# Patient Record
Sex: MALE | Race: WHITE | Employment: OTHER | URBAN - METROPOLITAN AREA
[De-identification: names, ages, dates, MRNs, and addresses within clinical notes are randomized per-mention and may not be internally consistent; named-entity substitution may affect disease eponyms.]

---

## 2018-10-18 ENCOUNTER — OFFICE VISIT (OUTPATIENT)
Dept: INTERNAL MEDICINE CLINIC | Age: 18
End: 2018-10-18
Payer: MEDICARE

## 2018-10-18 ENCOUNTER — TELEPHONE (OUTPATIENT)
Dept: INTERNAL MEDICINE CLINIC | Age: 18
End: 2018-10-18

## 2018-10-18 VITALS — HEART RATE: 88 BPM | DIASTOLIC BLOOD PRESSURE: 88 MMHG | SYSTOLIC BLOOD PRESSURE: 136 MMHG

## 2018-10-18 DIAGNOSIS — Z23 NEED FOR INFLUENZA VACCINATION: ICD-10-CM

## 2018-10-18 DIAGNOSIS — G80.1 CEREBRAL PALSY WITH SPASTIC DIPLEGIA (HCC): ICD-10-CM

## 2018-10-18 DIAGNOSIS — R32 URINARY INCONTINENCE, UNSPECIFIED TYPE: ICD-10-CM

## 2018-10-18 DIAGNOSIS — E55.9 VITAMIN D DEFICIENCY: ICD-10-CM

## 2018-10-18 DIAGNOSIS — Z76.89 ENCOUNTER TO ESTABLISH CARE: Primary | ICD-10-CM

## 2018-10-18 PROCEDURE — 99203 OFFICE O/P NEW LOW 30 MIN: CPT | Performed by: NURSE PRACTITIONER

## 2018-10-18 PROCEDURE — G0008 ADMIN INFLUENZA VIRUS VAC: HCPCS | Performed by: NURSE PRACTITIONER

## 2018-10-18 PROCEDURE — 90682 RIV4 VACC RECOMBINANT DNA IM: CPT | Performed by: NURSE PRACTITIONER

## 2018-10-18 ASSESSMENT — PATIENT HEALTH QUESTIONNAIRE - PHQ9
SUM OF ALL RESPONSES TO PHQ QUESTIONS 1-9: 0
1. LITTLE INTEREST OR PLEASURE IN DOING THINGS: 0
SUM OF ALL RESPONSES TO PHQ QUESTIONS 1-9: 0
SUM OF ALL RESPONSES TO PHQ9 QUESTIONS 1 & 2: 0
2. FEELING DOWN, DEPRESSED OR HOPELESS: 0

## 2018-10-21 PROBLEM — R32 URINARY INCONTINENCE: Status: ACTIVE | Noted: 2018-10-21

## 2018-10-21 PROBLEM — E55.9 VITAMIN D DEFICIENCY: Status: ACTIVE | Noted: 2018-10-21

## 2018-10-21 PROBLEM — G80.1 CEREBRAL PALSY WITH SPASTIC DIPLEGIA (HCC): Status: ACTIVE | Noted: 2018-10-21

## 2018-10-21 ASSESSMENT — ENCOUNTER SYMPTOMS
GASTROINTESTINAL NEGATIVE: 1
RESPIRATORY NEGATIVE: 1

## 2018-10-21 NOTE — PROGRESS NOTES
SUBJECTIVE:    Patient ID: Jamila Calvert is a 19 y.o. male. CC: New patient appointment    HPI: The patient presents to the office to [address] a new primary care provider. Previous PCP was [facility]. He is also regular patient of [facility] for CP. Lifelong history of cerebral palsy. He is a patient of [facility]  He has chronic leg weakness, has been seen by physical therapy. He uses a wheelchair. He reports good arm strength. He has trouble getting to the restroom on time. This is particularly problematic at work and this is embarrassing for him. His mom inquires if condom catheters might be a reasonable choice. He is going to [employer] life. \"    According to his records, he also has a history of vitamin D deficiency. He is single. He does not smoke. He does not drink alcohol. He denies illicit drug use. He enjoys video games. Past Medical History:   Diagnosis Date    Cerebral palsy ([facility] [address]         No past surgical history on file. Family History   Problem Relation Age of Onset    High Blood Pressure Mother     Diabetes Mother    [de-identified] Arthritis Mother         RA    Substance Abuse Father        Social History     Social History    Marital status: Single     Spouse name: N/A    Number of children: N/A    Years of education: N/A     Occupational History    Not on file. Social History Main Topics    Smoking status: Never Smoker    Smokeless tobacco: Never Used    Alcohol use No    Drug use: No    Sexual activity: Not on file     Other Topics Concern    Not on file     Social History Narrative    No narrative on file       No current outpatient prescriptions on file prior to visit. No current facility-administered medications on file prior to visit. No Known Allergies        Review of Systems   Constitutional: Negative for chills and fever. Respiratory: Negative. Cardiovascular: Negative.     Gastrointestinal: Negative. Genitourinary: Negative. Urinary incontinence   Skin: Negative. Neurological: Positive for weakness. Psychiatric/Behavioral: Negative. All other systems reviewed and are negative. OBJECTIVE:  Physical Exam   Constitutional: He is oriented to person, place, and time. He appears well-developed and well-nourished. No distress. HENT:   Head: Normocephalic and atraumatic. Eyes: Conjunctivae are normal. No scleral icterus. Neck: Neck supple. No JVD present. Cardiovascular: Normal rate and regular rhythm. Pulmonary/Chest: Effort normal and breath sounds normal. No respiratory distress. He has no wheezes. He has no rales. Abdominal: Soft. He exhibits no distension. There is no tenderness. There is no rebound and no guarding. Musculoskeletal: Normal range of motion. He exhibits no edema. Sitting in a wheelchair. Moving his arms   Neurological: He is alert and oriented to person, place, and time. Skin: Skin is warm and dry. He is not diaphoretic. Psychiatric: He has a normal mood and affect. His behavior is normal. Thought content normal.      /88   Pulse 88        PHQ Scores 10/18/2018   PHQ2 Score 0   PHQ9 Score 0     Interpretation of Total Score DepressionSeverity: 1-4 = Minimal depression, 5-9 = Mild depression, 10-14 = Moderate depression, 15-19 = Moderately severe depression, 20-27 = Severe depression         ASSESSMENT/PLAN:  Kishore Gongora was seen today for established new doctor. Diagnoses and all orders for this visit:    Encounter to establish care  - H&P reviewed and scanned into EMR  - Oriented to provider in practice  [facility] records reviewed via EMR  - We will attempt to obtain pediatric records    Cerebral palsy with spastic diplegia ([facility] [address]  - Chronic CP. He has bilateral leg weakness. Normal arm strength. He uses a wheelchair.  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 Urinary incontinence is problematic.   He has trouble

## 2020-02-25 ENCOUNTER — OFFICE VISIT (OUTPATIENT)
Dept: INTERNAL MEDICINE CLINIC | Age: 20
End: 2020-02-25
Payer: MEDICARE

## 2020-02-25 VITALS — HEART RATE: 64 BPM | DIASTOLIC BLOOD PRESSURE: 60 MMHG | SYSTOLIC BLOOD PRESSURE: 100 MMHG

## 2020-02-25 PROCEDURE — G8484 FLU IMMUNIZE NO ADMIN: HCPCS | Performed by: INTERNAL MEDICINE

## 2020-02-25 PROCEDURE — 99213 OFFICE O/P EST LOW 20 MIN: CPT | Performed by: INTERNAL MEDICINE

## 2020-02-25 PROCEDURE — G8421 BMI NOT CALCULATED: HCPCS | Performed by: INTERNAL MEDICINE

## 2020-02-25 PROCEDURE — 20550 NJX 1 TENDON SHEATH/LIGAMENT: CPT | Performed by: INTERNAL MEDICINE

## 2020-02-25 PROCEDURE — 1036F TOBACCO NON-USER: CPT | Performed by: INTERNAL MEDICINE

## 2020-02-25 PROCEDURE — G8427 DOCREV CUR MEDS BY ELIG CLIN: HCPCS | Performed by: INTERNAL MEDICINE

## 2020-02-25 RX ORDER — TRIAMTERENE AND HYDROCHLOROTHIAZIDE 37.5; 25 MG/1; MG/1
1 TABLET ORAL DAILY
Qty: 30 TABLET | Refills: 3 | Status: SHIPPED | OUTPATIENT
Start: 2020-02-25 | End: 2021-02-05 | Stop reason: ALTCHOICE

## 2020-02-26 NOTE — PROGRESS NOTES
Chief complaint foot pain    Present illness    The patient is here for an acute appointment    He chronically wears leg braces, he is developed acute pain over the left calcaneus. Its onset was several months ago, is becoming worse and he is having a difficult time wearing his braces. He is accompanied by his mother. In addition he complains of swelling in his feet bilaterally which is a long-term problem. Physical examination  Bilateral pitting edema of the feet  Lungs clear  Cardiac exam normal  No thyromegaly  Does have a point tenderness over the calcaneus    Impression  Edema of the feet secondary to inactivity  Plantar fasciitis    Plan  Treatment strategies discussed with the patient and his mother, after discussion of risk and benefit of injection, the severity of his problem the fact that he cannot wear his braces it was elected to treat him with injection. 30 mg of triamcinolone and 1 cc of 2% lidocaine was injected into the calcaneal area    He was given a therapeutic trial of hydrochlorothiazide for his chronic peripheral edema.

## 2021-02-03 ENCOUNTER — TELEPHONE (OUTPATIENT)
Dept: INTERNAL MEDICINE CLINIC | Age: 21
End: 2021-02-03

## 2021-02-03 NOTE — TELEPHONE ENCOUNTER
Pt's mother calling and states pt woke up this am with weakness on L side of face. Inability to smile on that side and eye not blinking. Pt's mother not able to provide or obtain good testing for weakness. Advised that he needs to be taken directly to the ER and I would send a message to PCP.

## 2021-02-05 ENCOUNTER — OFFICE VISIT (OUTPATIENT)
Dept: INTERNAL MEDICINE CLINIC | Age: 21
End: 2021-02-05
Payer: MEDICARE

## 2021-02-05 VITALS
SYSTOLIC BLOOD PRESSURE: 150 MMHG | TEMPERATURE: 97.9 F | DIASTOLIC BLOOD PRESSURE: 76 MMHG | OXYGEN SATURATION: 98 % | HEART RATE: 100 BPM

## 2021-02-05 DIAGNOSIS — I10 ESSENTIAL HYPERTENSION: ICD-10-CM

## 2021-02-05 DIAGNOSIS — G51.0 BELL'S PALSY: Primary | ICD-10-CM

## 2021-02-05 PROCEDURE — G8421 BMI NOT CALCULATED: HCPCS | Performed by: NURSE PRACTITIONER

## 2021-02-05 PROCEDURE — G8484 FLU IMMUNIZE NO ADMIN: HCPCS | Performed by: NURSE PRACTITIONER

## 2021-02-05 PROCEDURE — 99213 OFFICE O/P EST LOW 20 MIN: CPT | Performed by: NURSE PRACTITIONER

## 2021-02-05 PROCEDURE — G8427 DOCREV CUR MEDS BY ELIG CLIN: HCPCS | Performed by: NURSE PRACTITIONER

## 2021-02-05 PROCEDURE — 1036F TOBACCO NON-USER: CPT | Performed by: NURSE PRACTITIONER

## 2021-02-05 RX ORDER — LISINOPRIL 10 MG/1
10 TABLET ORAL DAILY
COMMUNITY
Start: 2020-12-20 | End: 2021-02-19 | Stop reason: SDUPTHER

## 2021-02-05 RX ORDER — HYDROCHLOROTHIAZIDE 25 MG/1
25 TABLET ORAL DAILY
Qty: 30 TABLET | Refills: 3 | Status: SHIPPED | OUTPATIENT
Start: 2021-02-05 | End: 2021-02-08

## 2021-02-05 RX ORDER — PREDNISONE 10 MG/1
TABLET ORAL
COMMUNITY
Start: 2021-02-03 | End: 2021-02-15

## 2021-02-05 RX ORDER — ACYCLOVIR 800 MG/1
800 TABLET ORAL
COMMUNITY
Start: 2021-02-03 | End: 2021-02-19 | Stop reason: ALTCHOICE

## 2021-02-09 RX ORDER — HYDROCHLOROTHIAZIDE 25 MG/1
25 TABLET ORAL DAILY
Qty: 90 TABLET | Refills: 3 | Status: SHIPPED | OUTPATIENT
Start: 2021-02-09 | End: 2021-06-17

## 2021-02-11 ASSESSMENT — ENCOUNTER SYMPTOMS
TROUBLE SWALLOWING: 0
GASTROINTESTINAL NEGATIVE: 1
RESPIRATORY NEGATIVE: 1

## 2021-02-11 NOTE — PROGRESS NOTES
SUBJECTIVE:    Patient ID: Royce David is a 23 y.o. male. CC: Bell's palsy    HPI: The patient presents to the office today accompanied by his mother for emergency department follow-up. Much of history comes from mother due to the patient's underlying cerebral palsy and intellectual debility. Mother reports patient was in his normal state of health on 2/3/2021 when he awakened with left-sided facial droop and inability to close his left eye. Patient contacted the call center who recommended he go to the emergency department for emergent evaluation. When I saw the message, I advised that this is likely Bell's palsy but as the patient was already at the emergency department his evaluation had already commenced. He was seen at [facility] emergency department where a CT of the head showed no evidence of acute intracranial hemorrhage, mass, or ischemic infarct. He was diagnosed with left-sided Bell's palsy and given prescriptions for antiviral, eye lubricant, and prednisone. Today, mother reports symptoms are still present but have improved some. They report compliance with medication regimen. Past Medical History:   Diagnosis Date    Borderline intellectual functioning     Cerebral palsy (HCC)     Obesity     Vitamin D deficiency     Wheelchair bound         Current Outpatient Medications   Medication Sig Dispense Refill    lisinopril (PRINIVIL;ZESTRIL) 10 MG tablet Take 10 mg by mouth daily      acyclovir (ZOVIRAX) 800 MG tablet Take 800 mg by mouth 5 times daily      predniSONE (DELTASONE) 10 MG tablet Take by mouth      hydroCHLOROthiazide (HYDRODIURIL) 25 MG tablet TAKE 1 TABLET BY MOUTH DAILY 90 tablet 3    Cholecalciferol (VITAMIN D3) 1000 units CAPS   5    Catheters (GIZMO CONDOM CATHETER) MISC 100 each by Does not apply route as needed (Urinary incontinence) 100 each 5     No current facility-administered medications for this visit.             Review of Systems Constitutional: Negative. HENT: Negative for trouble swallowing. Respiratory: Negative. Cardiovascular: Negative. Gastrointestinal: Negative. Genitourinary: Negative. Musculoskeletal: Negative. Neurological: Positive for facial asymmetry. Negative for numbness and headaches. All other systems reviewed and are negative. OBJECTIVE:  Physical Exam  Constitutional:       General: He is not in acute distress. Appearance: He is well-developed. He is not diaphoretic. HENT:      Head: Normocephalic and atraumatic. Eyes:      General: No scleral icterus. Conjunctiva/sclera: Conjunctivae normal.   Neck:      Musculoskeletal: Neck supple. Vascular: No JVD. Cardiovascular:      Rate and Rhythm: Normal rate and regular rhythm. Pulmonary:      Effort: Pulmonary effort is normal. No respiratory distress. Breath sounds: Normal breath sounds. No wheezing or rales. Abdominal:      General: There is no distension. Palpations: Abdomen is soft. Tenderness: There is no abdominal tenderness. There is no guarding or rebound. Musculoskeletal: Normal range of motion. Comments: Sitting in a wheelchair. Moving his arms   Skin:     General: Skin is warm and dry. Neurological:      Mental Status: He is alert. Cranial Nerves: Facial asymmetry present. Comments: Facial asymmetry with left-sided facial droop consistent with his diagnosis of Bell's palsy   Psychiatric:         Behavior: Behavior normal.         Thought Content:  Thought content normal.        BP (!) 150/76 (Site: Right Upper Arm, Cuff Size: Large Adult)   Pulse 100   Temp 97.9 °F (36.6 °C)   SpO2 98%      PHQ Scores 10/18/2018   PHQ2 Score 0   PHQ9 Score 0     Interpretation of Total Score Depression Severity: 1-4 = Minimal depression, 5-9 = Mild depression, 10-14 = Moderate depression, 15-19 = Moderately severe depression, 20-27 =Severe depression        ASSESSMENT/PLAN:  Reece Iyer was seen today for follow-up from hospital and hypertension. Diagnoses and all orders for this visit:    Bell's palsy  -Symptoms consistent with Bell's palsy. CT of the head was negative for acute finding.  -He is on appropriate treatment for Bell's palsy per ER  -Recommended he continue current regimen. We discussed the risks of eyelid not closing and safety measures to protect the eye. Essential hypertension  -     hydroCHLOROthiazide (HYDRODIURIL) 25 MG tablet;  Take 1 tablet by mouth daily      Kacy Bush, LEVON - CNP

## 2021-02-19 ENCOUNTER — OFFICE VISIT (OUTPATIENT)
Dept: INTERNAL MEDICINE CLINIC | Age: 21
End: 2021-02-19
Payer: MEDICARE

## 2021-02-19 VITALS
TEMPERATURE: 98.4 F | SYSTOLIC BLOOD PRESSURE: 138 MMHG | HEART RATE: 100 BPM | DIASTOLIC BLOOD PRESSURE: 70 MMHG | WEIGHT: 230 LBS | OXYGEN SATURATION: 99 %

## 2021-02-19 DIAGNOSIS — R73.9 HYPERGLYCEMIA: ICD-10-CM

## 2021-02-19 DIAGNOSIS — Z11.59 NEED FOR HEPATITIS C SCREENING TEST: ICD-10-CM

## 2021-02-19 DIAGNOSIS — E55.9 VITAMIN D DEFICIENCY: ICD-10-CM

## 2021-02-19 DIAGNOSIS — G51.0 BELL'S PALSY: ICD-10-CM

## 2021-02-19 DIAGNOSIS — Z11.4 SCREENING FOR HIV (HUMAN IMMUNODEFICIENCY VIRUS): ICD-10-CM

## 2021-02-19 DIAGNOSIS — G80.1 CEREBRAL PALSY WITH SPASTIC DIPLEGIA (HCC): ICD-10-CM

## 2021-02-19 DIAGNOSIS — I10 ESSENTIAL HYPERTENSION: ICD-10-CM

## 2021-02-19 DIAGNOSIS — I10 ESSENTIAL HYPERTENSION: Primary | ICD-10-CM

## 2021-02-19 DIAGNOSIS — Z23 NEED FOR TDAP VACCINATION: ICD-10-CM

## 2021-02-19 LAB
A/G RATIO: 1.5 (ref 1.1–2.2)
ALBUMIN SERPL-MCNC: 4.4 G/DL (ref 3.4–5)
ALP BLD-CCNC: 100 U/L (ref 40–129)
ALT SERPL-CCNC: 48 U/L (ref 10–40)
ANION GAP SERPL CALCULATED.3IONS-SCNC: 10 MMOL/L (ref 3–16)
AST SERPL-CCNC: 20 U/L (ref 15–37)
BILIRUB SERPL-MCNC: 0.3 MG/DL (ref 0–1)
BUN BLDV-MCNC: 15 MG/DL (ref 7–20)
CALCIUM SERPL-MCNC: 10.1 MG/DL (ref 8.3–10.6)
CHLORIDE BLD-SCNC: 100 MMOL/L (ref 99–110)
CHOLESTEROL, TOTAL: 188 MG/DL (ref 0–199)
CO2: 27 MMOL/L (ref 21–32)
CREAT SERPL-MCNC: 0.8 MG/DL (ref 0.9–1.3)
GFR AFRICAN AMERICAN: >60
GFR NON-AFRICAN AMERICAN: >60
GLOBULIN: 2.9 G/DL
GLUCOSE BLD-MCNC: 99 MG/DL (ref 70–99)
HDLC SERPL-MCNC: 39 MG/DL (ref 40–60)
HEPATITIS C ANTIBODY INTERPRETATION: NORMAL
LDL CHOLESTEROL CALCULATED: 100 MG/DL
POTASSIUM SERPL-SCNC: 3.9 MMOL/L (ref 3.5–5.1)
SODIUM BLD-SCNC: 137 MMOL/L (ref 136–145)
TOTAL PROTEIN: 7.3 G/DL (ref 6.4–8.2)
TRIGL SERPL-MCNC: 247 MG/DL (ref 0–150)
VITAMIN D 25-HYDROXY: 24.1 NG/ML
VLDLC SERPL CALC-MCNC: 49 MG/DL

## 2021-02-19 PROCEDURE — G8484 FLU IMMUNIZE NO ADMIN: HCPCS | Performed by: NURSE PRACTITIONER

## 2021-02-19 PROCEDURE — 90715 TDAP VACCINE 7 YRS/> IM: CPT | Performed by: NURSE PRACTITIONER

## 2021-02-19 PROCEDURE — 90471 IMMUNIZATION ADMIN: CPT | Performed by: NURSE PRACTITIONER

## 2021-02-19 PROCEDURE — 1036F TOBACCO NON-USER: CPT | Performed by: NURSE PRACTITIONER

## 2021-02-19 PROCEDURE — 99214 OFFICE O/P EST MOD 30 MIN: CPT | Performed by: NURSE PRACTITIONER

## 2021-02-19 PROCEDURE — G8421 BMI NOT CALCULATED: HCPCS | Performed by: NURSE PRACTITIONER

## 2021-02-19 PROCEDURE — G8427 DOCREV CUR MEDS BY ELIG CLIN: HCPCS | Performed by: NURSE PRACTITIONER

## 2021-02-19 RX ORDER — LISINOPRIL 10 MG/1
10 TABLET ORAL DAILY
Qty: 90 TABLET | Refills: 3 | Status: SHIPPED | OUTPATIENT
Start: 2021-02-19 | End: 2022-02-25

## 2021-02-19 ASSESSMENT — PATIENT HEALTH QUESTIONNAIRE - PHQ9
1. LITTLE INTEREST OR PLEASURE IN DOING THINGS: 0
SUM OF ALL RESPONSES TO PHQ QUESTIONS 1-9: 0
SUM OF ALL RESPONSES TO PHQ9 QUESTIONS 1 & 2: 0
SUM OF ALL RESPONSES TO PHQ QUESTIONS 1-9: 0
SUM OF ALL RESPONSES TO PHQ QUESTIONS 1-9: 0

## 2021-02-20 LAB
ESTIMATED AVERAGE GLUCOSE: 91.1 MG/DL
HBA1C MFR BLD: 4.8 %
HIV AG/AB: NORMAL
HIV ANTIGEN: NORMAL
HIV-1 ANTIBODY: NORMAL
HIV-2 AB: NORMAL

## 2021-02-22 ENCOUNTER — TELEPHONE (OUTPATIENT)
Dept: INTERNAL MEDICINE CLINIC | Age: 21
End: 2021-02-22

## 2021-02-23 ASSESSMENT — ENCOUNTER SYMPTOMS
RESPIRATORY NEGATIVE: 1
TROUBLE SWALLOWING: 0
EYE PAIN: 0
GASTROINTESTINAL NEGATIVE: 1

## 2021-02-23 NOTE — PROGRESS NOTES
SUBJECTIVE:    Patient ID: Radha Walker is a 23 y.o. male. CC: Bell's palsy, hypertension, cerebral palsy, vitamin D deficiency    HPI: The patient presents to the office today for follow-up of Bell's palsy and follow-up of chronic medical conditions as well as to establish with a new primary care provider after his retired. He is seen today accompanied by his grandmother. Patient was recently diagnosed with Bell's palsy. He reports this has been improving. His eye is closing more and smile is returning to normal.  There have been no identified complications. He has a history of cerebral palsy and is wheelchair dependent. Grandmother is frustrated by his lack of effort to ambulate or take care of himself. Patient is agreeable to physical therapy referral.    He has a history of hypertension. He denies any chest pain or shortness of breath. He has a history of vitamin D deficiency. He has not been compliant with repletion. Past Medical History:   Diagnosis Date    Borderline intellectual functioning     Cerebral palsy (HCC)     Obesity     Vitamin D deficiency     Wheelchair bound         Current Outpatient Medications   Medication Sig Dispense Refill    Cholecalciferol (VITAMIN D3) 25 MCG (1000 UT) CAPS Take 1,000 capsules by mouth daily 90 capsule 3    lisinopril (PRINIVIL;ZESTRIL) 10 MG tablet Take 1 tablet by mouth daily 90 tablet 3    hydroCHLOROthiazide (HYDRODIURIL) 25 MG tablet TAKE 1 TABLET BY MOUTH DAILY 90 tablet 3    Catheters (GIZMO CONDOM CATHETER) MISC 100 each by Does not apply route as needed (Urinary incontinence) 100 each 5     No current facility-administered medications for this visit. Review of Systems   Constitutional: Negative. HENT: Negative for trouble swallowing. Eyes: Negative for pain. Respiratory: Negative. Cardiovascular: Negative. Gastrointestinal: Negative. Genitourinary: Negative.     Musculoskeletal: Positive for gait problem. Neurological: Positive for facial asymmetry and weakness. Negative for numbness and headaches. All other systems reviewed and are negative. OBJECTIVE:  Physical Exam  Constitutional:       General: He is not in acute distress. Appearance: He is well-developed. He is obese. He is not diaphoretic. Comments: Young gentleman with cerebral palsy sitting in a wheelchair. HENT:      Head: Normocephalic and atraumatic. Eyes:      General: No scleral icterus. Conjunctiva/sclera: Conjunctivae normal.   Neck:      Musculoskeletal: Neck supple. Vascular: No JVD. Cardiovascular:      Rate and Rhythm: Normal rate and regular rhythm. Pulmonary:      Effort: Pulmonary effort is normal. No respiratory distress. Breath sounds: Normal breath sounds. No wheezing or rales. Abdominal:      General: There is no distension. Palpations: Abdomen is soft. Tenderness: There is no abdominal tenderness. There is no guarding or rebound. Musculoskeletal: Normal range of motion. Comments: Sitting in a wheelchair. Moving his arms   Skin:     General: Skin is warm and dry. Neurological:      Mental Status: He is alert. Cranial Nerves: Facial asymmetry present. Comments: Facial asymmetry with left-sided facial droop consistent with his diagnosis of Bell's palsy   Psychiatric:         Behavior: Behavior normal.         Thought Content: Thought content normal.        /70   Pulse 100   Temp 98.4 °F (36.9 °C)   Wt 230 lb (104.3 kg) Comment: pt reported  SpO2 99%      PHQ Scores 2/19/2021 10/18/2018   PHQ2 Score 0 0   PHQ9 Score 0 0     Interpretation of Total Score Depression Severity: 1-4 = Minimal depression, 5-9 = Mild depression, 10-14 = Moderate depression, 15-19 = Moderately severe depression, 20-27 =Severe depression        ASSESSMENT/PLAN:  Isha Olson was seen today for other.   Diagnoses and all orders for this visit:    Essential hypertension  -

## 2021-03-01 ENCOUNTER — HOSPITAL ENCOUNTER (OUTPATIENT)
Dept: OCCUPATIONAL THERAPY | Age: 21
Setting detail: THERAPIES SERIES
Discharge: HOME | End: 2021-03-01
Payer: MEDICARE

## 2021-03-01 ENCOUNTER — HOSPITAL ENCOUNTER (OUTPATIENT)
Dept: PHYSICAL THERAPY | Age: 21
Setting detail: THERAPIES SERIES
Discharge: HOME | End: 2021-03-01
Payer: MEDICARE

## 2021-03-01 PROCEDURE — 97535 SELF CARE MNGMENT TRAINING: CPT

## 2021-03-01 PROCEDURE — 97112 NEUROMUSCULAR REEDUCATION: CPT

## 2021-03-01 PROCEDURE — 97166 OT EVAL MOD COMPLEX 45 MIN: CPT

## 2021-03-01 PROCEDURE — 97530 THERAPEUTIC ACTIVITIES: CPT

## 2021-03-01 PROCEDURE — 97110 THERAPEUTIC EXERCISES: CPT

## 2021-03-01 PROCEDURE — 97161 PT EVAL LOW COMPLEX 20 MIN: CPT

## 2021-03-01 NOTE — FLOWSHEET NOTE
[facility] - Outpatient Physical Therapy  Phone: [phone]   Fax: [phone]    Physical Therapy Daily Treatment Note  Date:  3/2/2021    Patient Name:  Deondre Melendez    :  2000  MRN: [id number]  Medical/Treatment Diagnosis Information:  Diagnosis: Cerebral palsy with spastic diplegia  Treatment Diagnosis: Decreased trunk control impacting ability to complete safe transfers, decreased standing tolerance, LE weakness  Insurance/Certification information:  PT Insurance Information: Medicare & Medicaid  Physician Information:  Referring Practitioner: JOHNNY Zapata  Plan of care signed (Y/N): []  Yes []  No     Date of Patient follow up with Physician:      Progress Report: []  Yes  []  No     Date Range for reporting period:  Beginning  3/1/2021   Ending      Progress report due (10 Rx/or 30 days whichever is less): visit #10 or 06     Recertification due (POC duration/ or 90 days whichever is less): visit #12 or 21     Visit # Insurance Allowable Auth required?  Date Range    Medical necessity []  Yes  [x]  No n/a     Latex Allergy:  [x]NO      []YES  Preferred Language for Healthcare:   [x]English       []Other:      Functional Scale/Test Evaluation 3/1/2021 30 day  60 day  Discharge   STREAM 23                          Pain level:  0/10  Location:  none    SUBJECTIVE:  See eval    OBJECTIVE: See eval      RESTRICTIONS/PRECAUTIONS: recent Bell's Palsy    Interventions/Exercises:   Therapeutic Exercises (98371) Resistance / level Sets/sec Reps Notes                               Neuromuscular Re-ed (67100) /  Gait Training (73167)                                                               Therapeutic Activities (26690) /  Functional Tasks       Cone reaching across midline in seated  2 X 5 B Min-Mod A to maintain trunk control           STS to std walker    Min A x 2, PT and OT blocking knees and L foot          Transfer to w/c from mat table Mod A for set up, CGA for completion with poor form, decreased safety                               Manual Intervention (10150)                                                     Modalities:     Pt. Education:  -patient educated on diagnosis, prognosis and expectations for rehab  -all patient questions were answered    HEP instruction:      NMR and Therapeutic Activities:    [x] (99160 or 43406) Provided verbal/tactile cueing for activities related to improving balance, coordination, kinesthetic sense, posture, motor skill, proprioception and motor activation to allow for proper function of   [x] LE / Core, hip and LE with self care and ADLs  [x] UE / Shoulder complex: cervical, postural, scapular, scapulothoracic and UE control with self care, carrying, lifting, driving, computer work.   [] (77482) Gait Re-education- Provided training and instruction to the patient for proper LE, core and hip recruitment, positioning, and eccentric body weight control with ambulation re-education, including ascending & descending stairs     Home Management Training / Self Care:  [] (44495) Provided self-care/home management training related to activities of daily living and compensatory training, and/or use of adaptive equipment for improvement with: ADLs and compensatory training, meal preparation, safety procedures and instruction in use of adaptive equipment, including bathing, grooming, dressing, personal hygiene, basic household cleaning and chores. Therapeutic Exercise and NMR EXR  [x] (06327) Provided verbal/tactile cueing for activities related to strengthening, flexibility, endurance, ROM for improvements in  [x] LE / Core stability: LE, hip, and core control with self care, mobility, lifting, ambulation.   [] UE / Shoulder complex: cervical, postural, scapular, scapulothoracic and UE control with self care, reaching, carrying, lifting, house/yardwork, driving, computer work.  [] (37213) Provided verbal/tactile cueing for activities related to improving balance, coordination, kinesthetic sense, posture, motor skill, proprioception to assist with   [] LE / Core stability: LE, hip, and core control in self care, mobility, lifting, ambulation and eccentric single leg control. [] UE / Shoulder complex: cervical, scapular, scapulothoracic and UE control with self care, reaching, carrying, lifting, house/yardwork, driving, computer work.   [] (66874) Therapist is in constant attendance of 2 or more patients providing skilled therapy interventions, but not providing any significant amount of measurable one-on-one time to either patient, for improvements in  [] LE / Core stability: LE, hip, and core control in self care, mobility, lifting, ambulation and eccentric single leg control. [] UE / Shoulder complex: cervical, scapular, scapulothoracic and UE control with self care, reaching, carrying, lifting, house/yardwork, driving, computer work.      Home Exercise Program:    [x] (88641) Reviewed/Progressed HEP activities related to strengthening, flexibility, endurance, ROM of   [] LE / Core stability: core, hip and LE for functional self-care, mobility, lifting and ambulation/stair navigation   [] UE / Shoulder complex: cervical, postural, scapular, scapulothoracic and UE control with self care, reaching, carrying, lifting, house/yardwork, driving, computer work  [] (97046)Reviewed/Progressed HEP activities related to improving balance, coordination, kinesthetic sense, posture, motor skill, proprioception of   [] LE: core, hip and LE for self care, mobility, lifting, and ambulation/stair navigation    [] UE / Shoulder complex: cervical, postural,  scapular, scapulothoracic and UE control with self care, reaching, carrying, lifting, house/yardwork, driving, computer work    Manual Treatments:  PROM / STM / Oscillations-Mobs:  G-I, II, III, IV (PA's, Inf., Post.)  [] (30566) Provided manual therapy to mobilize shoulder complex, hip, LE, and/or cervicothoracic/LS spine soft tissue/joints for the purpose of modulating pain, promoting relaxation,  increasing ROM, reducing/eliminating soft tissue swelling/inflammation/restriction, improving soft tissue extensibility and allowing for proper ROM for normal function with   [] LE / Core stability: self care, mobility, lifting and ambulation. [] UE / Shoulder complex: self care, reaching, carrying, lifting, house/yardwork, driving, computer work. Modalities:  [] (15100) Vasopneumatic compression: Utilized vasopneumatic compression to decrease edema / swelling for the purpose of improving mobility and quad tone / recruitment which will allow for increased overall function including but not limited to self-care, transfers, ambulation, and ascending / descending stairs. Charges:  Timed Code Treatment Minutes: 30   Total Treatment Minutes: 50     [x] EVAL - LOW (26268)   [] EVAL - MOD (77718)  [] EVAL - HIGH (51562)  [] RE-EVAL (71695)  [x] TE (58186) x   1   [] Ionto  [] NMR (57035) x      [] Vaso  [] Manual (08943) x      [] Ultrasound  [x] TA x  1     [] Mech Traction (48748)  [] Gait Training x     [] ES (un) (72632):   [] Aquatic therapy x   [] Other:   [] Group:     GOALS:   Patient stated goal: improve ability to complete transfers   []? Progressing: []? Met: []? Not Met: []? Adjusted     Therapist goals for Patient:   Short Term Goals: To be achieved in: 2 weeks  1. Independent in HEP and progression per patient tolerance, in order to prevent re-injury. []? Progressing: []? Met: []? Not Met: []? Adjusted  2. Patient will have a decrease in pain to facilitate improvement in movement, function, and ADLs as indicated by Functional Deficits. []? Progressing: []? Met: []? Not Met: []? Adjusted     Long Term Goals: To be achieved in: 6 weeks  1. Patient will report increased standing tolerance to at least 30 minutes in standing frame. []? Progressing: []? Met: []? Not Met: []? Adjusted  2. Patient will demonstrate an increase in strength to good shoulder & hip complex, and core activation to allow for proper functional mobility as indicated by patients Functional Deficits. []? Progressing: []? Met: []? Not Met: []? Adjusted  3. Patient will return to functional activities including demo'ing safe transfers to/from w/c with mod I.    []? Progressing: []? Met: []? Not Met: []? Adjusted  4. Patient will increase STREAM score to 32 or above for increased UE/LE movement in sitting, supine, and standing. []? Progressing: []? Met: []? Not Met: []? Adjusted          Overall Progression Towards Functional goals/ Treatment Progress Update:  [] Patient is progressing as expected towards functional goals listed. [] Progression is slowed due to complexities/Impairments listed. [] Progression has been slowed due to co-morbidities. [x] Plan just implemented, too soon to assess goals progression <30days   [] Goals require adjustment due to lack of progress  [] Patient is not progressing as expected and requires additional follow up with physician  [] Other    Persisting Functional Limitations/Impairments:  []Sleeping [x]Sitting               []Standing [x]Transfers        []Walking []Kneeling               []Stairs []Squatting / bending   [x]ADLs []Reaching  []Lifting  []Housework  []Driving []Job related tasks  []Sports/Recreation []Other:        ASSESSMENT:  See eval    Treatment/Activity Tolerance:  [x] Patient able to complete tx [] Patient limited by fatigue  [] Patient limited by pain  [] Patient limited by other medical complications  [] Other:     Prognosis: [] Good [] Fair  [] Poor    Patient Requires Follow-up: [x] Yes  [] No    Plan for next treatment session: transfers, seated core strength, will plan to co-treat with OT when possible    PLAN: See eval. PT 2x / week for 6 weeks.    [] Continue per plan of care [] Alter current plan (see comments)  [x] Plan of care initiated [] Hold pending MD visit [] Discharge    Electronically signed by: Dawood Lebron PT, DPT    Note: If patient does not return for scheduled/ recommended follow up visits, his note will serve as a discharge from care along with most recent update on progress.

## 2021-03-01 NOTE — PROGRESS NOTES
[address] 69, [address] [facility] [address]  Phone: [phone]   Fax: [phone]                                                     Occupational Therapy Certification    Dear Truett Hammans  ,    We had the pleasure of evaluating the following patient for occupational therapy services at [facility]. A summary of our findings can be found in the initial assessment below. This includes our plan of care. If you have any questions or concerns regarding these findings, please do not hesitate to contact me at the office phone number checked above. Thank you for the referral.       Referring Practitioner Signature:_______________________________Date:__________________  By signing above (or electronic signature), therapists plan is approved by the referring practicioner      Patient: Arturo Horton   : 2000   MRN: [id number]  Referring Practicioner:       Evaluation Date: 3/1/2021      Medical Diagnosis Information:     Cerebral palsey , status post rhizotomy as a child, paraplegia                                              Insurance information:   medicare/ medicaide    Precautions/ Contra-indications:    Latex Allergy:  []NO      []YES  Preferred Language for Healthcare:   []English       []Other:    C-SSRS Triggered by Intake questionnaire (Past 2 wk assessment ):   [x] No, Questionnaire did not trigger screening.   [] Yes, Patient intake triggered C-SSRS Screening     [] Completed, no further action required. [] Completed, PCP notified via [place]  Reason for Seeking OT Services and Patient Goals:  Improve strength, rom, mobility, has a stander at home, lives with grandmother,  Patient would like to be able to get in and out of Pamela He,   Personal Interests and Values:  Loves to sing,   Loves to bowl, go out to eat.    Patient does not have accessible Pamela He can only go out if a man comes to help  his mother  get him in and out of [place]. 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 He has not participated in any community based activity since [facility] because his mother is high risk. Performance Patterns (Routines, Roles, Rituals, & Habits): 70year old mother with COPD has to assist patient with all self care, bathing, dressing and toileting , has accessible bathroom with accessible shower, shower seat, hand held shower hose. Physical and Social Environments (which aide or inhibit performance in desired occupations): Ramped entrance, shower chair, grab bars, wheelchair. Patient is wheelchair bound. He uses a wheelchair for all of his mobility. He has taken one arm rest off because the chair is too narrow for him. He has removed the comfort cushion because he feels like it interferes with his transfers. Patient does have a stander that he has not used in the past 6 months due to pain in lower extremity, he was trying to build up time to 90 minutes but has not been able to progress to this duration due to fatigue and leg pain. Personal, Temporal, and Virtual Contexts (which aide or inhibit performance in desired occupations): Patient would like to be able to get in and out of van to be able to get out into community. Patient is social and self motivated.         SUBJECTIVE: Patient stated complaint: decreased independence with standing, decreased safety with transfers, decreased trunk control / decreased sitting balance, decreased independence with all ADL and IADL    Pain Scale: 0/10  Easing factors:   Provocative factors:      Type: []Constant   []Intermittent  []Radiating []Localized []other:       OBJECTIVE:   Hand dominance: ambidextorous Marky's Nodes (PIP)       Mallet Finger       Grind Test Poonam Aviles                      Hand Assessment Left  Right  Comments    9 Hole Peg Test (s)       Strength (lbs)      Lateral Pinch Strength (lbs)      Palmar Pinch Strength (lbs)      Tip Pinch Strength (lbs)      Opposition (Y/N)      Functional Outcome Measures:        STREAM score 23                                  Functional Mobility/Transfers: min assist functional transfers, patient throws himself from one surface to the other , he is not able to maintain upright trunk with scoot transfers , he initiates all transfers with upper trunk throwing himself upper trunk sideways and pulls himself out of the chair from chair to bed and bed to chair     Posture:  Patient has difficulty stabilizing at trunk to complete scoot transfers, lateral reaches    Synergy/Muscle tone:  Grossly proximal hypotonicity    Sensation: : decreased proprioception    Coordination:  Impaired for fasteners, clothing management during toileting    Visual Acuity: wnl    Perception: not tested    MVPT:      Trail making A:     Trail making B:     Visual field cut:    Cognition:  Impulsive with transfers                  [x] Patient history, allergies, meds reviewed. Medical chart reviewed. See intake form. Review Of Systems (ROS):  [x]Performed Review of systems (Integumentary, CardioPulmonary, Neurological) by intake and observation. Intake form has been scanned into medical record. Patient has been instructed to contact their primary care physician regarding ROS issues if not already being addressed at this time.       Co-morbidities/Complexities (which will affect course of rehabilitation):    []None        []Hx of COVID   Arthritic conditions   []Rheumatoid arthritis (M05.9)  []Osteoarthritis (M19.91)  []Gout   Cardiovascular conditions   []Hypertension (I10)  []Hyperlipidemia (E78.5)  []Angina pectoris (I20)  []Atherosclerosis (I70)  []Pacemaker  []Hx of CABG/stent/  cardiac surgeries   Musculoskeletal conditions   []Disc pathology   []Congenital spine pathologies   []Osteoporosis (M81.8)  []Osteopenia (M85.8)  []Scoliosis       Endocrine conditions   []Hypothyroid (E03.9)  []Hyperthyroid Gastrointestinal conditions   []Constipation (L54.96)   Metabolic conditions   [x]Morbid obesity (E66.01)  []Diabetes type 1(E10.65) or 2 (E11.65)   []Neuropathy (G60.9)     Cardio/Pulmonary conditions   []Asthma (J45)  []Coughing   []COPD (J44.9)  []CHF  []A-fib  HTN Psychological Disorders  []Anxiety (F41.9)  []Depression (F32.9)   []Other:   Developmental Disorders  []Autism (F84.0)  [x]CP (G80)  []Down Syndrome (Q90.9)  []Developmental delay     Neurological conditions  []Prior Stroke (I69.30)  []Parkinson's (G20)  []Encephalopathy (G93.40)  []MS (G35)  []Post-polio (G14)  []SCI  []TBI  []ALS Other conditions  []Fibromyalgia (M79.7)  []Vertigo  []Syncope  []Kidney Failure  []Cancer      []currently undergoing                treatment  []Pregnancy  []Incontinence   Prior surgeries  []involved limb  []previous spinal surgery  [] section birth  []hysterectomy  []bowel / bladder surgery  []other relevant surgeries   []Other:              Barriers to/and or personal factors that will affect rehab potential:              [x]Age  []Sex    []Smoker              [x]Motivation/Lack of Motivation                        []Co-Morbidities              [x]Cognitive Function, education/learning barriers              [x]Environmental, home barriers  Mother's vehicle is not accessible              []profession/work barriers  []past PT/medical experience  []other:  Justification: Daniella Figueroa presents with cerebral palsy causing paraplegia. He is self motivated and has a steady decline in his functional status since adolescence. He will benefit from skilled outpatient rehabilitation to improve overall strength, safety and mobility and independent self care.       Falls Risk Assessment (30 days):    [] Falls risk assessed and no intervention required. [x] Falls risk assessed (via clinical reasoning) and patient requires intervention due to being higher risk for falls  [x] Falls education provided, including need for having stand by assist for self care and all transfers    ASSESSMENT: The pt has chief complaints of : Patient stated complaint: decreased independence with standing, decreased safety with transfers, decreased trunk control / decreased sitting balance, decreased independence with all ADL and IADL . These symptoms as well as client factor and performance skill deficits create barriers to the patient engaging in desired occupational pursuits. Therefore, the patient is in need of skilled occupational therapy services to mitigate functional deficits in order to allow the patient to return to baseline, prevent further decline, and/or compensate for decreased functional abilities.       Functional Impairments and Activity Limitations (from functional questionnaire, intake, and interview)    [x]Reduced participation in functional mobility   [x]Reduced cognition   [x]Reduced participation in high level IADLs   [x]Reduced participation ADLs   [x]Reduced endurance  [x]Reduced fine motor control   []Reduced ROM   [x]Reduced sensation   []Reduced participation ADLs   [x]Reduced coordination  [x]Reduced strength   [x]Reduced balance   [x]Reduced posture   [x]Reduced safety awareness   []Reduced functional vision      Participation Restrictions   none   Prognosis/Rehab Potential:      []Excellent   [x]Good    []Fair   []Poor    Tolerance of evaluation/treatment:    []Excellent   [x]Good    []Fair   []Poor    Occupational Therapy Evaluation Complexity Justification   [x] A Occupational Profile/Medical and Therapy History  [] no personal factors and/or comorbidities that impact the plan of care; brief history relating to presenting problem  []1-2 personal factors and/or comorbidities that impact the plan of care; additional review of physical, cognitive, or psychosocial performance  [x]3 personal factors and/or comorbidities that impact the plan of care; extensive review of physical cognitive, psychosocial performance  [x] Patient Assessment:  [] a total of 1-3 performance deficits relating to physical, cognitive, psychosocial limitations/restrictions  [x] a total of 3-5 performance deficits relating to physical, cognitive, psychosocial limitations/restrictions  [] a total of 5 or more performance deficits relating to physical, cognitive, psychosocial limitations/restrictions  [x] A clinical presentation with:  [] low complexity, limited amount of treatment options no assessment modification, no co-morbidities  [x] moderate analytical complexity, detailed assessments, minimal to moderate modification of assessments, may have co-morbidities  [] high analytic complexity, comprehensive assessments, multiple treatment options, significant modifications of assessment, co-morbidities affecting performance  [x] Clinical decision making of [] low , [x] moderate, [] high complexity using standardized patient assessment instrument and/or measurable assessment of functional outcome. [] EVAL (LOW) 75761 (typically 15 minutes face-to-face)  [x] EVAL (MOD) 14509 (typically 30 minutes face-to-face)  [] EVAL (HIGH) 81830 (typically 45 minutes face-to-face)  [] RE-EVAL 18512    PLAN:  2 days per week for6 Weeks:  INTERVENTIONS:  [x] Strength training, ROM, balance training, functional mobility training  [x] Neuromuscular re-education and positioning  [] Manual therapy including soft tissue mobilization and joint manipululations  [] Modalities as needed that may include: E-stim, Ultrasound, Fluidotherapy, Iontophoresis as indicated, Paraffin, Hot Packs, Cold Packs  [x] Patient/caregiver education on joint protection, postural re-education, activity modification, progression of HEP.     [x] Patient/caregiver education regarding home management and safety as well as ADL and IADL performance  [x] Cognitive re-orientation and training  [x] Manual therapy including soft tissue mobilization, manual lymph drainage, and joint manipululations  [x] Adaptive Equipment Education and Training  [] Splinting including custom or pre-fabricated    HEP instruction: Written and verbal HEP instructions provided and reviewed:    Patient encouraged to start working on increasing stand tolerance in stander by using stander 3 15 minute intervals through the day. While in the stander encouraged patient to start working on over head reach to increase trunk length in standing position. Patient and mother verbalized understanding. GOALS:  Patient stated goal: improved trunk control and standing tolerance to increase independence in self care. [] Progressing: [] Met: [] Not Met: [] Adjusted    Therapist goals for Patient:   Short Term Goals: To be achieved in: 30 days  1. Pt will be independent with clothing management on toilet or in wheelchair to complete toileting with use of grab bar  [] Progressing: [] Met: [] Not Met: [] Adjusted  2. Pt will be stand by assist completing scoot transfers on level surfaces  [] Progressing: [] Met: [] Not Met: [] Adjusted  3. Patient will be able to stand up to 30 seconds at grab bar for toile ting and lower body dress with min assist.      Long Term Goals: To be achieved by alton  1.  Pt will will demonstrate an improved stream score of 33  [] Progressing: [] Met: [] Not Met: [] Adjusted  Electronically signed by:  Kaia Diggs OT

## 2021-03-01 NOTE — PROGRESS NOTES
[facility] - Outpatient Occupational Therapy      Hand Therapy Daily Treatment Note  Date:  3/1/2021    Patient: Gladys Blanco   : 2000   MRN: [id number]  Referring Physician:  Sudha Guerrero CNP       Medical Diagnosis Information:paraplegia, cerebral palsey                                                 Insurance information: medicare/ medicaide    Date of Injury:   Date of Surgery:rhizotomy when he was about 13    Progress Report: []  Yes  [x]  No     Date Range for reporting period:  Beginning: 3/1/2021  Endin2021    Progress report due (10 Rx/or 30 days whichever is less): visit #10 or  (date)     Recertification due (POC duration/ or 90 days whichever is less): visit #10 or 2021 (date)     Visit # Insurance Allowable Auth required? Date Range    []  Yes  [x]  No na       Latex Allergy:  []No      []Yes  Pacemaker:  [] No       [] Yes     Preferred Language for Healthcare:   [x]English       []other:    Pain level: 0/10     SUBJECTIVE:  See eval    Functional Disability Index:  STREAM score 23    OBJECTIVE: See eval      RESTRICTIONS/PRECAUTIONS:     Exercises/Interventions: functional assessment complete     Therapeutic Exercises  Resistance / level Sets/sec Reps Notes                                                                                Neuromuscular Re-ed / Therapeutic Activities                                                 Manual Intervention                                                     Modalities:     Patient education:  OT evaluation, plan of care, importance of weight bearing in stander for over health, home exercise program, goals. Home Exercise Program:   Patient encouraged to start using stander 3 sessions a day and while in stander completing over head reaches . Patient to try and reach 15 minute intervals in stander.       Therapeutic Exercise and NMR:  [x] (30015) Provided verbal/tactile cueing for activities related to strengthening, flexibility, endurance, ROM  for improvements in scapular, scapulothoracic and UE control with self care, reaching, carrying, lifting, house/yardwork, driving/computer work.    [] (22365) Provided verbal/tactile cueing for activities related to improving balance, coordination, kinesthetic sense, posture, motor skill, proprioception  to assist with  scapular, scapulothoracic and UE control with self care, reaching, carrying, lifting, house/yardwork, driving/computer work.   [] Comments:    Therapeutic Activities:    [x] (40479 or 31202) Provided verbal/tactile cueing for activities related to improving balance, coordination, kinesthetic sense, posture, motor skill, proprioception and motor activation to allow for proper function of scapular, scapulothoracic and UE control with self care, carrying, lifting, driving/computer work  [] Comments:    Home Exercise Program:    [x] (40310) Reviewed/Progressed HEP activities related to strengthening, flexibility, endurance, ROM of scapular, scapulothoracic and UE control with self care, reaching, carrying, lifting, house/yardwork, driving/computer work  [] (81291) Reviewed/Progressed HEP activities related to improving balance, coordination, kinesthetic sense, posture, motor skill, proprioception of scapular, scapulothoracic and UE control with self care, reaching, carrying, lifting, house/yardwork, driving/computer work    [] Comments:    Manual Treatments:  PROM / STM / Oscillations-Mobs:  G-I, II, III, IV (PA's, Inf., Post.)  [] (36775) Provided manual therapy to mobilize soft tissue/joints of cervical/CT, scapular GHJ and UE for the purpose of modulating pain, promoting relaxation,  increasing ROM, reducing/eliminating soft tissue swelling/inflammation/restriction, improving soft tissue extensibility and allowing for proper ROM for normal function with self care, reaching, carrying, lifting, house/yardwork, driving/computer work  [] Comments:    ADL Training:  [] (21641) Provided self-care/home management training related to activities of daily living and compensatory training, and/or use of adaptive equipment   [] Comments:     Splinting:  [] Fabrication of:   [] (56814) Orthotic/Prosthetic Management, subsequent encounter  [] (52332) Orthotic management and training (fitting and assessment)  [] Comments:      Charges:  Timed Code Treatment Minutes: 45   Total Treatment Minutes: 60     [] EVAL (LOW) 72523   [] OT Re-eval (28259)  [x] EVAL (MOD) 43002   [] EVAL (HIGH) 44710       [] Ru (02496) x     [] VQXJJ(08631)  [x] NMR (06155) x  1   [] Estim (attended) (72787)   [] Manual ([id number]) x      [] US (67520)  [x] TA (03997) x   2  [] Paraffin (55930)  [] ADL  ([phone]) x    [] Splint/L code:    [] Estim (unattended) ([id number])  [] Fluidotherapy (04240)  [] Other:    GOALS:     Patient stated goal: improved trunk control and standing tolerance to increase independence in self care. []? Progressing: []? Met: []? Not Met: []? Adjusted     Therapist goals for Patient:   Short Term Goals: To be achieved in: 30 days  1. Pt will be independent with clothing management on toilet or in wheelchair to complete toileting with use of grab bar  []? Progressing: []? Met: []? Not Met: []? Adjusted  2. Pt will be stand by assist completing scoot transfers on level surfaces  []? Progressing: []? Met: []? Not Met: []? Adjusted  3. Patient will be able to stand up to 30 seconds at grab bar for toile ting and lower body dress with min assist.       Long Term Goals: To be achieved by alton  1. Pt will will demonstrate an improved stream score of 33  []? Progressing: []? Met: []? Not Met: []? Adjusted    Progression Towards Functional goals:  [] Patient is progressing as expected towards functional goals listed. [] Progression is slowed due to complexities listed. [] Progression has been slowed due to co-morbidities.   [x] Plan just implemented, too soon to assess goals progression  [] All goals are met  [] Other:     ASSESSMENT:  See eval    Treatment/Activity Tolerance:  [x] Patient tolerated treatment well [] Patient limited by fatique  [] Patient limited by pain  [] Patient limited by other medical complications  [] Other:     Prognosis: [x] Good [] Fair  [] Poor    Patient Requires Follow-up: [x] Yes  [] No    PLAN: See eval  [] Continue per plan of care [] Alter current plan (see comments)  [x] Plan of care initiated [] Hold pending MD visit [] Discharge    Electronically signed by: Karyle Roof        Note: If patient does not return for scheduled/ recommended follow up visits, this note will serve as a discharge from care along with most recent update on progress.

## 2021-03-01 NOTE — PLAN OF CARE
[address], [facility], [address]  Phone: [phone]   Fax: [phone]     Physical Therapy Certification    Dear Referring Practitioner: JOHNNY Forbes,    We had the pleasure of evaluating the following patient for physical therapy services at [address] [place] [address]. A summary of our findings can be found in the initial assessment below. This includes our plan of care. If you have any questions or concerns regarding these findings, please do not hesitate to contact me at the office phone number checked above. Thank you for the referral.       Physician Signature:_______________________________Date:__________________  By signing above (or electronic signature), therapists plan is approved by physician      Patient: Oliverio Santiago   : 2000   MRN: [id number]  Referring Physician: Referring Practitioner: JOHNNY Forbes      Evaluation Date: 3/2/2021      Medical Diagnosis Information:  Diagnosis: Cerebral palsy with spastic diplegia   Treatment Diagnosis: Decreased trunk control impacting ability to complete safe transfers, decreased standing tolerance, LE weakness                                         Insurance information: PT Insurance Information: Medicare & Medicaid     Precautions/ Contra-indications: Decreased trunk control   Latex Allergy:  [x]NO      []YES  Preferred Language for Healthcare:   [x]English       []other:    Relevant Medical History:   Recent Bell's Palsy    C-SSRS Triggered by Intake questionnaire (Past 2 wk assessment ):   [x] No, Questionnaire did not trigger screening.   [] Yes, Patient intake triggered C-SSRS Screening     [] Completed, no further action required. [] Completed, PCP notified via Epic    SUBJECTIVE:   Pt has CP and presents to physical therapy to be able to work on using his stander and improving ability to complete transfers.      Pain Scale: 0/10  Easing factors: N/A  Provocative factors: N/A  Type: []Constant   []Intermittent  []Radiating []Localized []other:    Numbness/Tingling: L side of face due to recent Bell's Palsy    Occupation/School/Hobbies: Listening to music (60's, 50's), drawing trees, going out to eat. Was participating in community activities but has not since [address] began as his grandmother is high risk. He graduated high school in 2017 and was participating in [employer] at [employer] and Search at [employer]. Living Status/Prior Level of Function: Pt lives with grandmother who adopted him. He was ambulatory until age 13. He was able to stand to manage toileting and LE clothing independent  until 24 yo. Grandmother does not have a handicap accessible [employer], so community outings are limited. Able to manage if friend of grandmother comes to help. Domicile:  []Single level house  []Multi-level house []Trailer/Mobile home        []Condo/Town home  []Apartment   []Other:          []Steps to enter home:    w/ []HR []No HR; []Level [x]Ramped entry/exit; []Elevator access    Assistance in home:   []None  []Family  []Family/Friend/Neighbor outside of home    Available DME:  []SPC     []QC []RW     []4WW []Dick-walker       [x]Manual w/c []Powered w/c []Transport chair     [x]Shower bench []Transfer-tub bench    []Grab bars       []BSC  []RTS []Versa-frame              []Lift-chair  []Bed rail   []Hospital Bed  []Alert Button  [x]Other:  Stander      OBJECTIVE:   Functional Outcome: Tinetti Assessment:  N/A - pt wheelchair dependent    TUG:  N/A - pt wheelchair dependent  STREAM score = 23      Posture:   Pt sitting in posterior pelvic tilt with rounded shoulders and forward head. Unable to attain upright posture in sitting without stabilizing through UEs.     Functional Mobility:     Bed mobility: Pt performs by throwing himself (forcing trunk laterally) and pulling himself, grandmother will assist with LE   Transfers: Pt performs transfers to/from wheelchair by throwing himself and pulling with his UEs, able to grab AFOs and move legs with UEs. Gait: N/A   Balance: Sitting balance: Fair(-) patient unable to maintain balance due to decreased trunk control   Standing: Pt required min A of 2 to stand at RW. PT and OT assisted in blocking pt's knees and feet.  Pt's L LE tended to move more in standing, it did not appear to be full WBing, potentially due to a leg length discrepancy or pelvic malalignment, did not have time to formally assess      AROM LEFT RIGHT Comments   Hamstring 90/90      Ankle Dorsiflexion    NT, wearing AFOs   Ankle Plantarflexion    NT wearing AFOs       MMT LEFT RIGHT Comments   Neck flexion (C1-C2)      Neck sidebending (C3)      Shoulder flexion/elevation (C4)      Shoulder abduction (C5)      Shoulder ER      Shoulder IR      Elbow flexion/wrist extension (C6)      Elbow extension/wrist flexion (C7)      Thumb abduction (C8)      Finger abduction (T1)            Hip flexion (L1-L2)   Unable to compete in sitting   Hip abduction      Hip extension      Knee extension (L2-L4)   Unable to complete in sitting   Knee flexion      Ankle Dorsiflexion (L4-L5)      Ankle Plantar flexion (S1-S2)      Ankle Eversion (S1-S2)      Great Toe Ext (L5)            Core Strength               Cognitive Function:   []Normal [x]Impulsive with transfers []Flat-affect []Other:      Visual Deficit:  [x]None []Hemianopsia []Homonymous []Blindness []Other:     Speech:  []Normal []Dysphasic  []Aphasic []Dysarthria  []Low-volume    Preferred method of feedback:   [x]Immediate  []After task      Quality of Movement:     Coordination:  Rapid Alternation Movement:    Finger to Nose:   Heel to Lianet Ryan      []Normal     []Normal    []Normal     []Dysdiadokinesia   []Dysmetric   []Ataxic              []Abnormal    Tone:   RUE:  []Normotonic  []Hypertonic []Hypotonic []Hyperreflexive []Hyporeflexive []Clonus  LUE:  []Normotonic []Hypertonic []Hypotonic []Hyperreflexive []Hyporeflexive []Clonus  RLE:  []Normotonic  []Hypertonic []Hypotonic []Hyperreflexive []Hyporeflexive []Clonus  LLE:   []Normotonic  []Hypertonic []Hypotonic []Hyperreflexive []Hyporeflexive []Clonus    []R []L UE Synergy: []Flexion  []Extensor  []Other:    []R []L LE Synergy: []Flexion  []Extensor  []Other:        [x] Patient history, allergies, meds reviewed. Medical chart reviewed. See intake form. Review Of Systems (ROS):  [x]Performed Review of systems (Integumentary, CardioPulmonary, Neurological) by intake and observation. Intake form has been scanned into medical record. Patient has been instructed to contact their primary care physician regarding ROS issues if not already being addressed at this time.       Co-morbidities/Complexities (which will affect course of rehabilitation):   []None        []Hx of COVID   Arthritic conditions   []Rheumatoid arthritis (M05.9)  []Osteoarthritis (M19.91)  []Gout   Cardiovascular conditions   []Hypertension (I10)  []Hyperlipidemia (E78.5)  []Angina pectoris (I20)  []Atherosclerosis (I70)  []Pacemaker  []Hx of CABG/stent/  cardiac surgeries   Musculoskeletal conditions   []Disc pathology   []Congenital spine pathologies   []Osteoporosis (M81.8)  []Osteopenia (M85.8)  []Scoliosis       Endocrine conditions   []Hypothyroid (E03.9)  []Hyperthyroid Gastrointestinal conditions   []Constipation (D94.35)   Metabolic conditions   [x]Morbid obesity (E66.01)  []Diabetes type 1(E10.65) or 2 (E11.65)   []Neuropathy (G60.9)     Cardio/Pulmonary conditions   []Asthma (J45)  []Coughing   []COPD (J44.9)  []CHF  []A-fib   Psychological Disorders  []Anxiety (F41.9)  []Depression (F32.9)   []Other:   Developmental Disorders  []Autism (F84.0)  [x]CP (G80)  []Down Syndrome (Q90.9)  []Developmental delay     Neurological conditions  []Prior Stroke (I69.30)  []Parkinson's (G20)  []Encephalopathy (G93.40)  []MS (G35)  []Post-polio []Abnormal reflexes/sensation/myotomal/dermatomal deficits  []Hypertonic UE/LE   []Hypotonic UE/LE  []Dislocated//Hypermobile Glenohumeral joint  [x]Impaired balance/coordination/proprioceptive control    []other:      Functional Activity Limitations (from functional questionnaire and intake)   [x]Reduced ability to tolerate prolonged functional positions   [x]Reduced ability or difficulty with changes of positions or transfers between positions   [x]Reduced ability to maintain good posture and demonstrate good body mechanics with sitting, bending, and lifting   []Reduced ability to sleep   [] Reduced ability or tolerance with driving and/or computer work   []Reduced ability to perform lifting, reaching, carrying tasks   []Reduced ability to squat   []Reduced ability to forward bend   []Reduced ability to ambulate prolonged functional periods/distances/surfaces   []Reduced ability to ascend/descend stairs   []other:       Participation Restrictions   [x]Reduced participation in self care activities   [x]Reduced participation in home management activities   []Reduced participation in work activities   []Reduced participation in social activities. []Reduced participation in sport/recreational activities.     Classification:   []Signs/symptoms consistent with Primary prevention/risk reduction for loss of balance and falls    []Signs/symptoms consistent with Impaired neuromotor development   [x]Signs/symptoms consistent with Impaired motor function and sensory integrity associated w/non-progressive disorders of CNS - Congenital/Aquired in Infancy/Childhood or Acquired in Adolescence/Adulthood   []Signs/symptoms consistent with Impaired motor function and sensory integrity associate w/progressive disorders of the CNS     []Signs/symptoms consistent with Impaired motor function and sensory integrity associated w/acute or chronic polyneuropathies   []Signs/symptoms consistent with Impaired motor function, peripheral nerve integrity, and sendory integrity associated w/non-progressive disorders of spinal cord   []other:      Prognosis/Rehab Potential:   Tolerance of evaluation/treatment:    []Excellent     []Excellent   [x]Good     [x]Good   []Fair      []Fair   []Poor      []Poor      Physical Therapy Evaluation Complexity Justification  [x] A history of present problem with:  [] no personal factors and/or comorbidities that impact the plan of care;  [x]1-2 personal factors and/or comorbidities that impact the plan of care  []3 personal factors and/or comorbidities that impact the plan of care  [x] An examination of body systems using standardized tests and measures addressing any of the following: body structures and functions (impairments), activity limitations, and/or participation restrictions;:  [] a total of 1-2 or more elements   [x] a total of 3 or more elements   [] a total of 4 or more elements   [x] A clinical presentation with:  [x] stable and/or uncomplicated characteristics   [] evolving clinical presentation with changing characteristics  [] unstable and unpredictable characteristics;   [x] Clinical decision making of [x] low, [] moderate, [] high complexity using standardized patient assessment instrument and/or measurable assessment of functional outcome. [x] EVAL (LOW) 06529 (typically 15 minutes face-to-face)  [] EVAL (MOD) 13311 (typically 30 minutes face-to-face)  [] EVAL (HIGH) 55979 (typically 45 minutes face-to-face)  [] RE-EVAL     PLAN: PT to begin focusing on:  Activation of scapular and hip musculature, Transfer & Gait safety, Evenly distributed weight-bearing through extremities, Pain Management    Frequency/Duration:  2 days per week for 6 Weeks:  Interventions:  [x]  Therapeutic exercise including: strength training, ROM, for LE, Glutes and core   [x]  NMR activation and proprioception for shoulder complex, glutes, UE/LE, and Core   [x]  Manual therapy as indicated for Shoulder & Hip complex, LE and core activation to include: STM, manual cues to facilitate/inhibit muscle groups. [x]  Modalities as needed that may include: thermal agents, E-stim, Biofeedback, US as indicated  [x]  Patient education on joint protection, postural re-education, home/activity modification, progression of HEP. GOALS:  Patient stated goal: improve ability to complete transfers   [] Progressing: [] Met: [] Not Met: [] Adjusted    Therapist goals for Patient:   Short Term Goals: To be achieved in: 2 weeks  1. Independent in HEP and progression per patient tolerance, in order to prevent re-injury. [] Progressing: [] Met: [] Not Met: [] Adjusted  2. Patient will have a decrease in pain to facilitate improvement in movement, function, and ADLs as indicated by Functional Deficits. [] Progressing: [] Met: [] Not Met: [] Adjusted    Long Term Goals: To be achieved in: 6 weeks  1. Patient will report increased standing tolerance to at least 30 minutes in standing frame. [] Progressing: [] Met: [] Not Met: [] Adjusted  2. Patient will demonstrate an increase in strength to good shoulder & hip complex, and core activation to allow for proper functional mobility as indicated by patients Functional Deficits. [] Progressing: [] Met: [] Not Met: [] Adjusted  3. Patient will return to functional activities including demo'ing safe transfers to/from w/c with mod I.    [] Progressing: [] Met: [] Not Met: [] Adjusted  4. Patient will increase STREAM score to 32 or above for increased UE/LE movement in sitting, supine, and standing.    [] Progressing: [] Met: [] Not Met: [] Adjusted     Electronically signed by:  Galindo Feldman, PT DPT

## 2021-03-04 ENCOUNTER — HOSPITAL ENCOUNTER (OUTPATIENT)
Dept: OCCUPATIONAL THERAPY | Age: 21
Setting detail: THERAPIES SERIES
Discharge: HOME | End: 2021-03-04
Payer: MEDICARE

## 2021-03-04 ENCOUNTER — HOSPITAL ENCOUNTER (OUTPATIENT)
Dept: PHYSICAL THERAPY | Age: 21
Setting detail: THERAPIES SERIES
Discharge: HOME | End: 2021-03-04
Payer: MEDICARE

## 2021-03-04 PROCEDURE — 97112 NEUROMUSCULAR REEDUCATION: CPT

## 2021-03-04 PROCEDURE — 97110 THERAPEUTIC EXERCISES: CPT

## 2021-03-04 PROCEDURE — 97530 THERAPEUTIC ACTIVITIES: CPT

## 2021-03-04 NOTE — FLOWSHEET NOTE
[facility]  Phone: [phone]   Fax: [phone]    Physical Therapy Daily Treatment Note  Date:  3/4/2021    Patient Name:  Negar Her    :  2000  MRN: [id number]  Medical/Treatment Diagnosis Information:  Diagnosis: Cerebral palsy with spastic diplegia  Treatment Diagnosis: Decreased trunk control impacting ability to complete safe transfers, decreased standing tolerance, LE weakness  Insurance/Certification information:  PT Insurance Information: Medicare & Medicaid  Physician Information:  Referring Practitioner: JOHNNY Nugent  Plan of care signed (Y/N): [x]  Yes []  No     Date of Patient follow up with Physician:      Progress Report: []  Yes  []  No     Date Range for reporting period:  Beginning  3/1/2021   Ending      Progress report due (10 Rx/or 30 days whichever is less): visit #10 or 35     Recertification due (POC duration/ or 90 days whichever is less): visit #12 or 21     Visit # Insurance Allowable Auth required? Date Range    Medical necessity []  Yes  [x]  No n/a     Latex Allergy:  [x]NO      []YES  Preferred Language for Healthcare:   [x]English       []Other:      Functional Scale/Test Evaluation 3/1/2021 30 day  60 day  Discharge   STREAM 23                          Pain level:  0/10  Location:  none    SUBJECTIVE:  Pt reports he is planning on using his stander today after therapy.       OBJECTIVE: See eval      RESTRICTIONS/PRECAUTIONS: recent Bell's Palsy    Interventions/Exercises:   Therapeutic Exercises (16424) Resistance / level Sets/sec Reps Notes   W/c push ups in preparation for standing   X 12                         Neuromuscular Re-ed (72745) /  Gait Training (54418)       Upright posture seated in W/C   2 X 10 X 5 reps holding football, rest of reps completed with L UE bracing on L knee, PT hand assist on sternum and lumbar spine to facilitate skill, proprioception of   [] LE: core, hip and LE for self care, mobility, lifting, and ambulation/stair navigation    [] UE / Shoulder complex: cervical, postural,  scapular, scapulothoracic and UE control with self care, reaching, carrying, lifting, house/yardwork, driving, computer work    Manual Treatments:  PROM / STM / Oscillations-Mobs:  G-I, II, III, IV (PA's, Inf., Post.)  [] (02901) Provided manual therapy to mobilize shoulder complex, hip, LE, and/or cervicothoracic/LS spine soft tissue/joints for the purpose of modulating pain, promoting relaxation,  increasing ROM, reducing/eliminating soft tissue swelling/inflammation/restriction, improving soft tissue extensibility and allowing for proper ROM for normal function with   [] LE / Core stability: self care, mobility, lifting and ambulation. [] UE / Shoulder complex: self care, reaching, carrying, lifting, house/yardwork, driving, computer work. Modalities:  [] (03447) Vasopneumatic compression: Utilized vasopneumatic compression to decrease edema / swelling for the purpose of improving mobility and quad tone / recruitment which will allow for increased overall function including but not limited to self-care, transfers, ambulation, and ascending / descending stairs. Charges:  Timed Code Treatment Minutes: 45   Total Treatment Minutes: 45     [] EVAL - LOW (35064)   [] EVAL - MOD (84838)  [] EVAL - HIGH (33931)  [] RE-EVAL (24396)  [] TE (66659) x      [] Ionto  [x] NMR (71697) x 1      [] Vaso  [] Manual (69054) x      [] Ultrasound  [x] TA x  2     [] Mech Traction (63508)  [] Gait Training x     [] ES (un) (49193):   [] Aquatic therapy x   [] Other:   [] Group:     GOALS:   Patient stated goal: improve ability to complete transfers   []? Progressing: []? Met: []? Not Met: []? Adjusted     Therapist goals for Patient:   Short Term Goals: To be achieved in: 2 weeks  1.  Independent in HEP and progression per patient tolerance, in order to prevent re-injury. []? Progressing: []? Met: []? Not Met: []? Adjusted  2. Patient will have a decrease in pain to facilitate improvement in movement, function, and ADLs as indicated by Functional Deficits. []? Progressing: []? Met: []? Not Met: []? Adjusted     Long Term Goals: To be achieved in: 6 weeks  1. Patient will report increased standing tolerance to at least 30 minutes in standing frame. []? Progressing: []? Met: []? Not Met: []? Adjusted  2. Patient will demonstrate an increase in strength to good shoulder & hip complex, and core activation to allow for proper functional mobility as indicated by patients Functional Deficits. []? Progressing: []? Met: []? Not Met: []? Adjusted  3. Patient will return to functional activities including demo'ing safe transfers to/from w/c with mod I.    []? Progressing: []? Met: []? Not Met: []? Adjusted  4. Patient will increase STREAM score to 32 or above for increased UE/LE movement in sitting, supine, and standing. []? Progressing: []? Met: []? Not Met: []? Adjusted          Overall Progression Towards Functional goals/ Treatment Progress Update:  [] Patient is progressing as expected towards functional goals listed. [] Progression is slowed due to complexities/Impairments listed. [] Progression has been slowed due to co-morbidities.   [x] Plan just implemented, too soon to assess goals progression <30days   [] Goals require adjustment due to lack of progress  [] Patient is not progressing as expected and requires additional follow up with physician  [] Other    Persisting Functional Limitations/Impairments:  []Sleeping [x]Sitting               []Standing [x]Transfers        []Walking []Kneeling               []Stairs []Squatting / bending   [x]ADLs []Reaching  []Lifting  []Housework  []Driving []Job related tasks  []Sports/Recreation []Other:        ASSESSMENT: Pt needed some time to increase his confidence with standing before he felt comfortable letting go of his wheelchair. Was not able to cotreat today, but still able to work on standing, trunk control, and posture. Pt will need continued encouragement and hopefully next session will be able to work on transfers. Treatment/Activity Tolerance:  [x] Patient able to complete tx [] Patient limited by fatigue  [] Patient limited by pain  [] Patient limited by other medical complications  [] Other:     Prognosis: [] Good [] Fair  [] Poor    Patient Requires Follow-up: [x] Yes  [] No    Plan for next treatment session: transfers, seated core strength, will plan to co-treat with OT when possible    PLAN: See eval. PT 2x / week for 6 weeks. [x] Continue per plan of care [] Alter current plan (see comments)  [] Plan of care initiated [] Hold pending MD visit [] Discharge    Electronically signed by: Cathy Goodpasture PT, DPT    Note: If patient does not return for scheduled/ recommended follow up visits, his note will serve as a discharge from care along with most recent update on progress.

## 2021-03-04 NOTE — FLOWSHEET NOTE
[facility] - Outpatient Occupational Therapy      Occupational Therapy Daily Treatment Note  Date:  3/4/2021    Patient: Radha Walker   : 2000   MRN: [id number]  Referring Physician:  Maria D Browne CNP       Medical Diagnosis Information:paraplegia, cerebral palsey                                                  Insurance information: medicare/ medicaid    Date of Injury:   Date of Surgery: rhizotomy when he was about 10    Progress Report: []  Yes  [x]  No     Date Range for reporting period:  Beginning: 3/1/2021  Endin2021    Progress report due (10 Rx/or 30 days whichever is less): visit #10 or 888 (date)     Recertification due (POC duration/ or 90 days whichever is less): visit #10 or 2021 (date)     Visit # Insurance Allowable Auth required? Date Range    [place] []  Yes  [x]  No na       Latex Allergy:  []No      []Yes  Pacemaker:  [] No       [] Yes     Preferred Language for Healthcare:   [x]English       []other:    Pain level: 0/10     SUBJECTIVE:  Pt presents with video of technique used for wheelchair<>bed transfer with pt throwing self onto bed in prone position following sit>stand from wheelchair. Pt completing safe squat-pivot from EOB to w/c. Pt reports he will use his stander following a rest break after therapy. Functional Disability Index:  STREAM score 23    OBJECTIVE: See eval      RESTRICTIONS/PRECAUTIONS:     Exercises/Interventions: Treatment focused on trunk control for improved dynamic balance during UE reaching to enhance functional independence. Session initiated with discussion of wheelchair<>bed transfer technique in video noted above with plan to work on squat pivot technique for both transfers, followed by improved bed mobility, to prevent pt from need to \"throw\" self up towards head of bed.  Pt completed sit<>stand x3 for ~20-30 seconds each time with weight bearing through RW for UE endurance and assist for trunk strengthening, flexibility, endurance, ROM  for improvements in scapular, scapulothoracic and UE control with self care, reaching, carrying, lifting, house/yardwork, driving/computer work.    [] (02128) Provided verbal/tactile cueing for activities related to improving balance, coordination, kinesthetic sense, posture, motor skill, proprioception  to assist with  scapular, scapulothoracic and UE control with self care, reaching, carrying, lifting, house/yardwork, driving/computer work.   [] Comments:    Therapeutic Activities:    [x] (67753 or 85661) Provided verbal/tactile cueing for activities related to improving balance, coordination, kinesthetic sense, posture, motor skill, proprioception and motor activation to allow for proper function of scapular, scapulothoracic and UE control with self care, carrying, lifting, driving/computer work  [] Comments:    Home Exercise Program:    [x] (83424) Reviewed/Progressed HEP activities related to strengthening, flexibility, endurance, ROM of scapular, scapulothoracic and UE control with self care, reaching, carrying, lifting, house/yardwork, driving/computer work  [] (75107) Reviewed/Progressed HEP activities related to improving balance, coordination, kinesthetic sense, posture, motor skill, proprioception of scapular, scapulothoracic and UE control with self care, reaching, carrying, lifting, house/yardwork, driving/computer work    [] Comments:    Manual Treatments:  PROM / STM / Oscillations-Mobs:  G-I, II, III, IV (PA's, Inf., Post.)  [] (64848) Provided manual therapy to mobilize soft tissue/joints of cervical/CT, scapular GHJ and UE for the purpose of modulating pain, promoting relaxation,  increasing ROM, reducing/eliminating soft tissue swelling/inflammation/restriction, improving soft tissue extensibility and allowing for proper ROM for normal function with self care, reaching, carrying, lifting, house/yardwork, driving/computer work  [] Comments:    ADL Training:  [] (67164) Provided self-care/home management training related to activities of daily living and compensatory training, and/or use of adaptive equipment   [] Comments:     Splinting:  [] Fabrication of:   [] (38675) Orthotic/Prosthetic Management, subsequent encounter  [] (83197) Orthotic management and training (fitting and assessment)  [] Comments:      Charges:  Timed Code Treatment Minutes: 55   Total Treatment Minutes: 55     [] EVAL (LOW) 18745   [] OT Re-eval (14593)  [] EVAL (MOD) 20548   [] EVAL (HIGH) 57033       [x] Ru (80236) x   1  [] CEYPK(12771)  [x] NMR (94316) x  1   [] Estim (attended) (01097)   [] Manual ([id number]) x      [] US (76219)  [x] TA (22822) x   2  [] Paraffin (99207)  [] ADL  (88 649 24 60) x    [] Splint/L code:    [] Estim (unattended) (22 [id number])  [] Fluidotherapy (49072)  [] Other:    GOALS:     Patient stated goal: improved trunk control and standing tolerance to increase independence in self care. []? Progressing: []? Met: []? Not Met: []? Adjusted     Therapist goals for Patient:   Short Term Goals: To be achieved in: 30 days  1. Pt will be independent with clothing management on toilet or in wheelchair to complete toileting with use of grab bar  []? Progressing: []? Met: []? Not Met: []? Adjusted  2. Pt will be stand by assist completing scoot transfers on level surfaces  []? Progressing: []? Met: []? Not Met: []? Adjusted  3. Patient will be able to stand up to 30 seconds at grab bar for toile ting and lower body dress with min assist.       Long Term Goals: To be achieved by alton  1. Pt will will demonstrate an improved stream score of 33  []? Progressing: []? Met: []? Not Met: []? Adjusted    Progression Towards Functional goals:  [] Patient is progressing as expected towards functional goals listed. [] Progression is slowed due to complexities listed. [] Progression has been slowed due to co-morbidities.   [x] Plan just implemented, too soon to assess goals progression  [] All goals are met  [] Other:     ASSESSMENT:  See eval    Treatment/Activity Tolerance:  [x] Patient tolerated treatment well [] Patient limited by fatique  [] Patient limited by pain  [] Patient limited by other medical complications  [] Other:     Prognosis: [x] Good [] Fair  [] Poor    Patient Requires Follow-up: [x] Yes  [] No    PLAN: See eval  [] Continue per plan of care [] Alter current plan (see comments)  [x] Plan of care initiated [] Hold pending MD visit [] Discharge    Electronically signed by: SHUKRI Daniel/L [id number]      Note: If patient does not return for scheduled/ recommended follow up visits, this note will serve as a discharge from care along with most recent update on progress.

## 2021-03-08 ENCOUNTER — HOSPITAL ENCOUNTER (OUTPATIENT)
Dept: OCCUPATIONAL THERAPY | Age: 21
Setting detail: THERAPIES SERIES
Discharge: HOME | End: 2021-03-08
Payer: MEDICARE

## 2021-03-08 ENCOUNTER — HOSPITAL ENCOUNTER (OUTPATIENT)
Dept: PHYSICAL THERAPY | Age: 21
Setting detail: THERAPIES SERIES
Discharge: HOME | End: 2021-03-08
Payer: MEDICARE

## 2021-03-08 PROCEDURE — 97110 THERAPEUTIC EXERCISES: CPT

## 2021-03-08 PROCEDURE — 97112 NEUROMUSCULAR REEDUCATION: CPT

## 2021-03-08 PROCEDURE — 97530 THERAPEUTIC ACTIVITIES: CPT

## 2021-03-08 NOTE — FLOWSHEET NOTE
[facility] - Outpatient Occupational Therapy      Occupational Therapy Daily Treatment Note  Date:  3/8/2021    Patient: Jeniffer Lee   : 2000   MRN: [id number]  Referring Physician:  Shellie Barrios CNP       Medical Diagnosis Information:paraplegia, cerebral palsey                                                  Insurance information: medicare/ medicaid    Date of Injury:   Date of Surgery: rhizotomy when he was about 10    Progress Report: []  Yes  [x]  No     Date Range for reporting period:  Beginning: 3/1/2021  Endin2021    Progress report due (10 Rx/or 30 days whichever is less): visit #10 or 5525 (date)     Recertification due (POC duration/ or 90 days whichever is less): visit #10 or 2021 (date)     Visit # Insurance Allowable Auth required? Date Range   3/12 [place] []  Yes  [x]  No na       Latex Allergy:  []No      []Yes  Pacemaker:  [] No       [] Yes     Preferred Language for Healthcare:   [x]English       []other:    Pain level: 0/10     SUBJECTIVE: Patient  in good spirits     Functional Disability Index:  STREAM score 23    OBJECTIVE: See eval      RESTRICTIONS/PRECAUTIONS:     Exercises/Interventions: Treatment focused on trunk control for improved dynamic balance during UE reaching to enhance functional independence. Session initiated with transfer wheelchair to mat with min assist and increased time to improve safety . Patient then worked on sit to supine with mod assist of one. Progressed to work on bridge, lateral scoots for functional strengthening upper and lower trunk needing mod assist of two. Patient then worked on segmental rolling initiating with lower body with mod assist of one and max verbal cues. Patient then assumed prone and worked on transitions to quadriped with mod assist of two to maintain pelvic stability and then weight shifts for reciprocal crawling.  Patient then transitioned to sidelying and worked on transitions from [place] lying in sidelying to short sit both directions with mod assist.  Patient then worked on transitions sit to 1/2 stand and sit to full stand times 4 reps with mod assist of two. Transfer mat to wheelchair with min assist of one    Therapeutic Exercises  Resistance / level Sets/sec Reps Notes                                                                                Neuromuscular Re-ed / Therapeutic Activities                                                 Manual Intervention                                                     Modalities:     Patient education:  OT evaluation, plan of care, importance of weight bearing in stander for over health, home exercise program, goals. Home Exercise Program:   Patient encouraged to start using stander 3 sessions a day and while in stander completing over head reaches . Patient to try and reach 15 minute intervals in stander. Therapeutic Exercise and NMR:  [x] (94604) Provided verbal/tactile cueing for activities related to strengthening, flexibility, endurance, ROM  for improvements in scapular, scapulothoracic and UE control with self care, reaching, carrying, lifting, house/yardwork, driving/computer work.    [] (06231) Provided verbal/tactile cueing for activities related to improving balance, coordination, kinesthetic sense, posture, motor skill, proprioception  to assist with  scapular, scapulothoracic and UE control with self care, reaching, carrying, lifting, house/yardwork, driving/computer work.   [] Comments:    Therapeutic Activities:    [x] (38897 or 90802) Provided verbal/tactile cueing for activities related to improving balance, coordination, kinesthetic sense, posture, motor skill, proprioception and motor activation to allow for proper function of scapular, scapulothoracic and UE control with self care, carrying, lifting, driving/computer work  [] Comments:    Home Exercise Program:    [x] (86165) Reviewed/Progressed HEP activities related to strengthening, flexibility, endurance, ROM of scapular, scapulothoracic and UE control with self care, reaching, carrying, lifting, house/yardwork, driving/computer work  [] (49022) Reviewed/Progressed HEP activities related to improving balance, coordination, kinesthetic sense, posture, motor skill, proprioception of scapular, scapulothoracic and UE control with self care, reaching, carrying, lifting, house/yardwork, driving/computer work    [] Comments:    Manual Treatments:  PROM / STM / Oscillations-Mobs:  G-I, II, III, IV (PA's, Inf., Post.)  [] ([id number]) Provided manual therapy to mobilize soft tissue/joints of cervical/CT, scapular GHJ and UE for the purpose of modulating pain, promoting relaxation,  increasing ROM, reducing/eliminating soft tissue swelling/inflammation/restriction, improving soft tissue extensibility and allowing for proper ROM for normal function with self care, reaching, carrying, lifting, house/yardwork, driving/computer work  [] Comments:    ADL Training:  [] (19294) Provided self-care/home management training related to activities of daily living and compensatory training, and/or use of adaptive equipment   [] Comments:     Splinting:  [] Fabrication of:   [] (02869) Orthotic/Prosthetic Management, subsequent encounter  [] (83268) Orthotic management and training (fitting and assessment)  [] Comments:      Charges:  Timed Code Treatment Minutes: 90   Total Treatment Minutes: 90     [] EVAL (LOW) 10934   [] OT Re-eval (51156)  [] EVAL (MOD) 49821   [] EVAL (HIGH) 57525       [x] Ru (24401) x   1  [] HNUGZ(73150)  [x] NMR (24269) x  2 [] Estim (attended) (62336)   [] Manual ([id number]) x      [] US (01786)  [x] TA (00411) x   3  [] Paraffin (36434)  [] ADL  (31 649 24 60) x    [] Splint/L code:    [] Estim (unattended) (22 662872)  [] Fluidotherapy (23875)  [] Other:    GOALS:     Patient stated goal: improved trunk control and standing tolerance to increase independence in self care. []?  Progressing: []? Met: []? Not Met: []? Adjusted     Therapist goals for Patient:   Short Term Goals: To be achieved in: 30 days  1. Pt will be independent with clothing management on toilet or in wheelchair to complete toileting with use of grab bar  [x]? Progressing: []? Met: []? Not Met: []? Adjusted  2. Pt will be stand by assist completing scoot transfers on level surfaces  [x]? Progressing: []? Met: []? Not Met: []? Adjusted  3. Patient will be able to stand up to 30 seconds at grab bar for toile ting and lower body dress with min assist.       Long Term Goals: To be achieved by dishcharge  1. Pt will will demonstrate an improved stream score of 33  [x]? Progressing: []? Met: []? Not Met: []? Adjusted    Progression Towards Functional goals:  [] Patient is progressing as expected towards functional goals listed. [] Progression is slowed due to complexities listed. [] Progression has been slowed due to co-morbidities. [x] Plan just implemented, too soon to assess goals progression  [] All goals are met  [] Other:     ASSESSMENT:  See eval    Treatment/Activity Tolerance:  [x] Patient tolerated treatment well [] Patient limited by fatique  [] Patient limited by pain  [] Patient limited by other medical complications  [] Other:     Prognosis: [x] Good [] Fair  [] Poor    Patient Requires Follow-up: [x] Yes  [] No    PLAN: See eval  [] Continue per plan of care [] Alter current plan (see comments)  [x] Plan of care initiated [] Hold pending MD visit [] Discharge    Electronically signed by:           Note: If patient does not return for scheduled/ recommended follow up visits, this note will serve as a discharge from care along with most recent update on progress.

## 2021-03-10 ENCOUNTER — HOSPITAL ENCOUNTER (OUTPATIENT)
Dept: OCCUPATIONAL THERAPY | Age: 21
Setting detail: THERAPIES SERIES
Discharge: HOME | End: 2021-03-10
Payer: MEDICARE

## 2021-03-10 ENCOUNTER — HOSPITAL ENCOUNTER (OUTPATIENT)
Dept: PHYSICAL THERAPY | Age: 21
Setting detail: THERAPIES SERIES
Discharge: HOME | End: 2021-03-10
Payer: MEDICARE

## 2021-03-10 NOTE — PROGRESS NOTES
[facility]    Physical Therapy  Cancellation/No-show Note  Patient Name:  Logan Myles  :  2000   Date:  3/10/2021    Cancelled visits to date: 1  No-shows to date: 0    For today's appointment patient:  [x]  Cancelled 3/10  []  Rescheduled appointment  []  No-show     Reason given by patient:  []  Patient ill  []  Conflicting appointment  [x]  No transportation    []  Conflict with work  []  No reason given  []  Other:     Comments:      Phone call information:   []  Phone call made today to patient at _ time at number provided:      []  Patient answered, conversation as follows:    []  Patient did not answer, message left as follows:  []  Phone call not made today  [x]  Phone call not needed - pt contacted us to cancel and provided reason for cancellation.      Electronically signed by:  Jesus Werner PT DPT

## 2021-03-10 NOTE — PROGRESS NOTES
Occupational Therapy  Cancellation/No-show Note  Patient Name:  Negar Her   :  2000   Date:  3/10/2021  Cancelled visits to date: 1  No-shows to date: 0    Patient status for today's appointment patient:  [x]  Cancelled - 3/10  []  Rescheduled appointment  []  No-show     Reason given by patient:  []  Patient ill  []  Conflicting appointment  [x]  No transportation    []  Conflict with work  []  No reason given  []  Other:     Comments:      Phone call information:   []  Phone call made today to patient at _ time at number provided:      []  Patient answered, conversation as follows:    []  Patient did not answer, message left as follows:  [x]  Phone call not made today    Electronically signed by:  Lani Bains OTR/L [id number]

## 2021-03-15 ENCOUNTER — HOSPITAL ENCOUNTER (OUTPATIENT)
Dept: PHYSICAL THERAPY | Age: 21
Setting detail: THERAPIES SERIES
Discharge: HOME | End: 2021-03-15
Payer: MEDICARE

## 2021-03-15 ENCOUNTER — HOSPITAL ENCOUNTER (OUTPATIENT)
Dept: OCCUPATIONAL THERAPY | Age: 21
Setting detail: THERAPIES SERIES
Discharge: HOME | End: 2021-03-15
Payer: MEDICARE

## 2021-03-15 NOTE — PROGRESS NOTES
[address]    Physical Therapy  Cancellation/No-show Note  Patient Name:  Sukhwinder Corcoran  :  2000   Date:  3/15/2021    Cancelled visits to date: 2  No-shows to date: 0    For today's appointment patient:  [x]  Cancelled 3/10, 3/15  []  Rescheduled appointment  []  No-show     Reason given by patient:  [x]  Patient ill  []  Conflicting appointment  []  No transportation    []  Conflict with work  []  No reason given  []  Other:     Comments:      Phone call information:   []  Phone call made today to patient at _ time at number provided:      []  Patient answered, conversation as follows:    []  Patient did not answer, message left as follows:  []  Phone call not made today  [x]  Phone call not needed - pt contacted us to cancel and provided reason for cancellation.      Electronically signed by:  Elinor Fishman PT DPT

## 2021-03-17 ENCOUNTER — APPOINTMENT (OUTPATIENT)
Dept: OCCUPATIONAL THERAPY | Age: 21
End: 2021-03-17
Payer: MEDICARE

## 2021-03-17 ENCOUNTER — APPOINTMENT (OUTPATIENT)
Dept: PHYSICAL THERAPY | Age: 21
End: 2021-03-17
Payer: MEDICARE

## 2021-03-18 ENCOUNTER — OFFICE VISIT (OUTPATIENT)
Dept: INTERNAL MEDICINE CLINIC | Age: 21
End: 2021-03-18
Payer: MEDICARE

## 2021-03-18 VITALS
SYSTOLIC BLOOD PRESSURE: 128 MMHG | DIASTOLIC BLOOD PRESSURE: 60 MMHG | HEART RATE: 96 BPM | TEMPERATURE: 97.4 F | OXYGEN SATURATION: 98 %

## 2021-03-18 DIAGNOSIS — G80.1 CEREBRAL PALSY WITH SPASTIC DIPLEGIA (HCC): Primary | ICD-10-CM

## 2021-03-18 DIAGNOSIS — R73.9 HYPERGLYCEMIA: ICD-10-CM

## 2021-03-18 DIAGNOSIS — E55.9 VITAMIN D DEFICIENCY: ICD-10-CM

## 2021-03-18 DIAGNOSIS — I10 ESSENTIAL HYPERTENSION: ICD-10-CM

## 2021-03-18 PROCEDURE — 1036F TOBACCO NON-USER: CPT | Performed by: NURSE PRACTITIONER

## 2021-03-18 PROCEDURE — G8427 DOCREV CUR MEDS BY ELIG CLIN: HCPCS | Performed by: NURSE PRACTITIONER

## 2021-03-18 PROCEDURE — 99214 OFFICE O/P EST MOD 30 MIN: CPT | Performed by: NURSE PRACTITIONER

## 2021-03-18 PROCEDURE — G8421 BMI NOT CALCULATED: HCPCS | Performed by: NURSE PRACTITIONER

## 2021-03-18 PROCEDURE — G8484 FLU IMMUNIZE NO ADMIN: HCPCS | Performed by: NURSE PRACTITIONER

## 2021-03-21 ASSESSMENT — ENCOUNTER SYMPTOMS
RESPIRATORY NEGATIVE: 1
GASTROINTESTINAL NEGATIVE: 1

## 2021-03-22 ENCOUNTER — HOSPITAL ENCOUNTER (OUTPATIENT)
Dept: OCCUPATIONAL THERAPY | Age: 21
Setting detail: THERAPIES SERIES
Discharge: HOME | End: 2021-03-22
Payer: MEDICARE

## 2021-03-22 ENCOUNTER — HOSPITAL ENCOUNTER (OUTPATIENT)
Dept: PHYSICAL THERAPY | Age: 21
Setting detail: THERAPIES SERIES
Discharge: HOME | End: 2021-03-22
Payer: MEDICARE

## 2021-03-22 PROCEDURE — 97112 NEUROMUSCULAR REEDUCATION: CPT

## 2021-03-22 PROCEDURE — 97110 THERAPEUTIC EXERCISES: CPT

## 2021-03-22 NOTE — PROGRESS NOTES
SUBJECTIVE:    Patient ID: Doris Gray is a 23 y.o. male. CC: Cerebral palsy, urinary incontinence, hypertension, hyperglycemia, vitamin D deficiency    HPI: Patient presents to the office today for follow-up of chronic medical conditions. 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 He is here today accompanied by his grandmother who is his primary caretaker. Due to the patient's condition, he is wheelchair dependent. Uses transfer devices for movement assistance. He had a recent assessment from occupational therapy. He has a history of urinary incontinence. Uses a condom catheter at night. He has a history of hypertension. He denies any chest pain or shortness of breath. Has a history of vitamin D deficiency. He is on repletion. Past Medical History:   Diagnosis Date    Borderline intellectual functioning     Cerebral palsy (HCC)     Obesity     Vitamin D deficiency     Wheelchair bound         Current Outpatient Medications   Medication Sig Dispense Refill    Cholecalciferol (VITAMIN D3) 25 MCG (1000 UT) CAPS Take 1,000 capsules by mouth daily 90 capsule 3    lisinopril (PRINIVIL;ZESTRIL) 10 MG tablet Take 1 tablet by mouth daily 90 tablet 3    hydroCHLOROthiazide (HYDRODIURIL) 25 MG tablet TAKE 1 TABLET BY MOUTH DAILY 90 tablet 3    Catheters (GIZMO CONDOM CATHETER) MISC 100 each by Does not apply route as needed (Urinary incontinence) 100 each 5     No current facility-administered medications for this visit. Review of Systems   Constitutional: Negative. Respiratory: Negative. Cardiovascular: Negative. Gastrointestinal: Negative. Genitourinary: Negative. Musculoskeletal: Positive for gait problem. Neurological: Positive for weakness. Psychiatric/Behavioral: Negative. OBJECTIVE:  Physical Exam  Constitutional:       General: He is not in acute distress. Appearance: He is well-developed. He is obese. He is not diaphoretic. Comments: Young gentleman with cerebral palsy sitting in a wheelchair. HENT:      Head: Normocephalic and atraumatic. Eyes:      General: No scleral icterus. Conjunctiva/sclera: Conjunctivae normal.   Neck:      Musculoskeletal: Neck supple. Vascular: No JVD. Cardiovascular:      Rate and Rhythm: Normal rate and regular rhythm. Pulmonary:      Effort: Pulmonary effort is normal. No respiratory distress. Breath sounds: Normal breath sounds. No wheezing or rales. Abdominal:      General: There is no distension. Palpations: Abdomen is soft. Tenderness: There is no abdominal tenderness. There is no guarding or rebound. Musculoskeletal: Normal range of motion. Comments: Sitting in a wheelchair. Moving his arms   Skin:     General: Skin is warm and dry. Neurological:      Mental Status: He is alert. Cranial Nerves: Facial asymmetry present. Comments: Facial asymmetry with left-sided facial droop consistent with his diagnosis of Bell's palsy   Psychiatric:         Behavior: Behavior normal.         Thought Content: Thought content normal.        /60   Pulse 96   Temp 97.4 °F (36.3 °C)   SpO2 98%      PHQ Scores 2/19/2021 10/18/2018   PHQ2 Score 0 0   PHQ9 Score 0 0     Interpretation of Total Score Depression Severity: 1-4 = Minimal depression, 5-9 = Mild depression, 10-14 = Moderate depression, 15-19 = Moderately severe depression, 20-27 =Severe depression           ASSESSMENT/PLAN:  Burgess Davis was seen today for other. Diagnoses and all orders for this visit:    Cerebral palsy with spastic diplegia ([facility] [address]  - OT assessment reviewed and agree.   - Due to the patient's underlying cerebral palsy and mobility challenges, a standing aide, toilet to shower transfer bench, and shower cradle are all medically necessary for the patient to conduct his ADLs safely.    -     DME Order for (Specify) as OP  -     DME Order for (Specify) as OP  -     DME Order for (Specify) as OP    Essential hypertension  - Normotensive  - he has met JNC standards.  - Continue current regimen.     Hyperglycemia  -A1c 4.8, inconsistent with diabetes  -Monitor    Vitamin D deficiency  -Vitamin D 24.1  -Continue repletion        LEVON Monet - CNP

## 2021-03-22 NOTE — FLOWSHEET NOTE
[facility] - Outpatient Occupational Therapy      Occupational Therapy Daily Treatment Note  Date:  3/22/2021    Patient: Mary Togolese   : 2000   MRN: [id number]  Referring Physician:  Frances Peterson CNP       Medical Diagnosis Information:paraplegia, cerebral palsey                                                  Insurance information: medicare/ medicaid    Date of Injury:   Date of Surgery: rhizotomy when he was about 13    Progress Report: []  Yes  [x]  No     Date Range for reporting period:  Beginning: 3/1/2021  Endin2021    Progress report due (10 Rx/or 30 days whichever is less): visit #10 or 5625 (date)     Recertification due (POC duration/ or 90 days whichever is less): visit #10 or 2021 (date)     Visit # Insurance Allowable Auth required? Date Range    [place] []  Yes  [x]  No na       Latex Allergy:  []No      []Yes  Pacemaker:  [] No       [] Yes     Preferred Language for Healthcare:   [x]English       []other:    Pain level: 0/10     SUBJECTIVE: Patient  in good spirits     Functional Disability Index:  STREAM score 23    OBJECTIVE: See eval      RESTRICTIONS/PRECAUTIONS:     Exercises/Interventions: Treatment focused on trunk control for improved dynamic balance during UE reaching to enhance functional independence. Session initiated with transfer wheelchair to mat with min assist and increased time to improve safety . Initiated with sit to stand times three with mod assist of two at sw. Patient then worked on sit to supine with mod assist of one. Progressed to work on bridge, lateral scoots for functional strengthening upper and lower trunk needing mod assist of 1. Patient then worked on segmental rolling initiating with lower body with CG assist of one and max verbal cues. Patient then assumed prone and worked on transitions to plank with mod assist of two to maintain pelvic stability.   In tall kneeling and quadriped over ball Patient worked on use of ue as base of support with open hand and intrinsic hand function reaching for and manipulating magnets from vertical surface needing cues for metacapel extension and spread and min assist for stable trunk. and then in short sit forward reach completed to same surface needing verbal cues and demonstration for scapular stability, pelvic stability and proprioception of hand function for metacarpel spread and extension. , Completed treatment with sit to stands with mod assist of two times two. .     Therapeutic Exercises  Resistance / level Sets/sec Reps Notes                                                                                Neuromuscular Re-ed / Therapeutic Activities                                                 Manual Intervention                                                     Modalities:     Patient education:  OT evaluation, plan of care, importance of weight bearing in stander for over health, home exercise program, goals. Home Exercise Program:   Patient encouraged to start using stander 3 sessions a day and while in stander completing over head reaches . Patient to try and reach 15 minute intervals in stander. Therapeutic Exercise and NMR:  [x] (45968) Provided verbal/tactile cueing for activities related to strengthening, flexibility, endurance, ROM  for improvements in scapular, scapulothoracic and UE control with self care, reaching, carrying, lifting, house/yardwork, driving/computer work.    [] (26538) Provided verbal/tactile cueing for activities related to improving balance, coordination, kinesthetic sense, posture, motor skill, proprioception  to assist with  scapular, scapulothoracic and UE control with self care, reaching, carrying, lifting, house/yardwork, driving/computer work.   [] Comments:    Therapeutic Activities:    [x] (68224 or 80074) Provided verbal/tactile cueing for activities related to improving balance, coordination, kinesthetic sense, posture, motor skill, proprioception and motor activation to allow for proper function of scapular, scapulothoracic and UE control with self care, carrying, lifting, driving/computer work  [] Comments:    Home Exercise Program:    [x] (01638) Reviewed/Progressed HEP activities related to strengthening, flexibility, endurance, ROM of scapular, scapulothoracic and UE control with self care, reaching, carrying, lifting, house/yardwork, driving/computer work  [] (21710) Reviewed/Progressed HEP activities related to improving balance, coordination, kinesthetic sense, posture, motor skill, proprioception of scapular, scapulothoracic and UE control with self care, reaching, carrying, lifting, house/yardwork, driving/computer work    [] Comments:    Manual Treatments:  PROM / STM / Oscillations-Mobs:  G-I, II, III, IV (PA's, Inf., Post.)  [] ([id number]) Provided manual therapy to mobilize soft tissue/joints of cervical/CT, scapular GHJ and UE for the purpose of modulating pain, promoting relaxation,  increasing ROM, reducing/eliminating soft tissue swelling/inflammation/restriction, improving soft tissue extensibility and allowing for proper ROM for normal function with self care, reaching, carrying, lifting, house/yardwork, driving/computer work  [] Comments:    ADL Training:  [] (58168) Provided self-care/home management training related to activities of daily living and compensatory training, and/or use of adaptive equipment   [] Comments:     Splinting:  [] Fabrication of:   [] (95623) Orthotic/Prosthetic Management, subsequent encounter  [] (75846) Orthotic management and training (fitting and assessment)  [] Comments:      Charges: co treat with PT for safety and increased intensity of treatment.   Timed Code Treatment Minutes: 45   Total Treatment Minutes: 90     [] EVAL (LOW) 43535   [] OT Re-eval (92124)  [] EVAL (MOD) 58011   [] EVAL (HIGH) 01838       [x] Ru (86461) x   1  [] VBAPT(12349)  [x] NMR (87295) x  2 [] Estim (attended) (53489)   [] Manual ([id number]) x      [] US (76588)  [] TA (21775) x      [] Paraffin (30313)  [] ADL  ([phone] [id number]) x    [] Splint/L code:    [] Estim (unattended) ([id number])  [] Fluidotherapy (04729)  [] Other:    GOALS:     Patient stated goal: improved trunk control and standing tolerance to increase independence in self care. []? Progressing: []? Met: []? Not Met: []? Adjusted     Therapist goals for Patient:   Short Term Goals: To be achieved in: 30 days  1. Pt will be independent with clothing management on toilet or in wheelchair to complete toileting with use of grab bar  [x]? Progressing: []? Met: []? Not Met: []? Adjusted  2. Pt will be stand by assist completing scoot transfers on level surfaces  [x]? Progressing: []? Met: []? Not Met: []? Adjusted  3. Patient will be able to stand up to 30 seconds at grab bar for toile ting and lower body dress with min assist.       Long Term Goals: To be achieved by yurjeffrey  1. Pt will will demonstrate an improved stream score of 33  [x]? Progressing: []? Met: []? Not Met: []? Adjusted    Progression Towards Functional goals:  [x] Patient is progressing as expected towards functional goals listed. [] Progression is slowed due to complexities listed. [] Progression has been slowed due to co-morbidities. [] Plan just implemented, too soon to assess goals progression  [] All goals are met  [] Other:     ASSESSMENT:  Patient has demonstrated significant improvement especially in the area of trunk control in spite of cancellations due to illness.      Treatment/Activity Tolerance:  [x] Patient tolerated treatment well [] Patient limited by fatique  [] Patient limited by pain  [] Patient limited by other medical complications  [] Other:     Prognosis: [x] Good [] Fair  [] Poor    Patient Requires Follow-up: [x] Yes  [] No    PLAN: See eval  [] Continue per plan of care [] Alter current plan (see comments)  [x] Plan of care initiated [] Hold pending MD visit [] Discharge    Electronically signed by:           Note: If patient does not return for scheduled/ recommended follow up visits, this note will serve as a discharge from care along with most recent update on progress.

## 2021-03-22 NOTE — FLOWSHEET NOTE
[facility] - Outpatient Physical Therapy  Phone: [phone]   Fax: [phone]    Physical Therapy Daily Treatment Note  Date:  3/22/2021     Patient Name:  Mary Lawrence    :  2000  MRN: [id number]  Medical/Treatment Diagnosis Information:  Diagnosis: Cerebral palsy with spastic diplegia  Treatment Diagnosis: Decreased trunk control impacting ability to complete safe transfers, decreased standing tolerance, LE weakness  Insurance/Certification information:  PT Insurance Information: Medicare & Medicaid  Physician Information:  Referring Practitioner: JOHNNY Hunt  Plan of care signed (Y/N): [x]  Yes []  No     Date of Patient follow up with Physician:      Progress Report: []  Yes  []  No     Date Range for reporting period:  Beginning  3/1/2021   Ending      Progress report due (10 Rx/or 30 days whichever is less): visit #10 or 32     Recertification due (POC duration/ or 90 days whichever is less): visit #12 or 21     Visit # Insurance Allowable Auth required? Date Range    Medical necessity []  Yes  [x]  No n/a     Latex Allergy:  [x]NO      []YES  Preferred Language for Healthcare:   [x]English       []Other:      Functional Scale/Test Evaluation 3/1/2021 30 day  60 day  Discharge   STREAM 23                          Pain level:  0/10  Location:  none    SUBJECTIVE:  Pt reports he is feeling better after being sick last week.      OBJECTIVE:      RESTRICTIONS/PRECAUTIONS: recent Bell's Palsy    Interventions/Exercises:   Therapeutic Exercises (53324) Resistance / level Sets/sec Reps Notes   W/c push ups in preparation for standing                            Neuromuscular Re-ed (57709) /  Gait Training (13313)       Upright posture seated in W/C   X 5 reps holding football, rest of reps completed with L UE bracing on L knee, PT hand assist on sternum and lumbar spine to facilitate                                                     Therapeutic for proper LE, core and hip recruitment, positioning, and eccentric body weight control with ambulation re-education, including ascending & descending stairs     Home Management Training / Self Care:  [] (03805) Provided self-care/home management training related to activities of daily living and compensatory training, and/or use of adaptive equipment for improvement with: ADLs and compensatory training, meal preparation, safety procedures and instruction in use of adaptive equipment, including bathing, grooming, dressing, personal hygiene, basic household cleaning and chores. Therapeutic Exercise and NMR EXR  [x] (49097) Provided verbal/tactile cueing for activities related to strengthening, flexibility, endurance, ROM for improvements in  [x] LE / Core stability: LE, hip, and core control with self care, mobility, lifting, ambulation. [] UE / Shoulder complex: cervical, postural, scapular, scapulothoracic and UE control with self care, reaching, carrying, lifting, house/yardwork, driving, computer work.  [] (10239) Provided verbal/tactile cueing for activities related to improving balance, coordination, kinesthetic sense, posture, motor skill, proprioception to assist with   [] LE / Core stability: LE, hip, and core control in self care, mobility, lifting, ambulation and eccentric single leg control. [] UE / Shoulder complex: cervical, scapular, scapulothoracic and UE control with self care, reaching, carrying, lifting, house/yardwork, driving, computer work.   [] (33389) Therapist is in constant attendance of 2 or more patients providing skilled therapy interventions, but not providing any significant amount of measurable one-on-one time to either patient, for improvements in  [] LE / Core stability: LE, hip, and core control in self care, mobility, lifting, ambulation and eccentric single leg control.    [] UE / Shoulder complex: cervical, scapular, scapulothoracic and UE control with self care, reaching, carrying, lifting, house/yardwork, driving, computer work. Home Exercise Program:    [x] (47111) Reviewed/Progressed HEP activities related to strengthening, flexibility, endurance, ROM of   [] LE / Core stability: core, hip and LE for functional self-care, mobility, lifting and ambulation/stair navigation   [] UE / Shoulder complex: cervical, postural, scapular, scapulothoracic and UE control with self care, reaching, carrying, lifting, house/yardwork, driving, computer work  [] (90299)Reviewed/Progressed HEP activities related to improving balance, coordination, kinesthetic sense, posture, motor skill, proprioception of   [] LE: core, hip and LE for self care, mobility, lifting, and ambulation/stair navigation    [] UE / Shoulder complex: cervical, postural,  scapular, scapulothoracic and UE control with self care, reaching, carrying, lifting, house/yardwork, driving, computer work    Manual Treatments:  PROM / STM / Oscillations-Mobs:  G-I, II, III, IV (PA's, Inf., Post.)  [] (65393) Provided manual therapy to mobilize shoulder complex, hip, LE, and/or cervicothoracic/LS spine soft tissue/joints for the purpose of modulating pain, promoting relaxation,  increasing ROM, reducing/eliminating soft tissue swelling/inflammation/restriction, improving soft tissue extensibility and allowing for proper ROM for normal function with   [] LE / Core stability: self care, mobility, lifting and ambulation. [] UE / Shoulder complex: self care, reaching, carrying, lifting, house/yardwork, driving, computer work. Modalities:  [] (06485) Vasopneumatic compression: Utilized vasopneumatic compression to decrease edema / swelling for the purpose of improving mobility and quad tone / recruitment which will allow for increased overall function including but not limited to self-care, transfers, ambulation, and ascending / descending stairs.          Charges:  Timed Code Treatment Minutes: 45   Total Treatment Minutes: 90 **CO-treat with OT    [] EVAL - LOW (46604)   [] EVAL - MOD (07352)  [] EVAL - HIGH (72483)  [] RE-EVAL (55638)  [x] TE (55158) x 1     [] Ionto  [x] NMR (96847) x 2      [] Vaso  [] Manual (34248) x      [] Ultrasound  [] TA x       [] Mech Traction (29059)  [] Gait Training x     [] ES (un) (90960):   [] Aquatic therapy x   [] Other:   [] Group:     GOALS:   Patient stated goal: improve ability to complete transfers   []? Progressing: []? Met: []? Not Met: []? Adjusted     Therapist goals for Patient:   Short Term Goals: To be achieved in: 2 weeks  1. Independent in HEP and progression per patient tolerance, in order to prevent re-injury. []? Progressing: []? Met: []? Not Met: []? Adjusted  2. Patient will have a decrease in pain to facilitate improvement in movement, function, and ADLs as indicated by Functional Deficits. []? Progressing: []? Met: []? Not Met: []? Adjusted     Long Term Goals: To be achieved in: 6 weeks  1. Patient will report increased standing tolerance to at least 30 minutes in standing frame. []? Progressing: []? Met: []? Not Met: []? Adjusted  2. Patient will demonstrate an increase in strength to good shoulder & hip complex, and core activation to allow for proper functional mobility as indicated by patients Functional Deficits. []? Progressing: []? Met: []? Not Met: []? Adjusted  3. Patient will return to functional activities including demo'ing safe transfers to/from w/c with mod I.    []? Progressing: []? Met: []? Not Met: []? Adjusted  4. Patient will increase STREAM score to 32 or above for increased UE/LE movement in sitting, supine, and standing. []? Progressing: []? Met: []? Not Met: []? Adjusted          Overall Progression Towards Functional goals/ Treatment Progress Update:  [] Patient is progressing as expected towards functional goals listed. [] Progression is slowed due to complexities/Impairments listed.   [] Progression has been slowed due to co-morbidities. [x] Plan just implemented, too soon to assess goals progression <30days   [] Goals require adjustment due to lack of progress  [] Patient is not progressing as expected and requires additional follow up with physician  [] Other    Persisting Functional Limitations/Impairments:  []Sleeping [x]Sitting               []Standing [x]Transfers        []Walking []Kneeling               []Stairs []Squatting / bending   [x]ADLs []Reaching  []Lifting  []Housework  []Driving []Job related tasks  []Sports/Recreation []Other:        ASSESSMENT: Pt continues to demonstrate improvements with trunk control in sitting but also in bed mobility - specifically scooting. Pt also better able to engage quads and glutes this date with standing with minimal cueing - verbal and tactile. Pt's ultimate goal is to walk again, will need to continue to trunk and LE strengthening. Treatment/Activity Tolerance:  [x] Patient able to complete tx [] Patient limited by fatigue  [] Patient limited by pain  [] Patient limited by other medical complications  [] Other:     Prognosis: [] Good [] Fair  [] Poor    Patient Requires Follow-up: [x] Yes  [] No    Plan for next treatment session: transfers, seated core strength, will plan to co-treat with OT when possible    PLAN: See eval. PT 2x / week for 6 weeks. [x] Continue per plan of care [] Alter current plan (see comments)  [] Plan of care initiated [] Hold pending MD visit [] Discharge    Electronically signed by: Krystle Barraza PT, DPT    Note: If patient does not return for scheduled/ recommended follow up visits, his note will serve as a discharge from care along with most recent update on progress.

## 2021-03-24 ENCOUNTER — HOSPITAL ENCOUNTER (OUTPATIENT)
Dept: OCCUPATIONAL THERAPY | Age: 21
Setting detail: THERAPIES SERIES
Discharge: HOME | End: 2021-03-24
Payer: MEDICARE

## 2021-03-24 ENCOUNTER — HOSPITAL ENCOUNTER (OUTPATIENT)
Dept: PHYSICAL THERAPY | Age: 21
Setting detail: THERAPIES SERIES
Discharge: HOME | End: 2021-03-24
Payer: MEDICARE

## 2021-03-24 PROCEDURE — 97530 THERAPEUTIC ACTIVITIES: CPT

## 2021-03-24 PROCEDURE — 97110 THERAPEUTIC EXERCISES: CPT

## 2021-03-24 PROCEDURE — 97112 NEUROMUSCULAR REEDUCATION: CPT

## 2021-03-24 NOTE — FLOWSHEET NOTE
[facility] - Outpatient Occupational Therapy      Occupational Therapy Daily Treatment Note  Date:  3/24/2021    Patient: Gela Leonard   : 2000   MRN: [id number]  Referring Physician:  Harleen Cowan CNP       Medical Diagnosis Information: paraplegia, cerebral palsy                                                  Insurance information: medicare/ medicaid    Date of Injury:   Date of Surgery: rhizotomy when he was about 13    Progress Report: []  Yes  [x]  No     Date Range for reporting period:  Beginning: 3/1/2021  Endin2021    Progress report due (10 Rx/or 30 days whichever is less): visit #10 or 2461 (date)     Recertification due (POC duration/ or 90 days whichever is less): visit #10 or 2021 (date)     Visit # Insurance Allowable Auth required? Date Range    [place] []  Yes  [x]  No na       Latex Allergy:  []No      []Yes  Pacemaker:  [] No       [] Yes     Preferred Language for Healthcare:   [x]English       []other:    Pain level: 0/10     SUBJECTIVE: Patient reports he forgot to use the stander yesterday, but has been using for 1 hour in the evenings. Discussed 15-20 minute increments 3x/day- may be easier for pt's mom to complete 1x/day. Functional Disability Index:  STREAM score 23    OBJECTIVE: See eval      RESTRICTIONS/PRECAUTIONS:     Exercises/Interventions: Treatment focused on trunk control for improved dynamic balance during UE reaching to enhance functional independence. Session initiated with 3 minutes total NuStep seated in wheelchair with redirection to attend to task and verbal cues for full knee extension. Pt completed stand pivot transfer from wheelchair to mat with mod A x2 due to inability to advance RLE.  Seated edge of mat, pt trained on forward weight shift with trunk flexion and BUE weight bearing during scooting due to posterior lean with pt progressing from max A to CGA for scooting throughout session with good carry over noted. Pt positioned with 4-inch block under feet to prevent sliding forward with improved posture following. For reinforcement of dynamic balance during forward weight shift, pt participated in reaching activity, beginning with alternating hands reaching and acting as [place] on edge of mat for ipsilateral and contralateral reaching, progressing to bilateral reaching task for increased trunk control challenge during reach outside of [place] and primarily CGA for trunk control. Once pt retrieved cones, pt completed trunk rotation to place behind self with verbal cues to look in the direction of the reach to increase trunk rotation and head/neck control. Pt then worked on weight shifting forward and back to hit targets with head for trunk control and dynamic stability with primarily CGA. Next, pt attempted sit>stand transfer with walker with difficulty due to posterior lean; removed walker to allow for increased thoracic flexion with improved hip and trunk stability in stance following and pt completing static stance x5 ~20 seconds; PT positioned to assist with knee extension and blocking feet with OT assisting with hip extension and trunk control. Session concluded with mat>wheelchair transfer with min A. Therapeutic Exercises  Resistance / level Sets/sec Reps Notes                                                                                Neuromuscular Re-ed / Therapeutic Activities                                                 Manual Intervention                                                     Modalities:     Patient education:  OT evaluation, plan of care, importance of weight bearing in stander for over health, home exercise program, goals. Home Exercise Program:   Patient encouraged to start using stander 3 sessions a day and while in stander completing over head reaches . Patient to try and reach 15 minute intervals in stander.       Therapeutic Exercise and NMR:  [x] (24767) Provided verbal/tactile cueing for activities related to strengthening, flexibility, endurance, ROM  for improvements in scapular, scapulothoracic and UE control with self care, reaching, carrying, lifting, house/yardwork, driving/computer work.    [] (34742) Provided verbal/tactile cueing for activities related to improving balance, coordination, kinesthetic sense, posture, motor skill, proprioception  to assist with  scapular, scapulothoracic and UE control with self care, reaching, carrying, lifting, house/yardwork, driving/computer work.   [] Comments:    Therapeutic Activities:    [x] (44307 or 81321) Provided verbal/tactile cueing for activities related to improving balance, coordination, kinesthetic sense, posture, motor skill, proprioception and motor activation to allow for proper function of scapular, scapulothoracic and UE control with self care, carrying, lifting, driving/computer work  [] Comments:    Home Exercise Program:    [x] (93723) Reviewed/Progressed HEP activities related to strengthening, flexibility, endurance, ROM of scapular, scapulothoracic and UE control with self care, reaching, carrying, lifting, house/yardwork, driving/computer work  [] (95351) Reviewed/Progressed HEP activities related to improving balance, coordination, kinesthetic sense, posture, motor skill, proprioception of scapular, scapulothoracic and UE control with self care, reaching, carrying, lifting, house/yardwork, driving/computer work    [] Comments:    Manual Treatments:  PROM / STM / Oscillations-Mobs:  G-I, II, III, IV (PA's, Inf., Post.)  [] (73328) Provided manual therapy to mobilize soft tissue/joints of cervical/CT, scapular GHJ and UE for the purpose of modulating pain, promoting relaxation,  increasing ROM, reducing/eliminating soft tissue swelling/inflammation/restriction, improving soft tissue extensibility and allowing for proper ROM for normal function with self care, reaching, carrying, lifting, house/yardwork, driving/computer work  [] Comments:    ADL Training:  [] (50208) Provided self-care/home management training related to activities of daily living and compensatory training, and/or use of adaptive equipment   [] Comments:     Splinting:  [] Fabrication of:   [] (00798) Orthotic/Prosthetic Management, subsequent encounter  [] (14131) Orthotic management and training (fitting and assessment)  [] Comments:      Charges: co treat with PT for safety and increased intensity of treatment  Timed Code Treatment Minutes: 40   Total Treatment Minutes: 85     [] EVAL (LOW) 56812   [] OT Re-eval (09750)  [] EVAL (MOD) 83542   [] EVAL (HIGH) 46767       [x] Ru (20395) x   1  [] BKALD(96701)  [x] NMR (97529) x  2  [] Estim (attended) (56920)   [] Manual ([id number]) x      [] US (68312)  [] TA (02536) x      [] Paraffin (16822)  [] ADL  ([phone]) x    [] Splint/L code:    [] Estim (unattended) (22 362259)  [] Fluidotherapy (37652)  [] Other:    GOALS:    Patient stated goal: improved trunk control and standing tolerance to increase independence in self care. []? Progressing: []? Met: []? Not Met: []? Adjusted     Therapist goals for Patient:   Short Term Goals: To be achieved in: 30 days  1. Pt will be independent with clothing management on toilet or in wheelchair to complete toileting with use of grab bar  [x]? Progressing: []? Met: []? Not Met: []? Adjusted  2. Pt will be stand by assist completing scoot transfers on level surfaces  [x]? Progressing: []? Met: []? Not Met: []? Adjusted  3. Patient will be able to stand up to 30 seconds at grab bar for toile ting and lower body dress with min assist.       Long Term Goals: To be achieved by dishcharge  1. Pt will will demonstrate an improved stream score of 33  [x]? Progressing: []? Met: []? Not Met: []? Adjusted    Progression Towards Functional goals:  [x] Patient is progressing as expected towards functional goals listed. [] Progression is slowed due to complexities listed.   []

## 2021-03-24 NOTE — FLOWSHEET NOTE
[facility] - Outpatient Physical Therapy  Phone: [phone]   Fax: [phone]    Physical Therapy Daily Treatment Note  Date:  3/24/2021     Patient Name:  Deondre Melendez    :  2000  MRN: [id number]  Medical/Treatment Diagnosis Information:  Diagnosis: Cerebral palsy with spastic diplegia  Treatment Diagnosis: Decreased trunk control impacting ability to complete safe transfers, decreased standing tolerance, LE weakness  Insurance/Certification information:  PT Insurance Information: Medicare & Medicaid  Physician Information:  Referring Practitioner: JOHNNY Zapata  Plan of care signed (Y/N): [x]  Yes []  No     Date of Patient follow up with Physician:      Progress Report: []  Yes  []  No     Date Range for reporting period:  Beginning  3/1/2021   Ending      Progress report due (10 Rx/or 30 days whichever is less): visit #10 or 43     Recertification due (POC duration/ or 90 days whichever is less): visit #12 or 21     Visit # Insurance Allowable Auth required? Date Range    Medical necessity []  Yes  [x]  No n/a     Latex Allergy:  [x]NO      []YES  Preferred Language for Healthcare:   [x]English       []Other:      Functional Scale/Test Evaluation 3/1/2021 30 day  60 day  Discharge   STREAM 23                          Pain level:  0/10  Location:  none    SUBJECTIVE:  Pt reports he forgot to use the stander yesterday.      OBJECTIVE:      RESTRICTIONS/PRECAUTIONS: recent Bell's Palsy    Interventions/Exercises:   Therapeutic Exercises (27207) Resistance / level Sets/sec Reps Notes   W/c push ups in preparation for standing                            Neuromuscular Re-ed (87416) /  Gait Training (65938)       Upright posture seated in W/C   X 5 reps holding football, rest of reps completed with L UE bracing on L knee, PT hand assist on sternum and lumbar spine to facilitate                                                     Therapeutic Activities (00733) /  Functional Tasks       Cone reaching across midline in seated  2 Pt seated in w/c using seat belt to help stay in w/c          [facility] reaching across midline and diagonally   Pt seated in w/c using seat belt to help stay in w/c               STS to std walker            Transfer to w/c from mat table            STS from w/c to mat table - with large bolster placed on table in front of patient to hold onto   Min A, w/c close to mat table to assist in blocking pt's knees. Manual Intervention (90172)                                                   Sessions:  3/24: Session today began with pt using Nustep while sitting in his w/c x 3 min total, but pt stopped occasionally to talk and needed cueing to continue. Pt then transferred to mat table with stand pivot transfer. Pt was able to stand fully upright, mod A x 2, but then was not table to advance R LE before sitting. Worked on trunk flexion to shift COG forward while scooting hips/buttocks back on mat table as pt has tendency to lean backwards when trying to scoot backwards on a surface. Pt encouraged through all movement today to keep hands flat on table instead of pushing through knuckles. Pt then worked on weight shifting at trunk forward and backward and was given targets to hit with forehead. After the initital task, pt could perform pushing up and scooting his hips backwards onto the table with CGA. With 4 in box placed under pt's feet for stability, pt worked on reaching outside [place] with forward lean both ipsilaterally and contralaterally to cones and then sitting back up and rotating his trunk to the R and L to place the cones behind him. Pt able to do this with CGA to Stacey for trunk control. Pt then worked on standing without walker, pt was in front of pt to assist with knee extension and block feet with OT assisting with hip ext and trunk control. Pt stood a total of 5 times. 3/22: Pt transferred from w/c to EO.  Pt then completed 1/2 stands to ladder with assist to block feet from moving and help push B knees into extension. Pt then went from EOM>sidelying>supine and completed 10 bridges with blocking at LEs. Pt worked on rolling from supine to L and R, with more difficulty rolling to R. Pt used momentum from UEs but was cued to not reach and pull. Stacey needed to progress LEs to each side. Pt rolled to prone and with PT/OT blocking LEs and assisting at knees (modA-maxA), performed 5 planks for 5-10 second holds. Pt able to better press up with extended arms vs forearms. Pt achieved tall kneeling with a swiss ball with min A at his LEs, then progressed to Qped over the swiss ball with reaching with B UEs. Stacey at LEs progressed to Mod A as pt faitgued. Next pt sat EOB with reaching to magnets, then worked on reaching [place] while holding a small bolster to facilitate open hands. Pt with Stacey needed for these activities to maintain trunk control. Pt then held each end of the bolster and hit a ball while maintaining trunk control. ModA needed to maintain trunk control as pt was quite fatigued by the end of the session. Pt then stood with a std walker x 3 with mod A x 2. Pt transferred back to  with min A for LE placement. 3/9: session began with transferring from w/c to mat table with min Ax 2. Increased time required and cueing to improve safety. Once on the mat table, pt worked on scooting leading with upper body first and performing small abdominal crunch then bridging to move hips. Pt required assistance to keep LEs stable in a hooklying position. Pt also performed rolling to each side R/L x 5, in which he rolled hips first then his upper body. Next pt rolled to prone and pushed through UEs to get into quadruped. Mod Ax 2 required for stability at pelvis and LEs. Pt cued for spine neutral position and then weight shifting to prepare for crawling.  Pt able to move R UE and LE forward with mod A, but then was too fatigued and rested. Pt sat in tall kneeling and was able to attain this position by pushing up on a swiss ball. Pt transitioned next to sitting EOB and shifting weight fwd to stand at standard walker. First he completed partial stands with mod A x 2, with PT/OT blocking knees and feet. OT suggested pt stand at ladder but pt reported he was more comfortable using std walker. Lastly pt laid supine and rotated side to side with min A x 1, with reaching arm diagonally and protracting/retracting hips x 10 B/L. Pt completed a stand/pivot transfer from the mat to the W/C with min A x 1 at the end of the session. Modalities:     Pt. Education:  -patient educated on diagnosis, prognosis and expectations for rehab  -all patient questions were answered    HEP instruction:      NMR and Therapeutic Activities:    [x] (16170 or 25064) Provided verbal/tactile cueing for activities related to improving balance, coordination, kinesthetic sense, posture, motor skill, proprioception and motor activation to allow for proper function of   [x] LE / Core, hip and LE with self care and ADLs  [x] UE / Shoulder complex: cervical, postural, scapular, scapulothoracic and UE control with self care, carrying, lifting, driving, computer work.   [] (20110) Gait Re-education- Provided training and instruction to the patient for proper LE, core and hip recruitment, positioning, and eccentric body weight control with ambulation re-education, including ascending & descending stairs     Home Management Training / Self Care:  [] (04256) Provided self-care/home management training related to activities of daily living and compensatory training, and/or use of adaptive equipment for improvement with: ADLs and compensatory training, meal preparation, safety procedures and instruction in use of adaptive equipment, including bathing, grooming, dressing, personal hygiene, basic household cleaning and chores.      Therapeutic Exercise and NMR EXR  [x] (91389) Provided re-injury. []? Progressing: []? Met: []? Not Met: []? Adjusted  2. Patient will have a decrease in pain to facilitate improvement in movement, function, and ADLs as indicated by Functional Deficits. []? Progressing: []? Met: []? Not Met: []? Adjusted     Long Term Goals: To be achieved in: 6 weeks  1. Patient will report increased standing tolerance to at least 30 minutes in standing frame. []? Progressing: []? Met: []? Not Met: []? Adjusted  2. Patient will demonstrate an increase in strength to good shoulder & hip complex, and core activation to allow for proper functional mobility as indicated by patients Functional Deficits. []? Progressing: []? Met: []? Not Met: []? Adjusted  3. Patient will return to functional activities including demo'ing safe transfers to/from w/c with mod I.    []? Progressing: []? Met: []? Not Met: []? Adjusted  4. Patient will increase STREAM score to 32 or above for increased UE/LE movement in sitting, supine, and standing. []? Progressing: []? Met: []? Not Met: []? Adjusted          Overall Progression Towards Functional goals/ Treatment Progress Update:  [] Patient is progressing as expected towards functional goals listed. [] Progression is slowed due to complexities/Impairments listed. [] Progression has been slowed due to co-morbidities. [x] Plan just implemented, too soon to assess goals progression <30days   [] Goals require adjustment due to lack of progress  [] Patient is not progressing as expected and requires additional follow up with physician  [] Other    Persisting Functional Limitations/Impairments:  []Sleeping [x]Sitting               []Standing [x]Transfers        []Walking []Kneeling               []Stairs []Squatting / bending   [x]ADLs []Reaching  []Lifting  []Housework  []Driving []Job related tasks  []Sports/Recreation []Other:        ASSESSMENT: Pt already showing considerable improvements with trunk control and during his sessions.  He is able to

## 2021-03-29 ENCOUNTER — HOSPITAL ENCOUNTER (OUTPATIENT)
Dept: PHYSICAL THERAPY | Age: 21
Setting detail: THERAPIES SERIES
Discharge: HOME | End: 2021-03-29
Payer: MEDICARE

## 2021-03-29 ENCOUNTER — HOSPITAL ENCOUNTER (OUTPATIENT)
Dept: OCCUPATIONAL THERAPY | Age: 21
Setting detail: THERAPIES SERIES
Discharge: HOME | End: 2021-03-29
Payer: MEDICARE

## 2021-03-29 NOTE — FLOWSHEET NOTE
Occupational Therapy  Cancellation/No-show Note  Patient Name:  Deondre Melendez   :  2000   Date:  3/29/2021  Cancelled visits to date: 3  No-shows to date: 0    Patient status for today's appointment patient:  [x]  Cancelled - 3/10, 3/15, 3/29  []  Rescheduled appointment  []  No-show     Reason given by patient:  []  Patient ill  []  Conflicting appointment  [x]  No transportation    []  Conflict with work  []  No reason given  []  Other:     Comments:   Pt's mom called to report transportation arrived late and pt would be unable to attend appt due to \"needing to leave as soon as he gets started. \" PT spoke with pt's mom and reminded of Wednesday appt, as well as requested transportation be adjusted to allow for full 90 minutes of tx time.      Phone call information:   []  Phone call made today to patient at _ time at number provided:      []  Patient answered, conversation as follows:    []  Patient did not answer, message left as follows:  []  Phone call not made today    Electronically signed by:  Mick Paredes, OTR/L [id number]

## 2021-03-29 NOTE — PROGRESS NOTES
[facility]    Physical Therapy  Cancellation/No-show Note  Patient Name:  Ama Hernandez  :  2000   Date:  3/29/2021    Cancelled visits to date: 3  No-shows to date: 0    For today's appointment patient:  [x]  Cancelled 3/10, 3/15, 3/29  []  Rescheduled appointment  []  No-show     Reason given by patient:  []  Patient ill  []  Conflicting appointment  []  No transportation    []  Conflict with work  []  No reason given  [x]  Other:     Comments: Pt's mom called to report transportation arrived late and pt would be unable to attend appt due to \"needing to leave as soon as he gets started. \" PT spoke with pt's mom and reminded of Wednesday appt, as well as requested transportation be adjusted to allow for full 90 minutes of tx time. Phone call information:   []  Phone call made today to patient at _ time at number provided:      []  Patient answered, conversation as follows:    []  Patient did not answer, message left as follows:  []  Phone call not made today  [x]  Phone call not needed - pt contacted us to cancel and provided reason for cancellation.      Electronically signed by:  Alton Caceres, PT DPT

## 2021-03-31 ENCOUNTER — HOSPITAL ENCOUNTER (OUTPATIENT)
Dept: PHYSICAL THERAPY | Age: 21
Setting detail: THERAPIES SERIES
Discharge: HOME | End: 2021-03-31
Payer: MEDICARE

## 2021-03-31 ENCOUNTER — HOSPITAL ENCOUNTER (OUTPATIENT)
Dept: OCCUPATIONAL THERAPY | Age: 21
Setting detail: THERAPIES SERIES
Discharge: HOME | End: 2021-03-31
Payer: MEDICARE

## 2021-03-31 PROCEDURE — 97530 THERAPEUTIC ACTIVITIES: CPT

## 2021-03-31 PROCEDURE — 97116 GAIT TRAINING THERAPY: CPT

## 2021-03-31 PROCEDURE — 97112 NEUROMUSCULAR REEDUCATION: CPT

## 2021-03-31 PROCEDURE — 97110 THERAPEUTIC EXERCISES: CPT

## 2021-03-31 NOTE — FLOWSHEET NOTE
Re-ed / Therapeutic Activities                                                 Manual Intervention                                                     Modalities:     Patient education:  OT evaluation, plan of care, importance of weight bearing in stander for over health, home exercise program, goals. Home Exercise Program:   Patient encouraged to start using stander 3 sessions a day and while in stander completing over head reaches . Patient to try and reach 15 minute intervals in stander. Therapeutic Exercise and NMR:  [x] (99392) Provided verbal/tactile cueing for activities related to strengthening, flexibility, endurance, ROM  for improvements in scapular, scapulothoracic and UE control with self care, reaching, carrying, lifting, house/yardwork, driving/computer work.    [] (57386) Provided verbal/tactile cueing for activities related to improving balance, coordination, kinesthetic sense, posture, motor skill, proprioception  to assist with  scapular, scapulothoracic and UE control with self care, reaching, carrying, lifting, house/yardwork, driving/computer work.   [] Comments:    Therapeutic Activities:    [x] (43931 or 09318) Provided verbal/tactile cueing for activities related to improving balance, coordination, kinesthetic sense, posture, motor skill, proprioception and motor activation to allow for proper function of scapular, scapulothoracic and UE control with self care, carrying, lifting, driving/computer work  [] Comments:    Home Exercise Program:    [x] (50840) Reviewed/Progressed HEP activities related to strengthening, flexibility, endurance, ROM of scapular, scapulothoracic and UE control with self care, reaching, carrying, lifting, house/yardwork, driving/computer work  [] (58621) Reviewed/Progressed HEP activities related to improving balance, coordination, kinesthetic sense, posture, motor skill, proprioception of scapular, scapulothoracic and UE control with self care, reaching, completing scoot transfers on level surfaces  [x]? Progressing: []? Met: []? Not Met: []? Adjusted  3. Patient will be able to stand up to 30 seconds at grab bar for toile ting and lower body dress with min assist.       Long Term Goals: To be achieved by alton  1. Pt will will demonstrate an improved stream score of 33  [x]? Progressing: []? Met: []? Not Met: []? Adjusted    Progression Towards Functional goals:  [x] Patient is progressing as expected towards functional goals listed. [] Progression is slowed due to complexities listed. [] Progression has been slowed due to co-morbidities. [] Plan just implemented, too soon to assess goals progression  [] All goals are met  [] Other:     ASSESSMENT:  Patient has demonstrated significant improvement especially in the area of trunk control in spite of cancellations due to illness. Treatment/Activity Tolerance:  [x] Patient tolerated treatment well [] Patient limited by fatique  [] Patient limited by pain  [] Patient limited by other medical complications  [] Other:     Prognosis: [x] Good [] Fair  [] Poor    Patient Requires Follow-up: [x] Yes  [] No    PLAN: See eval  [x] Continue per plan of care [] Alter current plan (see comments)  [x] Plan of care initiated (MD cosigned) [] Hold pending MD visit [] Discharge    Electronically signed by:     Shikha Reyes OTR/L [id number]    Note: If patient does not return for scheduled/ recommended follow up visits, this note will serve as a discharge from care along with most recent update on progress.

## 2021-03-31 NOTE — FLOWSHEET NOTE
[facility] - Outpatient Physical Therapy  Phone: [phone]   Fax: [phone]    Physical Therapy Daily Treatment Note  Date:  3/31/2021     Patient Name:  Adrienne Loomis    :  2000  MRN: [id number]  Medical/Treatment Diagnosis Information:  Diagnosis: Cerebral palsy with spastic diplegia  Treatment Diagnosis: Decreased trunk control impacting ability to complete safe transfers, decreased standing tolerance, LE weakness  Insurance/Certification information:  PT Insurance Information: Medicare & Medicaid  Physician Information:  Referring Practitioner: JOHNNY Leyva  Plan of care signed (Y/N): [x]  Yes []  No     Date of Patient follow up with Physician:      Progress Report: []  Yes  []  No     Date Range for reporting period:  Beginning  3/1/2021   Ending      Progress report due (10 Rx/or 30 days whichever is less): visit #10 or 52     Recertification due (POC duration/ or 90 days whichever is less): visit #12 or 21     Visit # Insurance Allowable Auth required? Date Range    Medical necessity []  Yes  [x]  No n/a     Latex Allergy:  [x]NO      []YES  Preferred Language for Healthcare:   [x]English       []Other:      Functional Scale/Test Evaluation 3/1/2021 30 day  60 day  Discharge   STREAM 23                          Pain level:  0/10  Location:  none    SUBJECTIVE:    Able to use stander again Monday and Tuesday this week, for about an hour Monday and 30 mins Tuesday. Was able to stand from wheelchair and even hold his arms out in front, controlling himself, before sitting back down. Open to using //bars to stand and maybe take some steps today.       OBJECTIVE:    Co-treat w/OT for safety and assistance    RESTRICTIONS/PRECAUTIONS: recent Bell's Palsy    Interventions/Exercises:   Therapeutic Exercises (90226) Resistance / level Sets/sec Reps Notes   W/c push ups in preparation for standing                            Neuromuscular Re-ed (44618) /  Gait Training (24082)       Upright posture seated in W/C   X 5 reps holding football, rest of reps completed with L UE bracing on L knee, PT hand assist on sternum and lumbar spine to facilitate                                Gait Training:  Amb in //bars   8 ft    x2   -manual required to advance LEs, pt activation hip extensors to initiate swing phase, demo'd adequate weight-shifting. OT provided assist & w/c follow for safety                 Therapeutic Activities (40533) /  Functional Tasks       [facility] reaching across midline in seated  2 Pt seated in w/c using seat belt to help stay in w/c          [facility] reaching across midline and diagonally   Pt seated in w/c using seat belt to help stay in w/c               STS to std walker            Transfer to w/c from mat table            STS from w/c to mat table - with large bolster placed on table in front of patient to hold onto   Min A, w/c close to mat table to assist in blocking pt's knees. Manual Intervention (11074)                                                   Sessions:     3/31:  Pt completed stand pivot transfer from wheelchair to mat to R side with min A x2; good carry over noted from last session regarding forward lean and weight shift prior to stand, manual required for improved LE placement. Seated edge of mat, pt completed posture training for thoracic extension and maintaining midline during hamstring stretch of RLE and conversation regarding transfers and use of stander at home. Pt reports he has a raised toilet seat with arm rests that he can transfer to without assistance from his mom. Pt completed mat to wheelchair transfer with min A to L side to transition to parallel bars, required manual assist for improved LE placement.  At parallel bars, pt completed sit>stand transfer with BUE assist.   For reinforcement of trunk control and dynamic balance during forward weight shift, pt participated in reaching activity with alternating UEs completing contralateral reaching and [place] changes on parallel bar with SBA for trunk control. Progressed to completing in static stance with increased trunk rotation x6 each side with rest break in between sides, followed by ipsilateral reaching behind self x5 each side to improve trunk control and balance required for toileting tasks. BLEs blocked at knees with 1 instance L knee buckling; pt self-corrected into knee extension, but demonstrated difficulty with trunk correction to lean to R side to return to midline. In seated, pt participated in L><R weight shift to touch alternating shoulders to parallel bars x8 each side, followed by forward><back weight shift x8 with hands placed on knees for UE weight bearing and focus on eccentric control. Pt completed sit>stand with BUE assist using parallel bars to participate in standing without UE support with knees blocked and min-mod A for trunk stability for ~45 seconds prior to sitting on gerardo disc. To conclude session, pt sat on gerardo disc to complete progressively increased head/neck and trunk rotation to alternating sides for trunk control with verbal cues to prevent thoracic flexion. 3/24: Session today began with pt using Nustep while sitting in his w/c x 3 min total, but pt stopped occasionally to talk and needed cueing to continue. Pt then transferred to mat table with stand pivot transfer. Pt was able to stand fully upright, mod A x 2, but then was not table to advance R LE before sitting. Worked on trunk flexion to shift COG forward while scooting hips/buttocks back on mat table as pt has tendency to lean backwards when trying to scoot backwards on a surface. Pt encouraged through all movement today to keep hands flat on table instead of pushing through knuckles. Pt then worked on weight shifting at trunk forward and backward and was given targets to hit with forehead.  After the initital task, pt could perform pushing up and scooting his hips backwards onto the table with CGA. With 4 in box placed under pt's feet for stability, pt worked on reaching outside [place] with forward lean both ipsilaterally and contralaterally to cones and then sitting back up and rotating his trunk to the R and L to place the cones behind him. Pt able to do this with CGA to Stacey for trunk control. Pt then worked on standing without walker, pt was in front of pt to assist with knee extension and block feet with OT assisting with hip ext and trunk control. Pt stood a total of 5 times. 3/22: Pt transferred from w/c to EOM. Pt then completed 1/2 stands to ladder with assist to block feet from moving and help push B knees into extension. Pt then went from EOM>sidelying>supine and completed 10 bridges with blocking at LEs. Pt worked on rolling from supine to L and R, with more difficulty rolling to R. Pt used momentum from UEs but was cued to not reach and pull. Stacey needed to progress LEs to each side. Pt rolled to prone and with PT/OT blocking LEs and assisting at knees (modA-maxA), performed 5 planks for 5-10 second holds. Pt able to better press up with extended arms vs forearms. Pt achieved tall kneeling with a swiss ball with min A at his LEs, then progressed to Qped over the swiss ball with reaching with B UEs. Stacey at LEs progressed to Mod A as pt faitgued. Next pt sat EOB with reaching to magnets, then worked on reaching [place] while holding a small bolster to facilitate open hands. Pt with Stacey needed for these activities to maintain trunk control. Pt then held each end of the bolster and hit a ball while maintaining trunk control. ModA needed to maintain trunk control as pt was quite fatigued by the end of the session. Pt then stood with a std walker x 3 with mod A x 2. Pt transferred back to  with min A for LE placement. 3/9: session began with transferring from w/c to mat table with min Ax 2. Increased time required and cueing to improve safety.  Once on the mat table, pt worked on scooting leading with upper body first and performing small abdominal crunch then bridging to move hips. Pt required assistance to keep LEs stable in a hooklying position. Pt also performed rolling to each side R/L x 5, in which he rolled hips first then his upper body. Next pt rolled to prone and pushed through UEs to get into quadruped. Mod Ax 2 required for stability at pelvis and LEs. Pt cued for spine neutral position and then weight shifting to prepare for crawling. Pt able to move R UE and LE forward with mod A, but then was too fatigued and rested. Pt sat in tall kneeling and was able to attain this position by pushing up on a swiss ball. Pt transitioned next to sitting EOB and shifting weight fwd to stand at standard walker. First he completed partial stands with mod A x 2, with PT/OT blocking knees and feet. OT suggested pt stand at ladder but pt reported he was more comfortable using std walker. Lastly pt laid supine and rotated side to side with min A x 1, with reaching arm diagonally and protracting/retracting hips x 10 B/L. Pt completed a stand/pivot transfer from the mat to the W/C with min A x 1 at the end of the session.      Modalities:     Pt. Education:  -patient educated on diagnosis, prognosis and expectations for rehab  -all patient questions were answered    HEP instruction:      NMR and Therapeutic Activities:    [x] (56785 or 35626) Provided verbal/tactile cueing for activities related to improving balance, coordination, kinesthetic sense, posture, motor skill, proprioception and motor activation to allow for proper function of   [x] LE / Core, hip and LE with self care and ADLs  [x] UE / Shoulder complex: cervical, postural, scapular, scapulothoracic and UE control with self care, carrying, lifting, driving, computer work.   [] (20076) Gait Re-education- Provided training and instruction to the patient for proper LE, core and hip recruitment, positioning, and eccentric body weight control with ambulation re-education, including ascending & descending stairs     Home Management Training / Self Care:  [] (19768) Provided self-care/home management training related to activities of daily living and compensatory training, and/or use of adaptive equipment for improvement with: ADLs and compensatory training, meal preparation, safety procedures and instruction in use of adaptive equipment, including bathing, grooming, dressing, personal hygiene, basic household cleaning and chores. Therapeutic Exercise and NMR EXR  [x] (16125) Provided verbal/tactile cueing for activities related to strengthening, flexibility, endurance, ROM for improvements in  [x] LE / Core stability: LE, hip, and core control with self care, mobility, lifting, ambulation. [] UE / Shoulder complex: cervical, postural, scapular, scapulothoracic and UE control with self care, reaching, carrying, lifting, house/yardwork, driving, computer work.  [] (76146) Provided verbal/tactile cueing for activities related to improving balance, coordination, kinesthetic sense, posture, motor skill, proprioception to assist with   [] LE / Core stability: LE, hip, and core control in self care, mobility, lifting, ambulation and eccentric single leg control. [] UE / Shoulder complex: cervical, scapular, scapulothoracic and UE control with self care, reaching, carrying, lifting, house/yardwork, driving, computer work.   [] (69670) Therapist is in constant attendance of 2 or more patients providing skilled therapy interventions, but not providing any significant amount of measurable one-on-one time to either patient, for improvements in  [] LE / Core stability: LE, hip, and core control in self care, mobility, lifting, ambulation and eccentric single leg control. [] UE / Shoulder complex: cervical, scapular, scapulothoracic and UE control with self care, reaching, carrying, lifting, house/yardwork, driving, computer work.      Home Exercise Program:    [x] (57632) Reviewed/Progressed HEP activities related to strengthening, flexibility, endurance, ROM of   [] LE / Core stability: core, hip and LE for functional self-care, mobility, lifting and ambulation/stair navigation   [] UE / Shoulder complex: cervical, postural, scapular, scapulothoracic and UE control with self care, reaching, carrying, lifting, house/yardwork, driving, computer work  [] (78744)Reviewed/Progressed HEP activities related to improving balance, coordination, kinesthetic sense, posture, motor skill, proprioception of   [] LE: core, hip and LE for self care, mobility, lifting, and ambulation/stair navigation    [] UE / Shoulder complex: cervical, postural,  scapular, scapulothoracic and UE control with self care, reaching, carrying, lifting, house/yardwork, driving, computer work    Manual Treatments:  PROM / STM / Oscillations-Mobs:  G-I, II, III, IV (PA's, Inf., Post.)  [] (14759) Provided manual therapy to mobilize shoulder complex, hip, LE, and/or cervicothoracic/LS spine soft tissue/joints for the purpose of modulating pain, promoting relaxation,  increasing ROM, reducing/eliminating soft tissue swelling/inflammation/restriction, improving soft tissue extensibility and allowing for proper ROM for normal function with   [] LE / Core stability: self care, mobility, lifting and ambulation. [] UE / Shoulder complex: self care, reaching, carrying, lifting, house/yardwork, driving, computer work. Modalities:  [] (14690) Vasopneumatic compression: Utilized vasopneumatic compression to decrease edema / swelling for the purpose of improving mobility and quad tone / recruitment which will allow for increased overall function including but not limited to self-care, transfers, ambulation, and ascending / descending stairs.          Charges:  Timed Code Treatment Minutes: 40   Total Treatment Minutes: 85   **CO-treat with OT    [] EVAL - LOW (24415)   [] EVAL - MOD (09883)  [] EVAL - HIGH (88938)  [] RE-EVAL (09122)  [] TE (96905) x      [] Ionto  [x] NMR (62245) x 1      [] Vaso  [] Manual (42099) x      [] Ultrasound  [x] TA x 1      [] Mech Traction (96303)  [x] Gait Training x  1   [] ES (un) (56747):   [] Aquatic therapy x   [] Other:   [] Group:     GOALS:   Patient stated goal: improve ability to complete transfers   []? Progressing: []? Met: []? Not Met: []? Adjusted     Therapist goals for Patient:   Short Term Goals: To be achieved in: 2 weeks  1. Independent in HEP and progression per patient tolerance, in order to prevent re-injury. []? Progressing: []? Met: []? Not Met: []? Adjusted  2. Patient will have a decrease in pain to facilitate improvement in movement, function, and ADLs as indicated by Functional Deficits. []? Progressing: []? Met: []? Not Met: []? Adjusted     Long Term Goals: To be achieved in: 6 weeks  1. Patient will report increased standing tolerance to at least 30 minutes in standing frame. []? Progressing: []? Met: []? Not Met: []? Adjusted  2. Patient will demonstrate an increase in strength to good shoulder & hip complex, and core activation to allow for proper functional mobility as indicated by patients Functional Deficits. []? Progressing: []? Met: []? Not Met: []? Adjusted  3. Patient will return to functional activities including demo'ing safe transfers to/from w/c with mod I.    []? Progressing: []? Met: []? Not Met: []? Adjusted  4. Patient will increase STREAM score to 32 or above for increased UE/LE movement in sitting, supine, and standing. []? Progressing: []? Met: []? Not Met: []? Adjusted          Overall Progression Towards Functional goals/ Treatment Progress Update:  [] Patient is progressing as expected towards functional goals listed. [] Progression is slowed due to complexities/Impairments listed. [] Progression has been slowed due to co-morbidities.   [x] Plan just implemented, too soon to assess goals progression <30days [] Goals require adjustment due to lack of progress  [] Patient is not progressing as expected and requires additional follow up with physician  [] Other    Persisting Functional Limitations/Impairments:  []Sleeping [x]Sitting               []Standing [x]Transfers        []Walking []Kneeling               []Stairs []Squatting / bending   [x]ADLs []Reaching  []Lifting  []Housework  []Driving []Job related tasks  []Sports/Recreation []Other:        ASSESSMENT:    Patient demo'd good tolerance to gait in //bars, did require man assist to advance BLEs but completed requested distance twice. Patient's core stability is improving as well. With introduction of //bars, pt aware to minimize dependency on UEs to allow BLEs increased workload in safe environment during dynamic tasks. Continue to progress core stability and WB'ing through BLEs. Treatment/Activity Tolerance:  [x] Patient able to complete tx  [] Patient limited by fatigue  [] Patient limited by pain  [] Patient limited by other medical complications  [] Other:     Prognosis: [] Good [] Fair  [] Poor    Patient Requires Follow-up: [x] Yes  [] No    Plan for next treatment session: transfers, seated core strength, will plan to co-treat with OT when possible    PLAN: See eval. PT 2x / week for 6 weeks. [x] Continue per plan of care [] Alter current plan (see comments)  [] Plan of care initiated [] Hold pending MD visit [] Discharge    Electronically signed by: Gloria Mojica PT, DPT    Note: If patient does not return for scheduled/ recommended follow up visits, his note will serve as a discharge from care along with most recent update on progress.

## 2021-04-01 ENCOUNTER — HOSPITAL ENCOUNTER (OUTPATIENT)
Dept: OCCUPATIONAL THERAPY | Age: 21
Setting detail: THERAPIES SERIES
Discharge: HOME | End: 2021-04-01
Payer: MEDICARE

## 2021-04-01 ENCOUNTER — HOSPITAL ENCOUNTER (OUTPATIENT)
Dept: PHYSICAL THERAPY | Age: 21
Setting detail: THERAPIES SERIES
Discharge: HOME | End: 2021-04-01
Payer: MEDICARE

## 2021-04-01 PROCEDURE — 97530 THERAPEUTIC ACTIVITIES: CPT

## 2021-04-01 PROCEDURE — 97116 GAIT TRAINING THERAPY: CPT

## 2021-04-01 PROCEDURE — 97112 NEUROMUSCULAR REEDUCATION: CPT

## 2021-04-01 PROCEDURE — 97110 THERAPEUTIC EXERCISES: CPT

## 2021-04-01 NOTE — PROGRESS NOTES
[facility] - Outpatient Occupational Therapy      Occupational Therapy Daily Treatment/Progress Note  Date:  2021    Patient: Nona Alvarez   : 2000   MRN: [id number]  Referring Physician:  Randee Hernandez CNP       Medical Diagnosis Information: paraplegia, cerebral palsy                                                  Insurance information: medicare/ medicaid    Date of Injury:   Date of Surgery: rhizotomy when he was about 13    Progress Report: [x]  Yes  []  No     Date Range for reporting period:  Beginning: 3/1/2021  Endin2021    Progress report due (10 Rx/or 30 days whichever is less): visit #17 or 200    Recertification due (POC duration/ or 90 days whichever is less): visit #12 or 2021    Visit # Insurance Allowable Auth required? Date Range    [place] []  [place]  [x]  No na       Latex Allergy:  []No      []Yes  Pacemaker:  [] No       [] Yes     Preferred Language for Healthcare:   [x]English       []other:    Pain level: 0/10     SUBJECTIVE:   Pt's mom initially called to cancel session due to transportation, but then pt was able to attend. PT thinks she saw bed bug on pt's leg during session with pt reporting they have \"had trouble with bed bugs before. \" Plan to call pt's mom to get more information and change pt into scrubs prior to tx at next session. Functional Disability Index:  STREAM: score 23    OBJECTIVE: See andres    21: 3 minutes static stance at parallel bars with CGA x2 for trunk control and blocking knees      RESTRICTIONS/PRECAUTIONS: fall risk    Exercises/Interventions: Treatment focused on trunk control for improved dynamic balance during UE reaching, transfers, and mobility to enhance functional independence. Session initiated with assisting pt to restroom with pt completing toileting using urinal and hand hygiene mod I. In // bars, pt completed STS with B UE assist on bars and CGA for trunk control.  Pt completed forward stepping with mod A for R LE advancement and max A for L LE advancement. Able to complete 6-8 steps forward each time. PT blocking pt's stance leg and assisting with advancing opp LE. Pt then able to complete retro stepping in bars, first trail about 1/2 length of bars, second trial with full length of bars with CGA for trunk control and improved extension through UEs. Pt cued for glute activation for hip extension but had difficulty activating glutes and hamstrings. Pt then completed static standing for 3 min with CGA x2 and assist to block knees. Pt completed stand pivot transfer from wheelchair to mat to R side with min A x2; good carry over noted from last session regarding forward lean and weight shift prior to stand. Seated edge of mat with feet on 6 in step, pt worked on reaching activities reaching across midline outside [place] for a ball, then reaching the ball behind his back and passing it to his opp hand for UE function and trunk rotation with emphasis on maintaining trunk control. Pt blocked pt's R LE to keep in a neutral position. Pt then completed ball toss with OT and was able to maintain his trunk with min A to block R LE in neutral position and CGA for his trunk. Pt demonstrated ~80% accuracy with tossing and catching ball with verbal cues for finger extension during catch and grasp; good reaction time noted for catch. With increased speed of task requiring quick weight shifts for trunk stability, pt demonstrated decreased control with min A for dynamic sitting balance. Pt completed mat to wheelchair transfer with min A to L side to conclude session. Plan to stretch LEs prior to standing at next session due to tightness noted in hamstrings affecting posture and possibly LE advancement.      Therapeutic Exercises  Resistance / level Sets/sec Reps Notes                                                                                Neuromuscular Re-ed / Therapeutic Activities Manual Intervention                                                     Modalities:     Patient education:  OT evaluation, plan of care, importance of weight bearing in stander for overall health, home exercise program, goals. Home Exercise Program:   Patient encouraged to start using stander 3 sessions a day and while in stander completing over head reaches . Patient to try and reach 15 minute intervals in stander. Therapeutic Exercise and NMR:  [x] (44828) Provided verbal/tactile cueing for activities related to strengthening, flexibility, endurance, ROM  for improvements in scapular, scapulothoracic and UE control with self care, reaching, carrying, lifting, house/yardwork, driving/computer work.    [] (29487) Provided verbal/tactile cueing for activities related to improving balance, coordination, kinesthetic sense, posture, motor skill, proprioception  to assist with  scapular, scapulothoracic and UE control with self care, reaching, carrying, lifting, house/yardwork, driving/computer work.   [] Comments:    Therapeutic Activities:    [x] (55099 or 27301) Provided verbal/tactile cueing for activities related to improving balance, coordination, kinesthetic sense, posture, motor skill, proprioception and motor activation to allow for proper function of scapular, scapulothoracic and UE control with self care, carrying, lifting, driving/computer work  [] Comments:    Home Exercise Program:    [x] (50487) Reviewed/Progressed HEP activities related to strengthening, flexibility, endurance, ROM of scapular, scapulothoracic and UE control with self care, reaching, carrying, lifting, house/yardwork, driving/computer work  [] (04011) Reviewed/Progressed HEP activities related to improving balance, coordination, kinesthetic sense, posture, motor skill, proprioception of scapular, scapulothoracic and UE control with self care, reaching, carrying, lifting, house/yardwork, driving/computer work [] Comments:    Manual Treatments:  PROM / STM / Oscillations-Mobs:  G-I, II, III, IV (PA's, Inf., Post.)  [] (45873) Provided manual therapy to mobilize soft tissue/joints of cervical/CT, scapular GHJ and UE for the purpose of modulating pain, promoting relaxation,  increasing ROM, reducing/eliminating soft tissue swelling/inflammation/restriction, improving soft tissue extensibility and allowing for proper ROM for normal function with self care, reaching, carrying, lifting, house/yardwork, driving/computer work  [] Comments:    ADL Training:  [] (02369) Provided self-care/home management training related to activities of daily living and compensatory training, and/or use of adaptive equipment   [] Comments:     Splinting:  [] Fabrication of:   [] (63753) Orthotic/Prosthetic Management, subsequent encounter  [] (86356) Orthotic management and training (fitting and assessment)  [] Comments:      Charges: co treat with PT for safety and increased intensity of treatment  Timed Code Treatment Minutes: 45   Total Treatment Minutes: 90     [] EVAL (LOW) 94558   [] OT Re-eval (52076)  [] EVAL (MOD) 29210   [] EVAL (HIGH) 27094       [x] Ru (27337) x   1  [] FXSAQ(39184)  [x] NMR (27755) x  2  [] Estim (attended) (21366)   [] Manual ([id number]) x      [] US (41504)  [] TA (42424) x      [] Paraffin (42131)  [] ADL  ([phone]) x    [] Splint/L code:    [] Estim (unattended) ([id number])  [] Fluidotherapy (93169)  [] Other:    GOALS:    Patient stated goal: improved trunk control and standing tolerance to increase independence in self care. [x]? Progressing: []? Met: []? Not Met: []? Adjusted     Therapist goals for Patient:   Short Term Goals: To be achieved in: 30 days  1. Pt will be independent with clothing management on toilet or in wheelchair to complete toileting with use of grab bar  [x]? Progressing: []? Met: []? Not Met: []? Adjusted  2. Pt will be stand by assist completing scoot transfers on level surfaces  [x]? Progressing: []? Met: []? Not Met: []? Adjusted  3. Patient will be able to stand up to 30 seconds at grab bar for toile ting and lower body dress with min assist.       Long Term Goals: To be achieved by alton  1. Pt will will demonstrate an improved stream score of 33  [x]? Progressing: []? Met: []? Not Met: []? Adjusted    Progression Towards Functional goals:  [x] Patient is progressing as expected towards functional goals listed. [] Progression is slowed due to complexities listed. [] Progression has been slowed due to co-morbidities. [] Plan just implemented, too soon to assess goals progression  [] All goals are met  [] Other:     ASSESSMENT:  Patient has demonstrated significant improvement in trunk control during static and dynamic sitting and standing. Pt continues to demonstrate difficulty with hip and glute activation for functional mobility and posture. Treatment/Activity Tolerance:  [x] Patient tolerated treatment well [] Patient limited by fatique  [] Patient limited by pain  [] Patient limited by other medical complications  [] Other:     Prognosis: [x] Good [] Fair  [] Poor    Patient Requires Follow-up: [x] Yes  [] No    PLAN: See eval  [x] Continue per plan of care [] Alter current plan (see comments)  [x] Plan of care initiated (MD cosigned) [] Hold pending MD visit [] Discharge    Electronically signed by:     SHUKRI Velasquez/L [id number]    Note: If patient does not return for scheduled/ recommended follow up visits, this note will serve as a discharge from care along with most recent update on progress.

## 2021-04-01 NOTE — FLOWSHEET NOTE
[facility] - Outpatient Physical Therapy  Phone: [phone]   Fax: [phone]    Physical Therapy Daily Treatment Note  Date:  2021     Patient Name:  Ventura Kingsley    :  2000  MRN: [id number]  Medical/Treatment Diagnosis Information:  Diagnosis: Cerebral palsy with spastic diplegia  Treatment Diagnosis: Decreased trunk control impacting ability to complete safe transfers, decreased standing tolerance, LE weakness  Insurance/Certification information:  PT Insurance Information: Medicare & Medicaid  Physician Information:  Referring Practitioner: JOHNNY Oliva  Plan of care signed (Y/N): [x]  Yes []  No     Date of Patient follow up with Physician:      Progress Report: [x]  Yes  []  No     Date Range for reporting period:  Beginning  3/1/2021   Ending 21     Progress report due (10 Rx/or 30 days whichever is less): visit #10 or      Recertification due (POC duration/ or 90 days whichever is less): visit #12 or 21     Visit # Insurance Allowable Auth required? Date Range    Medical necessity []  Yes  [x]  No n/a     Latex Allergy:  [x]NO      []YES  Preferred Language for Healthcare:   [x]English       []Other:      Functional Scale/Test Evaluation 3/1/2021 30 day  60 day  Discharge   STREAM 23                          Pain level:  0/10  Location:  none    SUBJECTIVE:    Pt's mom initially called to cancel session due to transportation, but then pt was able to attend.     OBJECTIVE:    Co-treat w/OT for safety and assistance    RESTRICTIONS/PRECAUTIONS: recent Bell's Palsy    Interventions/Exercises:   Therapeutic Exercises (77542) Resistance / level Sets/sec Reps Notes   W/c push ups in preparation for standing                            Neuromuscular Re-ed (70595) /  Gait Training (88505)       Upright posture seated in W/C   X 5 reps holding football, rest of reps completed with L UE bracing on L knee, PT hand assist on sternum and lumbar trunk rotation, all while maintaining trunk control. Pt blocked pt's R LE to keep in a neutral position. Pt then completed ball toss with OT and was able to maintain his trunk with only min A to block R LE in neutral position and CGA for his trunk. Pt completed mat to wheelchair transfer with min A to L side to transition to parallel bars. 3/31:  Pt completed stand pivot transfer from wheelchair to mat to R side with min A x2; good carry over noted from last session regarding forward lean and weight shift prior to stand, manual required for improved LE placement. Seated edge of mat, pt completed posture training for thoracic extension and maintaining midline during hamstring stretch of RLE and conversation regarding transfers and use of stander at home. Pt reports he has a raised toilet seat with arm rests that he can transfer to without assistance from his mom. Pt completed mat to wheelchair transfer with min A to L side to transition to parallel bars, required manual assist for improved LE placement. At parallel bars, pt completed sit>stand transfer with BUE assist.   For reinforcement of trunk control and dynamic balance during forward weight shift, pt participated in reaching activity with alternating UEs completing contralateral reaching and [place] changes on parallel bar with SBA for trunk control. Progressed to completing in static stance with increased trunk rotation x6 each side with rest break in between sides, followed by ipsilateral reaching behind self x5 each side to improve trunk control and balance required for toileting tasks. BLEs blocked at knees with 1 instance L knee buckling; pt self-corrected into knee extension, but demonstrated difficulty with trunk correction to lean to R side to return to midline.  In seated, pt participated in L><R weight shift to touch alternating shoulders to parallel bars x8 each side, followed by forward><back weight shift x8 with hands placed on knees for UE weight completed 10 bridges with blocking at LEs. Pt worked on rolling from supine to L and R, with more difficulty rolling to R. Pt used momentum from UEs but was cued to not reach and pull. Stacey needed to progress LEs to each side. Pt rolled to prone and with PT/OT blocking LEs and assisting at knees (modA-maxA), performed 5 planks for 5-10 second holds. Pt able to better press up with extended arms vs forearms. Pt achieved tall kneeling with a swiss ball with min A at his LEs, then progressed to Qped over the swiss ball with reaching with B UEs. Stacey at LEs progressed to Mod A as pt faitgued. Next pt sat EOB with reaching to magnets, then worked on reaching [place] while holding a small bolster to facilitate open hands. Pt with Stacey needed for these activities to maintain trunk control. Pt then held each end of the bolster and hit a ball while maintaining trunk control. ModA needed to maintain trunk control as pt was quite fatigued by the end of the session. Pt then stood with a std walker x 3 with mod A x 2. Pt transferred back to  with min A for LE placement. 3/9: session began with transferring from w/c to mat table with min Ax 2. Increased time required and cueing to improve safety. Once on the mat table, pt worked on scooting leading with upper body first and performing small abdominal crunch then bridging to move hips. Pt required assistance to keep LEs stable in a hooklying position. Pt also performed rolling to each side R/L x 5, in which he rolled hips first then his upper body. Next pt rolled to prone and pushed through UEs to get into quadruped. Mod Ax 2 required for stability at pelvis and LEs. Pt cued for spine neutral position and then weight shifting to prepare for crawling. Pt able to move R UE and LE forward with mod A, but then was too fatigued and rested. Pt sat in tall kneeling and was able to attain this position by pushing up on a swiss ball.  Pt transitioned next to sitting EOB and shifting weight fwd to stand at standard walker. First he completed partial stands with mod A x 2, with PT/OT blocking knees and feet. OT suggested pt stand at ladder but pt reported he was more comfortable using std walker. Lastly pt laid supine and rotated side to side with min A x 1, with reaching arm diagonally and protracting/retracting hips x 10 B/L. Pt completed a stand/pivot transfer from the mat to the W/C with min A x 1 at the end of the session. Modalities:     Pt. Education:  -patient educated on diagnosis, prognosis and expectations for rehab  -all patient questions were answered    HEP instruction:      NMR and Therapeutic Activities:    [x] (24397 or 04196) Provided verbal/tactile cueing for activities related to improving balance, coordination, kinesthetic sense, posture, motor skill, proprioception and motor activation to allow for proper function of   [x] LE / Core, hip and LE with self care and ADLs  [x] UE / Shoulder complex: cervical, postural, scapular, scapulothoracic and UE control with self care, carrying, lifting, driving, computer work.   [] (85562) Gait Re-education- Provided training and instruction to the patient for proper LE, core and hip recruitment, positioning, and eccentric body weight control with ambulation re-education, including ascending & descending stairs     Home Management Training / Self Care:  [] (24566) Provided self-care/home management training related to activities of daily living and compensatory training, and/or use of adaptive equipment for improvement with: ADLs and compensatory training, meal preparation, safety procedures and instruction in use of adaptive equipment, including bathing, grooming, dressing, personal hygiene, basic household cleaning and chores.      Therapeutic Exercise and NMR EXR  [x] (41513) Provided verbal/tactile cueing for activities related to strengthening, flexibility, endurance, ROM for improvements in  [x] LE / Core stability: LE, hip, and core control with self care, mobility, lifting, ambulation. [] UE / Shoulder complex: cervical, postural, scapular, scapulothoracic and UE control with self care, reaching, carrying, lifting, house/yardwork, driving, computer work.  [] (50146) Provided verbal/tactile cueing for activities related to improving balance, coordination, kinesthetic sense, posture, motor skill, proprioception to assist with   [] LE / Core stability: LE, hip, and core control in self care, mobility, lifting, ambulation and eccentric single leg control. [] UE / Shoulder complex: cervical, scapular, scapulothoracic and UE control with self care, reaching, carrying, lifting, house/yardwork, driving, computer work.   [] (70409) Therapist is in constant attendance of 2 or more patients providing skilled therapy interventions, but not providing any significant amount of measurable one-on-one time to either patient, for improvements in  [] LE / Core stability: LE, hip, and core control in self care, mobility, lifting, ambulation and eccentric single leg control. [] UE / Shoulder complex: cervical, scapular, scapulothoracic and UE control with self care, reaching, carrying, lifting, house/yardwork, driving, computer work.      Home Exercise Program:    [x] (92398) Reviewed/Progressed HEP activities related to strengthening, flexibility, endurance, ROM of   [] LE / Core stability: core, hip and LE for functional self-care, mobility, lifting and ambulation/stair navigation   [] UE / Shoulder complex: cervical, postural, scapular, scapulothoracic and UE control with self care, reaching, carrying, lifting, house/yardwork, driving, computer work  [] (08859)Reviewed/Progressed HEP activities related to improving balance, coordination, kinesthetic sense, posture, motor skill, proprioception of   [] LE: core, hip and LE for self care, mobility, lifting, and ambulation/stair navigation    [] UE / Shoulder complex: cervical, postural,  scapular, scapulothoracic and UE control with self care, reaching, carrying, lifting, house/yardwork, driving, computer work    Manual Treatments:  PROM / STM / Oscillations-Mobs:  G-I, II, III, IV (PA's, Inf., Post.)  [] (75317) Provided manual therapy to mobilize shoulder complex, hip, LE, and/or cervicothoracic/LS spine soft tissue/joints for the purpose of modulating pain, promoting relaxation,  increasing ROM, reducing/eliminating soft tissue swelling/inflammation/restriction, improving soft tissue extensibility and allowing for proper ROM for normal function with   [] LE / Core stability: self care, mobility, lifting and ambulation. [] UE / Shoulder complex: self care, reaching, carrying, lifting, house/yardwork, driving, computer work. Modalities:  [] (65075) Vasopneumatic compression: Utilized vasopneumatic compression to decrease edema / swelling for the purpose of improving mobility and quad tone / recruitment which will allow for increased overall function including but not limited to self-care, transfers, ambulation, and ascending / descending stairs. Charges:  Timed Code Treatment Minutes: 40   Total Treatment Minutes: 85   **CO-treat with OT    [] EVAL - LOW (67783)   [] EVAL - MOD (11766)  [] EVAL - HIGH (77261)  [] RE-EVAL (70575)  [] TE (16877) x      [] Ionto  [x] NMR (87772) x 1      [] Vaso  [] Manual (41604) x      [] Ultrasound  [x] TA x 1      [] Mech Traction (88633)  [x] Gait Training x  1   [] ES (un) (60849):   [] Aquatic therapy x   [] Other:   [] Group:     GOALS:   Patient stated goal: improve ability to complete transfers   []? Progressing: []? Met: []? Not Met: []? Adjusted     Therapist goals for Patient:   Short Term Goals: To be achieved in: 2 weeks  1. Independent in HEP and progression per patient tolerance, in order to prevent re-injury. []? Progressing: []? Met: []? Not Met: []? Adjusted  2.  Patient will have a decrease in pain to facilitate improvement in movement, function, and ADLs as indicated by Functional Deficits. []? Progressing: []? Met: []? Not Met: []? Adjusted     Long Term Goals: To be achieved in: 6 weeks  1. Patient will report increased standing tolerance to at least 30 minutes in standing frame. [x]? Progressing: []? Met: []? Not Met: []? Adjusted  2. Patient will demonstrate an increase in strength to good shoulder & hip complex, and core activation to allow for proper functional mobility as indicated by patients Functional Deficits. [x]? Progressing: []? Met: []? Not Met: []? Adjusted  3. Patient will return to functional activities including demo'ing safe transfers to/from w/c with mod I.    [x]? Progressing: []? Met: []? Not Met: []? Adjusted  4. Patient will increase STREAM score to 32 or above for increased UE/LE movement in sitting, supine, and standing. [x]? Progressing: []? Met: []? Not Met: []? Adjusted          Overall Progression Towards Functional goals/ Treatment Progress Update:  [] Patient is progressing as expected towards functional goals listed. [] Progression is slowed due to complexities/Impairments listed. [] Progression has been slowed due to co-morbidities. [x] Plan just implemented, too soon to assess goals progression <30days   [] Goals require adjustment due to lack of progress  [] Patient is not progressing as expected and requires additional follow up with physician  [] Other    Persisting Functional Limitations/Impairments:  []Sleeping [x]Sitting               []Standing [x]Transfers        []Walking []Kneeling               []Stairs []Squatting / bending   [x]ADLs []Reaching  []Lifting  []Housework  []Driving []Job related tasks  []Sports/Recreation []Other:        ASSESSMENT:    Pt continues to progress with trunk control in seated and able to shift laterally without UE assist and correct to neutral. Still requires cueing to maintain upright posture in seated.  Able to ambulate a few steps this date with Max A for L LE advancement, mod A for R LE advancement. Pt unable to complete hip flexion for LE advancement as well as unable to squeeze ball with adductors in seated with hips in neutral position. *Pt with possible bed bugs (PT saw one on pt's leg during session); plan to call pt's mom to get more information, but pt may need to change into scrubs prior to tx sessions. Treatment/Activity Tolerance:  [x] Patient able to complete tx  [] Patient limited by fatigue  [] Patient limited by pain  [] Patient limited by other medical complications  [] Other:     Prognosis: [] Good [] Fair  [] Poor    Patient Requires Follow-up: [x] Yes  [] No    Plan for next treatment session: transfers, seated core strength, will plan to co-treat with OT when possible    PLAN: See eval. PT 2x / week for 6 weeks. [x] Continue per plan of care [] Alter current plan (see comments)  [] Plan of care initiated [] Hold pending MD visit [] Discharge    Electronically signed by: Ray Turner PT, DPT    Note: If patient does not return for scheduled/ recommended follow up visits, his note will serve as a discharge from care along with most recent update on progress.

## 2021-04-09 ENCOUNTER — HOSPITAL ENCOUNTER (OUTPATIENT)
Dept: PHYSICAL THERAPY | Age: 21
Setting detail: THERAPIES SERIES
Discharge: HOME | End: 2021-04-09
Payer: MEDICARE

## 2021-04-09 ENCOUNTER — HOSPITAL ENCOUNTER (OUTPATIENT)
Dept: OCCUPATIONAL THERAPY | Age: 21
Setting detail: THERAPIES SERIES
Discharge: HOME | End: 2021-04-09
Payer: MEDICARE

## 2021-04-09 PROCEDURE — 97112 NEUROMUSCULAR REEDUCATION: CPT

## 2021-04-09 PROCEDURE — 97116 GAIT TRAINING THERAPY: CPT

## 2021-04-09 PROCEDURE — 97110 THERAPEUTIC EXERCISES: CPT

## 2021-04-09 NOTE — FLOWSHEET NOTE
[facility] - Outpatient Occupational Therapy      Occupational Therapy Daily Treatment Note  Date:  2021    Patient: Gina Hunt   : 2000   MRN: [id number]  Referring Physician:  Santosh Bond CNP       Medical Diagnosis Information: paraplegia, cerebral palsy                                                  Insurance information: medicare/ medicaid    Date of Injury:   Date of Surgery: rhizotomy when he was about 13    Progress Report: []  Yes  [x]  No     Date Range for reporting period:  Beginning: 3/1/2021  Ending: end of POC    Progress report due (10 Rx/or 30 days whichever is less): visit #17 or     Recertification due (POC duration/ or 90 days whichever is less): visit #12 or 2021    Visit # Insurance Allowable Auth required? Date Range    [place] []  Yes  [x]  No n/a       Latex Allergy:  []No      []Yes  Pacemaker:  [] No       [] Yes     Preferred Language for Healthcare:   [x]English       []other:    Pain level: 0/10     SUBJECTIVE:   Pt had to use the restroom prior to treatment and had difficulty removing bag from back of wheelchair; ended up getting urine on his sweatshirt. Requested to change out of it at the beginning of session. Functional Disability Index:  STREAM: score 23    OBJECTIVE: See eval    21: 3 minutes static stance at parallel bars with CGA x2 for trunk control and blocking knees      RESTRICTIONS/PRECAUTIONS: fall risk    Exercises/Interventions: Treatment focused on trunk control for improved dynamic balance during UE reaching, transfers, and mobility to enhance functional independence. Session initiated with UB dressing due to urine on sweatshirt with pt declining changing pants. Pt doffed sheatshirt with SBA and donned scrub top with set up for positioning due to unfamiliar clothing item. Pt threaded UEs with SBA and pulled over head.  Pt transferred w/c to mat table w/mod A of 1 for safety, pt sat on edge of mat and required mod A of 1 to scoot back. Pt's feet again became caught in wheels under w/c, reducing safety of transfer with instructions provided regarding extension of LE closer to transfer surface to assist in positioning. Pt remained sitting edge of mat w/SBA/Sup during discussion of planned activities then was instructed to lay supine. Pt transitioned straight backwards with fair descent control but left feet on floor. Total A of 2 to transition pt to supine position. Pt rolled to L to prone position w/min A of 1 for trunk and min A of 1 to cross R over L LE. Once prone, pt instructed to assume quadruped, pt able to assume CAYLA and then position UEs correctly with hands under shoulders & verbal cues but required mod of 2 to position knees & LEs correctly. Quadruped: hip ext/flex to starting position x3 L -min A for balance, min A manual cues LE movement; x4 R -mod A for balance/weight-shift, mod A manual cues for LE movement. Max manual cues to extend fingers on mat initially, then pt able to perform w/only instruction to do so. Pt rested while seated on feet after 2 reps on RLE. Pt able to assume tall kneeling position w/SBA x30\". Pt returned to quadruped w/o physical assist, using UEs to push hips back over knees rather than hip flexion to position LEs under trunk. Completed remaining RLE reps. Pt rested in prone. Max A of 1 to roll pt to R for supine position to complete hamstring stretches. Max A of 2 supine to long-sitting. Pt able to maintain position w/UEs in weight bearing behind him and CGA. Pt transitioned long-sitting to sitting edge of mat w/max cues for instruction, mod of 1 for trunk support/balance, and mod of 1 for assist positioning LEs. Sit pivot mat table to w/c min A of 1. Gait training followed at parallel bars. In // bars, pt completed STS with B UE assist on bars and CGA for trunk control.  Pt completed forward stepping with mod A for R LE advancement and max A for L LE advancement. Able to complete 6-8 steps forward each time. PT blocking pt's stance leg and assisting with advancing opp LE with OT assisting with trunk control. Pt then able to complete retro stepping in bars with CGA for trunk control and improved extension through UEs with verbal cues for changing [place] during ambulation. Therapeutic Exercises  Resistance / level Sets/sec Reps Notes                                                                                Neuromuscular Re-ed / Therapeutic Activities                                                 Manual Intervention                                                     Modalities:     Patient education:  OT evaluation, plan of care, importance of weight bearing in stander for overall health, home exercise program, goals. Home Exercise Program:   Patient encouraged to start using stander 3 sessions a day and while in stander completing over head reaches . Patient to try and reach 15 minute intervals in stander. Therapeutic Exercise and NMR:  [x] (63943) Provided verbal/tactile cueing for activities related to strengthening, flexibility, endurance, ROM  for improvements in scapular, scapulothoracic and UE control with self care, reaching, carrying, lifting, house/yardwork, driving/computer work.    [] (97712) Provided verbal/tactile cueing for activities related to improving balance, coordination, kinesthetic sense, posture, motor skill, proprioception  to assist with  scapular, scapulothoracic and UE control with self care, reaching, carrying, lifting, house/yardwork, driving/computer work.   [] Comments:    Therapeutic Activities:    [x] (63714 or 24415) Provided verbal/tactile cueing for activities related to improving balance, coordination, kinesthetic sense, posture, motor skill, proprioception and motor activation to allow for proper function of scapular, scapulothoracic and UE control with self care, carrying, lifting, driving/computer work  [] Comments:    Home Exercise Program:    [x] (17385) Reviewed/Progressed HEP activities related to strengthening, flexibility, endurance, ROM of scapular, scapulothoracic and UE control with self care, reaching, carrying, lifting, house/yardwork, driving/computer work  [] (28946) Reviewed/Progressed HEP activities related to improving balance, coordination, kinesthetic sense, posture, motor skill, proprioception of scapular, scapulothoracic and UE control with self care, reaching, carrying, lifting, house/yardwork, driving/computer work    [] Comments:    Manual Treatments:  PROM / STM / Oscillations-Mobs:  G-I, II, III, IV (PA's, Inf., Post.)  [] ([id number]) Provided manual therapy to mobilize soft tissue/joints of cervical/CT, scapular GHJ and UE for the purpose of modulating pain, promoting relaxation,  increasing ROM, reducing/eliminating soft tissue swelling/inflammation/restriction, improving soft tissue extensibility and allowing for proper ROM for normal function with self care, reaching, carrying, lifting, house/yardwork, driving/computer work  [] Comments:    ADL Training:  [] (23197) Provided self-care/home management training related to activities of daily living and compensatory training, and/or use of adaptive equipment   [] Comments:     Splinting:  [] Fabrication of:   [] (38483) Orthotic/Prosthetic Management, subsequent encounter  [] (66301) Orthotic management and training (fitting and assessment)  [] Comments:      Charges: co treat with PT for safety and increased intensity of treatment  Timed Code Treatment Minutes: 45   Total Treatment Minutes: 90     [] EVAL (LOW) 60600   [] OT Re-eval (24139)  [] EVAL (MOD) 09193   [] EVAL (HIGH) 88233       [x] Ru (09169) x   1  [] DDFEJ(10400)  [x] NMR (84489) x  2  [] Estim (attended) (00150)   [] Manual ([id number]) x      [] US (24563)  [] TA (55641) x      [] Paraffin (27588)  [] ADL  ([phone]) x    [] Splint/L code:    [] Estim (unattended) ([id number])  [] [id number]    Note: If patient does not return for scheduled/ recommended follow up visits, this note will serve as a discharge from care along with most recent update on progress.

## 2021-04-09 NOTE — FLOWSHEET NOTE
[facility] - Outpatient Physical Therapy  Phone: [phone]   Fax: [phone]    Physical Therapy Daily Treatment Note  Date:  2021     Patient Name:  Genie Miller    :  2000  MRN: [id number]  Medical/Treatment Diagnosis Information:  Diagnosis: Cerebral palsy with spastic diplegia  Treatment Diagnosis: Decreased trunk control impacting ability to complete safe transfers, decreased standing tolerance, LE weakness  Insurance/Certification information:  PT Insurance Information: Medicare & Medicaid  Physician Information:  Referring Practitioner: JOHNNY Levine  Plan of care signed (Y/N): [x]  Yes []  No     Date of Patient follow up with Physician:      Progress Report: [x]  Yes  []  No     Date Range for reporting period:  Beginning  3/1/2021   Ending 21     Progress report due (10 Rx/or 30 days whichever is less): visit #10 or 20     Recertification due (POC duration/ or 90 days whichever is less): visit #12 or 21     Visit # Insurance Allowable Auth required? Date Range    Medical necessity []  Yes  [x]  No n/a     Latex Allergy:  [x]NO      []YES  Preferred Language for Healthcare:   [x]English       []Other:      Functional Scale/Test Evaluation 3/1/2021 30 day  60 day  Discharge   STREAM 23                          Pain level:  0/10  Location:  none    SUBJECTIVE:    Had to use the restroom prior to treatment, had to rush and ended up getting urine on his sweatshirt. Requested to change out of it at the beginning of session. Denies pain. Has been practicing B hip add and gets his legs to move, but is unable to complete hip abd still.         OBJECTIVE:    Co-treat w/OT for safety and assistance    RESTRICTIONS/PRECAUTIONS: recent Bell's Palsy    Interventions/Exercises:   Therapeutic Exercises (00236) Resistance / level Sets/sec Reps Notes   W/c push ups in preparation for standing                            Neuromuscular Re-ed (67068) /  Gait Training (77490)       Upright posture seated in W/C   X 5 reps holding football, rest of reps completed with L UE bracing on L knee, PT hand assist on sternum and lumbar spine to facilitate                                Gait Training:  Amb in //bars    Bwd walking in //bars   8 ft    8 ft  5 ft    x2    x1  x1   -manual required to advance LEs, pt activation hip extensors to initiate swing phase, demo'd adequate weight-shifting. OT provided assist & w/c follow for safety                 Therapeutic Activities (38044) /  Functional Tasks       [facility] reaching across midline in seated   Pt seated in w/c using seat belt to help stay in w/c          [facility] reaching across midline and diagonally   Pt seated in w/c using seat belt to help stay in w/c               STS to std walker            Transfer to w/c from mat table            STS from w/c to mat table - with large bolster placed on table in front of patient to hold onto   Min A, w/c close to mat table to assist in blocking pt's knees. Manual Intervention ([id number])       Supine hamstring stretch 90/90  3x30\" R  3x30\" L  -completed by PT  -completed by OT                                        Sessions:     4/9:  Pt used restroom prior to treatment, however had accident and urine got on his clothing. OT provided pt with scrub top change, pt declined scrub bottom change. Pt doffed sheatshirt and donned scrub top with SBA primarily, min A for scrub top setup so pt could kendall correctly. Able to put UEs in pull overhead with SBA. Pt transferred w/c to mat table w/mod A of 1 for safety, pt sat on edge of mat and required mod A of 1 to scoot back. Pt's feet again became caught in wheels under w/c, reducing safety of transfer. Pt remained sitting edge of mat w/SBA/Sup during discussion of planned activities then was instructed to lay supine. Pt transitioned straight backwards with fair descent control but left feet on floor.   Total A of 2 to transition pt to supine position. Pt rolled to L to prone position w/min A of 1 for trunk and min A of 1 to cross R over L LE. Once prone, pt instructed to assume quadruped, pt able to assume CAYLA and then position UEs correctly with hands under shoulders & verbal cues but required mod of 2 to position knees & LEs correctly. Quadruped: hip ext/flex to starting position x3 L -min A for balance, min A manual cues LE movement; x4 R -mod A for balance/weight-shift, mod A manual cues for LE movement. **Max manual cues to extend fingers on mat initially, then pt able to perform w/only instruction to do so. Pt rested while seated on feet after 2 reps on RLE. Pt able to assume tall kneeling position w/SBA x30\". Pt returned to quadruped w/o physical assist.  To complete RLE reps. Pt rested prone. Max A of 1 to roll pt to R for supine position. Hamstring stretches. Max A of 2 supine to long-sitting. Pt able to maintain position w/UEs behind him and CGA. Pt transitioned long-sitting to sitting edge of mat w/max cues for instruction, mod of 1 for trunk support/balance, and mod of 1 for assist positioning LEs. Sit pivot mat table to w/c min A of 1. Gait training followed. 4/1:  Session began with assisting pt to restroom. In // bars, pt completed STS with B UE assist on bars and CGA for trunk control. Pt completed forward stepping with mod A for R LE advancement and max A for L LE advancement. Able to complete 6-8 steps forward each time. PT blocking pt's stance leg and assisting with advancing opp LE. Pt then able to complete retro stepping in bars, first trail about 1/2 length of bars, second trial with full length of bars. Pt cued for glute activation for hip extension but had difficulty activating glutes and hamstrings. Again, PT blocked stance leg. Pt then completed static standing for 3 min with CGA from both PT and OT.  Pt completed stand pivot transfer from wheelchair to mat to R side with min A x2; good carry over noted from last session regarding forward lean and weight shift prior to stand. Seated edge of mat with feet on 6 in step, pt worked on reaching activities reaching across midline outside [place] for a ball, then reaching the ball behind his back and passing it to his opp hand for UE function and trunk rotation, all while maintaining trunk control. Pt blocked pt's R LE to keep in a neutral position. Pt then completed ball toss with OT and was able to maintain his trunk with only min A to block R LE in neutral position and CGA for his trunk. Pt completed mat to wheelchair transfer with min A to L side to transition to parallel bars. 3/31:  Pt completed stand pivot transfer from wheelchair to mat to R side with min A x2; good carry over noted from last session regarding forward lean and weight shift prior to stand, manual required for improved LE placement. Seated edge of mat, pt completed posture training for thoracic extension and maintaining midline during hamstring stretch of RLE and conversation regarding transfers and use of stander at home. Pt reports he has a raised toilet seat with arm rests that he can transfer to without assistance from his mom. Pt completed mat to wheelchair transfer with min A to L side to transition to parallel bars, required manual assist for improved LE placement. At parallel bars, pt completed sit>stand transfer with BUE assist.   For reinforcement of trunk control and dynamic balance during forward weight shift, pt participated in reaching activity with alternating UEs completing contralateral reaching and [place] changes on parallel bar with SBA for trunk control. Progressed to completing in static stance with increased trunk rotation x6 each side with rest break in between sides, followed by ipsilateral reaching behind self x5 each side to improve trunk control and balance required for toileting tasks.  BLEs blocked at knees with 1 instance L knee buckling; pt self-corrected into knee extension, but demonstrated difficulty with trunk correction to lean to R side to return to midline. In seated, pt participated in L><R weight shift to touch alternating shoulders to parallel bars x8 each side, followed by forward><back weight shift x8 with hands placed on knees for UE weight bearing and focus on eccentric control. Pt completed sit>stand with BUE assist using parallel bars to participate in standing without UE support with knees blocked and min-mod A for trunk stability for ~45 seconds prior to sitting on gerardo disc. To conclude session, pt sat on gerardo disc to complete progressively increased head/neck and trunk rotation to alternating sides for trunk control with verbal cues to prevent thoracic flexion. 3/24: Session today began with pt using Nustep while sitting in his w/c x 3 min total, but pt stopped occasionally to talk and needed cueing to continue. Pt then transferred to mat table with stand pivot transfer. Pt was able to stand fully upright, mod A x 2, but then was not table to advance R LE before sitting. Worked on trunk flexion to shift COG forward while scooting hips/buttocks back on mat table as pt has tendency to lean backwards when trying to scoot backwards on a surface. Pt encouraged through all movement today to keep hands flat on table instead of pushing through knuckles. Pt then worked on weight shifting at trunk forward and backward and was given targets to hit with forehead. After the initital task, pt could perform pushing up and scooting his hips backwards onto the table with CGA. With 4 in box placed under pt's feet for stability, pt worked on reaching outside [place] with forward lean both ipsilaterally and contralaterally to cones and then sitting back up and rotating his trunk to the R and L to place the cones behind him. Pt able to do this with CGA to Stacey for trunk control.  Pt then worked on standing without walker, pt was in front of pt to assist with knee extension and block feet with OT assisting with hip ext and trunk control. Pt stood a total of 5 times. 3/22: Pt transferred from w/c to EO. Pt then completed 1/2 stands to ladder with assist to block feet from moving and help push B knees into extension. Pt then went from EOM>sidelying>supine and completed 10 bridges with blocking at LEs. Pt worked on rolling from supine to L and R, with more difficulty rolling to R. Pt used momentum from UEs but was cued to not reach and pull. Stacey needed to progress LEs to each side. Pt rolled to prone and with PT/OT blocking LEs and assisting at knees (modA-maxA), performed 5 planks for 5-10 second holds. Pt able to better press up with extended arms vs forearms. Pt achieved tall kneeling with a swiss ball with min A at his LEs, then progressed to Qped over the swiss ball with reaching with B UEs. Stacey at LEs progressed to Mod A as pt faitgued. Next pt sat EOB with reaching to [employer], then worked on reaching [place] while holding a small bolster to facilitate open hands. Pt with Stacey needed for these activities to maintain trunk control. Pt then held each end of the bolster and hit a ball while maintaining trunk control. ModA needed to maintain trunk control as pt was quite fatigued by the end of the session. Pt then stood with a std walker x 3 with mod A x 2. Pt transferred back to  with min A for LE placement. 3/9: session began with transferring from w/c to mat table with min Ax 2. Increased time required and cueing to improve safety. Once on the mat table, pt worked on scooting leading with upper body first and performing small abdominal crunch then bridging to move hips. Pt required assistance to keep LEs stable in a hooklying position. Pt also performed rolling to each side R/L x 5, in which he rolled hips first then his upper body. Next pt rolled to prone and pushed through UEs to get into quadruped. Mod Ax 2 required for stability at pelvis and LEs.  Pt cued for spine neutral position and then weight shifting to prepare for crawling. Pt able to move R UE and LE forward with mod A, but then was too fatigued and rested. Pt sat in tall kneeling and was able to attain this position by pushing up on a swiss ball. Pt transitioned next to sitting EOB and shifting weight fwd to stand at standard walker. First he completed partial stands with mod A x 2, with PT/OT blocking knees and feet. OT suggested pt stand at ladder but pt reported he was more comfortable using std walker. Lastly pt laid supine and rotated side to side with min A x 1, with reaching arm diagonally and protracting/retracting hips x 10 B/L. Pt completed a stand/pivot transfer from the mat to the W/C with min A x 1 at the end of the session.      Modalities:     Pt. Education:  -patient educated on diagnosis, prognosis and expectations for rehab  -all patient questions were answered    HEP instruction:      NMR and Therapeutic Activities:    [x] (87348 or 84883) Provided verbal/tactile cueing for activities related to improving balance, coordination, kinesthetic sense, posture, motor skill, proprioception and motor activation to allow for proper function of   [x] LE / Core, hip and LE with self care and ADLs  [x] UE / Shoulder complex: cervical, postural, scapular, scapulothoracic and UE control with self care, carrying, lifting, driving, computer work.   [] (22334) Gait Re-education- Provided training and instruction to the patient for proper LE, core and hip recruitment, positioning, and eccentric body weight control with ambulation re-education, including ascending & descending stairs     Home Management Training / Self Care:  [] (42017) Provided self-care/home management training related to activities of daily living and compensatory training, and/or use of adaptive equipment for improvement with: ADLs and compensatory training, meal preparation, safety procedures and instruction in use of adaptive equipment, including bathing, grooming, dressing, personal hygiene, basic household cleaning and chores. Therapeutic Exercise and NMR EXR  [x] (57763) Provided verbal/tactile cueing for activities related to strengthening, flexibility, endurance, ROM for improvements in  [x] LE / Core stability: LE, hip, and core control with self care, mobility, lifting, ambulation. [] UE / Shoulder complex: cervical, postural, scapular, scapulothoracic and UE control with self care, reaching, carrying, lifting, house/yardwork, driving, computer work.  [] (33243) Provided verbal/tactile cueing for activities related to improving balance, coordination, kinesthetic sense, posture, motor skill, proprioception to assist with   [] LE / Core stability: LE, hip, and core control in self care, mobility, lifting, ambulation and eccentric single leg control. [] UE / Shoulder complex: cervical, scapular, scapulothoracic and UE control with self care, reaching, carrying, lifting, house/yardwork, driving, computer work.   [] (36698) Therapist is in constant attendance of 2 or more patients providing skilled therapy interventions, but not providing any significant amount of measurable one-on-one time to either patient, for improvements in  [] LE / Core stability: LE, hip, and core control in self care, mobility, lifting, ambulation and eccentric single leg control. [] UE / Shoulder complex: cervical, scapular, scapulothoracic and UE control with self care, reaching, carrying, lifting, house/yardwork, driving, computer work.      Home Exercise Program:    [x] (78086) Reviewed/Progressed HEP activities related to strengthening, flexibility, endurance, ROM of   [] LE / Core stability: core, hip and LE for functional self-care, mobility, lifting and ambulation/stair navigation   [] UE / Shoulder complex: cervical, postural, scapular, scapulothoracic and UE control with self care, reaching, carrying, lifting, house/yardwork, driving, computer work  [] (36053)Reviewed/Progressed HEP activities related to improving balance, coordination, kinesthetic sense, posture, motor skill, proprioception of   [] LE: core, hip and LE for self care, mobility, lifting, and ambulation/stair navigation    [] UE / Shoulder complex: cervical, postural,  scapular, scapulothoracic and UE control with self care, reaching, carrying, lifting, house/yardwork, driving, computer work    Manual Treatments:  PROM / STM / Oscillations-Mobs:  G-I, II, III, IV (PA's, Inf., Post.)  [] ([id number]) Provided manual therapy to mobilize shoulder complex, hip, LE, and/or cervicothoracic/LS spine soft tissue/joints for the purpose of modulating pain, promoting relaxation,  increasing ROM, reducing/eliminating soft tissue swelling/inflammation/restriction, improving soft tissue extensibility and allowing for proper ROM for normal function with   [] LE / Core stability: self care, mobility, lifting and ambulation. [] UE / Shoulder complex: self care, reaching, carrying, lifting, house/yardwork, driving, computer work. Modalities:  [] (31476) Vasopneumatic compression: Utilized vasopneumatic compression to decrease edema / swelling for the purpose of improving mobility and quad tone / recruitment which will allow for increased overall function including but not limited to self-care, transfers, ambulation, and ascending / descending stairs. Charges:  Timed Code Treatment Minutes: 40   Total Treatment Minutes: 85   **CO-treat with OT    [] EVAL - LOW (93448)   [] EVAL - MOD (40052)  [] EVAL - HIGH (19881)  [] RE-EVAL (79166)  [] TE (84649) x      [] Ionto  [x] NMR (12534) x 2      [] Vaso  [] Manual (38104) x      [] Ultrasound  [] TA x       [] Mech Traction (83524)  [x] Gait Training x  1   [] ES (un) (48053):   [] Aquatic therapy x   [] Other:   [] Group:     GOALS:   Patient stated goal: improve ability to complete transfers   []? Progressing: []? Met: []?  Not Met: []? Adjusted     Therapist goals for Patient:   Short Term Goals: To be achieved in: 2 weeks  1. Independent in HEP and progression per patient tolerance, in order to prevent re-injury. []? Progressing: []? Met: []? Not Met: []? Adjusted  2. Patient will have a decrease in pain to facilitate improvement in movement, function, and ADLs as indicated by Functional Deficits. []? Progressing: []? Met: []? Not Met: []? Adjusted     Long Term Goals: To be achieved in: 6 weeks  1. Patient will report increased standing tolerance to at least 30 minutes in standing frame. [x]? Progressing: []? Met: []? Not Met: []? Adjusted  2. Patient will demonstrate an increase in strength to good shoulder & hip complex, and core activation to allow for proper functional mobility as indicated by patients Functional Deficits. [x]? Progressing: []? Met: []? Not Met: []? Adjusted  3. Patient will return to functional activities including demo'ing safe transfers to/from w/c with mod I.    [x]? Progressing: []? Met: []? Not Met: []? Adjusted  4. Patient will increase STREAM score to 32 or above for increased UE/LE movement in sitting, supine, and standing. [x]? Progressing: []? Met: []? Not Met: []? Adjusted          Overall Progression Towards Functional goals/ Treatment Progress Update:  [] Patient is progressing as expected towards functional goals listed. [] Progression is slowed due to complexities/Impairments listed. [] Progression has been slowed due to co-morbidities.   [x] Plan just implemented, too soon to assess goals progression <30days   [] Goals require adjustment due to lack of progress  [] Patient is not progressing as expected and requires additional follow up with physician  [] Other    Persisting Functional Limitations/Impairments:  []Sleeping [x]Sitting               []Standing [x]Transfers        []Walking []Kneeling               []Stairs []Squatting / bending   [x]ADLs []Reaching  []Lifting []Housework  []Driving []Job related tasks  []Sports/Recreation []Other:        ASSESSMENT:    Patient able to transition from CAYLA to quadruped w/min A of 1, but had difficulty activating hip extensors once in quadruped. Continues to require incr assist to advance LEs, and L toes frequently sticking to ground to inhibit advancement but does demo sufficient weight-shifting. Sit pivot transfers w/c to/from mat table continue to be unsafe but, with use of //bars, sit to stand transfers are progressing. Continue to progress core stability & stamina to improve bed mobility in long-sitting position. Treatment/Activity Tolerance:  [x] Patient able to complete tx  [] Patient limited by fatigue  [] Patient limited by pain  [] Patient limited by other medical complications  [] Other:     Prognosis: [] Good [] Fair  [] Poor    Patient Requires Follow-up: [x] Yes  [] No    Plan for next treatment session: transfers, seated core strength, will plan to co-treat with OT when possible    PLAN: See andres. PT 2x / week for 6 weeks. [x] Continue per plan of care [] Alter current plan (see comments)  [] Plan of care initiated [] Hold pending MD visit [] Discharge    Electronically signed by: Rahul Beltre PT, DPT    Note: If patient does not return for scheduled/ recommended follow up visits, his note will serve as a discharge from care along with most recent update on progress.

## 2021-04-12 ENCOUNTER — HOSPITAL ENCOUNTER (OUTPATIENT)
Dept: OCCUPATIONAL THERAPY | Age: 21
Setting detail: THERAPIES SERIES
Discharge: HOME | End: 2021-04-12
Payer: MEDICARE

## 2021-04-12 ENCOUNTER — HOSPITAL ENCOUNTER (OUTPATIENT)
Dept: PHYSICAL THERAPY | Age: 21
Setting detail: THERAPIES SERIES
Discharge: HOME | End: 2021-04-12
Payer: MEDICARE

## 2021-04-12 PROCEDURE — 97112 NEUROMUSCULAR REEDUCATION: CPT

## 2021-04-12 PROCEDURE — 97530 THERAPEUTIC ACTIVITIES: CPT

## 2021-04-12 PROCEDURE — 97535 SELF CARE MNGMENT TRAINING: CPT

## 2021-04-12 PROCEDURE — 97116 GAIT TRAINING THERAPY: CPT

## 2021-04-12 NOTE — FLOWSHEET NOTE
stab to knees for eccentric control, VC's to facilitate movement. Supine to long-sitting mod A of 1. Min to mod A of 1 for balance. Mod A of 1 to assist hip & knee flex bilaterally, mod A of 1 for trunk support. Pt reached & unbuckled 1 Velcro strap on each shoe, mod A for trunk balance. Pt doffed & donned R AFO & shoe w/assist from OT. Long-sitting to sitting EOB, min to mod A trunk support, pt maneuvered LEs to left with BUEs until feet over edge. Squat pivot mat table to w/c, min A/CGA of 2. Gait training in //bars and standing w/Lofstrand crutches. 4/9:  Pt used restroom prior to treatment, however had accident and urine got on his clothing. OT provided pt with scrub top change, pt declined scrub bottom change. Pt doffed sheatshirt and donned scrub top with SBA primarily, min A for scrub top setup so pt could kendall correctly. Able to put UEs in pull overhead with SBA. Pt transferred w/c to mat table w/mod A of 1 for safety, pt sat on edge of mat and required mod A of 1 to scoot back. Pt's feet again became caught in wheels under w/c, reducing safety of transfer. Pt remained sitting edge of mat w/SBA/Sup during discussion of planned activities then was instructed to lay supine. Pt transitioned straight backwards with fair descent control but left feet on floor. Total A of 2 to transition pt to supine position. Pt rolled to L to prone position w/min A of 1 for trunk and min A of 1 to cross R over L LE. Once prone, pt instructed to assume quadruped, pt able to assume CAYLA and then position UEs correctly with hands under shoulders & verbal cues but required mod of 2 to position knees & LEs correctly. Quadruped: hip ext/flex to starting position x3 L -min A for balance, min A manual cues LE movement; x4 R -mod A for balance/weight-shift, mod A manual cues for LE movement. **Max manual cues to extend fingers on mat initially, then pt able to perform w/only instruction to do so.   Pt rested while seated on feet after 2 reps on RLE. Pt able to assume tall kneeling position w/SBA x30\". Pt returned to quadruped w/o physical assist.  To complete RLE reps. Pt rested prone. Max A of 1 to roll pt to R for supine position. Hamstring stretches. Max A of 2 supine to long-sitting. Pt able to maintain position w/UEs behind him and CGA. Pt transitioned long-sitting to sitting edge of mat w/max cues for instruction, mod of 1 for trunk support/balance, and mod of 1 for assist positioning LEs. Sit pivot mat table to w/c min A of 1. Gait training followed. 4/1:  Session began with assisting pt to restroom. In // bars, pt completed STS with B UE assist on bars and CGA for trunk control. Pt completed forward stepping with mod A for R LE advancement and max A for L LE advancement. Able to complete 6-8 steps forward each time. PT blocking pt's stance leg and assisting with advancing opp LE. Pt then able to complete retro stepping in bars, first trail about 1/2 length of bars, second trial with full length of bars. Pt cued for glute activation for hip extension but had difficulty activating glutes and hamstrings. Again, PT blocked stance leg. Pt then completed static standing for 3 min with CGA from both PT and OT. Pt completed stand pivot transfer from wheelchair to mat to R side with min A x2; good carry over noted from last session regarding forward lean and weight shift prior to stand. Seated edge of mat with feet on 6 in step, pt worked on reaching activities reaching across midline outside [place] for a ball, then reaching the ball behind his back and passing it to his opp hand for UE function and trunk rotation, all while maintaining trunk control. Pt blocked pt's R LE to keep in a neutral position. Pt then completed ball toss with OT and was able to maintain his trunk with only min A to block R LE in neutral position and CGA for his trunk.  Pt completed mat to wheelchair transfer with min A to L side to transition to parallel bars. 3/31:  Pt completed stand pivot transfer from wheelchair to mat to R side with min A x2; good carry over noted from last session regarding forward lean and weight shift prior to stand, manual required for improved LE placement. Seated edge of mat, pt completed posture training for thoracic extension and maintaining midline during hamstring stretch of RLE and conversation regarding transfers and use of stander at home. Pt reports he has a raised toilet seat with arm rests that he can transfer to without assistance from his mom. Pt completed mat to wheelchair transfer with min A to L side to transition to parallel bars, required manual assist for improved LE placement. At parallel bars, pt completed sit>stand transfer with BUE assist.   For reinforcement of trunk control and dynamic balance during forward weight shift, pt participated in reaching activity with alternating UEs completing contralateral reaching and [place] changes on parallel bar with SBA for trunk control. Progressed to completing in static stance with increased trunk rotation x6 each side with rest break in between sides, followed by ipsilateral reaching behind self x5 each side to improve trunk control and balance required for toileting tasks. BLEs blocked at knees with 1 instance L knee buckling; pt self-corrected into knee extension, but demonstrated difficulty with trunk correction to lean to R side to return to midline. In seated, pt participated in L><R weight shift to touch alternating shoulders to parallel bars x8 each side, followed by forward><back weight shift x8 with hands placed on knees for UE weight bearing and focus on eccentric control. Pt completed sit>stand with BUE assist using parallel bars to participate in standing without UE support with knees blocked and min-mod A for trunk stability for ~45 seconds prior to sitting on gerardo disc.  To conclude session, pt sat on gerardo disc to complete progressively increased head/neck and trunk rotation to alternating sides for trunk control with verbal cues to prevent thoracic flexion. 3/24: Session today began with pt using Nustep while sitting in his w/c x 3 min total, but pt stopped occasionally to talk and needed cueing to continue. Pt then transferred to mat table with stand pivot transfer. Pt was able to stand fully upright, mod A x 2, but then was not table to advance R LE before sitting. Worked on trunk flexion to shift COG forward while scooting hips/buttocks back on mat table as pt has tendency to lean backwards when trying to scoot backwards on a surface. Pt encouraged through all movement today to keep hands flat on table instead of pushing through knuckles. Pt then worked on weight shifting at trunk forward and backward and was given targets to hit with forehead. After the initital task, pt could perform pushing up and scooting his hips backwards onto the table with CGA. With 4 in box placed under pt's feet for stability, pt worked on reaching outside [place] with forward lean both ipsilaterally and contralaterally to cones and then sitting back up and rotating his trunk to the R and L to place the cones behind him. Pt able to do this with CGA to Stacey for trunk control. Pt then worked on standing without walker, pt was in front of pt to assist with knee extension and block feet with OT assisting with hip ext and trunk control. Pt stood a total of 5 times. 3/22: Pt transferred from w/c to EOM. Pt then completed 1/2 stands to ladder with assist to block feet from moving and help push B knees into extension. Pt then went from EOM>sidelying>supine and completed 10 bridges with blocking at LEs. Pt worked on rolling from supine to L and R, with more difficulty rolling to R. Pt used momentum from UEs but was cued to not reach and pull. Stacey needed to progress LEs to each side.  Pt rolled to prone and with PT/OT blocking LEs and assisting at knees (modA-maxA), performed 5 planks for 5-10 second holds. Pt able to better press up with extended arms vs forearms. Pt achieved tall kneeling with a swiss ball with min A at his LEs, then progressed to Qped over the swiss ball with reaching with B UEs. Satcey at LEs progressed to Mod A as pt faitgued. Next pt sat EOB with reaching to magnets, then worked on reaching [place] while holding a small bolster to facilitate open hands. Pt with Stacey needed for these activities to maintain trunk control. Pt then held each end of the bolster and hit a ball while maintaining trunk control. ModA needed to maintain trunk control as pt was quite fatigued by the end of the session. Pt then stood with a std walker x 3 with mod A x 2. Pt transferred back to  with min A for LE placement. 3/9: session began with transferring from w/c to mat table with min Ax 2. Increased time required and cueing to improve safety. Once on the mat table, pt worked on scooting leading with upper body first and performing small abdominal crunch then bridging to move hips. Pt required assistance to keep LEs stable in a hooklying position. Pt also performed rolling to each side R/L x 5, in which he rolled hips first then his upper body. Next pt rolled to prone and pushed through UEs to get into quadruped. Mod Ax 2 required for stability at pelvis and LEs. Pt cued for spine neutral position and then weight shifting to prepare for crawling. Pt able to move R UE and LE forward with mod A, but then was too fatigued and rested. Pt sat in tall kneeling and was able to attain this position by pushing up on a swiss ball. Pt transitioned next to sitting EOB and shifting weight fwd to stand at standard walker. First he completed partial stands with mod A x 2, with PT/OT blocking knees and feet. OT suggested pt stand at ladder but pt reported he was more comfortable using std walker.  Lastly pt laid supine and rotated side to side with min A x 1, with reaching arm diagonally and protracting/retracting hips x 10 B/L. Pt completed a stand/pivot transfer from the mat to the W/C with min A x 1 at the end of the session. Modalities:     Pt. Education:  -patient educated on diagnosis, prognosis and expectations for rehab  -all patient questions were answered    HEP instruction:      NMR and Therapeutic Activities:    [x] (73737 or 45790) Provided verbal/tactile cueing for activities related to improving balance, coordination, kinesthetic sense, posture, motor skill, proprioception and motor activation to allow for proper function of   [x] LE / Core, hip and LE with self care and ADLs  [x] UE / Shoulder complex: cervical, postural, scapular, scapulothoracic and UE control with self care, carrying, lifting, driving, computer work.   [] (22377) Gait Re-education- Provided training and instruction to the patient for proper LE, core and hip recruitment, positioning, and eccentric body weight control with ambulation re-education, including ascending & descending stairs     Home Management Training / Self Care:  [] (29533) Provided self-care/home management training related to activities of daily living and compensatory training, and/or use of adaptive equipment for improvement with: ADLs and compensatory training, meal preparation, safety procedures and instruction in use of adaptive equipment, including bathing, grooming, dressing, personal hygiene, basic household cleaning and chores. Therapeutic Exercise and NMR EXR  [x] (93170) Provided verbal/tactile cueing for activities related to strengthening, flexibility, endurance, ROM for improvements in  [x] LE / Core stability: LE, hip, and core control with self care, mobility, lifting, ambulation.   [] UE / Shoulder complex: cervical, postural, scapular, scapulothoracic and UE control with self care, reaching, carrying, lifting, house/yardwork, driving, computer work.  [] (98934) Provided verbal/tactile cueing for activities related to improving balance, coordination, kinesthetic sense, posture, motor skill, proprioception to assist with   [] LE / Core stability: LE, hip, and core control in self care, mobility, lifting, ambulation and eccentric single leg control. [] UE / Shoulder complex: cervical, scapular, scapulothoracic and UE control with self care, reaching, carrying, lifting, house/yardwork, driving, computer work.   [] (46938) Therapist is in constant attendance of 2 or more patients providing skilled therapy interventions, but not providing any significant amount of measurable one-on-one time to either patient, for improvements in  [] LE / Core stability: LE, hip, and core control in self care, mobility, lifting, ambulation and eccentric single leg control. [] UE / Shoulder complex: cervical, scapular, scapulothoracic and UE control with self care, reaching, carrying, lifting, house/yardwork, driving, computer work.      Home Exercise Program:    [x] (35787) Reviewed/Progressed HEP activities related to strengthening, flexibility, endurance, ROM of   [] LE / Core stability: core, hip and LE for functional self-care, mobility, lifting and ambulation/stair navigation   [] UE / Shoulder complex: cervical, postural, scapular, scapulothoracic and UE control with self care, reaching, carrying, lifting, house/yardwork, driving, computer work  [] (13909)Reviewed/Progressed HEP activities related to improving balance, coordination, kinesthetic sense, posture, motor skill, proprioception of   [] LE: core, hip and LE for self care, mobility, lifting, and ambulation/stair navigation    [] UE / Shoulder complex: cervical, postural,  scapular, scapulothoracic and UE control with self care, reaching, carrying, lifting, house/yardwork, driving, computer work    Manual Treatments:  PROM / STM / Oscillations-Mobs:  G-I, II, III, IV (PA's, Inf., Post.)  [] (64444) Provided manual therapy to mobilize shoulder complex, hip, LE, and/or cervicothoracic/LS spine soft tissue/joints for the purpose of modulating pain, promoting relaxation,  increasing ROM, reducing/eliminating soft tissue swelling/inflammation/restriction, improving soft tissue extensibility and allowing for proper ROM for normal function with   [] LE / Core stability: self care, mobility, lifting and ambulation. [] UE / Shoulder complex: self care, reaching, carrying, lifting, house/yardwork, driving, computer work. Modalities:  [] (17039) Vasopneumatic compression: Utilized vasopneumatic compression to decrease edema / swelling for the purpose of improving mobility and quad tone / recruitment which will allow for increased overall function including but not limited to self-care, transfers, ambulation, and ascending / descending stairs. Charges:  Timed Code Treatment Minutes: 45   Total Treatment Minutes: 84   **CO-treat with OT    [] EVAL - LOW (23278)   [] EVAL - MOD (00378)  [] EVAL - HIGH (18340)  [] RE-EVAL (46365)  [] TE (72526) x      [] Ionto  [x] NMR (21946) x 2      [] Vaso  [] Manual (11376) x      [] Ultrasound  [] TA x       [] Mech Traction (08314)  [x] Gait Training x  1   [] ES (un) (47345):   [] Aquatic therapy x   [] Other:   [] Group:     GOALS:   Patient stated goal: improve ability to complete transfers   []? Progressing: []? Met: []? Not Met: []? Adjusted     Therapist goals for Patient:   Short Term Goals: To be achieved in: 2 weeks  1. Independent in HEP and progression per patient tolerance, in order to prevent re-injury. []? Progressing: []? Met: []? Not Met: []? Adjusted  2. Patient will have a decrease in pain to facilitate improvement in movement, function, and ADLs as indicated by Functional Deficits. []? Progressing: []? Met: []? Not Met: []? Adjusted     Long Term Goals: To be achieved in: 6 weeks  1. Patient will report increased standing tolerance to at least 30 minutes in standing frame. [x]? Progressing: []? Met: []? Not Met: []? Adjusted  2.  Patient will demonstrate an increase in strength to good shoulder & hip complex, and core activation to allow for proper functional mobility as indicated by patients Functional Deficits. [x]? Progressing: []? Met: []? Not Met: []? Adjusted  3. Patient will return to functional activities including demo'ing safe transfers to/from w/c with mod I.    [x]? Progressing: []? Met: []? Not Met: []? Adjusted  4. Patient will increase STREAM score to 32 or above for increased UE/LE movement in sitting, supine, and standing. [x]? Progressing: []? Met: []? Not Met: []? Adjusted          Overall Progression Towards Functional goals/ Treatment Progress Update:  [] Patient is progressing as expected towards functional goals listed. [x] Progression is slowed due to complexities/Impairments listed. [] Progression has been slowed due to co-morbidities. [] Plan just implemented, too soon to assess goals progression <30days   [] Goals require adjustment due to lack of progress  [] Patient is not progressing as expected and requires additional follow up with physician  [] Other    Persisting Functional Limitations/Impairments:  []Sleeping [x]Sitting               []Standing [x]Transfers        []Walking []Kneeling               []Stairs []Squatting / bending   [x]ADLs []Reaching  []Lifting  []Housework  []Driving []Job related tasks  []Sports/Recreation []Other:        ASSESSMENT:    Patient tolerated standing w/ Lofstrand crutches well, even able to hold left crutches straight out in front when asked to improve posture and crutch placement. Pt did demo poor pelvic control initially but improved with during 2nd stand with improved placement and education of crutches. Pt demo'd improved balance and functional mobility while long-sitting. B hamstring tightness remains limiting further progression of balance and transfers.   Continue with hamstring stretches, core stability, transfers with minimal UE assist, and dynamic standing tasks.      Treatment/Activity Tolerance:  [x] Patient able to complete tx  [] Patient limited by fatigue  [] Patient limited by pain  [] Patient limited by other medical complications  [] Other:     Prognosis: [] Good [] Fair  [] Poor    Patient Requires Follow-up: [x] Yes  [] No    Plan for next treatment session: transfers, seated core strength, will plan to co-treat with OT when possible    PLAN: See eval. PT 2x / week for 6 weeks. [x] Continue per plan of care [] Alter current plan (see comments)  [] Plan of care initiated [] Hold pending MD visit [] Discharge    Electronically signed by: Reynaldo Todd PT, DPT    Note: If patient does not return for scheduled/ recommended follow up visits, his note will serve as a discharge from care along with most recent update on progress.

## 2021-04-12 NOTE — FLOWSHEET NOTE
[facility] - Outpatient Occupational Therapy      Occupational Therapy Daily Treatment Note  Date:  2021    Patient: Ventura Kingsley   : 2000   MRN: [id number]  Referring Physician:  Yue Park CNP       Medical Diagnosis Information: paraplegia, cerebral palsy                                                  Insurance information: medicare/ medicaid    Date of Injury:   Date of Surgery: rhizotomy when he was about 13    Progress Report: []  Yes  [x]  No     Date Range for reporting period:  Beginning: 3/1/2021  Ending: end of POC    Progress report due (10 Rx/or 30 days whichever is less): visit #17 or 3394    Recertification due (POC duration/ or 90 days whichever is less): visit #12 or 2021    Visit # Insurance Allowable Auth required? Date Range    [place] []  Yes  [x]  No n/a       Latex Allergy:  []No      []Yes  Pacemaker:  [] No       [] Yes     Preferred Language for Healthcare:   [x]English       []other:    Pain level: 0/10     SUBJECTIVE:   Pt had to use the restroom prior to treatment and had difficulty removing bag from back of wheelchair; ended up getting urine on his sweatshirt. Requested to change out of it at the beginning of session. Functional Disability Index:  STREAM: score 23    OBJECTIVE: See eval    21: 3 minutes static stance at parallel bars with CGA x2 for trunk control and blocking knees      RESTRICTIONS/PRECAUTIONS: fall risk    Exercises/Interventions: Treatment focused on trunk control for improved dynamic balance during UE reaching, transfers, and mobility to enhance functional independence. Session initiated with patient transferring to mat . Worked in short sit on anterior/ posterior pelvic tilt and mid range trunk control times 10 reps. Needing min assist.  He then assumed supine with mod assist and worked on coordination with supine bridging and scooting needing mod to min assist to scoot both directions times 5 reps. Patient then worked on long sit and doffed right shoe and afo in long sit with mod to min assist for trunk control and verbal cues for problem solving. Patient then assumed short sit with mod assist and transferred back to wheel chair with mod assist.  Patient then ambulated the length of paralell bars with mod assist of one and stand by of one. Patient then worked on standing with loftstrand crutches times three reps with mod assist of two for safety. Patient impulsive changing base of support with loftstrands however was able to  Maintain a standing position with assist of device. Therapeutic Exercises  Resistance / level Sets/sec Reps Notes                                                                                Neuromuscular Re-ed / Therapeutic Activities                                                 Manual Intervention                                                     Modalities:     Patient education:  OT evaluation, plan of care, importance of weight bearing in stander for overall health, home exercise program, goals. Home Exercise Program:   Patient encouraged to start using stander 3 sessions a day and while in stander completing over head reaches . Patient to try and reach 15 minute intervals in stander. Therapeutic Exercise and NMR:  [x] (98821) Provided verbal/tactile cueing for activities related to strengthening, flexibility, endurance, ROM  for improvements in scapular, scapulothoracic and UE control with self care, reaching, carrying, lifting, house/yardwork, driving/computer work.    [] (41270) Provided verbal/tactile cueing for activities related to improving balance, coordination, kinesthetic sense, posture, motor skill, proprioception  to assist with  scapular, scapulothoracic and UE control with self care, reaching, carrying, lifting, house/yardwork, driving/computer work.   [] Comments:    Therapeutic Activities:    [x] (38920 or 68964) Provided verbal/tactile cueing 99593       [x] Ru (26025) x   1  [] OVLFI(80406)  [x] NMR (84359) x  1 [] Estim (attended) (85221)   [] Manual ([id number]) x      [] US (20595)  [] TA (12621) x      [] Paraffin (54740)  [x] ADL  (63730) x   1 [] Splint/L code:    [] Estim (unattended) (22 [id number])  [] Fluidotherapy (61406)  [] Other:    GOALS:    Patient stated goal: improved trunk control and standing tolerance to increase independence in self care. [x]? Progressing: []? Met: []? Not Met: []? Adjusted     Therapist goals for Patient:   Short Term Goals: To be achieved in: 30 days  1. Pt will be independent with clothing management on toilet or in wheelchair to complete toileting with use of grab bar  [x]? Progressing: []? Met: []? Not Met: []? Adjusted  2. Pt will be stand by assist completing scoot transfers on level surfaces  [x]? Progressing: []? Met: []? Not Met: []? Adjusted  3. Patient will be able to stand up to 30 seconds at grab bar for toile ting and lower body dress with min assist.       Long Term Goals: To be achieved by alton  1. Pt will will demonstrate an improved stream score of 33  [x]? Progressing: []? Met: []? Not Met: []? Adjusted    Progression Towards Functional goals:  [x] Patient is progressing as expected towards functional goals listed. [] Progression is slowed due to complexities listed. [] Progression has been slowed due to co-morbidities. [] Plan just implemented, too soon to assess goals progression  [] All goals are met  [] Other:     ASSESSMENT:  Patient has demonstrated significant improvement in trunk control during static and dynamic sitting and standing. Pt continues to demonstrate difficulty with hip and glute activation for functional mobility and posture.      Treatment/Activity Tolerance:  [x] Patient tolerated treatment well [] Patient limited by fatique  [] Patient limited by pain  [] Patient limited by other medical complications  [] Other:     Prognosis: [x] Good [] Fair  [] Poor    Patient Requires Follow-up: [x] Yes  [] No    PLAN: See eval  [x] Continue per plan of care [] Alter current plan (see comments)  [x] Plan of care initiated (MD cosigned) [] Hold pending MD visit [] Discharge    Electronically signed by:            Note: If patient does not return for scheduled/ recommended follow up visits, this note will serve as a discharge from care along with most recent update on progress.

## 2021-04-14 ENCOUNTER — HOSPITAL ENCOUNTER (OUTPATIENT)
Dept: PHYSICAL THERAPY | Age: 21
Setting detail: THERAPIES SERIES
Discharge: HOME | End: 2021-04-14
Payer: MEDICARE

## 2021-04-14 ENCOUNTER — HOSPITAL ENCOUNTER (OUTPATIENT)
Dept: OCCUPATIONAL THERAPY | Age: 21
Setting detail: THERAPIES SERIES
Discharge: HOME | End: 2021-04-14
Payer: MEDICARE

## 2021-04-14 PROCEDURE — 97116 GAIT TRAINING THERAPY: CPT

## 2021-04-14 PROCEDURE — 97530 THERAPEUTIC ACTIVITIES: CPT

## 2021-04-14 PROCEDURE — 97110 THERAPEUTIC EXERCISES: CPT

## 2021-04-14 PROCEDURE — 97112 NEUROMUSCULAR REEDUCATION: CPT

## 2021-04-14 NOTE — FLOWSHEET NOTE
[facility] - Outpatient Occupational Therapy      Occupational Therapy Daily Treatment Note  Date:  2021    Patient: Gaby Adamson   : 2000   MRN: [id number]  Referring Physician:  Ana Magaña CNP       Medical Diagnosis Information: paraplegia, cerebral palsy                                                  Insurance information: medicare/ medicaid    Date of Injury:   Date of Surgery: rhizotomy when he was about 13    Progress Report: []  Yes  [x]  No     Date Range for reporting period:  Beginning: 3/1/2021  Ending: end of POC    Progress report due (10 Rx/or 30 days whichever is less): visit #17 or 6820    Recertification due (POC duration/ or 90 days whichever is less): visit #12 or 2021    Visit # Insurance Allowable Auth required? Date Range   10/12 [place] []  Yes  [x]  No n/a       Latex Allergy:  []No      []Yes  Pacemaker:  [] No       [] Yes     Preferred Language for Healthcare:   [x]English       []other:    Pain level: 0/10     SUBJECTIVE:   Pt reports some fatigue on this date due to not sleeping as much. Functional Disability Index:  STREAM: score 23    OBJECTIVE: See eval    21: 3 minutes static stance at parallel bars with CGA x2 for trunk control and blocking knees      RESTRICTIONS/PRECAUTIONS: fall risk    Exercises/Interventions: Treatment focused on trunk control for improved dynamic balance during UE reaching, transfers, and mobility to enhance functional independence. Session initiated with patient transferring to mat with min A x2. Pt assumed supine with training on weight bearing into forearm to lower self with mod A for LE management to control descent. In supine, pt completed hip bridges x10 with feet blocked; hip ab/adduction x10 each for both, R, and L with mod A for control of abduction; hamstring stretching; and resisted UE reaching overhead with improved ROM following.  Pt completed supine to sit transfer with mod A with feet blocked and min A for scooting to reposition. Pt worked on standing with lofstrand crutches x2 ~1 minute each with mod assist of two for safety; multiple cues required for safe placement of crutches and extension of LEs in addition to trunk control and BUE weight bearing. In short sit, pt completed trunk control and lofstrands management task, reaching to touch lofstrand crutch to therapist's hand x8 each ipsilateral and contralateral with CGA-min A for trunk control. Gait training followed at parallel bars. In // bars, pt completed STS with B UE assist on bars and CGA for trunk control. Pt completed forward stepping with mod A for R LE advancement and max A for L LE advancement. Able to complete 6-8 steps forward each time. PT blocking pt's stance leg and assisting with advancing opp LE with OT assisting with trunk control. Pt then able to complete retro stepping in bars with CGA for trunk control and improved extension through UEs with verbal cues for changing [place] during ambulation. Pt sat on gerardo disc to complete trunk stability training, tossing/catching ball x8 and completing weight shifting with SBA-min A. Therapeutic Exercises  Resistance / level Sets/sec Reps Notes                                                                                Neuromuscular Re-ed / Therapeutic Activities                                                 Manual Intervention                                                     Modalities:     Patient education:  OT evaluation, plan of care, importance of weight bearing in stander for overall health, home exercise program, goals. Home Exercise Program:   Patient encouraged to start using stander 3 sessions a day and while in stander completing over head reaches . Patient to try and reach 15 minute intervals in stander.       Therapeutic Exercise and NMR:  [x] (16242) Provided verbal/tactile cueing for activities related to strengthening, flexibility, endurance, ROM  for training related to activities of daily living and compensatory training, and/or use of adaptive equipment   [] Comments:     Splinting:  [] Fabrication of:   [] (71684) Orthotic/Prosthetic Management, subsequent encounter  [] (92761) Orthotic management and training (fitting and assessment)  [] Comments:      Charges: co treat with PT for safety and increased intensity of treatment  Timed Code Treatment Minutes: 45   Total Treatment Minutes: 90     [] EVAL (LOW) 22 [id number]   [] OT Re-eval (02962)  [] EVAL (MOD) 76042   [] EVAL (HIGH) 01887       [x] Ru (57610) x   1  [] MLGZC(75456)  [x] NMR (39989) x  2 [] Estim (attended) (10983)   [] Manual ([id number]) x      [] US (29244)  [] TA () x      [] Paraffin (95484)  [] ADL  ([phone]) x    [] Splint/L code:    [] Estim (unattended) (22 542130)  [] Fluidotherapy (45525)  [] Other:    GOALS:    Patient stated goal: improved trunk control and standing tolerance to increase independence in self care. [x]? Progressing: []? Met: []? Not Met: []? Adjusted     Therapist goals for Patient:   Short Term Goals: To be achieved in: 30 days  1. Pt will be independent with clothing management on toilet or in wheelchair to complete toileting with use of grab bar  [x]? Progressing: []? Met: []? Not Met: []? Adjusted  2. Pt will be stand by assist completing scoot transfers on level surfaces  [x]? Progressing: []? Met: []? Not Met: []? Adjusted  3. Patient will be able to stand up to 30 seconds at grab bar for toileting and lower body dress with min assist.     [x]? Progressing: []? Met: []? Not Met: []? Adjusted     Long Term Goals: To be achieved by alton  1. Pt will will demonstrate an improved stream score of 33  [x]? Progressing: []? Met: []? Not Met: []? Adjusted    Progression Towards Functional goals:  [x] Patient is progressing as expected towards functional goals listed. [] Progression is slowed due to complexities listed.   [] Progression has been slowed due to

## 2021-04-14 NOTE — FLOWSHEET NOTE
Gait Training:  Amb in //bars    Bwd walking in //bars      Transfer w/B Lofstrand crutches   Static stand w/B Lofstrand crutches   8 ft    8 ft    Mod A of 2  Mod A of 2             x2  2'   x2    x2        x2   -manual required to advance LEs, pt activation hip extensors to initiate swing phase, demo'd adequate weight-shifting. OT provided assist & w/c follow for safety                 Therapeutic Activities (15537) /  Functional Tasks       [facility] reaching across midline in seated   Pt seated in w/c using seat belt to help stay in w/c          [facility] reaching across midline and diagonally   Pt seated in w/c using seat belt to help stay in w/c               STS to std walker            Transfer to w/c from mat table            STS from w/c to mat table - with large bolster placed on table in front of patient to hold onto   Min A, w/c close to mat table to assist in blocking pt's knees. Manual Intervention ([id number] [facility])       Supine hamstring stretch 90/90    -completed by PT  -completed by OT                                        Sessions:     4/14:  Session initiated with patient transferring to mat with min A x2. Pt with good control during stand but then difficulty moving L LE when pivoting. Pt moved into supine with cueing on weight bearing through forearm to lower onto side first; required mod A for LE's onto table. In supine, pt completed hip bridges x10 with feet blocked; hip ab/adduction x10 each for both, R, and L with mod A for control of abduction; hamstring stretching 5 x 30 sec  B in 90/90 position; and resisted UE reaching overhead with improved ROM following (completed by OT). Pt completed supine to sit transfer with mod A with feet blocked and min A for scooting to reposition.  Pt worked on standing with lofstrand crutches x2 ~1 minute each with mod assist of two for safety; multiple cues required for safe placement of crutches as pt with tendency to keep crutches too far outside his [place]. Pt also cued for extension of LEs, siva at B knees and to raise chest for improved trunk control. Pt then sat EOB and used lofstrand crutches for trunk control training, while reaching to OT's hands x 8 ea ipsilateral and contralateral with CGA/min A for maintaining trunk control. Pt was able to scoot back on the mat table when he felt like he was sliding forward with min A-CGA. Next, patient was in // bars and completed STS with B UEs on // bars and CGA for trunk control. Gait training was completed with mod A for R LE advancement and max A for L LE. Pt able to complete 6-8 steps forward x 2 trials, with PT blocking stance leg and assisting moving leg with OT assisting for trunk control. Pt completed retro stepping with improved control from last session, with cueing at HS and glutes for LE extension vs trunk rotation to force LE posteriorly. Pt finished the session sitting on gerardo disc in w/c to work on trunk control while using cell phone and while tossing/catching a ball. 4/12:  Upon arrival, pt working w/OT. EOB w/shoulders abd & wrist & hands extended & open on table, green gerardo-disc placed under each hand to increase instability. Sit to left S/L, mod A of 2. Right roll to supine supervision. Verbal & manual cues to posterior knee and ankle for heel side bilaterally. Bridge x10 w/manual stab to knee, bridge w/hip add gerardo-disc squeeze x10 w/minimal man stab to knees. Scooting to right, man stab to knees for placement, mod A to min A for shoulders and trunk. Supine to/from left S/L w/big arms, man stab to knees for eccentric control, VC's to facilitate movement. Scooting to left, man stab to knees for placement, min A for shoulders and trunk. Supine to/from right S/L w/big arms, man stab to knees for eccentric control, VC's to facilitate movement. Supine to long-sitting mod A of 1. Min to mod A of 1 for balance. Mod A of 1 to assist hip & knee flex bilaterally, mod A of 1 for trunk support. Pt reached & unbuckled 1 Velcro strap on each shoe, mod A for trunk balance. Pt doffed & donned R AFO & shoe w/assist from OT. Long-sitting to sitting EOB, min to mod A trunk support, pt maneuvered LEs to left with BUEs until feet over edge. Squat pivot mat table to w/c, min A/CGA of 2. Gait training in //bars and standing w/Lofstrand crutches. 4/9:  Pt used restroom prior to treatment, however had accident and urine got on his clothing. OT provided pt with scrub top change, pt declined scrub bottom change. Pt doffed sheatshirt and donned scrub top with SBA primarily, min A for scrub top setup so pt could kendall correctly. Able to put UEs in pull overhead with SBA. Pt transferred w/c to mat table w/mod A of 1 for safety, pt sat on edge of mat and required mod A of 1 to scoot back. Pt's feet again became caught in wheels under w/c, reducing safety of transfer. Pt remained sitting edge of mat w/SBA/Sup during discussion of planned activities then was instructed to lay supine. Pt transitioned straight backwards with fair descent control but left feet on floor. Total A of 2 to transition pt to supine position. Pt rolled to L to prone position w/min A of 1 for trunk and min A of 1 to cross R over L LE. Once prone, pt instructed to assume quadruped, pt able to assume CAYLA and then position UEs correctly with hands under shoulders & verbal cues but required mod of 2 to position knees & LEs correctly. Quadruped: hip ext/flex to starting position x3 L -min A for balance, min A manual cues LE movement; x4 R -mod A for balance/weight-shift, mod A manual cues for LE movement. **Max manual cues to extend fingers on mat initially, then pt able to perform w/only instruction to do so. Pt rested while seated on feet after 2 reps on RLE. Pt able to assume tall kneeling position w/SBA x30\". Pt returned to quadruped w/o physical assist.  To complete RLE reps. Pt rested prone.   Max A of 1 to roll pt to R for supine position. Hamstring stretches. Max A of 2 supine to long-sitting. Pt able to maintain position w/UEs behind him and CGA. Pt transitioned long-sitting to sitting edge of mat w/max cues for instruction, mod of 1 for trunk support/balance, and mod of 1 for assist positioning LEs. Sit pivot mat table to w/c min A of 1. Gait training followed. 4/1:  Session began with assisting pt to restroom. In // bars, pt completed STS with B UE assist on bars and CGA for trunk control. Pt completed forward stepping with mod A for R LE advancement and max A for L LE advancement. Able to complete 6-8 steps forward each time. PT blocking pt's stance leg and assisting with advancing opp LE. Pt then able to complete retro stepping in bars, first trail about 1/2 length of bars, second trial with full length of bars. Pt cued for glute activation for hip extension but had difficulty activating glutes and hamstrings. Again, PT blocked stance leg. Pt then completed static standing for 3 min with CGA from both PT and OT. Pt completed stand pivot transfer from wheelchair to mat to R side with min A x2; good carry over noted from last session regarding forward lean and weight shift prior to stand. Seated edge of mat with feet on 6 in step, pt worked on reaching activities reaching across midline outside [place] for a ball, then reaching the ball behind his back and passing it to his opp hand for UE function and trunk rotation, all while maintaining trunk control. Pt blocked pt's R LE to keep in a neutral position. Pt then completed ball toss with OT and was able to maintain his trunk with only min A to block R LE in neutral position and CGA for his trunk. Pt completed mat to wheelchair transfer with min A to L side to transition to parallel bars.        3/31:  Pt completed stand pivot transfer from wheelchair to mat to R side with min A x2; good carry over noted from last session regarding forward lean and weight shift prior to stand, manual required for improved LE placement. Seated edge of mat, pt completed posture training for thoracic extension and maintaining midline during hamstring stretch of RLE and conversation regarding transfers and use of stander at home. Pt reports he has a raised toilet seat with arm rests that he can transfer to without assistance from his mom. Pt completed mat to wheelchair transfer with min A to L side to transition to parallel bars, required manual assist for improved LE placement. At parallel bars, pt completed sit>stand transfer with BUE assist.   For reinforcement of trunk control and dynamic balance during forward weight shift, pt participated in reaching activity with alternating UEs completing contralateral reaching and [place] changes on parallel bar with SBA for trunk control. Progressed to completing in static stance with increased trunk rotation x6 each side with rest break in between sides, followed by ipsilateral reaching behind self x5 each side to improve trunk control and balance required for toileting tasks. BLEs blocked at knees with 1 instance L knee buckling; pt self-corrected into knee extension, but demonstrated difficulty with trunk correction to lean to R side to return to midline. In seated, pt participated in L><R weight shift to touch alternating shoulders to parallel bars x8 each side, followed by forward><back weight shift x8 with hands placed on knees for UE weight bearing and focus on eccentric control. Pt completed sit>stand with BUE assist using parallel bars to participate in standing without UE support with knees blocked and min-mod A for trunk stability for ~45 seconds prior to sitting on gerardo disc. To conclude session, pt sat on gerardo disc to complete progressively increased head/neck and trunk rotation to alternating sides for trunk control with verbal cues to prevent thoracic flexion.      3/24: Session today began with pt using Nustep while sitting in his w/c x 3 min total, but pt stopped occasionally to talk and needed cueing to continue. Pt then transferred to mat table with stand pivot transfer. Pt was able to stand fully upright, mod A x 2, but then was not table to advance R LE before sitting. Worked on trunk flexion to shift COG forward while scooting hips/buttocks back on mat table as pt has tendency to lean backwards when trying to scoot backwards on a surface. Pt encouraged through all movement today to keep hands flat on table instead of pushing through knuckles. Pt then worked on weight shifting at trunk forward and backward and was given targets to hit with forehead. After the initital task, pt could perform pushing up and scooting his hips backwards onto the table with CGA. With 4 in box placed under pt's feet for stability, pt worked on reaching outside [place] with forward lean both ipsilaterally and contralaterally to cones and then sitting back up and rotating his trunk to the R and L to place the cones behind him. Pt able to do this with CGA to Stacey for trunk control. Pt then worked on standing without walker, pt was in front of pt to assist with knee extension and block feet with OT assisting with hip ext and trunk control. Pt stood a total of 5 times. 3/22: Pt transferred from w/c to EOM. Pt then completed 1/2 stands to ladder with assist to block feet from moving and help push B knees into extension. Pt then went from EOM>sidelying>supine and completed 10 bridges with blocking at LEs. Pt worked on rolling from supine to L and R, with more difficulty rolling to R. Pt used momentum from UEs but was cued to not reach and pull. Stacey needed to progress LEs to each side. Pt rolled to prone and with PT/OT blocking LEs and assisting at knees (modA-maxA), performed 5 planks for 5-10 second holds. Pt able to better press up with extended arms vs forearms.  Pt achieved tall kneeling with a swiss ball with min A at his LEs, then progressed to Qped over the swiss ball with reaching control. [] UE / Shoulder complex: cervical, scapular, scapulothoracic and UE control with self care, reaching, carrying, lifting, house/yardwork, driving, computer work.   [] (32001) Therapist is in constant attendance of 2 or more patients providing skilled therapy interventions, but not providing any significant amount of measurable one-on-one time to either patient, for improvements in  [] LE / Core stability: LE, hip, and core control in self care, mobility, lifting, ambulation and eccentric single leg control. [] UE / Shoulder complex: cervical, scapular, scapulothoracic and UE control with self care, reaching, carrying, lifting, house/yardwork, driving, computer work.      Home Exercise Program:    [x] (62446) Reviewed/Progressed HEP activities related to strengthening, flexibility, endurance, ROM of   [] LE / Core stability: core, hip and LE for functional self-care, mobility, lifting and ambulation/stair navigation   [] UE / Shoulder complex: cervical, postural, scapular, scapulothoracic and UE control with self care, reaching, carrying, lifting, house/yardwork, driving, computer work  [] (34634)Reviewed/Progressed HEP activities related to improving balance, coordination, kinesthetic sense, posture, motor skill, proprioception of   [] LE: core, hip and LE for self care, mobility, lifting, and ambulation/stair navigation    [] UE / Shoulder complex: cervical, postural,  scapular, scapulothoracic and UE control with self care, reaching, carrying, lifting, house/yardwork, driving, computer work    Manual Treatments:  PROM / STM / Oscillations-Mobs:  G-I, II, III, IV (PA's, Inf., Post.)  [] (07156) Provided manual therapy to mobilize shoulder complex, hip, LE, and/or cervicothoracic/LS spine soft tissue/joints for the purpose of modulating pain, promoting relaxation,  increasing ROM, reducing/eliminating soft tissue swelling/inflammation/restriction, improving soft tissue extensibility and allowing for proper ROM for normal function with   [] LE / Core stability: self care, mobility, lifting and ambulation. [] UE / Shoulder complex: self care, reaching, carrying, lifting, house/yardwork, driving, computer work. Modalities:  [] (87171) Vasopneumatic compression: Utilized vasopneumatic compression to decrease edema / swelling for the purpose of improving mobility and quad tone / recruitment which will allow for increased overall function including but not limited to self-care, transfers, ambulation, and ascending / descending stairs. Charges:  Timed Code Treatment Minutes: 45   Total Treatment Minutes: 90   **CO-treat with OT    [] EVAL - LOW (30623)   [] EVAL - MOD (45127)  [] EVAL - HIGH (39631)  [] RE-EVAL (13733)  [] TE (07120) x      [] Ionto  [x] NMR (62245) x 1      [] Vaso  [] Manual (56286) x      [] Ultrasound  [x] TA x  1     [] Mech Traction (36249)  [x] Gait Training x  1   [] ES (un) (10143):   [] Aquatic therapy x   [] Other:   [] Group:     GOALS:   Patient stated goal: improve ability to complete transfers   []? Progressing: []? Met: []? Not Met: []? Adjusted     Therapist goals for Patient:   Short Term Goals: To be achieved in: 2 weeks  1. Independent in HEP and progression per patient tolerance, in order to prevent re-injury. []? Progressing: []? Met: []? Not Met: []? Adjusted  2. Patient will have a decrease in pain to facilitate improvement in movement, function, and ADLs as indicated by Functional Deficits. []? Progressing: []? Met: []? Not Met: []? Adjusted     Long Term Goals: To be achieved in: 6 weeks  1. Patient will report increased standing tolerance to at least 30 minutes in standing frame. [x]? Progressing: []? Met: []? Not Met: []? Adjusted  2. Patient will demonstrate an increase in strength to good shoulder & hip complex, and core activation to allow for proper functional mobility as indicated by patients Functional Deficits. [x]? Progressing: []? Met: []?  Not Met: []? Adjusted  3. Patient will return to functional activities including demo'ing safe transfers to/from w/c with mod I.    [x]? Progressing: []? Met: []? Not Met: []? Adjusted  4. Patient will increase STREAM score to 32 or above for increased UE/LE movement in sitting, supine, and standing. [x]? Progressing: []? Met: []? Not Met: []? Adjusted          Overall Progression Towards Functional goals/ Treatment Progress Update:  [] Patient is progressing as expected towards functional goals listed. [x] Progression is slowed due to complexities/Impairments listed. [] Progression has been slowed due to co-morbidities. [] Plan just implemented, too soon to assess goals progression <30days   [] Goals require adjustment due to lack of progress  [] Patient is not progressing as expected and requires additional follow up with physician  [] Other    Persisting Functional Limitations/Impairments:  []Sleeping [x]Sitting               []Standing [x]Transfers        []Walking []Kneeling               []Stairs []Squatting / bending   [x]ADLs []Reaching  []Lifting  []Housework  []Driving []Job related tasks  []Sports/Recreation []Other:        ASSESSMENT:   Today's session again focused on trunk control for improved balance during functional tasks. Pt demo'd improved ability to complete retro stepping in // bars when cued at HS. Will continue with standing tolerance and trunk control. Treatment/Activity Tolerance:  [x] Patient able to complete tx  [] Patient limited by fatigue  [] Patient limited by pain  [] Patient limited by other medical complications  [] Other:     Prognosis: [] Good [] Fair  [] Poor    Patient Requires Follow-up: [x] Yes  [] No    Plan for next treatment session: transfers, seated core strength, will plan to co-treat with OT when possible    PLAN: See eval. PT 2x / week for 6 weeks.    [x] Continue per plan of care [] Alter current plan (see comments)  [] Plan of care initiated [] Hold pending MD visit [] Discharge    Electronically signed by: Abdiel Ray PT, DPT    Note: If patient does not return for scheduled/ recommended follow up visits, his note will serve as a discharge from care along with most recent update on progress.

## 2021-04-19 ENCOUNTER — HOSPITAL ENCOUNTER (OUTPATIENT)
Dept: OCCUPATIONAL THERAPY | Age: 21
Setting detail: THERAPIES SERIES
Discharge: HOME | End: 2021-04-19
Payer: MEDICARE

## 2021-04-19 ENCOUNTER — HOSPITAL ENCOUNTER (OUTPATIENT)
Dept: PHYSICAL THERAPY | Age: 21
Setting detail: THERAPIES SERIES
Discharge: HOME | End: 2021-04-19
Payer: MEDICARE

## 2021-04-19 PROCEDURE — 97112 NEUROMUSCULAR REEDUCATION: CPT

## 2021-04-19 PROCEDURE — 97530 THERAPEUTIC ACTIVITIES: CPT

## 2021-04-19 PROCEDURE — 97110 THERAPEUTIC EXERCISES: CPT

## 2021-04-19 PROCEDURE — 97535 SELF CARE MNGMENT TRAINING: CPT

## 2021-04-19 NOTE — FLOWSHEET NOTE
[facility] - Outpatient Occupational Therapy      Occupational Therapy Daily Treatment Note  Date:  2021    Patient: Margo Willis   : 2000   MRN: [id number]  Referring Physician:  Blayne Bernal CNP       Medical Diagnosis Information: paraplegia, cerebral palsy                                                  Insurance information: medicare/ medicaid    Date of Injury:   Date of Surgery: rhizotomy when he was about 13    Progress Report: []  Yes  [x]  No     Date Range for reporting period:  Beginning: 3/1/2021  Ending: end of POC    Progress report due (10 Rx/or 30 days whichever is less): visit #17 or 6456    Recertification due (POC duration/ or 90 days whichever is less): visit #12 or 2021    Visit # Insurance Allowable Auth required? Date Range    [place] []  Yes  [x]  No n/a       Latex Allergy:  []No      []Yes  Pacemaker:  [] No       [] Yes     Preferred Language for Healthcare:   [x]English       []other:    Pain level: 0/10     SUBJECTIVE:   Pt reports he has not been using his stander consistently due to his mom not putting his braces on for him before leaving the home. Pt reports he has not attempted to don/doff his braces independently, saying \"I can't\" repeatedly. Functional Disability Index:  STREAM: score 23    OBJECTIVE: See andres    21: 3 minutes static stance at parallel bars with CGA x2 for trunk control and blocking knees      RESTRICTIONS/PRECAUTIONS: fall risk    Exercises/Interventions: Treatment focused on improving dynamic balance during UE reaching, transfers, and mobility to enhance functional independence. Session initiated with patient transferring to mat with min A x2 with continued difficulty in foot placement and repositioning during and after transfer. Pt completed scooting with good carryover in forward weight shift.  Pt participated in trunk control task with forward/back weight shift x10 with verbal cues to maintain encouraged to start using stander 3 sessions a day and while in stander completing over head reaches . Patient to try and reach 15 minute intervals in stander. Therapeutic Exercise and NMR:  [x] (62297) Provided verbal/tactile cueing for activities related to strengthening, flexibility, endurance, ROM  for improvements in scapular, scapulothoracic and UE control with self care, reaching, carrying, lifting, house/yardwork, driving/computer work.    [] (21536) Provided verbal/tactile cueing for activities related to improving balance, coordination, kinesthetic sense, posture, motor skill, proprioception  to assist with  scapular, scapulothoracic and UE control with self care, reaching, carrying, lifting, house/yardwork, driving/computer work.   [] Comments:    Therapeutic Activities:    [x] (97110 or 36798) Provided verbal/tactile cueing for activities related to improving balance, coordination, kinesthetic sense, posture, motor skill, proprioception and motor activation to allow for proper function of scapular, scapulothoracic and UE control with self care, carrying, lifting, driving/computer work  [] Comments:    Home Exercise Program:    [x] (86092) Reviewed/Progressed HEP activities related to strengthening, flexibility, endurance, ROM of scapular, scapulothoracic and UE control with self care, reaching, carrying, lifting, house/yardwork, driving/computer work  [] (82559) Reviewed/Progressed HEP activities related to improving balance, coordination, kinesthetic sense, posture, motor skill, proprioception of scapular, scapulothoracic and UE control with self care, reaching, carrying, lifting, house/yardwork, driving/computer work    [] Comments:    Manual Treatments:  PROM / STM / Oscillations-Mobs:  G-I, II, III, IV (PA's, Inf., Post.)  [] (61866) Provided manual therapy to mobilize soft tissue/joints of cervical/CT, scapular GHJ and UE for the purpose of modulating pain, promoting relaxation,  increasing will demonstrate an improved stream score of 33  [x]? Progressing: []? Met: []? Not Met: []? Adjusted    Progression Towards Functional goals:  [x] Patient is progressing as expected towards functional goals listed. [] Progression is slowed due to complexities listed. [] Progression has been slowed due to co-morbidities. [] Plan just implemented, too soon to assess goals progression  [] All goals are met  [] Other:     ASSESSMENT:  Patient has demonstrated significant improvement in trunk control during static and dynamic sitting and standing. Pt continues to demonstrate difficulty with hip and glute activation for functional mobility and posture. Treatment/Activity Tolerance:  [x] Patient tolerated treatment well [] Patient limited by fatique  [] Patient limited by pain  [] Patient limited by other medical complications  [] Other:     Prognosis: [x] Good [] Fair  [] Poor    Patient Requires Follow-up: [x] Yes  [] No    PLAN: See eval  [x] Continue per plan of care [] Alter current plan (see comments)  [x] Plan of care initiated (MD cosigned) [] Hold pending MD visit [] Discharge    Electronically signed by:        SHUKRI Huertas/L [id number]      Note: If patient does not return for scheduled/ recommended follow up visits, this note will serve as a discharge from care along with most recent update on progress.

## 2021-04-19 NOTE — FLOWSHEET NOTE
L weight shift prior to end ROM with improved control following. Pt completed STS transfer min A x2 to work on standing with lofstrand crutches ~2.5 minutes with mod A x2 progressing to mod + max A with fatigue for safety; multiple cues required for safe placement of crutches and extension of LEs in addition to trunk control and BUE weight bearing. Pt sat edge of mat to scoot back with difficulty lifting LEs over edge due to decreased active knee extension; pt disengaging trunk to fall into supine with max A + mod A to sit in long sit. Pt trained in knee flexion to utilize LEs for scooting and as assist for trunk control. Pt in long sit with back against wall for trunk support. Pt participated in training to doff/don AFOs with extended time required; pt leaving braces in shoes during doffing with improved ability to don and verbal cues to hold tongue out of the way when donning. Min A to extend R toes due to pt curling toes and inhibiting foot placement in shoe. CO-OP approach utilized for problem solving with discussion of barriers/solutions. Pt scooted to edge of mat with min A and completed edge of mat to wheelchair transfer min A to conclude session. 4/14:  Session initiated with patient transferring to mat with min A x2. Pt with good control during stand but then difficulty moving L LE when pivoting. Pt moved into supine with cueing on weight bearing through forearm to lower onto side first; required mod A for LE's onto table. In supine, pt completed hip bridges x10 with feet blocked; hip ab/adduction x10 each for both, R, and L with mod A for control of abduction; hamstring stretching 5 x 30 sec  B in 90/90 position; and resisted UE reaching overhead with improved ROM following (completed by OT). Pt completed supine to sit transfer with mod A with feet blocked and min A for scooting to reposition.  Pt worked on standing with lofstrand crutches x2 ~1 minute each with mod assist of two for safety; multiple cues required for safe placement of crutches as pt with tendency to keep crutches too far outside his [place]. Pt also cued for extension of LEs, siva at B knees and to raise chest for improved trunk control. Pt then sat EOB and used lofstrand crutches for trunk control training, while reaching to OT's hands x 8 ea ipsilateral and contralateral with CGA/min A for maintaining trunk control. Pt was able to scoot back on the mat table when he felt like he was sliding forward with min A-CGA. Next, patient was in // bars and completed STS with B UEs on // bars and CGA for trunk control. Gait training was completed with mod A for R LE advancement and max A for L LE. Pt able to complete 6-8 steps forward x 2 trials, with PT blocking stance leg and assisting moving leg with OT assisting for trunk control. Pt completed retro stepping with improved control from last session, with cueing at HS and glutes for LE extension vs trunk rotation to force LE posteriorly. Pt finished the session sitting on gerardo disc in w/c to work on trunk control while using cell phone and while tossing/catching a ball. 4/12:  Upon arrival, pt working w/OT. EOB w/shoulders abd & wrist & hands extended & open on table, green gerardo-disc placed under each hand to increase instability. Sit to left S/L, mod A of 2. Right roll to supine supervision. Verbal & manual cues to posterior knee and ankle for heel side bilaterally. Bridge x10 w/manual stab to knee, bridge w/hip add gerardo-disc squeeze x10 w/minimal man stab to knees. Scooting to right, man stab to knees for placement, mod A to min A for shoulders and trunk. Supine to/from left S/L w/big arms, man stab to knees for eccentric control, VC's to facilitate movement. Scooting to left, man stab to knees for placement, min A for shoulders and trunk. Supine to/from right S/L w/big arms, man stab to knees for eccentric control, VC's to facilitate movement. Supine to long-sitting mod A of 1.   Min to [place] A of 1 for balance. Mod A of 1 to assist hip & knee flex bilaterally, mod A of 1 for trunk support. Pt reached & unbuckled 1 Velcro strap on each shoe, mod A for trunk balance. Pt doffed & donned R AFO & shoe w/assist from OT. Long-sitting to sitting EOB, min to mod A trunk support, pt maneuvered LEs to left with BUEs until feet over edge. Squat pivot mat table to w/c, min A/CGA of 2. Gait training in //bars and standing w/Lofstrand crutches. 4/9:  Pt used restroom prior to treatment, however had accident and urine got on his clothing. OT provided pt with scrub top change, pt declined scrub bottom change. Pt doffed sheatshirt and donned scrub top with SBA primarily, min A for scrub top setup so pt could kendall correctly. Able to put UEs in pull overhead with SBA. Pt transferred w/c to mat table w/mod A of 1 for safety, pt sat on edge of mat and required mod A of 1 to scoot back. Pt's feet again became caught in wheels under w/c, reducing safety of transfer. Pt remained sitting edge of mat w/SBA/Sup during discussion of planned activities then was instructed to lay supine. Pt transitioned straight backwards with fair descent control but left feet on floor. Total A of 2 to transition pt to supine position. Pt rolled to L to prone position w/min A of 1 for trunk and min A of 1 to cross R over L LE. Once prone, pt instructed to assume quadruped, pt able to assume CAYLA and then position UEs correctly with hands under shoulders & verbal cues but required mod of 2 to position knees & LEs correctly. Quadruped: hip ext/flex to starting position x3 L -min A for balance, min A manual cues LE movement; x4 R -mod A for balance/weight-shift, mod A manual cues for LE movement. **Max manual cues to extend fingers on mat initially, then pt able to perform w/only instruction to do so. Pt rested while seated on feet after 2 reps on RLE. Pt able to assume tall kneeling position w/SBA x30\".   Pt returned to quadruped w/o physical assist.  To complete RLE reps. Pt rested prone. Max A of 1 to roll pt to R for supine position. Hamstring stretches. Max A of 2 supine to long-sitting. Pt able to maintain position w/UEs behind him and CGA. Pt transitioned long-sitting to sitting edge of mat w/max cues for instruction, mod of 1 for trunk support/balance, and mod of 1 for assist positioning LEs. Sit pivot mat table to w/c min A of 1. Gait training followed. 4/1:  Session began with assisting pt to restroom. In // bars, pt completed STS with B UE assist on bars and CGA for trunk control. Pt completed forward stepping with mod A for R LE advancement and max A for L LE advancement. Able to complete 6-8 steps forward each time. PT blocking pt's stance leg and assisting with advancing opp LE. Pt then able to complete retro stepping in bars, first trail about 1/2 length of bars, second trial with full length of bars. Pt cued for glute activation for hip extension but had difficulty activating glutes and hamstrings. Again, PT blocked stance leg. Pt then completed static standing for 3 min with CGA from both PT and OT. Pt completed stand pivot transfer from wheelchair to mat to R side with min A x2; good carry over noted from last session regarding forward lean and weight shift prior to stand. Seated edge of mat with feet on 6 in step, pt worked on reaching activities reaching across midline outside [place] for a ball, then reaching the ball behind his back and passing it to his opp hand for UE function and trunk rotation, all while maintaining trunk control. Pt blocked pt's R LE to keep in a neutral position. Pt then completed ball toss with OT and was able to maintain his trunk with only min A to block R LE in neutral position and CGA for his trunk. Pt completed mat to wheelchair transfer with min A to L side to transition to parallel bars.        3/31:  Pt completed stand pivot transfer from wheelchair to mat to R side with min A x2; good carry over noted from last session regarding forward lean and weight shift prior to stand, manual required for improved LE placement. Seated edge of mat, pt completed posture training for thoracic extension and maintaining midline during hamstring stretch of RLE and conversation regarding transfers and use of stander at home. Pt reports he has a raised toilet seat with arm rests that he can transfer to without assistance from his mom. Pt completed mat to wheelchair transfer with min A to L side to transition to parallel bars, required manual assist for improved LE placement. At parallel bars, pt completed sit>stand transfer with BUE assist.   For reinforcement of trunk control and dynamic balance during forward weight shift, pt participated in reaching activity with alternating UEs completing contralateral reaching and [place] changes on parallel bar with SBA for trunk control. Progressed to completing in static stance with increased trunk rotation x6 each side with rest break in between sides, followed by ipsilateral reaching behind self x5 each side to improve trunk control and balance required for toileting tasks. BLEs blocked at knees with 1 instance L knee buckling; pt self-corrected into knee extension, but demonstrated difficulty with trunk correction to lean to R side to return to midline. In seated, pt participated in L><R weight shift to touch alternating shoulders to parallel bars x8 each side, followed by forward><back weight shift x8 with hands placed on knees for UE weight bearing and focus on eccentric control. Pt completed sit>stand with BUE assist using parallel bars to participate in standing without UE support with knees blocked and min-mod A for trunk stability for ~45 seconds prior to sitting on gerardo disc. To conclude session, pt sat on gerardo disc to complete progressively increased head/neck and trunk rotation to alternating sides for trunk control with verbal cues to prevent thoracic flexion. 3/24: Session today began with pt using Nustep while sitting in his w/c x 3 min total, but pt stopped occasionally to talk and needed cueing to continue. Pt then transferred to mat table with stand pivot transfer. Pt was able to stand fully upright, mod A x 2, but then was not table to advance R LE before sitting. Worked on trunk flexion to shift COG forward while scooting hips/buttocks back on mat table as pt has tendency to lean backwards when trying to scoot backwards on a surface. Pt encouraged through all movement today to keep hands flat on table instead of pushing through knuckles. Pt then worked on weight shifting at trunk forward and backward and was given targets to hit with forehead. After the initital task, pt could perform pushing up and scooting his hips backwards onto the table with CGA. With 4 in box placed under pt's feet for stability, pt worked on reaching outside [place] with forward lean both ipsilaterally and contralaterally to cones and then sitting back up and rotating his trunk to the R and L to place the cones behind him. Pt able to do this with CGA to Stacey for trunk control. Pt then worked on standing without walker, pt was in front of pt to assist with knee extension and block feet with OT assisting with hip ext and trunk control. Pt stood a total of 5 times. 3/22: Pt transferred from w/c to EOM. Pt then completed 1/2 stands to ladder with assist to block feet from moving and help push B knees into extension. Pt then went from EOM>sidelying>supine and completed 10 bridges with blocking at LEs. Pt worked on rolling from supine to L and R, with more difficulty rolling to R. Pt used momentum from UEs but was cued to not reach and pull. Stacey needed to progress LEs to each side. Pt rolled to prone and with PT/OT blocking LEs and assisting at knees (modA-maxA), performed 5 planks for 5-10 second holds. Pt able to better press up with extended arms vs forearms.  Pt achieved tall kneeling with a swiss ball with min A at his LEs, then progressed to Qped over the swiss ball with reaching with B UEs. Stacey at LEs progressed to Mod A as pt faitgued. Next pt sat EOB with reaching to magnets, then worked on reaching [place] while holding a small bolster to facilitate open hands. Pt with Stacey needed for these activities to maintain trunk control. Pt then held each end of the bolster and hit a ball while maintaining trunk control. ModA needed to maintain trunk control as pt was quite fatigued by the end of the session. Pt then stood with a std walker x 3 with mod A x 2. Pt transferred back to  with min A for LE placement. 3/9: session began with transferring from w/c to mat table with min Ax 2. Increased time required and cueing to improve safety. Once on the mat table, pt worked on scooting leading with upper body first and performing small abdominal crunch then bridging to move hips. Pt required assistance to keep LEs stable in a hooklying position. Pt also performed rolling to each side R/L x 5, in which he rolled hips first then his upper body. Next pt rolled to prone and pushed through UEs to get into quadruped. Mod Ax 2 required for stability at pelvis and LEs. Pt cued for spine neutral position and then weight shifting to prepare for crawling. Pt able to move R UE and LE forward with mod A, but then was too fatigued and rested. Pt sat in tall kneeling and was able to attain this position by pushing up on a swiss ball. Pt transitioned next to sitting EOB and shifting weight fwd to stand at standard walker. First he completed partial stands with mod A x 2, with PT/OT blocking knees and feet. OT suggested pt stand at ladder but pt reported he was more comfortable using std walker. Lastly pt laid supine and rotated side to side with min A x 1, with reaching arm diagonally and protracting/retracting hips x 10 B/L.  Pt completed a stand/pivot transfer from the mat to the W/C with min A x 1 at the end of the session. Modalities:     Pt. Education:  -patient educated on diagnosis, prognosis and expectations for rehab  -all patient questions were answered    HEP instruction:      NMR and Therapeutic Activities:    [x] (48771 or 68832) Provided verbal/tactile cueing for activities related to improving balance, coordination, kinesthetic sense, posture, motor skill, proprioception and motor activation to allow for proper function of   [x] LE / Core, hip and LE with self care and ADLs  [x] UE / Shoulder complex: cervical, postural, scapular, scapulothoracic and UE control with self care, carrying, lifting, driving, computer work.   [] (83440) Gait Re-education- Provided training and instruction to the patient for proper LE, core and hip recruitment, positioning, and eccentric body weight control with ambulation re-education, including ascending & descending stairs     Home Management Training / Self Care:  [] (45480) Provided self-care/home management training related to activities of daily living and compensatory training, and/or use of adaptive equipment for improvement with: ADLs and compensatory training, meal preparation, safety procedures and instruction in use of adaptive equipment, including bathing, grooming, dressing, personal hygiene, basic household cleaning and chores. Therapeutic Exercise and NMR EXR  [x] (82033) Provided verbal/tactile cueing for activities related to strengthening, flexibility, endurance, ROM for improvements in  [x] LE / Core stability: LE, hip, and core control with self care, mobility, lifting, ambulation.   [] UE / Shoulder complex: cervical, postural, scapular, scapulothoracic and UE control with self care, reaching, carrying, lifting, house/yardwork, driving, computer work.  [] (33212) Provided verbal/tactile cueing for activities related to improving balance, coordination, kinesthetic sense, posture, motor skill, proprioception to assist with   [] LE / Core stability: LE, hip, and core control in self care, mobility, lifting, ambulation and eccentric single leg control. [] UE / Shoulder complex: cervical, scapular, scapulothoracic and UE control with self care, reaching, carrying, lifting, house/yardwork, driving, computer work.   [] (99454) Therapist is in constant attendance of 2 or more patients providing skilled therapy interventions, but not providing any significant amount of measurable one-on-one time to either patient, for improvements in  [] LE / Core stability: LE, hip, and core control in self care, mobility, lifting, ambulation and eccentric single leg control. [] UE / Shoulder complex: cervical, scapular, scapulothoracic and UE control with self care, reaching, carrying, lifting, house/yardwork, driving, computer work.      Home Exercise Program:    [x] (67289) Reviewed/Progressed HEP activities related to strengthening, flexibility, endurance, ROM of   [] LE / Core stability: core, hip and LE for functional self-care, mobility, lifting and ambulation/stair navigation   [] UE / Shoulder complex: cervical, postural, scapular, scapulothoracic and UE control with self care, reaching, carrying, lifting, house/yardwork, driving, computer work  [] (36447)Reviewed/Progressed HEP activities related to improving balance, coordination, kinesthetic sense, posture, motor skill, proprioception of   [] LE: core, hip and LE for self care, mobility, lifting, and ambulation/stair navigation    [] UE / Shoulder complex: cervical, postural,  scapular, scapulothoracic and UE control with self care, reaching, carrying, lifting, house/yardwork, driving, computer work    Manual Treatments:  PROM / STM / Oscillations-Mobs:  G-I, II, III, IV (PA's, Inf., Post.)  [] (14784) Provided manual therapy to mobilize shoulder complex, hip, LE, and/or cervicothoracic/LS spine soft tissue/joints for the purpose of modulating pain, promoting relaxation,  increasing ROM, reducing/eliminating soft tissue swelling/inflammation/restriction, improving soft tissue extensibility and allowing for proper ROM for normal function with   [] LE / Core stability: self care, mobility, lifting and ambulation. [] UE / Shoulder complex: self care, reaching, carrying, lifting, house/yardwork, driving, computer work. Modalities:  [] (52173) Vasopneumatic compression: Utilized vasopneumatic compression to decrease edema / swelling for the purpose of improving mobility and quad tone / recruitment which will allow for increased overall function including but not limited to self-care, transfers, ambulation, and ascending / descending stairs. Charges:  Timed Code Treatment Minutes: 40   Total Treatment Minutes: 85   **CO-treat with OT    [] EVAL - LOW (17876)   [] EVAL - MOD (91227)  [] EVAL - HIGH (38040)  [] RE-EVAL (82453)  [] TE (05959) x      [] Ionto  [x] NMR (36212) x 1      [] Vaso  [] Manual (81390) x      [] Ultrasound  [x] TA x  2     [] Mech Traction (07187)  [] Gait Training x     [] ES (un) (32737):   [] Aquatic therapy x   [] Other:   [] Group:     GOALS:   Patient stated goal: improve ability to complete transfers   []? Progressing: []? Met: []? Not Met: []? Adjusted     Therapist goals for Patient:   Short Term Goals: To be achieved in: 2 weeks  1. Independent in HEP and progression per patient tolerance, in order to prevent re-injury. []? Progressing: []? Met: []? Not Met: []? Adjusted  2. Patient will have a decrease in pain to facilitate improvement in movement, function, and ADLs as indicated by Functional Deficits. []? Progressing: []? Met: []? Not Met: []? Adjusted     Long Term Goals: To be achieved in: 6 weeks  1. Patient will report increased standing tolerance to at least 30 minutes in standing frame. [x]? Progressing: []? Met: []? Not Met: []? Adjusted  2.  Patient will demonstrate an increase in strength to good shoulder & hip complex, and core activation to allow for proper functional mobility as indicated by patients Functional Deficits. [x]? Progressing: []? Met: []? Not Met: []? Adjusted  3. Patient will return to functional activities including demo'ing safe transfers to/from w/c with mod I.    [x]? Progressing: []? Met: []? Not Met: []? Adjusted  4. Patient will increase STREAM score to 32 or above for increased UE/LE movement in sitting, supine, and standing. [x]? Progressing: []? Met: []? Not Met: []? Adjusted          Overall Progression Towards Functional goals/ Treatment Progress Update:  [] Patient is progressing as expected towards functional goals listed. [x] Progression is slowed due to complexities/Impairments listed. [] Progression has been slowed due to co-morbidities. [] Plan just implemented, too soon to assess goals progression <30days   [] Goals require adjustment due to lack of progress  [] Patient is not progressing as expected and requires additional follow up with physician  [] Other    Persisting Functional Limitations/Impairments:  []Sleeping [x]Sitting               []Standing [x]Transfers        []Walking []Kneeling               []Stairs []Squatting / bending   [x]ADLs []Reaching  []Lifting  []Housework  []Driving []Job related tasks  []Sports/Recreation []Other:        ASSESSMENT:    Patient's dynamic balance is improving noted by pt's improved ability to tolerate long-sitting position during dynamic tasks, was able to rest against wall as needed this date. Decreased hamstring length are most limiting to functional tasks at this time. Pt's tolerance to standing and gait activity are restricted when utilizing lofstrand crutches.     Treatment/Activity Tolerance:  [x] Patient able to complete tx  [] Patient limited by fatigue  [] Patient limited by pain  [] Patient limited by other medical complications  [] Other:     Prognosis: [] Good [] Fair  [] Poor    Patient Requires Follow-up: [x] Yes  [] No    Plan for next treatment session: transfers, seated core strength, will plan to co-treat with OT when possible    PLAN: See eval. PT 2x / week for 6 weeks. [x] Continue per plan of care [] Alter current plan (see comments)  [] Plan of care initiated [] Hold pending MD visit [] Discharge    Electronically signed by: Cole Flores PT, DPT    Note: If patient does not return for scheduled/ recommended follow up visits, his note will serve as a discharge from care along with most recent update on progress.

## 2021-04-21 ENCOUNTER — HOSPITAL ENCOUNTER (OUTPATIENT)
Dept: PHYSICAL THERAPY | Age: 21
Setting detail: THERAPIES SERIES
Discharge: HOME | End: 2021-04-21
Payer: MEDICARE

## 2021-04-21 ENCOUNTER — HOSPITAL ENCOUNTER (OUTPATIENT)
Dept: OCCUPATIONAL THERAPY | Age: 21
Setting detail: THERAPIES SERIES
Discharge: HOME | End: 2021-04-21
Payer: MEDICARE

## 2021-04-21 PROCEDURE — 97530 THERAPEUTIC ACTIVITIES: CPT

## 2021-04-21 PROCEDURE — 97112 NEUROMUSCULAR REEDUCATION: CPT

## 2021-04-21 PROCEDURE — 97110 THERAPEUTIC EXERCISES: CPT

## 2021-04-21 NOTE — PLAN OF CARE
[address] [place] [address] Occupational Therapy     Occupational Therapy Re-Certification Plan of Care    Dear Orly Helton CNP,    We had the pleasure of treating the following patient for physical therapy services at [address] [place] [address]. A summary of our findings can be found in the updated assessment below. This includes our plan of care. If you have any questions or concerns regarding these findings, please do not hesitate to contact me at the office phone number checked above. Thank you for the referral.     Physician Signature:________________________________Date:__________________  By signing above (or electronic signature), therapists plan is approved by physician      Functional Outcome:   STREAM: score 23    4/21/21: Stroke Rehabilitation Assessment of Movement (STREAM): total- 24/70 (supine- 9/15, sitting- 15/31, standing- 0/24)    Overall Response to Treatment:   [x]Patient is responding well to treatment and improvement is noted with regards to goals   []Patient should continue to improve in reasonable time if they continue HEP   []Patient has plateaued and is no longer responding to skilled OT intervention    []Patient is getting worse and would benefit from return to referring MD   []Patient unable to adhere to initial POC   [x]Other: PT and OT have been cotreating for both safety and maximizing pt functional mobility. Since evaluation, trunk control has significantly improved and pt able to maintain sitting EOB without assistance. Patient's dynamic sitting balance is improving noted by pt's improved ability to tolerate long-sitting position during dynamic tasks. Pt has been able to ambulate a few steps in // bars with mod A of 2 and PT blocking knee at stance leg and assisting with forward progression of LEs. Pt would continue to benefit from skilled therapy to maximize functional mobility.      Date range of Visits: 3/1/21-21  Total Visits: 11    Recommendation:    [x]Continue OT 2x / wk for 4 weeks. []Hold OT, pending MD visit      Occupational Therapy Daily Treatment/Progress Note  Date:  2021    Patient: Shahram Durant   : 2000   MRN: [id number]  Referring Physician:  Orly Helton CNP       Medical Diagnosis Information: paraplegia, cerebral palsy                                                  Insurance information: medicare/ medicaid    Date of Injury:   Date of Surgery: rhizotomy when he was about 13    Progress Report: []  Yes  [x]  No     Date Range for reporting period:  Beginning: 3/1/2021  Ending: end of POC    Progress report due (10 Rx/or 30 days whichever is less): visit #17 or 595    Recertification due (POC duration/ or 90 days whichever is less): visit #12 or 2021    Visit # Insurance Allowable Auth required? Date Range    [place] []  Yes  [x]  No n/a       Latex Allergy:  []No      []Yes  Pacemaker:  [] No       [] Yes     Preferred Language for Healthcare:   [x]English       []other:    Pain level: 0/10     SUBJECTIVE:   Pt fell this morning after legs slipped while transferring to the toilet. Pt tried to hold himself up but his arms gave out and he fell. He had to crawl to his bed to get up with assist from mom. Pt states his L arm is a little sore and his pinky toe was bleeding. Pt states he has not been trying to put his AFOs on at home. Pt was able to  the stander for a little over an hour yesterday.      Functional Disability Index:  STREAM: score 23    21: Stroke Rehabilitation Assessment of Movement (STREAM): total- 24/70 (supine- 915, sitting- 15, standing- 024)    OBJECTIVE: See eval    21: 3 minutes static stance at parallel bars with CGA x2 for trunk control and blocking knees      RESTRICTIONS/PRECAUTIONS: fall risk    Exercises/Interventions: Treatment focused on improving dynamic balance during UE reaching, transfers, and mobility to enhance functional independence. Session initiated with functional assessment of pt progress with subjective and objective measures noted above, and progress with goals noted below. In supine, pt performed 10 bridges with PT blocking at LEs to prevent hip abd. Pt then completed seated mat exercises with sitting EOB and reaching across midline outside base of support for dynamic sitting balance with min A during loss of balance, but otherwise pt able to self correct to upright position. Pt completed trunk rotation to reach behind self to grab bean bag to improve dynamic balance required for pericare while toileting, followed by  tossing to bucket, with again min A if loss of balance occurred. Bean bags were tossed back to patient and he was instructed to catch with open hands to reduce tone and throw behind self with both hands overhead while keeping his head up and trunk engaged to prevent posterior lean. Pt transitioned to the // bars where he completed static standing x 4 trials (lengths: 105 seconds, 90 seconds, 75 seconds, 45 seconds). Pt able to complete this with min Ax1, CGAx 1 with cues to keep arms in front of him to prepare for use of lofstrand crutches and to facilitate trunk engagement and knee extension. Lastly, pt kicked into knee extension x8 each side with assist into hip extension while sitting in w/c with upright posture. Therapeutic Exercises  Resistance / level Sets/sec Reps Notes                                                                                Neuromuscular Re-ed / Therapeutic Activities                                                 Manual Intervention                                                     Modalities:     Patient education:  OT evaluation, plan of care, importance of weight bearing in stander for overall health, home exercise program, goals.        Home Exercise Program:   Patient encouraged to start using stander 3 sessions a day and while in stander completing over head reaches . Patient to try and reach 15 minute intervals in stander. Therapeutic Exercise and NMR:  [x] (68297) Provided verbal/tactile cueing for activities related to strengthening, flexibility, endurance, ROM  for improvements in scapular, scapulothoracic and UE control with self care, reaching, carrying, lifting, house/yardwork, driving/computer work.    [] (08173) Provided verbal/tactile cueing for activities related to improving balance, coordination, kinesthetic sense, posture, motor skill, proprioception  to assist with  scapular, scapulothoracic and UE control with self care, reaching, carrying, lifting, house/yardwork, driving/computer work.   [] Comments:    Therapeutic Activities:    [x] (35851 or 16883) Provided verbal/tactile cueing for activities related to improving balance, coordination, kinesthetic sense, posture, motor skill, proprioception and motor activation to allow for proper function of scapular, scapulothoracic and UE control with self care, carrying, lifting, driving/computer work  [] Comments:    Home Exercise Program:    [x] (36687) Reviewed/Progressed HEP activities related to strengthening, flexibility, endurance, ROM of scapular, scapulothoracic and UE control with self care, reaching, carrying, lifting, house/yardwork, driving/computer work  [] (29339) Reviewed/Progressed HEP activities related to improving balance, coordination, kinesthetic sense, posture, motor skill, proprioception of scapular, scapulothoracic and UE control with self care, reaching, carrying, lifting, house/yardwork, driving/computer work    [] Comments:    Manual Treatments:  PROM / STM / Oscillations-Mobs:  G-I, II, III, IV (PA's, Inf., Post.)  [] (30191) Provided manual therapy to mobilize soft tissue/joints of cervical/CT, scapular GHJ and UE for the purpose of modulating pain, promoting relaxation,  increasing ROM, reducing/eliminating soft tissue swelling/inflammation/restriction, improving soft tissue extensibility and allowing for proper ROM for normal function with self care, reaching, carrying, lifting, house/yardwork, driving/computer work  [] Comments:    ADL Training:  [] (63240) Provided self-care/home management training related to activities of daily living and compensatory training, and/or use of adaptive equipment   [] Comments:     Splinting:  [] Fabrication of:   [] (75686) Orthotic/Prosthetic Management, subsequent encounter  [] (90141) Orthotic management and training (fitting and assessment)  [] Comments:      Charges: co treat with PT for safety and increased intensity of treatment  Timed Code Treatment Minutes: 45   Total Treatment Minutes: 90     [] EVAL (LOW) 66835   [] OT Re-eval (41264)  [] EVAL (MOD) 66021   [] EVAL (HIGH) 32903       [x] Ru (29436) x   1  [] VXSAH(08002)  [x] NMR (02757) x  2  [] Estim (attended) (66963)   [] Manual ([id number]) x      [] US (25432)  [] TA (38192) x      [] Paraffin (16901)  [] ADL  ([phone]) x     [] Splint/L code:    [] Estim (unattended) ([id number])  [] Fluidotherapy (66376)  [] Other:    GOALS:    Patient stated goal: improved trunk control and standing tolerance to increase independence in self care. [x]? Progressing: []? Met: []? Not Met: []? Adjusted     Therapist goals for Patient:   Short Term Goals: To be achieved in: 30 days  1. Pt will be independent with clothing management on toilet or in wheelchair to complete toileting with use of grab bar. [x]? Progressing: []? Met: []? Not Met: []? Adjusted  2. Pt will be stand by assist completing scoot transfers on level surfaces  [x]? Progressing: []? Met: []? Not Met: []? Adjusted  3. Patient will be able to stand up to 30 seconds at grab bar for toileting and lower body dress with min assist.   [x]? Progressing: []? Met: []? Not Met: []? Adjusted     Long Term Goals: To be achieved by discharge  1. Pt will demonstrate an improved stream score of 28. [x]? Progressing: []? Met: []? Not Met: [x]? Adjusted    Progression Towards Functional goals:  [x] Patient is progressing as expected towards functional goals listed. [] Progression is slowed due to complexities listed. [] Progression has been slowed due to co-morbidities. [] Plan just implemented, too soon to assess goals progression  [] All goals are met  [] Other:     ASSESSMENT:  Patient has demonstrated significant improvement in trunk control during static and dynamic sitting and standing with pt now able to sit EOB unsupported and tolerate long sitting during dynamic tasks. Pt has been able to ambulate a few steps in // bars with mod A of 2 and PT blocking knee at stance leg and assisting with forward progression of LEs. Pt continues to demonstrate difficulty with hip and glute activation for functional mobility and posture. Treatment/Activity Tolerance:  [x] Patient tolerated treatment well [] Patient limited by fatique  [] Patient limited by pain  [] Patient limited by other medical complications  [] Other:     Prognosis: [x] Good [] Fair  [] Poor    Patient Requires Follow-up: [x] Yes  [] No    PLAN: See eval  [x] Continue per plan of care [x] Alter current plan (see comments)  [x] Plan of care initiated (MD cosigned) [] Hold pending MD visit [] Discharge    Electronically signed by:        Helena Billingsley OTR/L [id number]       Note: If patient does not return for scheduled/ recommended follow up visits, this note will serve as a discharge from care along with most recent update on progress.

## 2021-04-21 NOTE — FLOWSHEET NOTE
[address] [facility] Physical Therapy  Phone: [phone]   Fax: [phone]   Physical Therapy Re-Certification Plan of Care    Dear   JOHNNY Swanson,     We had the pleasure of treating the following patient for physical therapy services at [facility]. A summary of our findings can be found in the updated assessment below. This includes our plan of care. If you have any questions or concerns regarding these findings, please do not hesitate to contact me at the office phone number checked above. Thank you for the referral.     Physician Signature:________________________________Date:__________________  By signing above (or electronic signature), therapists plan is approved by physician      Functional Outcome:  STREAM: score 23     4/21/21: Stroke Rehabilitation Assessment of Movement (STREAM): total- 24/70 (supine- 9/15, sitting- 15/31, standing- 0/24)    Overall Response to Treatment:   []Patient is responding well to treatment and improvement is noted with regards  to goals   []Patient should continue to improve in reasonable time if they continue HEP   []Patient has plateaued and is no longer responding to skilled PT intervention    []Patient is getting worse and would benefit from return to referring MD   []Patient unable to adhere to initial POC   [x]Other: Pt seen for a total of 11 visits of PT so far. PT and OT have been cotreating for both safety and maximizing pt functional mobility. Since evaluation, trunk control has significantly improved already and pt able to maintain sitting EOB without assistance. Patient's dynamic sitting balance is improving noted by pt's improved ability to tolerate long-sitting position during dynamic tasks. Pt has been able to ambulate a few steps in // bars but with mod A of 2 and PT blocking knee at stance leg and assisting with forward progression of LEs.  Ambulation was not performed this date as pt fell this morning and was generally more fatigued throughout the session and sore in his L shoulder. Pt would continue to benefit from skilled therapy to maximize functional mobility. Date range of Visits: 3/1/21 - 21  Total Visits: 11    Recommendation:    [x]Continue PT 2x / wk for 4 weeks. []Hold PT, pending MD visit      Physical Therapy Daily Treatment Note  Date:  2021     Patient Name:  Rigo Spence    :  2000  MRN: [id number]  Medical/Treatment Diagnosis Information:  Diagnosis: Cerebral palsy with spastic diplegia  Treatment Diagnosis: Decreased trunk control impacting ability to complete safe transfers, decreased standing tolerance, LE weakness  Insurance/Certification information:  PT Insurance Information: Medicare & Medicaid  Physician Information:  Referring Practitioner: JOHNNY Swanson  Plan of care signed (Y/N): [x]  Yes []  No     Date of Patient follow up with Physician:      Progress Report: [x]  Yes  []  No     Date Range for reporting period:  Beginning  21   Ending    Progress report due (10 Rx/or 30 days whichever is less): visit #10 or 2/1/10     Recertification due (POC duration/ or 90 days whichever is less): visit #12 or 21     Visit # Insurance Allowable Auth required? Date Range    Medical necessity []  Yes  [x]  No n/a     Latex Allergy:  [x]NO      []YES  Preferred Language for Healthcare:   [x]English       []Other:      Functional Scale/Test Evaluation 3/1/2021 4/21/21  60 day  Discharge   STREAM 23                          Pain level:  0/10  Location:  none    SUBJECTIVE:    Pt fell this morning. His legs slipped while transferring to the toilet. He had tried to hold himself up but his arms gave out and he fell. He had to crawl to his bed to be able to get up. Pt states his L arm is a little sore and his pinky toe was bleeding. Pt states he has not been trying to put his AFOs on.  Pt was able to  the stander for a little over an hour yesterday. OBJECTIVE:    Co-treat w/OT for safety and assistance    RESTRICTIONS/PRECAUTIONS: recent Bell's Palsy    Interventions/Exercises:   Therapeutic Exercises (25802) Resistance / level Sets/sec Reps Notes   W/c push ups in preparation for standing                            Neuromuscular Re-ed (32659) /  Gait Training (76198)       Upright posture seated in W/C   X 5 reps holding football, rest of reps completed with L UE bracing on L knee, PT hand assist on sternum and lumbar spine to facilitate                                Gait Training:  Amb in //bars    Bwd walking in //bars      Transfer w/B Lofstrand crutches   Static stand w/B Lofstrand crutches   8 ft    8 ft    Mod A of 2  Mod A of 2   -manual required to advance LEs, pt activation hip extensors to initiate swing phase, demo'd adequate weight-shifting. OT provided assist & w/c follow for safety      -difficulty with placement for adequate support                 Therapeutic Activities (35634) /  Functional Tasks       [facility] reaching across midline in seated   Pt seated in w/c using seat belt to help stay in w/c          [facility] reaching across midline and diagonally   Pt seated in w/c using seat belt to help stay in w/c               STS to std walker            Transfer to w/c from mat table            STS from w/c to mat table - with large bolster placed on table in front of patient to hold onto   Min A, w/c close to mat table to assist in blocking pt's knees. Manual Intervention ([phone])       Supine hamstring stretch 90/90    -completed by PT  -completed by OT                                        Sessions:     4/21: Session started performing tasks on STREAM assessment for PN. In supine, pt performed 10 bridges with PT blocking at LEs to prevent hip abd.  Pt then completed seated mat exercises with sitting EOB and reaching across midline outside base of support for dynamic sitting balance with min A during loss of balance, but otherwise pt able to self correct to upright position. Pt completed trunk twisting to reach behind him to grab bean bag, and then tossing to bucket, with again min A if loss of balance occurred. Bean bags were tossed back to patient and he was instructed to catch with open hands and throw behind him with both hands overhead while keeping his head up. Pt was moved to the // bars where he completed static standing x 4 trials (lengths: 105 sec, 90 sec, 75 sec, 45 sec). Pt able to complete this with min Ax1, CGAx 1 with cues to keep arms in front of him to prepare for use of lofstrand crutches and knee extension. Lastly, pt kicked into knee extension while sitting in w/c with upright posture. Pt was able to kick into knee extension if assisted with slight hip flexion. 4/20:  Pt transferred w/c to mat with min A x2 with continued difficulty in foot placement and repositioning during and after transfer. Pt completed scooting with good carryover in forward weight shift. Pt participated in trunk control task with forward/back weight shift x10 with verbal cues to maintain contact of hands on thighs to avoid pulling self up by holding edge of mat and engage trunk. Pt then completed lateral weight shifts x10 each side with decreased control leaning to R with physical and verbal cues to engage L obliques for eccentric control and to begin L weight shift prior to end ROM with improved control following. Pt completed STS transfer min A x2 to work on standing with lofstrand crutches ~2.5 minutes with mod A x2 progressing to mod + max A with fatigue for safety; multiple cues required for safe placement of crutches and extension of LEs in addition to trunk control and BUE weight bearing. Pt sat edge of mat to scoot back with difficulty lifting LEs over edge due to decreased active knee extension; pt disengaging trunk to fall into supine with max A + mod A to sit in long sit.  Pt trained in knee flexion to utilize LEs for scooting and as assist for trunk control. Pt in long sit with back against wall for trunk support. Pt participated in training to doff/don AFOs with extended time required; pt leaving braces in shoes during doffing with improved ability to don and verbal cues to hold tongue out of the way when donning. Min A to extend R toes due to pt curling toes and inhibiting foot placement in shoe. CO-OP approach utilized for problem solving with discussion of barriers/solutions. Pt scooted to edge of mat with min A and completed edge of mat to wheelchair transfer min A to conclude session. 4/14:  Session initiated with patient transferring to mat with min A x2. Pt with good control during stand but then difficulty moving L LE when pivoting. Pt moved into supine with cueing on weight bearing through forearm to lower onto side first; required mod A for LE's onto table. In supine, pt completed hip bridges x10 with feet blocked; hip ab/adduction x10 each for both, R, and L with mod A for control of abduction; hamstring stretching 5 x 30 sec  B in 90/90 position; and resisted UE reaching overhead with improved ROM following (completed by OT). Pt completed supine to sit transfer with mod A with feet blocked and min A for scooting to reposition. Pt worked on standing with lofstrand crutches x2 ~1 minute each with mod assist of two for safety; multiple cues required for safe placement of crutches as pt with tendency to keep crutches too far outside his [place]. Pt also cued for extension of LEs, siva at B knees and to raise chest for improved trunk control. Pt then sat EOB and used lofstrand crutches for trunk control training, while reaching to OT's hands x 8 ea ipsilateral and contralateral with CGA/min A for maintaining trunk control. Pt was able to scoot back on the mat table when he felt like he was sliding forward with min A-CGA.  Next, patient was in // bars and completed STS with B UEs clothing. OT provided pt with scrub top change, pt declined scrub bottom change. Pt doffed sheatshirt and donned scrub top with SBA primarily, min A for scrub top setup so pt could kendall correctly. Able to put UEs in pull overhead with SBA. Pt transferred w/c to mat table w/mod A of 1 for safety, pt sat on edge of mat and required mod A of 1 to scoot back. Pt's feet again became caught in wheels under w/c, reducing safety of transfer. Pt remained sitting edge of mat w/SBA/Sup during discussion of planned activities then was instructed to lay supine. Pt transitioned straight backwards with fair descent control but left feet on floor. Total A of 2 to transition pt to supine position. Pt rolled to L to prone position w/min A of 1 for trunk and min A of 1 to cross R over L LE. Once prone, pt instructed to assume quadruped, pt able to assume CAYLA and then position UEs correctly with hands under shoulders & verbal cues but required mod of 2 to position knees & LEs correctly. Quadruped: hip ext/flex to starting position x3 L -min A for balance, min A manual cues LE movement; x4 R -mod A for balance/weight-shift, mod A manual cues for LE movement. **Max manual cues to extend fingers on mat initially, then pt able to perform w/only instruction to do so. Pt rested while seated on feet after 2 reps on RLE. Pt able to assume tall kneeling position w/SBA x30\". Pt returned to quadruped w/o physical assist.  To complete RLE reps. Pt rested prone. Max A of 1 to roll pt to R for supine position. Hamstring stretches. Max A of 2 supine to long-sitting. Pt able to maintain position w/UEs behind him and CGA. Pt transitioned long-sitting to sitting edge of mat w/max cues for instruction, mod of 1 for trunk support/balance, and mod of 1 for assist positioning LEs. Sit pivot mat table to w/c min A of 1. Gait training followed. 4/1:  Session began with assisting pt to restroom.  In // bars, pt completed STS with ALBERT UE assist on bars and CGA for trunk control. Pt completed forward stepping with mod A for R LE advancement and max A for L LE advancement. Able to complete 6-8 steps forward each time. PT blocking pt's stance leg and assisting with advancing opp LE. Pt then able to complete retro stepping in bars, first trail about 1/2 length of bars, second trial with full length of bars. Pt cued for glute activation for hip extension but had difficulty activating glutes and hamstrings. Again, PT blocked stance leg. Pt then completed static standing for 3 min with CGA from both PT and OT. Pt completed stand pivot transfer from wheelchair to mat to R side with min A x2; good carry over noted from last session regarding forward lean and weight shift prior to stand. Seated edge of mat with feet on 6 in step, pt worked on reaching activities reaching across midline outside [place] for a ball, then reaching the ball behind his back and passing it to his opp hand for UE function and trunk rotation, all while maintaining trunk control. Pt blocked pt's R LE to keep in a neutral position. Pt then completed ball toss with OT and was able to maintain his trunk with only min A to block R LE in neutral position and CGA for his trunk. Pt completed mat to wheelchair transfer with min A to L side to transition to parallel bars. 3/31:  Pt completed stand pivot transfer from wheelchair to mat to R side with min A x2; good carry over noted from last session regarding forward lean and weight shift prior to stand, manual required for improved LE placement. Seated edge of mat, pt completed posture training for thoracic extension and maintaining midline during hamstring stretch of RLE and conversation regarding transfers and use of stander at home. Pt reports he has a raised toilet seat with arm rests that he can transfer to without assistance from his mom.  Pt completed mat to wheelchair transfer with min A to L side to transition to parallel bars, required manual assist for improved LE placement. At parallel bars, pt completed sit>stand transfer with BUE assist.   For reinforcement of trunk control and dynamic balance during forward weight shift, pt participated in reaching activity with alternating UEs completing contralateral reaching and [place] changes on parallel bar with SBA for trunk control. Progressed to completing in static stance with increased trunk rotation x6 each side with rest break in between sides, followed by ipsilateral reaching behind self x5 each side to improve trunk control and balance required for toileting tasks. BLEs blocked at knees with 1 instance L knee buckling; pt self-corrected into knee extension, but demonstrated difficulty with trunk correction to lean to R side to return to midline. In seated, pt participated in L><R weight shift to touch alternating shoulders to parallel bars x8 each side, followed by forward><back weight shift x8 with hands placed on knees for UE weight bearing and focus on eccentric control. Pt completed sit>stand with BUE assist using parallel bars to participate in standing without UE support with knees blocked and min-mod A for trunk stability for ~45 seconds prior to sitting on gerardo disc. To conclude session, pt sat on gerardo disc to complete progressively increased head/neck and trunk rotation to alternating sides for trunk control with verbal cues to prevent thoracic flexion. 3/24: Session today began with pt using Nustep while sitting in his w/c x 3 min total, but pt stopped occasionally to talk and needed cueing to continue. Pt then transferred to mat table with stand pivot transfer. Pt was able to stand fully upright, mod A x 2, but then was not table to advance R LE before sitting. Worked on trunk flexion to shift COG forward while scooting hips/buttocks back on mat table as pt has tendency to lean backwards when trying to scoot backwards on a surface.  Pt encouraged through all movement today to keep hands flat on table instead of pushing through knuckles. Pt then worked on weight shifting at trunk forward and backward and was given targets to hit with forehead. After the initital task, pt could perform pushing up and scooting his hips backwards onto the table with CGA. With 4 in box placed under pt's feet for stability, pt worked on reaching outside [place] with forward lean both ipsilaterally and contralaterally to cones and then sitting back up and rotating his trunk to the R and L to place the cones behind him. Pt able to do this with CGA to Stacey for trunk control. Pt then worked on standing without walker, pt was in front of pt to assist with knee extension and block feet with OT assisting with hip ext and trunk control. Pt stood a total of 5 times. 3/22: Pt transferred from w/c to EOM. Pt then completed 1/2 stands to ladder with assist to block feet from moving and help push B knees into extension. Pt then went from EOM>sidelying>supine and completed 10 bridges with blocking at LEs. Pt worked on rolling from supine to L and R, with more difficulty rolling to R. Pt used momentum from UEs but was cued to not reach and pull. Stacey needed to progress LEs to each side. Pt rolled to prone and with PT/OT blocking LEs and assisting at knees (modA-maxA), performed 5 planks for 5-10 second holds. Pt able to better press up with extended arms vs forearms. Pt achieved tall kneeling with a swiss ball with min A at his LEs, then progressed to Qped over the swiss ball with reaching with B UEs. Stacey at LEs progressed to Mod A as pt faitgued. Next pt sat EOB with reaching to [place], then worked on reaching [place] while holding a small bolster to facilitate open hands. Pt with Stacey needed for these activities to maintain trunk control. Pt then held each end of the bolster and hit a ball while maintaining trunk control. ModA needed to maintain trunk control as pt was quite fatigued by the end of the session.  Pt then stood with a std walker x 3 with mod A x 2. Pt transferred back to  with min A for LE placement. 3/9: session began with transferring from w/c to mat table with min Ax 2. Increased time required and cueing to improve safety. Once on the mat table, pt worked on scooting leading with upper body first and performing small abdominal crunch then bridging to move hips. Pt required assistance to keep LEs stable in a hooklying position. Pt also performed rolling to each side R/L x 5, in which he rolled hips first then his upper body. Next pt rolled to prone and pushed through UEs to get into quadruped. Mod Ax 2 required for stability at pelvis and LEs. Pt cued for spine neutral position and then weight shifting to prepare for crawling. Pt able to move R UE and LE forward with mod A, but then was too fatigued and rested. Pt sat in tall kneeling and was able to attain this position by pushing up on a swiss ball. Pt transitioned next to sitting EOB and shifting weight fwd to stand at standard walker. First he completed partial stands with mod A x 2, with PT/OT blocking knees and feet. OT suggested pt stand at ladder but pt reported he was more comfortable using std walker. Lastly pt laid supine and rotated side to side with min A x 1, with reaching arm diagonally and protracting/retracting hips x 10 B/L. Pt completed a stand/pivot transfer from the mat to the W/C with min A x 1 at the end of the session.      Modalities:     Pt. Education:  -patient educated on diagnosis, prognosis and expectations for rehab  -all patient questions were answered    HEP instruction:      NMR and Therapeutic Activities:    [x] (37371 or 29539) Provided verbal/tactile cueing for activities related to improving balance, coordination, kinesthetic sense, posture, motor skill, proprioception and motor activation to allow for proper function of   [x] LE / Core, hip and LE with self care and ADLs  [x] UE / Shoulder complex: cervical, postural, scapular, scapulothoracic and UE control with self care, carrying, lifting, driving, computer work.   [] (34722) Gait Re-education- Provided training and instruction to the patient for proper LE, core and hip recruitment, positioning, and eccentric body weight control with ambulation re-education, including ascending & descending stairs     Home Management Training / Self Care:  [] (94901) Provided self-care/home management training related to activities of daily living and compensatory training, and/or use of adaptive equipment for improvement with: ADLs and compensatory training, meal preparation, safety procedures and instruction in use of adaptive equipment, including bathing, grooming, dressing, personal hygiene, basic household cleaning and chores. Therapeutic Exercise and NMR EXR  [x] (93957) Provided verbal/tactile cueing for activities related to strengthening, flexibility, endurance, ROM for improvements in  [x] LE / Core stability: LE, hip, and core control with self care, mobility, lifting, ambulation. [] UE / Shoulder complex: cervical, postural, scapular, scapulothoracic and UE control with self care, reaching, carrying, lifting, house/yardwork, driving, computer work.  [] (62573) Provided verbal/tactile cueing for activities related to improving balance, coordination, kinesthetic sense, posture, motor skill, proprioception to assist with   [] LE / Core stability: LE, hip, and core control in self care, mobility, lifting, ambulation and eccentric single leg control.    [] UE / Shoulder complex: cervical, scapular, scapulothoracic and UE control with self care, reaching, carrying, lifting, house/yardwork, driving, computer work.   [] (90823) Therapist is in constant attendance of 2 or more patients providing skilled therapy interventions, but not providing any significant amount of measurable one-on-one time to either patient, for improvements in  [] LE / Core stability: LE, hip, and core control in self care, mobility, lifting, ambulation and eccentric single leg control. [] UE / Shoulder complex: cervical, scapular, scapulothoracic and UE control with self care, reaching, carrying, lifting, house/yardwork, driving, computer work. Home Exercise Program:    [x] (38582) Reviewed/Progressed HEP activities related to strengthening, flexibility, endurance, ROM of   [] LE / Core stability: core, hip and LE for functional self-care, mobility, lifting and ambulation/stair navigation   [] UE / Shoulder complex: cervical, postural, scapular, scapulothoracic and UE control with self care, reaching, carrying, lifting, house/yardwork, driving, computer work  [] (57588)Reviewed/Progressed HEP activities related to improving balance, coordination, kinesthetic sense, posture, motor skill, proprioception of   [] LE: core, hip and LE for self care, mobility, lifting, and ambulation/stair navigation    [] UE / Shoulder complex: cervical, postural,  scapular, scapulothoracic and UE control with self care, reaching, carrying, lifting, house/yardwork, driving, computer work    Manual Treatments:  PROM / STM / Oscillations-Mobs:  G-I, II, III, IV (PA's, Inf., Post.)  [] (86792) Provided manual therapy to mobilize shoulder complex, hip, LE, and/or cervicothoracic/LS spine soft tissue/joints for the purpose of modulating pain, promoting relaxation,  increasing ROM, reducing/eliminating soft tissue swelling/inflammation/restriction, improving soft tissue extensibility and allowing for proper ROM for normal function with   [] LE / Core stability: self care, mobility, lifting and ambulation. [] UE / Shoulder complex: self care, reaching, carrying, lifting, house/yardwork, driving, computer work.      Modalities:  [] (43613) Vasopneumatic compression: Utilized vasopneumatic compression to decrease edema / swelling for the purpose of improving mobility and quad tone / recruitment which will allow for increased overall function including but not limited to self-care, transfers, ambulation, and ascending / descending stairs. Charges:  Timed Code Treatment Minutes: 40   Total Treatment Minutes: 90   **CO-treat with OT    [] EVAL - LOW (86217)   [] EVAL - MOD (69100)  [] EVAL - HIGH (98912)  [] RE-EVAL (72399)  [] TE (61773) x      [] Ionto  [x] NMR (55996) x 1      [] Vaso  [] Manual (62773) x      [] Ultrasound  [x] TA x  2     [] Mech Traction (65591)  [] Gait Training x     [] ES (un) (00922):   [] Aquatic therapy x   [] Other:   [] Group:     GOALS:   Patient stated goal: improve ability to complete transfers   []? Progressing: []? Met: []? Not Met: []? Adjusted     Therapist goals for Patient:   Short Term Goals: To be achieved in: 2 weeks  1. Independent in HEP and progression per patient tolerance, in order to prevent re-injury. []? Progressing: [x]? Met: []? Not Met: []? Adjusted  2. Patient will have a decrease in pain to facilitate improvement in movement, function, and ADLs as indicated by Functional Deficits. []? Progressing: [x]? Met: []? Not Met: []? Adjusted     Long Term Goals: To be achieved in: 6 weeks  1. Patient will report increased standing tolerance to at least 30 minutes in standing frame. []? Progressing: [x]? Met: []? Not Met: []? Adjusted  2. Patient will demonstrate an increase in strength to good shoulder & hip complex, and core activation to allow for proper functional mobility as indicated by patients Functional Deficits. [x]? Progressing: []? Met: []? Not Met: []? Adjusted  3. Patient will return to functional activities including demo'ing safe transfers to/from w/c with mod I.    [x]? Progressing: []? Met: []? Not Met: []? Adjusted  4. Patient will increase STREAM score to 32 or above for increased UE/LE movement in sitting, supine, and standing. [x]? Progressing: []? Met: []? Not Met: []?  Adjusted          Overall Progression Towards Functional goals/ Treatment Progress Update:  [] Patient is progressing as expected towards functional goals listed. [x] Progression is slowed due to complexities/Impairments listed. [] Progression has been slowed due to co-morbidities. [] Plan just implemented, too soon to assess goals progression <30days   [] Goals require adjustment due to lack of progress  [] Patient is not progressing as expected and requires additional follow up with physician  [] Other    Persisting Functional Limitations/Impairments:  []Sleeping [x]Sitting               []Standing [x]Transfers        []Walking []Kneeling               []Stairs []Squatting / bending   [x]ADLs []Reaching  []Lifting  []Housework  []Driving []Job related tasks  []Sports/Recreation []Other:        ASSESSMENT:      Treatment/Activity Tolerance:  [x] Patient able to complete tx  [] Patient limited by fatigue  [] Patient limited by pain  [] Patient limited by other medical complications  [] Other:     Prognosis: [] Good [] Fair  [] Poor    Patient Requires Follow-up: [x] Yes  [] No    Plan for next treatment session: transfers, seated core strength, will plan to co-treat with OT when possible    PLAN: See [facility] PT 2x / week for 6 weeks. [x] Continue per plan of care [] Alter current plan (see comments)  [] Plan of care initiated [] Hold pending MD visit [] Discharge    Electronically signed by: Apurva Romero PT, DPT    Note: If patient does not return for scheduled/ recommended follow up visits, his note will serve as a discharge from care along with most recent update on progress.

## 2021-04-26 ENCOUNTER — HOSPITAL ENCOUNTER (OUTPATIENT)
Dept: PHYSICAL THERAPY | Age: 21
Setting detail: THERAPIES SERIES
Discharge: HOME | End: 2021-04-26
Payer: MEDICARE

## 2021-04-26 ENCOUNTER — HOSPITAL ENCOUNTER (OUTPATIENT)
Dept: OCCUPATIONAL THERAPY | Age: 21
Setting detail: THERAPIES SERIES
Discharge: HOME | End: 2021-04-26
Payer: MEDICARE

## 2021-04-26 PROCEDURE — 97530 THERAPEUTIC ACTIVITIES: CPT

## 2021-04-26 PROCEDURE — 97112 NEUROMUSCULAR REEDUCATION: CPT

## 2021-04-26 PROCEDURE — 97110 THERAPEUTIC EXERCISES: CPT

## 2021-04-26 NOTE — FLOWSHEET NOTE
[facility] - Outpatient Occupational Therapy      Occupational Therapy Daily Treatment Note  Date:  2021    Patient: Red Starks   : 2000   MRN: [id number]  Referring Physician:  Kota Jacobs CNP       Medical Diagnosis Information: paraplegia, cerebral palsy                                                  Insurance information: medicare/ medicaid    Date of Injury:   Date of Surgery: rhizotomy when he was about 13    Progress Report: []  Yes  [x]  No     Date Range for reporting period:  Beginning: 3/1/2021  Ending: end of POC    Progress report due (10 Rx/or 30 days whichever is less): visit #17 or 4174    Recertification due (POC duration/ or 90 days whichever is less): visit #12 or 2021    Visit # Insurance Allowable Auth required? Date Range    + 0/8  [place] []  Yes  [x]  No n/a       Latex Allergy:  []No      []Yes  Pacemaker:  [] No       [] Yes     Preferred Language for Healthcare:   [x]English       []other:    Pain level: 0/10     SUBJECTIVE:   Pt not wearing braces this morning due to scab on medial ankle from falling last week that he states is aggravated by the braces. Functional Disability Index:  STREAM: score 23    21: Stroke Rehabilitation Assessment of Movement (STREAM): total- 24/70 (supine- 9/15, sitting- 15/31, standing- 0)    OBJECTIVE: See eval    21: 3 minutes static stance at parallel bars with CGA x2 for trunk control and blocking knees      RESTRICTIONS/PRECAUTIONS: fall risk    Exercises/Interventions: Treatment focused on improving dynamic balance during UE reaching, transfers, and mobility to enhance functional independence. Session initiated with application of foam splint padding between scab and sock for pressure and friction relief with use of AFOs until scab heals with pt reporting improved comfort.  Pt transferred to sit edge of mat from wheelchair with min A x2 to block feet and for trunk control during scooting. Grasping 20# bungee cord, pt completed horizontal ab/adduction x10, followed by mid row x10 with emphasis on scapular retraction and trunk control. For continued trunk control during dynamic balance, bungee cord placed around pt's trunk with pt completed hip and thoracic extension against resistance of bungee cord and then returning to upright starting position x8, followed by R and L lateral lean x5 each with min A required for balance. Pt completed trunk rotation to reach behind self to grab bean bag to improve dynamic balance required for pericare while toileting, followed by tossing to bucket, with min A for ~3 instances LOB. Pt instructed on placing flat hands on surface of mat to complete weight bearing during trunk rotation to improve balance. Bean bags were tossed back to patient x3 with instruction  to catch with open hands to reduce tone. Pt returned to wheelchair from edge of mat with min A for transfer. Pt transitioned to // bars to complete ambulation x4 steps with max A x2 for trunk control and advancing LEs prior to seated rest break due to increased fatigue with lack of AFO support. Pt re-attempted with 3 steps, followed by static standing x60 seconds with max A + mod A with cues to keep arms in front of him to prepare for use of lofstrand crutches and to facilitate trunk engagement and knee extension. Pt sat on gerardo disc placed in wheelchair for trunk control with min-mod A for balance to complete ball toss/catch task with multiple LOB; theraband loop placed around knees to prevent abduction and improve pelvic stability and base of support. UB dressing completed to doff/don sweatshirt with SBA and good trunk control noted during weight shifts.        Therapeutic Exercises  Resistance / level Sets/sec Reps Notes                                                                                Neuromuscular Re-ed / Therapeutic Activities                                                 Manual Met: []? Adjusted  3. Patient will be able to stand up to 30 seconds at grab bar for toileting and lower body dress with min assist.   [x]? Progressing: []? Met: []? Not Met: []? Adjusted     Long Term Goals: To be achieved by discharge  1. Pt will demonstrate an improved stream score of 28. [x]? Progressing: []? Met: []? Not Met: [x]? Adjusted    Progression Towards Functional goals:  [x] Patient is progressing as expected towards functional goals listed. [] Progression is slowed due to complexities listed. [] Progression has been slowed due to co-morbidities. [] Plan just implemented, too soon to assess goals progression  [] All goals are met  [] Other:     ASSESSMENT:  Patient has demonstrated significant improvement in trunk control during static and dynamic sitting and standing with pt now able to sit EOB unsupported and tolerate long sitting during dynamic tasks. Pt has been able to ambulate a few steps in // bars with mod A of 2 and PT blocking knee at stance leg and assisting with forward progression of LEs. Pt continues to demonstrate difficulty with hip and glute activation for functional mobility and posture. Treatment/Activity Tolerance:  [x] Patient tolerated treatment well [] Patient limited by fatique  [] Patient limited by pain  [] Patient limited by other medical complications  [] Other:     Prognosis: [x] Good [] Fair  [] Poor    Patient Requires Follow-up: [x] Yes  [] No    PLAN: See eval  [x] Continue per plan of care [x] Alter current plan (see comments)  [x] Plan of care initiated (MD cosigned) [] Hold pending MD visit [] Discharge    Electronically signed by:        Alejandra Kingsley OTR/L [id number]       Note: If patient does not return for scheduled/ recommended follow up visits, this note will serve as a discharge from care along with most recent update on progress.

## 2021-04-26 NOTE — FLOWSHEET NOTE
knee, PT hand assist on sternum and lumbar spine to facilitate                                Gait Training:  Amb in //bars    Bwd walking in //bars      Transfer w/B Lofstrand crutches   Static stand w/B Lofstrand crutches   8 ft    8 ft    Mod A of 2  Mod A of 2   -manual required to advance LEs, pt activation hip extensors to initiate swing phase, demo'd adequate weight-shifting. OT provided assist & w/c follow for safety      -difficulty with placement for adequate support                 Therapeutic Activities (43149) /  Functional Tasks       [facility] reaching across midline in seated   Pt seated in w/c using seat belt to help stay in w/c          [facility] reaching across midline and diagonally   Pt seated in w/c using seat belt to help stay in w/c               STS to std walker            Transfer to w/c from mat table            STS from w/c to mat table - with large bolster placed on table in front of patient to hold onto   Min A, w/c close to mat table to assist in blocking pt's knees. Manual Intervention ([id number] [facility])       Supine hamstring stretch 90/90    -completed by PT  -completed by OT                                        Sessions:     4/26:  Began session with OT applying foam padding to patient's ankle for cushion so pt can wear AFOs comfortably. Pt transferred to sit edge of mat from wheelchair with min A x2 to block feet and for trunk control during scooting. Grasping 20# bungee cord, pt completed horizontal ab/adduction x10, followed by mid row x10 with emphasis on scapular retraction and trunk control. For continued trunk control during dynamic balance, bungee cord placed around pt's trunk with pt completed hip and thoracic extension against resistance of bungee cord and then returning to upright starting position x8, followed by R and L lateral lean x5 each with min A required for balance.  Pt completed trunk rotation to reach behind self to grab bean bag to improve dynamic balance required for pericare while toileting, followed by tossing to bucket, with min A for ~3 instances LOB. Pt instructed on placing flat hands on surface of mat to complete weight bearing during trunk rotation to improve balance. Bean bags were tossed back to patient x3 with instruction  to catch with open hands to reduce tone. Pt returned to wheelchair from edge of mat with min A for transfer. Pt transitioned to // bars to complete ambulation x4 steps with max A x2 for trunk control and advancing LEs prior to seated rest break due to increased fatigue with lack of AFO support. Pt re-attempted with 3 steps, followed by static standing x60 seconds with max A + mod A with cues to keep arms in front of him to prepare for use of lofstrand crutches and to facilitate trunk engagement and knee extension. Pt sat on gerardo disc placed in wheelchair for trunk control with min-mod A for balance to complete ball toss/catch task with multiple LOB; theraband loop placed around knees to prevent abduction and improve pelvic stability and base of support. UB dressing completed to doff/don sweatshirt with SBA and good trunk control noted during weight shifts. Focus of today's session was to improve dynamic sitting and standing balance. 4/21: Session started performing tasks on STREAM assessment for PN. In supine, pt performed 10 bridges with PT blocking at LEs to prevent hip abd. Pt then completed seated mat exercises with sitting EOB and reaching across midline outside base of support for dynamic sitting balance with min A during loss of balance, but otherwise pt able to self correct to upright position. Pt completed trunk twisting to reach behind him to grab bean bag, and then tossing to bucket, with again min A if loss of balance occurred. Bean bags were tossed back to patient and he was instructed to catch with open hands and throw behind him with both hands overhead while keeping his head up.  Pt was moved to the // bars where he completed static standing x 4 trials (lengths: 105 sec, 90 sec, 75 sec, 45 sec). Pt able to complete this with min Ax1, CGAx 1 with cues to keep arms in front of him to prepare for use of lofstrand crutches and knee extension. Lastly, pt kicked into knee extension while sitting in w/c with upright posture. Pt was able to kick into knee extension if assisted with slight hip flexion. 4/20:  Pt transferred w/c to mat with min A x2 with continued difficulty in foot placement and repositioning during and after transfer. Pt completed scooting with good carryover in forward weight shift. Pt participated in trunk control task with forward/back weight shift x10 with verbal cues to maintain contact of hands on thighs to avoid pulling self up by holding edge of mat and engage trunk. Pt then completed lateral weight shifts x10 each side with decreased control leaning to R with physical and verbal cues to engage L obliques for eccentric control and to begin L weight shift prior to end ROM with improved control following. Pt completed STS transfer min A x2 to work on standing with lofstrand crutches ~2.5 minutes with mod A x2 progressing to mod + max A with fatigue for safety; multiple cues required for safe placement of crutches and extension of LEs in addition to trunk control and BUE weight bearing. Pt sat edge of mat to scoot back with difficulty lifting LEs over edge due to decreased active knee extension; pt disengaging trunk to fall into supine with max A + mod A to sit in long sit. Pt trained in knee flexion to utilize LEs for scooting and as assist for trunk control. Pt in long sit with back against wall for trunk support. Pt participated in training to doff/don AFOs with extended time required; pt leaving braces in shoes during doffing with improved ability to don and verbal cues to hold tongue out of the way when donning. Min A to extend R toes due to pt curling toes and inhibiting foot placement in shoe.  CO-OP approach utilized for problem solving with discussion of barriers/solutions. Pt scooted to edge of mat with min A and completed edge of mat to wheelchair transfer min A to conclude session. 4/14:  Session initiated with patient transferring to mat with min A x2. Pt with good control during stand but then difficulty moving L LE when pivoting. Pt moved into supine with cueing on weight bearing through forearm to lower onto side first; required mod A for LE's onto table. In supine, pt completed hip bridges x10 with feet blocked; hip ab/adduction x10 each for both, R, and L with mod A for control of abduction; hamstring stretching 5 x 30 sec  B in 90/90 position; and resisted UE reaching overhead with improved ROM following (completed by OT). Pt completed supine to sit transfer with mod A with feet blocked and min A for scooting to reposition. Pt worked on standing with lofstrand crutches x2 ~1 minute each with mod assist of two for safety; multiple cues required for safe placement of crutches as pt with tendency to keep crutches too far outside his [place]. Pt also cued for extension of LEs, siva at B knees and to raise chest for improved trunk control. Pt then sat EOB and used lofstrand crutches for trunk control training, while reaching to OT's hands x 8 ea ipsilateral and contralateral with CGA/min A for maintaining trunk control. Pt was able to scoot back on the mat table when he felt like he was sliding forward with min A-CGA. Next, patient was in // bars and completed STS with B UEs on // bars and CGA for trunk control. Gait training was completed with mod A for R LE advancement and max A for L LE. Pt able to complete 6-8 steps forward x 2 trials, with PT blocking stance leg and assisting moving leg with OT assisting for trunk control. Pt completed retro stepping with improved control from last session, with cueing at HS and glutes for LE extension vs trunk rotation to force LE posteriorly.  Pt finished the session sitting on gerardo disc in w/c to work on trunk control while using cell phone and while tossing/catching a ball. 4/12:  Upon arrival, pt working w/OT. EOB w/shoulders abd & wrist & hands extended & open on table, green gerardo-disc placed under each hand to increase instability. Sit to left S/L, mod A of 2. Right roll to supine supervision. Verbal & manual cues to posterior knee and ankle for heel side bilaterally. Bridge x10 w/manual stab to knee, bridge w/hip add gerardo-disc squeeze x10 w/minimal man stab to knees. Scooting to right, man stab to knees for placement, mod A to min A for shoulders and trunk. Supine to/from left S/L w/big arms, man stab to knees for eccentric control, VC's to facilitate movement. Scooting to left, man stab to knees for placement, min A for shoulders and trunk. Supine to/from right S/L w/big arms, man stab to knees for eccentric control, VC's to facilitate movement. Supine to long-sitting mod A of 1. Min to mod A of 1 for balance. Mod A of 1 to assist hip & knee flex bilaterally, mod A of 1 for trunk support. Pt reached & unbuckled 1 Velcro strap on each shoe, mod A for trunk balance. Pt doffed & donned R AFO & shoe w/assist from OT. Long-sitting to sitting EOB, min to mod A trunk support, pt maneuvered LEs to left with BUEs until feet over edge. Squat pivot mat table to w/c, min A/CGA of 2. Gait training in //bars and standing w/Lofstrand crutches. 4/9:  Pt used restroom prior to treatment, however had accident and urine got on his clothing. OT provided pt with scrub top change, pt declined scrub bottom change. Pt doffed sheatshirt and donned scrub top with SBA primarily, min A for scrub top setup so pt could kendall correctly. Able to put UEs in pull overhead with SBA. Pt transferred w/c to mat table w/mod A of 1 for safety, pt sat on edge of mat and required mod A of 1 to scoot back. Pt's feet again became caught in wheels under w/c, reducing safety of transfer. Pt remained sitting edge of mat w/SBA/Sup during discussion of planned activities then was instructed to lay supine. Pt transitioned straight backwards with fair descent control but left feet on floor. Total A of 2 to transition pt to supine position. Pt rolled to L to prone position w/min A of 1 for trunk and min A of 1 to cross R over L LE. Once prone, pt instructed to assume quadruped, pt able to assume CAYLA and then position UEs correctly with hands under shoulders & verbal cues but required mod of 2 to position knees & LEs correctly. Quadruped: hip ext/flex to starting position x3 L -min A for balance, min A manual cues LE movement; x4 R -mod A for balance/weight-shift, mod A manual cues for LE movement. **Max manual cues to extend fingers on mat initially, then pt able to perform w/only instruction to do so. Pt rested while seated on feet after 2 reps on RLE. Pt able to assume tall kneeling position w/SBA x30\". Pt returned to quadruped w/o physical assist.  To complete RLE reps. Pt rested prone. Max A of 1 to roll pt to R for supine position. Hamstring stretches. Max A of 2 supine to long-sitting. Pt able to maintain position w/UEs behind him and CGA. Pt transitioned long-sitting to sitting edge of mat w/max cues for instruction, mod of 1 for trunk support/balance, and mod of 1 for assist positioning LEs. Sit pivot mat table to w/c min A of 1. Gait training followed. 4/1:  Session began with assisting pt to restroom. In // bars, pt completed STS with B UE assist on bars and CGA for trunk control. Pt completed forward stepping with mod A for R LE advancement and max A for L LE advancement. Able to complete 6-8 steps forward each time. PT blocking pt's stance leg and assisting with advancing opp LE. Pt then able to complete retro stepping in bars, first trail about 1/2 length of bars, second trial with full length of bars.  Pt cued for glute activation for hip extension but had difficulty rest break in between sides, followed by ipsilateral reaching behind self x5 each side to improve trunk control and balance required for toileting tasks. BLEs blocked at knees with 1 instance L knee buckling; pt self-corrected into knee extension, but demonstrated difficulty with trunk correction to lean to R side to return to midline. In seated, pt participated in L><R weight shift to touch alternating shoulders to parallel bars x8 each side, followed by forward><back weight shift x8 with hands placed on knees for UE weight bearing and focus on eccentric control. Pt completed sit>stand with BUE assist using parallel bars to participate in standing without UE support with knees blocked and min-mod A for trunk stability for ~45 seconds prior to sitting on gerardo disc. To conclude session, pt sat on gerardo disc to complete progressively increased head/neck and trunk rotation to alternating sides for trunk control with verbal cues to prevent thoracic flexion. 3/24: Session today began with pt using Nustep while sitting in his w/c x 3 min total, but pt stopped occasionally to talk and needed cueing to continue. Pt then transferred to mat table with stand pivot transfer. Pt was able to stand fully upright, mod A x 2, but then was not table to advance R LE before sitting. Worked on trunk flexion to shift COG forward while scooting hips/buttocks back on mat table as pt has tendency to lean backwards when trying to scoot backwards on a surface. Pt encouraged through all movement today to keep hands flat on table instead of pushing through knuckles. Pt then worked on weight shifting at trunk forward and backward and was given targets to hit with forehead. After the initital task, pt could perform pushing up and scooting his hips backwards onto the table with CGA.  With 4 in box placed under pt's feet for stability, pt worked on reaching outside [place] with forward lean both ipsilaterally and contralaterally to cones and then sitting back up and rotating his trunk to the R and L to place the cones behind him. Pt able to do this with CGA to Stacey for trunk control. Pt then worked on standing without walker, pt was in front of pt to assist with knee extension and block feet with OT assisting with hip ext and trunk control. Pt stood a total of 5 times. 3/22: Pt transferred from w/c to EOM. Pt then completed 1/2 stands to ladder with assist to block feet from moving and help push B knees into extension. Pt then went from EOM>sidelying>supine and completed 10 bridges with blocking at LEs. Pt worked on rolling from supine to L and R, with more difficulty rolling to R. Pt used momentum from UEs but was cued to not reach and pull. Stacey needed to progress LEs to each side. Pt rolled to prone and with PT/OT blocking LEs and assisting at knees (modA-maxA), performed 5 planks for 5-10 second holds. Pt able to better press up with extended arms vs forearms. Pt achieved tall kneeling with a swiss ball with min A at his LEs, then progressed to Qped over the swiss ball with reaching with B UEs. Stacey at LEs progressed to Mod A as pt faitgued. Next pt sat EOB with reaching to magnets, then worked on reaching [place] while holding a small bolster to facilitate open hands. Pt with Stacey needed for these activities to maintain trunk control. Pt then held each end of the bolster and hit a ball while maintaining trunk control. ModA needed to maintain trunk control as pt was quite fatigued by the end of the session. Pt then stood with a std walker x 3 with mod A x 2. Pt transferred back to  with min A for LE placement. 3/9: session began with transferring from w/c to mat table with min Ax 2. Increased time required and cueing to improve safety. Once on the mat table, pt worked on scooting leading with upper body first and performing small abdominal crunch then bridging to move hips. Pt required assistance to keep LEs stable in a hooklying position.  Pt also performed rolling to each side R/L x 5, in which he rolled hips first then his upper body. Next pt rolled to prone and pushed through UEs to get into quadruped. Mod Ax 2 required for stability at pelvis and LEs. Pt cued for spine neutral position and then weight shifting to prepare for crawling. Pt able to move R UE and LE forward with mod A, but then was too fatigued and rested. Pt sat in tall kneeling and was able to attain this position by pushing up on a swiss ball. Pt transitioned next to sitting EOB and shifting weight fwd to stand at standard walker. First he completed partial stands with mod A x 2, with PT/OT blocking knees and feet. OT suggested pt stand at ladder but pt reported he was more comfortable using std walker. Lastly pt laid supine and rotated side to side with min A x 1, with reaching arm diagonally and protracting/retracting hips x 10 B/L. Pt completed a stand/pivot transfer from the mat to the W/C with min A x 1 at the end of the session.      Modalities:     Pt. Education:  -patient educated on diagnosis, prognosis and expectations for rehab  -all patient questions were answered    HEP instruction:      NMR and Therapeutic Activities:    [x] (87106 or 10228) Provided verbal/tactile cueing for activities related to improving balance, coordination, kinesthetic sense, posture, motor skill, proprioception and motor activation to allow for proper function of   [x] LE / Core, hip and LE with self care and ADLs  [x] UE / Shoulder complex: cervical, postural, scapular, scapulothoracic and UE control with self care, carrying, lifting, driving, computer work.   [] (53272) Gait Re-education- Provided training and instruction to the patient for proper LE, core and hip recruitment, positioning, and eccentric body weight control with ambulation re-education, including ascending & descending stairs     Home Management Training / Self Care:  [] (93904) Provided self-care/home management training related to activities of daily living and compensatory training, and/or use of adaptive equipment for improvement with: ADLs and compensatory training, meal preparation, safety procedures and instruction in use of adaptive equipment, including bathing, grooming, dressing, personal hygiene, basic household cleaning and chores. Therapeutic Exercise and NMR EXR  [x] (62803) Provided verbal/tactile cueing for activities related to strengthening, flexibility, endurance, ROM for improvements in  [x] LE / Core stability: LE, hip, and core control with self care, mobility, lifting, ambulation. [] UE / Shoulder complex: cervical, postural, scapular, scapulothoracic and UE control with self care, reaching, carrying, lifting, house/yardwork, driving, computer work.  [] (91986) Provided verbal/tactile cueing for activities related to improving balance, coordination, kinesthetic sense, posture, motor skill, proprioception to assist with   [] LE / Core stability: LE, hip, and core control in self care, mobility, lifting, ambulation and eccentric single leg control. [] UE / Shoulder complex: cervical, scapular, scapulothoracic and UE control with self care, reaching, carrying, lifting, house/yardwork, driving, computer work.   [] (67299) Therapist is in constant attendance of 2 or more patients providing skilled therapy interventions, but not providing any significant amount of measurable one-on-one time to either patient, for improvements in  [] LE / Core stability: LE, hip, and core control in self care, mobility, lifting, ambulation and eccentric single leg control. [] UE / Shoulder complex: cervical, scapular, scapulothoracic and UE control with self care, reaching, carrying, lifting, house/yardwork, driving, computer work.      Home Exercise Program:    [x] (61383) Reviewed/Progressed HEP activities related to strengthening, flexibility, endurance, ROM of   [] LE / Core stability: core, hip and LE for functional self-care, mobility, lifting and ambulation/stair navigation   [] UE / Shoulder complex: cervical, postural, scapular, scapulothoracic and UE control with self care, reaching, carrying, lifting, house/yardwork, driving, computer work  [] (22559)Reviewed/Progressed HEP activities related to improving balance, coordination, kinesthetic sense, posture, motor skill, proprioception of   [] LE: core, hip and LE for self care, mobility, lifting, and ambulation/stair navigation    [] UE / Shoulder complex: cervical, postural,  scapular, scapulothoracic and UE control with self care, reaching, carrying, lifting, house/yardwork, driving, computer work    Manual Treatments:  PROM / STM / Oscillations-Mobs:  G-I, II, III, IV (PA's, Inf., Post.)  [] (36306) Provided manual therapy to mobilize shoulder complex, hip, LE, and/or cervicothoracic/LS spine soft tissue/joints for the purpose of modulating pain, promoting relaxation,  increasing ROM, reducing/eliminating soft tissue swelling/inflammation/restriction, improving soft tissue extensibility and allowing for proper ROM for normal function with   [] LE / Core stability: self care, mobility, lifting and ambulation. [] UE / Shoulder complex: self care, reaching, carrying, lifting, house/yardwork, driving, computer work. Modalities:  [] (00308) Vasopneumatic compression: Utilized vasopneumatic compression to decrease edema / swelling for the purpose of improving mobility and quad tone / recruitment which will allow for increased overall function including but not limited to self-care, transfers, ambulation, and ascending / descending stairs.          Charges:  Timed Code Treatment Minutes: 45   Total Treatment Minutes: 90   **CO-treat with OT    [] EVAL - LOW (77152)   [] EVAL - MOD (95776)  [] EVAL - HIGH (29662)  [] RE-EVAL (56626)  [x] TE (25892) x 1     [] Ionto  [x] NMR (97001) x 1      [] Vaso  [] Manual (63520) x      [] Ultrasound  [x] TA x  1     [] Mech Traction (11296)  [] Gait Training x     [] ES (un) (62465):   [] Aquatic therapy x   [] Other:   [] Group:     GOALS:   Patient stated goal: improve ability to complete transfers   []? Progressing: []? Met: []? Not Met: []? Adjusted     Therapist goals for Patient:   Short Term Goals: To be achieved in: 2 weeks  1. Independent in HEP and progression per patient tolerance, in order to prevent re-injury. []? Progressing: [x]? Met: []? Not Met: []? Adjusted  2. Patient will have a decrease in pain to facilitate improvement in movement, function, and ADLs as indicated by Functional Deficits. []? Progressing: [x]? Met: []? Not Met: []? Adjusted     Long Term Goals: To be achieved in: 6 weeks  1. Patient will report increased standing tolerance to at least 30 minutes in standing frame. []? Progressing: [x]? Met: []? Not Met: []? Adjusted  2. Patient will demonstrate an increase in strength to good shoulder & hip complex, and core activation to allow for proper functional mobility as indicated by patients Functional Deficits. [x]? Progressing: []? Met: []? Not Met: []? Adjusted  3. Patient will return to functional activities including demo'ing safe transfers to/from w/c with mod I.    [x]? Progressing: []? Met: []? Not Met: []? Adjusted  4. Patient will increase STREAM score to 32 or above for increased UE/LE movement in sitting, supine, and standing. [x]? Progressing: []? Met: []? Not Met: []? Adjusted          Overall Progression Towards Functional goals/ Treatment Progress Update:  [] Patient is progressing as expected towards functional goals listed. [x] Progression is slowed due to complexities/Impairments listed. [] Progression has been slowed due to co-morbidities.   [] Plan just implemented, too soon to assess goals progression <30days   [] Goals require adjustment due to lack of progress  [] Patient is not progressing as expected and requires additional follow up with physician  [] Other    Persisting Functional Limitations/Impairments:  []Sleeping [x]Sitting               []Standing [x]Transfers        []Walking []Kneeling               []Stairs []Squatting / bending   [x]ADLs []Reaching  []Lifting  []Housework  []Driving []Job related tasks  []Sports/Recreation []Other:        ASSESSMENT:      Treatment/Activity Tolerance:  [x] Patient able to complete tx  [] Patient limited by fatigue  [] Patient limited by pain  [] Patient limited by other medical complications  [] Other:     Prognosis: [] Good [] Fair  [] Poor    Patient Requires Follow-up: [x] Yes  [] No    Plan for next treatment session: transfers, seated core strength, will plan to co-treat with OT when possible    PLAN: See andres. PT 2x / week for 6 weeks. [x] Continue per plan of care [] Alter current plan (see comments)  [] Plan of care initiated [] Hold pending MD visit [] Discharge    Electronically signed by: Kennie Opitz PT, DPT    Note: If patient does not return for scheduled/ recommended follow up visits, his note will serve as a discharge from care along with most recent update on progress.

## 2021-04-28 ENCOUNTER — HOSPITAL ENCOUNTER (OUTPATIENT)
Dept: OCCUPATIONAL THERAPY | Age: 21
Setting detail: THERAPIES SERIES
Discharge: HOME | End: 2021-04-28
Payer: MEDICARE

## 2021-04-28 ENCOUNTER — HOSPITAL ENCOUNTER (OUTPATIENT)
Dept: PHYSICAL THERAPY | Age: 21
Setting detail: THERAPIES SERIES
Discharge: HOME | End: 2021-04-28
Payer: MEDICARE

## 2021-04-28 PROCEDURE — 97112 NEUROMUSCULAR REEDUCATION: CPT

## 2021-04-28 PROCEDURE — 97110 THERAPEUTIC EXERCISES: CPT

## 2021-04-28 PROCEDURE — 97530 THERAPEUTIC ACTIVITIES: CPT

## 2021-04-28 PROCEDURE — 97116 GAIT TRAINING THERAPY: CPT

## 2021-04-28 NOTE — FLOWSHEET NOTE
[facility] - Outpatient Physical Therapy  Phone: [phone]   Fax: [phone]    Physical Therapy Daily Treatment Note  Date:  2021     Patient Name:  Melchor Orta    :  2000  MRN: [id number]  Medical/Treatment Diagnosis Information:  Diagnosis: Cerebral palsy with spastic diplegia  Treatment Diagnosis: Decreased trunk control impacting ability to complete safe transfers, decreased standing tolerance, LE weakness  Insurance/Certification information:  PT Insurance Information: Medicare & Medicaid  Physician Information:  Referring Practitioner: JOHNNY Barron  Plan of care signed (Y/N): [x]  Yes []  No     Date of Patient follow up with Physician:      Progress Report: []  Yes  [x]  No     Date Range for reporting period:  Beginning  21   Ending    Progress report due (10 Rx/or 30 days whichever is less): visit #10 or      Recertification due (POC duration/ or 90 days whichever is less): visit #12 or 21     Visit # Insurance Allowable Auth required? Date Range    + 0 Medical necessity []  Yes  [x]  No n/a     Latex Allergy:  [x]NO      []YES  Preferred Language for Healthcare:   [x]English       []Other:      Functional Scale/Test Evaluation 3/1/2021 4/21/21  60 day  Discharge   STREAM 23                          Pain level:  0/10  Location:  none    SUBJECTIVE:    Was able to kendall L AFO without socks yesterday, unable to get R AFO. Performed seated in chair vs in bed. States only getting into stander 1 time in the last week. May try stander without AFO to facilitate participation.     OBJECTIVE:  :  Pt 15 mins late then needed to use restroom    Co-treat w/OT for safety and assistance    RESTRICTIONS/PRECAUTIONS: recent Bell's Palsy    Interventions/Exercises:   Therapeutic Exercises (94059) Resistance / level Sets/sec Reps Notes   W/c push ups in preparation for standing                            Neuromuscular Re-ed (27923) trunk rotation to reach behind self to grab bean bag to improve dynamic balance required for pericare while toileting, followed by tossing to bucket, with min A for ~3 instances LOB. Pt instructed on placing flat hands on surface of mat to complete weight bearing during trunk rotation to improve balance. Bean bags were tossed back to patient x3 with instruction  to catch with open hands to reduce tone. Pt returned to wheelchair from edge of mat with min A for transfer. Pt transitioned to // bars to complete ambulation x4 steps with max A x2 for trunk control and advancing LEs prior to seated rest break due to increased fatigue with lack of AFO support. Pt re-attempted with 3 steps, followed by static standing x60 seconds with max A + mod A with cues to keep arms in front of him to prepare for use of lofstrand crutches and to facilitate trunk engagement and knee extension. Pt sat on gerardo disc placed in wheelchair for trunk control with min-mod A for balance to complete ball toss/catch task with multiple LOB; theraband loop placed around knees to prevent abduction and improve pelvic stability and base of support. UB dressing completed to doff/don sweatshirt with SBA and good trunk control noted during weight shifts. Focus of today's session was to improve dynamic sitting and standing balance. 4/21: Session started performing tasks on STREAM assessment for PN. In supine, pt performed 10 bridges with PT blocking at LEs to prevent hip abd. Pt then completed seated mat exercises with sitting EOB and reaching across midline outside base of support for dynamic sitting balance with min A during loss of balance, but otherwise pt able to self correct to upright position. Pt completed trunk twisting to reach behind him to grab bean bag, and then tossing to bucket, with again min A if loss of balance occurred.  Bean bags were tossed back to patient and he was instructed to catch with open hands and throw behind him with both R toes due to pt curling toes and inhibiting foot placement in shoe. CO-OP approach utilized for problem solving with discussion of barriers/solutions. Pt scooted to edge of mat with min A and completed edge of mat to wheelchair transfer min A to conclude session. 4/14:  Session initiated with patient transferring to mat with min A x2. Pt with good control during stand but then difficulty moving L LE when pivoting. Pt moved into supine with cueing on weight bearing through forearm to lower onto side first; required mod A for LE's onto table. In supine, pt completed hip bridges x10 with feet blocked; hip ab/adduction x10 each for both, R, and L with mod A for control of abduction; hamstring stretching 5 x 30 sec  B in 90/90 position; and resisted UE reaching overhead with improved ROM following (completed by OT). Pt completed supine to sit transfer with mod A with feet blocked and min A for scooting to reposition. Pt worked on standing with lofstrand crutches x2 ~1 minute each with mod assist of two for safety; multiple cues required for safe placement of crutches as pt with tendency to keep crutches too far outside his [place]. Pt also cued for extension of LEs, siva at B knees and to raise chest for improved trunk control. Pt then sat EOB and used lofstrand crutches for trunk control training, while reaching to OT's hands x 8 ea ipsilateral and contralateral with CGA/min A for maintaining trunk control. Pt was able to scoot back on the mat table when he felt like he was sliding forward with min A-CGA. Next, patient was in // bars and completed STS with B UEs on // bars and CGA for trunk control. Gait training was completed with mod A for R LE advancement and max A for L LE. Pt able to complete 6-8 steps forward x 2 trials, with PT blocking stance leg and assisting moving leg with OT assisting for trunk control.  Pt completed retro stepping with improved control from last session, with cueing at HS and glutes for LE extension vs trunk rotation to force LE posteriorly. Pt finished the session sitting on gerardo disc in w/c to work on trunk control while using cell phone and while tossing/catching a ball. 4/12:  Upon arrival, pt working w/OT. EOB w/shoulders abd & wrist & hands extended & open on table, green gerardo-disc placed under each hand to increase instability. Sit to left S/L, mod A of 2. Right roll to supine supervision. Verbal & manual cues to posterior knee and ankle for heel side bilaterally. Bridge x10 w/manual stab to knee, bridge w/hip add gerardo-disc squeeze x10 w/minimal man stab to knees. Scooting to right, man stab to knees for placement, mod A to min A for shoulders and trunk. Supine to/from left S/L w/big arms, man stab to knees for eccentric control, VC's to facilitate movement. Scooting to left, man stab to knees for placement, min A for shoulders and trunk. Supine to/from right S/L w/big arms, man stab to knees for eccentric control, VC's to facilitate movement. Supine to long-sitting mod A of 1. Min to mod A of 1 for balance. Mod A of 1 to assist hip & knee flex bilaterally, mod A of 1 for trunk support. Pt reached & unbuckled 1 Velcro strap on each shoe, mod A for trunk balance. Pt doffed & donned R AFO & shoe w/assist from OT. Long-sitting to sitting EOB, min to mod A trunk support, pt maneuvered LEs to left with BUEs until feet over edge. Squat pivot mat table to w/c, min A/CGA of 2. Gait training in //bars and standing w/Lofstrand crutches. 4/9:  Pt used restroom prior to treatment, however had accident and urine got on his clothing. OT provided pt with scrub top change, pt declined scrub bottom change. Pt doffed sheatshirt and donned scrub top with SBA primarily, min A for scrub top setup so pt could kendall correctly. Able to put UEs in pull overhead with SBA. Pt transferred w/c to mat table w/mod A of 1 for safety, pt sat on edge of mat and required mod A of 1 to scoot back.   Pt's feet again became caught in wheels under w/c, reducing safety of transfer. Pt remained sitting edge of mat w/SBA/Sup during discussion of planned activities then was instructed to lay supine. Pt transitioned straight backwards with fair descent control but left feet on floor. Total A of 2 to transition pt to supine position. Pt rolled to L to prone position w/min A of 1 for trunk and min A of 1 to cross R over L LE. Once prone, pt instructed to assume quadruped, pt able to assume CAYLA and then position UEs correctly with hands under shoulders & verbal cues but required mod of 2 to position knees & LEs correctly. Quadruped: hip ext/flex to starting position x3 L -min A for balance, min A manual cues LE movement; x4 R -mod A for balance/weight-shift, mod A manual cues for LE movement. **Max manual cues to extend fingers on mat initially, then pt able to perform w/only instruction to do so. Pt rested while seated on feet after 2 reps on RLE. Pt able to assume tall kneeling position w/SBA x30\". Pt returned to quadruped w/o physical assist.  To complete RLE reps. Pt rested prone. Max A of 1 to roll pt to R for supine position. Hamstring stretches. Max A of 2 supine to long-sitting. Pt able to maintain position w/UEs behind him and CGA. Pt transitioned long-sitting to sitting edge of mat w/max cues for instruction, mod of 1 for trunk support/balance, and mod of 1 for assist positioning LEs. Sit pivot mat table to w/c min A of 1. Gait training followed. 4/1:  Session began with assisting pt to restroom. In // bars, pt completed STS with B UE assist on bars and CGA for trunk control. Pt completed forward stepping with mod A for R LE advancement and max A for L LE advancement. Able to complete 6-8 steps forward each time. PT blocking pt's stance leg and assisting with advancing opp LE.  Pt then able to complete retro stepping in bars, first trail about 1/2 length of bars, second trial with full length of bars. Pt cued for glute activation for hip extension but had difficulty activating glutes and hamstrings. Again, PT blocked stance leg. Pt then completed static standing for 3 min with CGA from both PT and OT. Pt completed stand pivot transfer from wheelchair to mat to R side with min A x2; good carry over noted from last session regarding forward lean and weight shift prior to stand. Seated edge of mat with feet on 6 in step, pt worked on reaching activities reaching across midline outside [place] for a ball, then reaching the ball behind his back and passing it to his opp hand for UE function and trunk rotation, all while maintaining trunk control. Pt blocked pt's R LE to keep in a neutral position. Pt then completed ball toss with OT and was able to maintain his trunk with only min A to block R LE in neutral position and CGA for his trunk. Pt completed mat to wheelchair transfer with min A to L side to transition to parallel bars. 3/31:  Pt completed stand pivot transfer from wheelchair to mat to R side with min A x2; good carry over noted from last session regarding forward lean and weight shift prior to stand, manual required for improved LE placement. Seated edge of mat, pt completed posture training for thoracic extension and maintaining midline during hamstring stretch of RLE and conversation regarding transfers and use of stander at home. Pt reports he has a raised toilet seat with arm rests that he can transfer to without assistance from his mom. Pt completed mat to wheelchair transfer with min A to L side to transition to parallel bars, required manual assist for improved LE placement. At parallel bars, pt completed sit>stand transfer with BUE assist.   For reinforcement of trunk control and dynamic balance during forward weight shift, pt participated in reaching activity with alternating UEs completing contralateral reaching and [place] changes on parallel bar with SBA for trunk control.  Progressed to completing in static stance with increased trunk rotation x6 each side with rest break in between sides, followed by ipsilateral reaching behind self x5 each side to improve trunk control and balance required for toileting tasks. BLEs blocked at knees with 1 instance L knee buckling; pt self-corrected into knee extension, but demonstrated difficulty with trunk correction to lean to R side to return to midline. In seated, pt participated in L><R weight shift to touch alternating shoulders to parallel bars x8 each side, followed by forward><back weight shift x8 with hands placed on knees for UE weight bearing and focus on eccentric control. Pt completed sit>stand with BUE assist using parallel bars to participate in standing without UE support with knees blocked and min-mod A for trunk stability for ~45 seconds prior to sitting on gerardo disc. To conclude session, pt sat on gerardo disc to complete progressively increased head/neck and trunk rotation to alternating sides for trunk control with verbal cues to prevent thoracic flexion. 3/24: Session today began with pt using Nustep while sitting in his w/c x 3 min total, but pt stopped occasionally to talk and needed cueing to continue. Pt then transferred to mat table with stand pivot transfer. Pt was able to stand fully upright, mod A x 2, but then was not table to advance R LE before sitting. Worked on trunk flexion to shift COG forward while scooting hips/buttocks back on mat table as pt has tendency to lean backwards when trying to scoot backwards on a surface. Pt encouraged through all movement today to keep hands flat on table instead of pushing through knuckles. Pt then worked on weight shifting at trunk forward and backward and was given targets to hit with forehead. After the initital task, pt could perform pushing up and scooting his hips backwards onto the table with CGA.  With 4 in box placed under pt's feet for stability, pt worked on reaching outside [employer] with forward lean both ipsilaterally and contralaterally to cones and then sitting back up and rotating his trunk to the R and L to place the cones behind him. Pt able to do this with CGA to Stacey for trunk control. Pt then worked on standing without walker, pt was in front of pt to assist with knee extension and block feet with OT assisting with hip ext and trunk control. Pt stood a total of 5 times. 3/22: Pt transferred from w/c to EO. Pt then completed 1/2 stands to ladder with assist to block feet from moving and help push B knees into extension. Pt then went from EOM>sidelying>supine and completed 10 bridges with blocking at LEs. Pt worked on rolling from supine to L and R, with more difficulty rolling to R. Pt used momentum from UEs but was cued to not reach and pull. Satcey needed to progress LEs to each side. Pt rolled to prone and with PT/OT blocking LEs and assisting at knees (modA-maxA), performed 5 planks for 5-10 second holds. Pt able to better press up with extended arms vs forearms. Pt achieved tall kneeling with a swiss ball with min A at his LEs, then progressed to Qped over the swiss ball with reaching with B UEs. Stacey at LEs progressed to Mod A as pt faitgued. Next pt sat EOB with reaching to magnets, then worked on reaching [place] while holding a small bolster to facilitate open hands. Pt with Stacey needed for these activities to maintain trunk control. Pt then held each end of the bolster and hit a ball while maintaining trunk control. ModA needed to maintain trunk control as pt was quite fatigued by the end of the session. Pt then stood with a std walker x 3 with mod A x 2. Pt transferred back to  with min A for LE placement. 3/9: session began with transferring from w/c to mat table with min Ax 2. Increased time required and cueing to improve safety. Once on the mat table, pt worked on scooting leading with upper body first and performing small abdominal crunch then bridging to move hips. Pt required assistance to keep LEs stable in a hooklying position. Pt also performed rolling to each side R/L x 5, in which he rolled hips first then his upper body. Next pt rolled to prone and pushed through UEs to get into quadruped. Mod Ax 2 required for stability at pelvis and LEs. Pt cued for spine neutral position and then weight shifting to prepare for crawling. Pt able to move R UE and LE forward with mod A, but then was too fatigued and rested. Pt sat in tall kneeling and was able to attain this position by pushing up on a swiss ball. Pt transitioned next to sitting EOB and shifting weight fwd to stand at standard walker. First he completed partial stands with mod A x 2, with PT/OT blocking knees and feet. OT suggested pt stand at ladder but pt reported he was more comfortable using std walker. Lastly pt laid supine and rotated side to side with min A x 1, with reaching arm diagonally and protracting/retracting hips x 10 B/L. Pt completed a stand/pivot transfer from the mat to the W/C with min A x 1 at the end of the session.      Modalities:     Pt. Education:  -patient educated on diagnosis, prognosis and expectations for rehab  -all patient questions were answered    HEP instruction:      NMR and Therapeutic Activities:    [x] (71857 or 14833) Provided verbal/tactile cueing for activities related to improving balance, coordination, kinesthetic sense, posture, motor skill, proprioception and motor activation to allow for proper function of   [x] LE / Core, hip and LE with self care and ADLs  [x] UE / Shoulder complex: cervical, postural, scapular, scapulothoracic and UE control with self care, carrying, lifting, driving, computer work.   [] (08568) Gait Re-education- Provided training and instruction to the patient for proper LE, core and hip recruitment, positioning, and eccentric body weight control with ambulation re-education, including ascending & descending stairs     Home Management Training / Self Care:  [] (76714) Provided self-care/home management training related to activities of daily living and compensatory training, and/or use of adaptive equipment for improvement with: ADLs and compensatory training, meal preparation, safety procedures and instruction in use of adaptive equipment, including bathing, grooming, dressing, personal hygiene, basic household cleaning and chores. Therapeutic Exercise and NMR EXR  [x] (98406) Provided verbal/tactile cueing for activities related to strengthening, flexibility, endurance, ROM for improvements in  [x] LE / Core stability: LE, hip, and core control with self care, mobility, lifting, ambulation. [] UE / Shoulder complex: cervical, postural, scapular, scapulothoracic and UE control with self care, reaching, carrying, lifting, house/yardwork, driving, computer work.  [] (16476) Provided verbal/tactile cueing for activities related to improving balance, coordination, kinesthetic sense, posture, motor skill, proprioception to assist with   [] LE / Core stability: LE, hip, and core control in self care, mobility, lifting, ambulation and eccentric single leg control. [] UE / Shoulder complex: cervical, scapular, scapulothoracic and UE control with self care, reaching, carrying, lifting, house/yardwork, driving, computer work.   [] (17710) Therapist is in constant attendance of 2 or more patients providing skilled therapy interventions, but not providing any significant amount of measurable one-on-one time to either patient, for improvements in  [] LE / Core stability: LE, hip, and core control in self care, mobility, lifting, ambulation and eccentric single leg control. [] UE / Shoulder complex: cervical, scapular, scapulothoracic and UE control with self care, reaching, carrying, lifting, house/yardwork, driving, computer work.      Home Exercise Program:    [x] (10974) Reviewed/Progressed HEP activities related to strengthening, flexibility, endurance, ROM of x      [] Ultrasound  [x] TA x  1     [] [facility] Traction (96312)  [x] Gait Training x  1   [] ES (un) (74134):   [] Aquatic therapy x   [] Other:   [] Group:     GOALS:   Patient stated goal: improve ability to complete transfers   []? Progressing: []? Met: []? Not Met: []? Adjusted     Therapist goals for Patient:   Short Term Goals: To be achieved in: 2 weeks  1. Independent in HEP and progression per patient tolerance, in order to prevent re-injury. []? Progressing: [x]? Met: []? Not Met: []? Adjusted  2. Patient will have a decrease in pain to facilitate improvement in movement, function, and ADLs as indicated by Functional Deficits. []? Progressing: [x]? Met: []? Not Met: []? Adjusted     Long Term Goals: To be achieved in: 6 weeks  1. Patient will report increased standing tolerance to at least 30 minutes in standing frame. []? Progressing: [x]? Met: []? Not Met: []? Adjusted  2. Patient will demonstrate an increase in strength to good shoulder & hip complex, and core activation to allow for proper functional mobility as indicated by patients Functional Deficits. [x]? Progressing: []? Met: []? Not Met: []? Adjusted  3. Patient will return to functional activities including demo'ing safe transfers to/from w/c with mod I.    [x]? Progressing: []? Met: []? Not Met: []? Adjusted  4. Patient will increase STREAM score to 32 or above for increased UE/LE movement in sitting, supine, and standing. [x]? Progressing: []? Met: []? Not Met: []? Adjusted          Overall Progression Towards Functional goals/ Treatment Progress Update:  [] Patient is progressing as expected towards functional goals listed. [x] Progression is slowed due to complexities/Impairments listed. [] Progression has been slowed due to co-morbidities.   [] Plan just implemented, too soon to assess goals progression <30days   [] Goals require adjustment due to lack of progress  [] Patient is not progressing as expected and requires additional

## 2021-04-28 NOTE — FLOWSHEET NOTE
[facility] - Outpatient Occupational Therapy      Occupational Therapy Daily Treatment Note  Date:  2021    Patient: Wyatt Salmeron   : 2000   MRN: [id number]  Referring Physician:  Channing Weinstein CNP       Medical Diagnosis Information: paraplegia, cerebral palsy                                                  Insurance information: medicare/ medicaid    Date of Injury:   Date of Surgery: rhizotomy when he was about 13    Progress Report: []  Yes  [x]  No     Date Range for reporting period:  Beginning: 3/1/2021  Ending: end of POC    Progress report due (10 Rx/or 30 days whichever is less): visit #17 or 3/77/3531    Recertification due (POC duration/ or 90 days whichever is less): visit #20 or 2021    Visit # Insurance Allowable Auth required? Date Range    +   [place] []  Yes  [x]  No n/a       Latex Allergy:  []No      []Yes  Pacemaker:  [] No       [] Yes     Preferred Language for Healthcare:   [x]English       []other:    Pain level: 0/10     SUBJECTIVE:   Pt reports he was able to don L AFO seated in wheelchair rather than in long sit in bed, but was unable to don R AFO. Pt 15 minutes late to session on this date due to transportation. Functional Disability Index:  STREAM: score 23    21: Stroke Rehabilitation Assessment of Movement (STREAM): total- 24/70 (supine- 9/15, sitting- 15/31, standing- 0/24)    OBJECTIVE: See eval    21: 3 minutes static stance at parallel bars with CGA x2 for trunk control and blocking knees      RESTRICTIONS/PRECAUTIONS: fall risk    Exercises/Interventions: Treatment focused on improving dynamic balance during UE reaching, transfers, and mobility to enhance functional independence. Session initiated with pt completing toileting mod I with urinal. In // bars, pt completed STS with B UE assist on bars and CGA for trunk control.  Pt completed forward stepping ~6 steps with mod A for R LE advancement and max A for L LE purpose of modulating pain, promoting relaxation,  increasing ROM, reducing/eliminating soft tissue swelling/inflammation/restriction, improving soft tissue extensibility and allowing for proper ROM for normal function with self care, reaching, carrying, lifting, house/yardwork, driving/computer work  [] Comments:    ADL Training:  [] (12791) Provided self-care/home management training related to activities of daily living and compensatory training, and/or use of adaptive equipment   [] Comments:     Splinting:  [] Fabrication of:   [] (65695) Orthotic/Prosthetic Management, subsequent encounter  [] (22156) Orthotic management and training (fitting and assessment)  [] Comments:      Charges: co treat with PT for safety and increased intensity of treatment  Timed Code Treatment Minutes: 35   Total Treatment Minutes: 75     [] EVAL (LOW) 29081   [] OT Re-eval (26892)  [] EVAL (MOD) 31931   [] EVAL (HIGH) 52702       [x] Ru (34847) x   1  [] SVIPW(30584)  [x] NMR (51069) x  1  [] Estim (attended) (15383)   [] Manual ([id number]) x      [] US (34756)  [] TA (97050) x      [] Paraffin (37703)  [] ADL  ([phone]) x     [] Splint/L code:    [] Estim (unattended) (66118)  [] Fluidotherapy (70192)  [] Other:    GOALS:    Patient stated goal: improved trunk control and standing tolerance to increase independence in self care. [x]? Progressing: []? Met: []? Not Met: []? Adjusted     Therapist goals for Patient:   Short Term Goals: To be achieved in: 30 days  1. Pt will be independent with clothing management on toilet or in wheelchair to complete toileting with use of grab bar. [x]? Progressing: []? Met: []? Not Met: []? Adjusted  2. Pt will be stand by assist completing scoot transfers on level surfaces  [x]? Progressing: []? Met: []? Not Met: []? Adjusted  3. Patient will be able to stand up to 30 seconds at grab bar for toileting and lower body dress with min assist.   [x]? Progressing: []? Met: []?  Not Met: []?

## 2021-05-05 ENCOUNTER — HOSPITAL ENCOUNTER (OUTPATIENT)
Dept: PHYSICAL THERAPY | Age: 21
Setting detail: THERAPIES SERIES
Discharge: HOME | End: 2021-05-05
Payer: MEDICARE

## 2021-05-05 ENCOUNTER — HOSPITAL ENCOUNTER (OUTPATIENT)
Dept: OCCUPATIONAL THERAPY | Age: 21
Setting detail: THERAPIES SERIES
Discharge: HOME | End: 2021-05-05
Payer: MEDICARE

## 2021-05-05 PROCEDURE — 97112 NEUROMUSCULAR REEDUCATION: CPT

## 2021-05-05 PROCEDURE — 97110 THERAPEUTIC EXERCISES: CPT

## 2021-05-05 PROCEDURE — 97530 THERAPEUTIC ACTIVITIES: CPT

## 2021-05-05 NOTE — FLOWSHEET NOTE
[facility] - Outpatient Physical Therapy  Phone: [phone]   Fax: [phone]    Physical Therapy Daily Treatment Note  Date:  2021     Patient Name:  Alvie Nissen    :  2000  MRN: [id number]  Medical/Treatment Diagnosis Information:  Diagnosis: Cerebral palsy with spastic diplegia  Treatment Diagnosis: Decreased trunk control impacting ability to complete safe transfers, decreased standing tolerance, LE weakness  Insurance/Certification information:  PT Insurance Information: Medicare & Medicaid  Physician Information:  Referring Practitioner: JOHNNY Paige  Plan of care signed (Y/N): [x]  Yes []  No     Date of Patient follow up with Physician:      Progress Report: []  Yes  [x]  No     Date Range for reporting period:  Beginning  21   Ending    Progress report due (10 Rx/or 30 days whichever is less): visit #10 or      Recertification due (POC duration/ or 90 days whichever is less): visit #12 or 21     Visit # Insurance Allowable Auth required? Date Range    +  Medical necessity []  Yes  [x]  No n/a     Latex Allergy:  [x]NO      []YES  Preferred Language for Healthcare:   [x]English       []Other:      Functional Scale/Test Evaluation 3/1/2021 4/21/21  60 day  Discharge   STREAM 23                          Pain level:  0/10  Location:  none    SUBJECTIVE:  Pt reports he spent 4 hours in the stander yesterday and it was too much. He reports he had some redness around his knees.      OBJECTIVE:  :  Pt 15 mins late then needed to use restroom    Co-treat w/OT for safety and assistance    RESTRICTIONS/PRECAUTIONS: recent Bell's Palsy    Interventions/Exercises:   Therapeutic Exercises (57033) Resistance / level Sets/sec Reps Notes   W/c push ups in preparation for standing                            Neuromuscular Re-ed (73950) /  Gait Training (07002)       Upright posture seated in W/C   X 5 reps holding football, rest of reps completed with L UE bracing on L knee, PT hand assist on sternum and lumbar spine to facilitate                                Gait Training:  Amb in //bars    Bwd walking in //bars      Transfer w/B Lofstrand crutches   Static stand w/B Lofstrand crutches   8 ft    8 ft    Mod A of 2  Mod A of 2   -manual required to advance LEs, pt activation hip extensors to initiate swing phase, demo'd adequate weight-shifting. OT provided assist & w/c follow for safety      -difficulty with placement for adequate support                 Therapeutic Activities (15064) /  Functional Tasks       [facility] reaching across midline in seated   Pt seated in w/c using seat belt to help stay in w/c          [facility] reaching across midline and diagonally   Pt seated in w/c using seat belt to help stay in w/c               STS to std walker            Transfer to w/c from mat table            STS from w/c to mat table - with large bolster placed on table in front of patient to hold onto   Min A, w/c close to mat table to assist in blocking pt's knees. Manual Intervention ([id number])       Supine hamstring stretch 90/90    -completed by PT  -completed by OT                                        Sessions:     5/5: Discussed with pt appropriate amount of time to spend in stander at home, with education on importance of quality of participation rather than quantity/duration; pt verbalized understanding, but may require reinforcement. Pt completed sit pivot transfer from w/c to mat table with Min A x 2. Pt participated in crossing midline for open hand slap onto gerardo disc x10 each side with SBA-min A for trunk control during task. Pt with tendency to lose balance towards R side. Pt required min-mod A for partial stand to place gerardo disc under buttocks.  Pt seated on gerardo disc ~10 min to increase trunk strength and stability, with bilateral BUE reaching/shoulder flexion with CGA-mod A for trunk stability and blocking knees to prevent advancement. PT min blocked stance leg during contralateral swing phase with OT assist for trunk control min-mod A and wheelchair follow. Completed x3 with retro stepping after 2nd attempt ~6 steps with pt relying on momentum of trunk rotation for hip extension when stepping back. Continued verbal cues for placement of hands in front of body to prepare for use of lofstrand crutches. Also discussed potential use of posterior walker as pt previously used when ambulatory and due to pt's preference for hands used as [place] behind trunk. In treatment room, pt doffed/donned R AFO seated in wheelchair using technique of abducting hip to bring to side of wheelchair to place foot into AFO and shoe, and then placing foot on foot rest to adhere straps. Decreased time required with this method with pt agreeing to attempt donning both AFOs at home. Pt transferred to sit edge of mat from wheelchair with min A x2 to block feet and for trunk control during scooting. Continued difficulty with trunk flexion during lowering to sit far back on surface pt is transferring to affecting safety. With PT holding ball in front of pt, pt completed cross and hook style punches across body to opposite side x10 B with emphasis on trunk control and proprioceptive input of ball to improve grading of force; decreased trunk control on L punches due to R trunk weakness with mod-min A for sitting balance compared to CGA-min A on L. Trunk ext w/BUE OH to trunk flex & shoulder ext to hit ball forcefully x3 -difficulty achieving necessary trunk ext and focused on BUEs OH instead. Session concluded with mat to wheelchair transfer min A x2 with wheelchair on pt's right.      4/26:  Began session with OT applying foam padding to patient's ankle for cushion so pt can wear AFOs comfortably. Pt transferred to sit edge of mat from wheelchair with min A x2 to block feet and for trunk control during scooting.  Grasping 20# bungee cord, pt completed horizontal midline outside base of support for dynamic sitting balance with min A during loss of balance, but otherwise pt able to self correct to upright position. Pt completed trunk twisting to reach behind him to grab bean bag, and then tossing to bucket, with again min A if loss of balance occurred. Bean bags were tossed back to patient and he was instructed to catch with open hands and throw behind him with both hands overhead while keeping his head up. Pt was moved to the // bars where he completed static standing x 4 trials (lengths: 105 sec, 90 sec, 75 sec, 45 sec). Pt able to complete this with min Ax1, CGAx 1 with cues to keep arms in front of him to prepare for use of lofstrand crutches and knee extension. Lastly, pt kicked into knee extension while sitting in w/c with upright posture. Pt was able to kick into knee extension if assisted with slight hip flexion. 4/20:  Pt transferred w/c to mat with min A x2 with continued difficulty in foot placement and repositioning during and after transfer. Pt completed scooting with good carryover in forward weight shift. Pt participated in trunk control task with forward/back weight shift x10 with verbal cues to maintain contact of hands on thighs to avoid pulling self up by holding edge of mat and engage trunk. Pt then completed lateral weight shifts x10 each side with decreased control leaning to R with physical and verbal cues to engage L obliques for eccentric control and to begin L weight shift prior to end ROM with improved control following. Pt completed STS transfer min A x2 to work on standing with lofstrand crutches ~2.5 minutes with mod A x2 progressing to mod + max A with fatigue for safety; multiple cues required for safe placement of crutches and extension of LEs in addition to trunk control and BUE weight bearing.  Pt sat edge of mat to scoot back with difficulty lifting LEs over edge due to decreased active knee extension; pt disengaging trunk to fall into supine with max A + mod A to sit in long sit. Pt trained in knee flexion to utilize LEs for scooting and as assist for trunk control. Pt in long sit with back against wall for trunk support. Pt participated in training to doff/don AFOs with extended time required; pt leaving braces in shoes during doffing with improved ability to don and verbal cues to hold tongue out of the way when donning. Min A to extend R toes due to pt curling toes and inhibiting foot placement in shoe. CO-OP approach utilized for problem solving with discussion of barriers/solutions. Pt scooted to edge of mat with min A and completed edge of mat to wheelchair transfer min A to conclude session. 4/14:  Session initiated with patient transferring to mat with min A x2. Pt with good control during stand but then difficulty moving L LE when pivoting. Pt moved into supine with cueing on weight bearing through forearm to lower onto side first; required mod A for LE's onto table. In supine, pt completed hip bridges x10 with feet blocked; hip ab/adduction x10 each for both, R, and L with mod A for control of abduction; hamstring stretching 5 x 30 sec  B in 90/90 position; and resisted UE reaching overhead with improved ROM following (completed by OT). Pt completed supine to sit transfer with mod A with feet blocked and min A for scooting to reposition. Pt worked on standing with lofstrand crutches x2 ~1 minute each with mod assist of two for safety; multiple cues required for safe placement of crutches as pt with tendency to keep crutches too far outside his [place]. Pt also cued for extension of LEs, siva at B knees and to raise chest for improved trunk control. Pt then sat EOB and used lofstrand crutches for trunk control training, while reaching to OT's hands x 8 ea ipsilateral and contralateral with CGA/min A for maintaining trunk control. Pt was able to scoot back on the mat table when he felt like he was sliding forward with min A-CGA.  Next, patient was in // bars and completed STS with B UEs on // bars and CGA for trunk control. Gait training was completed with mod A for R LE advancement and max A for L LE. Pt able to complete 6-8 steps forward x 2 trials, with PT blocking stance leg and assisting moving leg with OT assisting for trunk control. Pt completed retro stepping with improved control from last session, with cueing at HS and glutes for LE extension vs trunk rotation to force LE posteriorly. Pt finished the session sitting on gerardo disc in w/c to work on trunk control while using cell phone and while tossing/catching a ball. 4/12:  Upon arrival, pt working w/OT. EOB w/shoulders abd & wrist & hands extended & open on table, green gerardo-disc placed under each hand to increase instability. Sit to left S/L, mod A of 2. Right roll to supine supervision. Verbal & manual cues to posterior knee and ankle for heel side bilaterally. Bridge x10 w/manual stab to knee, bridge w/hip add gerardo-disc squeeze x10 w/minimal man stab to knees. Scooting to right, man stab to knees for placement, mod A to min A for shoulders and trunk. Supine to/from left S/L w/big arms, man stab to knees for eccentric control, VC's to facilitate movement. Scooting to left, man stab to knees for placement, min A for shoulders and trunk. Supine to/from right S/L w/big arms, man stab to knees for eccentric control, VC's to facilitate movement. Supine to long-sitting mod A of 1. Min to mod A of 1 for balance. Mod A of 1 to assist hip & knee flex bilaterally, mod A of 1 for trunk support. Pt reached & unbuckled 1 Velcro strap on each shoe, mod A for trunk balance. Pt doffed & donned R AFO & shoe w/assist from OT. Long-sitting to sitting EOB, min to mod A trunk support, pt maneuvered LEs to left with BUEs until feet over edge. Squat pivot mat table to w/c, min A/CGA of 2. Gait training in //bars and standing w/Lofstrand crutches.     4/9:  Pt used restroom prior to treatment, however had accident and urine got on his clothing. OT provided pt with scrub top change, pt declined scrub bottom change. Pt doffed sheatshirt and donned scrub top with SBA primarily, min A for scrub top setup so pt could kendall correctly. Able to put UEs in pull overhead with SBA. Pt transferred w/c to mat table w/mod A of 1 for safety, pt sat on edge of mat and required mod A of 1 to scoot back. Pt's feet again became caught in wheels under w/c, reducing safety of transfer. Pt remained sitting edge of mat w/SBA/Sup during discussion of planned activities then was instructed to lay supine. Pt transitioned straight backwards with fair descent control but left feet on floor. Total A of 2 to transition pt to supine position. Pt rolled to L to prone position w/min A of 1 for trunk and min A of 1 to cross R over L LE. Once prone, pt instructed to assume quadruped, pt able to assume CAYLA and then position UEs correctly with hands under shoulders & verbal cues but required mod of 2 to position knees & LEs correctly. Quadruped: hip ext/flex to starting position x3 L -min A for balance, min A manual cues LE movement; x4 R -mod A for balance/weight-shift, mod A manual cues for LE movement. **Max manual cues to extend fingers on mat initially, then pt able to perform w/only instruction to do so. Pt rested while seated on feet after 2 reps on RLE. Pt able to assume tall kneeling position w/SBA x30\". Pt returned to quadruped w/o physical assist.  To complete RLE reps. Pt rested prone. Max A of 1 to roll pt to R for supine position. Hamstring stretches. Max A of 2 supine to long-sitting. Pt able to maintain position w/UEs behind him and CGA. Pt transitioned long-sitting to sitting edge of mat w/max cues for instruction, mod of 1 for trunk support/balance, and mod of 1 for assist positioning LEs. Sit pivot mat table to w/c min A of 1. Gait training followed.       4/1:  Session began with assisting pt to restroom. In // bars, pt completed STS with B UE assist on bars and CGA for trunk control. Pt completed forward stepping with mod A for R LE advancement and max A for L LE advancement. Able to complete 6-8 steps forward each time. PT blocking pt's stance leg and assisting with advancing opp LE. Pt then able to complete retro stepping in bars, first trail about 1/2 length of bars, second trial with full length of bars. Pt cued for glute activation for hip extension but had difficulty activating glutes and hamstrings. Again, PT blocked stance leg. Pt then completed static standing for 3 min with CGA from both PT and OT. Pt completed stand pivot transfer from wheelchair to mat to R side with min A x2; good carry over noted from last session regarding forward lean and weight shift prior to stand. Seated edge of mat with feet on 6 in step, pt worked on reaching activities reaching across midline outside [place] for a ball, then reaching the ball behind his back and passing it to his opp hand for UE function and trunk rotation, all while maintaining trunk control. Pt blocked pt's R LE to keep in a neutral position. Pt then completed ball toss with OT and was able to maintain his trunk with only min A to block R LE in neutral position and CGA for his trunk. Pt completed mat to wheelchair transfer with min A to L side to transition to parallel bars. 3/31:  Pt completed stand pivot transfer from wheelchair to mat to R side with min A x2; good carry over noted from last session regarding forward lean and weight shift prior to stand, manual required for improved LE placement. Seated edge of mat, pt completed posture training for thoracic extension and maintaining midline during hamstring stretch of RLE and conversation regarding transfers and use of stander at home. Pt reports he has a raised toilet seat with arm rests that he can transfer to without assistance from his mom.  Pt completed mat to wheelchair transfer with min A to L side to transition to parallel bars, required manual assist for improved LE placement. At parallel bars, pt completed sit>stand transfer with BUE assist.   For reinforcement of trunk control and dynamic balance during forward weight shift, pt participated in reaching activity with alternating UEs completing contralateral reaching and [place] changes on parallel bar with SBA for trunk control. Progressed to completing in static stance with increased trunk rotation x6 each side with rest break in between sides, followed by ipsilateral reaching behind self x5 each side to improve trunk control and balance required for toileting tasks. BLEs blocked at knees with 1 instance L knee buckling; pt self-corrected into knee extension, but demonstrated difficulty with trunk correction to lean to R side to return to midline. In seated, pt participated in L><R weight shift to touch alternating shoulders to parallel bars x8 each side, followed by forward><back weight shift x8 with hands placed on knees for UE weight bearing and focus on eccentric control. Pt completed sit>stand with BUE assist using parallel bars to participate in standing without UE support with knees blocked and min-mod A for trunk stability for ~45 seconds prior to sitting on gerardo disc. To conclude session, pt sat on gerardo disc to complete progressively increased head/neck and trunk rotation to alternating sides for trunk control with verbal cues to prevent thoracic flexion. 3/24: Session today began with pt using Nustep while sitting in his w/c x 3 min total, but pt stopped occasionally to talk and needed cueing to continue. Pt then transferred to mat table with stand pivot transfer. Pt was able to stand fully upright, mod A x 2, but then was not table to advance R LE before sitting.   Worked on trunk flexion to shift COG forward while scooting hips/buttocks back on mat table as pt has tendency to lean backwards when trying to scoot backwards on a surface. Pt encouraged through all movement today to keep hands flat on table instead of pushing through knuckles. Pt then worked on weight shifting at trunk forward and backward and was given targets to hit with forehead. After the initital task, pt could perform pushing up and scooting his hips backwards onto the table with CGA. With 4 in box placed under pt's feet for stability, pt worked on reaching outside [place] with forward lean both ipsilaterally and contralaterally to cones and then sitting back up and rotating his trunk to the R and L to place the cones behind him. Pt able to do this with CGA to Stacey for trunk control. Pt then worked on standing without walker, pt was in front of pt to assist with knee extension and block feet with OT assisting with hip ext and trunk control. Pt stood a total of 5 times. 3/22: Pt transferred from w/c to EOM. Pt then completed 1/2 stands to ladder with assist to block feet from moving and help push B knees into extension. Pt then went from EOM>sidelying>supine and completed 10 bridges with blocking at LEs. Pt worked on rolling from supine to L and R, with more difficulty rolling to R. Pt used momentum from UEs but was cued to not reach and pull. Stacey needed to progress LEs to each side. Pt rolled to prone and with PT/OT blocking LEs and assisting at knees (modA-maxA), performed 5 planks for 5-10 second holds. Pt able to better press up with extended arms vs forearms. Pt achieved tall kneeling with a swiss ball with min A at his LEs, then progressed to Qped over the swiss ball with reaching with B UEs. Stacey at LEs progressed to Mod A as pt faitgued. Next pt sat EOB with reaching to [employer], then worked on reaching [employer] while holding a small bolster to facilitate open hands. Pt with Stacey needed for these activities to maintain trunk control. Pt then held each end of the bolster and hit a ball while maintaining trunk control.  ModA needed to maintain trunk control as pt was ADLs  [x] UE / Shoulder complex: cervical, postural, scapular, scapulothoracic and UE control with self care, carrying, lifting, driving, computer work.   [] (78538) Gait Re-education- Provided training and instruction to the patient for proper LE, core and hip recruitment, positioning, and eccentric body weight control with ambulation re-education, including ascending & descending stairs     Home Management Training / Self Care:  [] (20941) Provided self-care/home management training related to activities of daily living and compensatory training, and/or use of adaptive equipment for improvement with: ADLs and compensatory training, meal preparation, safety procedures and instruction in use of adaptive equipment, including bathing, grooming, dressing, personal hygiene, basic household cleaning and chores. Therapeutic Exercise and NMR EXR  [x] (86905) Provided verbal/tactile cueing for activities related to strengthening, flexibility, endurance, ROM for improvements in  [x] LE / Core stability: LE, hip, and core control with self care, mobility, lifting, ambulation. [] UE / Shoulder complex: cervical, postural, scapular, scapulothoracic and UE control with self care, reaching, carrying, lifting, house/yardwork, driving, computer work.  [] (23665) Provided verbal/tactile cueing for activities related to improving balance, coordination, kinesthetic sense, posture, motor skill, proprioception to assist with   [] LE / Core stability: LE, hip, and core control in self care, mobility, lifting, ambulation and eccentric single leg control.    [] UE / Shoulder complex: cervical, scapular, scapulothoracic and UE control with self care, reaching, carrying, lifting, house/yardwork, driving, computer work.   [] (65134) Therapist is in constant attendance of 2 or more patients providing skilled therapy interventions, but not providing any significant amount of measurable one-on-one time to either patient, for improvements in  [] LE / Core stability: LE, hip, and core control in self care, mobility, lifting, ambulation and eccentric single leg control. [] UE / Shoulder complex: cervical, scapular, scapulothoracic and UE control with self care, reaching, carrying, lifting, house/yardwork, driving, computer work. Home Exercise Program:    [x] (21024) Reviewed/Progressed HEP activities related to strengthening, flexibility, endurance, ROM of   [] LE / Core stability: core, hip and LE for functional self-care, mobility, lifting and ambulation/stair navigation   [] UE / Shoulder complex: cervical, postural, scapular, scapulothoracic and UE control with self care, reaching, carrying, lifting, house/yardwork, driving, computer work  [] (40075)Reviewed/Progressed HEP activities related to improving balance, coordination, kinesthetic sense, posture, motor skill, proprioception of   [] LE: core, hip and LE for self care, mobility, lifting, and ambulation/stair navigation    [] UE / Shoulder complex: cervical, postural,  scapular, scapulothoracic and UE control with self care, reaching, carrying, lifting, house/yardwork, driving, computer work    Manual Treatments:  PROM / STM / Oscillations-Mobs:  G-I, II, III, IV (PA's, Inf., Post.)  [] (54738) Provided manual therapy to mobilize shoulder complex, hip, LE, and/or cervicothoracic/LS spine soft tissue/joints for the purpose of modulating pain, promoting relaxation,  increasing ROM, reducing/eliminating soft tissue swelling/inflammation/restriction, improving soft tissue extensibility and allowing for proper ROM for normal function with   [] LE / Core stability: self care, mobility, lifting and ambulation. [] UE / Shoulder complex: self care, reaching, carrying, lifting, house/yardwork, driving, computer work.      Modalities:  [] (49101) Vasopneumatic compression: Utilized vasopneumatic compression to decrease edema / swelling for the purpose of improving mobility and quad tone / recruitment which will allow for increased overall function including but not limited to self-care, transfers, ambulation, and ascending / descending stairs. Charges:  Timed Code Treatment Minutes: 40   Total Treatment Minutes: 80   **CO-treat with OT    [] EVAL - LOW (82304)   [] EVAL - MOD (04769)  [] EVAL - HIGH (49242)  [] RE-EVAL (09574)  [x] TE (60957) x 1     [] Ionto  [x] NMR (45419) x 1      [] Vaso  [] Manual (78255) x      [] Ultrasound  [x] TA x  1     [] Mech Traction (20997)  [] Gait Training x     [] ES (un) (83962):   [] Aquatic therapy x   [] Other:   [] Group:     GOALS:   Patient stated goal: improve ability to complete transfers   []? Progressing: []? Met: []? Not Met: []? Adjusted     Therapist goals for Patient:   Short Term Goals: To be achieved in: 2 weeks  1. Independent in HEP and progression per patient tolerance, in order to prevent re-injury. []? Progressing: [x]? Met: []? Not Met: []? Adjusted  2. Patient will have a decrease in pain to facilitate improvement in movement, function, and ADLs as indicated by Functional Deficits. []? Progressing: [x]? Met: []? Not Met: []? Adjusted     Long Term Goals: To be achieved in: 6 weeks  1. Patient will report increased standing tolerance to at least 30 minutes in standing frame. []? Progressing: [x]? Met: []? Not Met: []? Adjusted  2. Patient will demonstrate an increase in strength to good shoulder & hip complex, and core activation to allow for proper functional mobility as indicated by patients Functional Deficits. [x]? Progressing: []? Met: []? Not Met: []? Adjusted  3. Patient will return to functional activities including demo'ing safe transfers to/from w/c with mod I.    [x]? Progressing: []? Met: []? Not Met: []? Adjusted  4. Patient will increase STREAM score to 32 or above for increased UE/LE movement in sitting, supine, and standing. [x]? Progressing: []? Met: []? Not Met: []?  Adjusted          Overall Progression Towards Functional goals/ Treatment Progress Update:  [] Patient is progressing as expected towards functional goals listed. [x] Progression is slowed due to complexities/Impairments listed. [] Progression has been slowed due to co-morbidities. [] Plan just implemented, too soon to assess goals progression <30days   [] Goals require adjustment due to lack of progress  [] Patient is not progressing as expected and requires additional follow up with physician  [] Other    Persisting Functional Limitations/Impairments:  []Sleeping [x]Sitting               []Standing [x]Transfers        []Walking []Kneeling               []Stairs []Squatting / bending   [x]ADLs []Reaching  []Lifting  []Housework  []Driving []Job related tasks  []Sports/Recreation []Other:        ASSESSMENT:    Noted that pt had some improvement in R LE advancement this date when walking in // bars. Only completed 1 trial of walking at end of session, continues to need max A for advancement of L LE. Pt able to complete mid rows with OT and weighted bungee cord with light facilitation at mid scap for scap retraction. Will continue to work on standing tolerance and trunk control to improve safety when completing transfers and mobility. Treatment/Activity Tolerance:  [x] Patient able to complete tx  [] Patient limited by fatigue  [] Patient limited by pain  [] Patient limited by other medical complications  [] Other:     Prognosis: [x] Good [] Fair  [] Poor    Patient Requires Follow-up: [x] Yes  [] No    Plan for next treatment session: transfers, seated core strength, will plan to co-treat with OT when possible    PLAN: See eval. PT 2x / week for 6 weeks.    [x] Continue per plan of care [] Alter current plan (see comments)  [] Plan of care initiated [] Hold pending MD visit [] Discharge    Electronically signed by: Alden Workman PT, DPT    Note: If patient does not return for scheduled/ recommended follow up visits, his note will serve as a discharge from care along with most recent update on progress.

## 2021-05-05 NOTE — FLOWSHEET NOTE
[facility] - Outpatient Occupational Therapy      Occupational Therapy Daily Treatment Note  Date:  2021    Patient: Bonny Dacosta   : 2000   MRN: [id number]  Referring Physician:  Oj Mckenzie CNP       Medical Diagnosis Information: paraplegia, cerebral palsy                                                  Insurance information: medicare/ medicaid    Date of Injury:   Date of Surgery: rhizotomy when he was about 13    Progress Report: []  Yes  [x]  No     Date Range for reporting period:  Beginning: 3/1/2021  Ending: end of POC    Progress report due (10 Rx/or 30 days whichever is less): visit #17 or     Recertification due (POC duration/ or 90 days whichever is less): visit #20 or 2021    Visit # Insurance Allowable Auth required? Date Range    +   [place] []  Yes  [x]  No n/a       Latex Allergy:  []No      []Yes  Pacemaker:  [] No       [] Yes     Preferred Language for Healthcare:   [x]English       []other:    Pain level: 0/10     SUBJECTIVE:   Pt reports he spent 4 hours in the stander yesterday and it was too much with some redness noted around his knees. Pt frustrated he left cell phone at home, but pleasant and motivated for therapy session. Functional Disability Index:  STREAM: score 23    21: Stroke Rehabilitation Assessment of Movement (STREAM): total- 24/70 (supine- 9/15, sitting- 15/31, standing- 0/24)    OBJECTIVE: See eval    21: 3 minutes static stance at parallel bars with CGA x2 for trunk control and blocking knees      RESTRICTIONS/PRECAUTIONS: fall risk    Exercises/Interventions: Treatment focused on improving dynamic balance during UE reaching, transfers, and mobility to enhance functional independence.  Session initiated with review of appropriate time limits for use of stander, educating pt on importance of quality of participation rather than quantity/duration; pt verbalized understanding, but may require reinforcement. Pt completed sit pivot transfer from w/c to mat table with Min A x 2. Pt participated in crossing midline for open hand slap onto gerardo disc x10 each side with SBA-min A for trunk control during task. Pt required min-mod A for partial stand to lace gerardo disc under buttocks. Pt seated on gerardo disc ~10 min to increase trunk strength and stability, with bilateral BUE reaching/shoulder flexion with CGA-mod A for trunk stability and blocking knees to prevent sliding forward. Pt sit pivot edge of mat to w/c min A x 2 with verbal cues for safety as pt did not notify therapists prior to initiation of transfer. Pt seated at barre in w/c with 2 sit to stands with min A and use of barre and in stance ~2 min x 2 trials with min progressing to mod A with fatigue for trunk support by OT and PT blocking knees/facilitating LE extension; tactile/vcs for upright posture. During first trial pt with shoulder flexion of BUEs to slide up wall. Pt sat in w/c to complete bilateral LE advancement of w/c for strengthening requiring verbal cues and assist for proper technique as pt initially used rocking of trunk to advance w/c. Pt grasping cylindrical handle of 30# bungee cord to maintain 90* elbow flexion for stability of BUEs, followed by shoulder extension for scapular retraction with light mobilization of PT x10. In // bars, pt completed STS with B UE assist on bars and CGA for trunk control. Pt completed forward stepping length of // bars ~6 ft with mod A for R LE advancement and max A for L LE advancement. PT blocked stance leg during advancement assist of opposite LE with OT assist for trunk control min-mod A and wheelchair follow. Improved initiation of RLE movement on this date noted, specifically in knee flexion/extension and hip flexion. Continued verbal cues for placement of hands in front of body to prepare for use of lofstrand crutches. Pt declined need to toilet at end of session before transport arrived.  Pt ended session with cross midline \"high 5\" x6 with BUEs. Therapeutic Exercises  Resistance / level Sets/sec Reps Notes                                                                                Neuromuscular Re-ed / Therapeutic Activities                                                 Manual Intervention                                                     Modalities:     Patient education:  Plan of care, importance of weight bearing in stander for overall health, home exercise program, goals. Home Exercise Program:   Patient encouraged to start using stander 3 sessions a day and while in stander completing over head reaches . Patient to try and reach 15 minute intervals in stander. Therapeutic Exercise and NMR:  [x] (10238) Provided verbal/tactile cueing for activities related to strengthening, flexibility, endurance, ROM  for improvements in scapular, scapulothoracic and UE control with self care, reaching, carrying, lifting, house/yardwork, driving/computer work. [x] (46127) Provided verbal/tactile cueing for activities related to improving balance, coordination, kinesthetic sense, posture, motor skill, proprioception  to assist with  scapular, scapulothoracic and UE control with self care, reaching, carrying, lifting, house/yardwork, driving/computer work.   [] Comments:    Therapeutic Activities:    [x] (14322 or 83827) Provided verbal/tactile cueing for activities related to improving balance, coordination, kinesthetic sense, posture, motor skill, proprioception and motor activation to allow for proper function of scapular, scapulothoracic and UE control with self care, carrying, lifting, driving/computer work  [] Comments:    Home Exercise Program:    [x] (85372) Reviewed/Progressed HEP activities related to strengthening, flexibility, endurance, ROM of scapular, scapulothoracic and UE control with self care, reaching, carrying, lifting, house/yardwork, driving/computer work  [] (88504) Reviewed/Progressed HEP activities related to improving balance, coordination, kinesthetic sense, posture, motor skill, proprioception of scapular, scapulothoracic and UE control with self care, reaching, carrying, lifting, house/yardwork, driving/computer work    [] Comments:    Manual Treatments:  PROM / STM / Oscillations-Mobs:  G-I, II, III, IV (PA's, Inf., Post.)  [] (87338) Provided manual therapy to mobilize soft tissue/joints of cervical/CT, scapular GHJ and UE for the purpose of modulating pain, promoting relaxation,  increasing ROM, reducing/eliminating soft tissue swelling/inflammation/restriction, improving soft tissue extensibility and allowing for proper ROM for normal function with self care, reaching, carrying, lifting, house/yardwork, driving/computer work  [] Comments:    ADL Training:  [] (31460) Provided self-care/home management training related to activities of daily living and compensatory training, and/or use of adaptive equipment   [] Comments:     Splinting:  [] Fabrication of:   [] (73018) Orthotic/Prosthetic Management, subsequent encounter  [] (90324) Orthotic management and training (fitting and assessment)  [] Comments:      Charges: co treat with PT for safety and increased intensity of treatment  Timed Code Treatment Minutes: 40   Total Treatment Minutes: 80     [] EVAL (LOW) 30558   [] OT Re-eval (55943)  [] EVAL (MOD) 64216   [] EVAL (HIGH) 56143       [x] Ru (53281) x   1  [] HQGWK(17431)  [x] NMR (19372) x  1  [] Estim (attended) (40600)   [] Manual ([address] [facility]) x      [] US (10264)  [x] TA (64247) x 1      [] Paraffin (71412)  [] ADL  ([facility] ([place], [place])) x     [] Splint/L code:    [] Estim (unattended) ([id number])  [] Fluidotherapy (90115)  [] Other:    GOALS:    Patient stated goal: improved trunk control and standing tolerance to increase independence in self care. [x]? Progressing: []? Met: []? Not Met: []? Adjusted     Therapist goals for Patient:   Short Term Goals:  To be achieved in: 30 days  1. Pt will be independent with clothing management on toilet or in wheelchair to complete toileting with use of grab bar. [x]? Progressing: []? Met: []? Not Met: []? Adjusted  2. Pt will be stand by assist completing scoot transfers on level surfaces  [x]? Progressing: []? Met: []? Not Met: []? Adjusted  3. Patient will be able to stand up to 30 seconds at grab bar for toileting and lower body dress with min assist.   [x]? Progressing: []? Met: []? Not Met: []? Adjusted     Long Term Goals: To be achieved by discharge  1. Pt will demonstrate an improved stream score of 28. [x]? Progressing: []? Met: []? Not Met: [x]? Adjusted    Progression Towards Functional goals:  [x] Patient is progressing as expected towards functional goals listed. [] Progression is slowed due to complexities listed. [] Progression has been slowed due to co-morbidities. [] Plan just implemented, too soon to assess goals progression  [] All goals are met  [] Other:     ASSESSMENT:  Patient has demonstrated significant improvement in trunk control during static and dynamic sitting and standing with pt now able to sit EOB unsupported and tolerate long sitting during dynamic tasks. Pt with increased distance of ambulation in // bars with mod A of 2 and PT blocking knee at stance leg and assisting with forward progression of LEs. Pt continues to demonstrate difficulty with hip and glute activation for functional mobility and posture, but is now demonstrating improved initiation of R knee flexion/extension and hip flexion.      Treatment/Activity Tolerance:  [x] Patient tolerated treatment well [] Patient limited by fatique  [] Patient limited by pain  [] Patient limited by other medical complications  [] Other:     Prognosis: [x] Good [] Fair  [] Poor    Patient Requires Follow-up: [x] Yes  [] No    PLAN: See eval  [x] Continue per plan of care [x] Alter current plan (see comments)  [x] Plan of care initiated (MD cosigned) [] Hold pending MD visit [] Discharge    Electronically signed by:        Shelia Giraldo OTR/L [id number]    Note: If patient does not return for scheduled/ recommended follow up visits, this note will serve as a discharge from care along with most recent update on progress.

## 2021-05-07 ENCOUNTER — HOSPITAL ENCOUNTER (OUTPATIENT)
Dept: PHYSICAL THERAPY | Age: 21
Setting detail: THERAPIES SERIES
Discharge: HOME | End: 2021-05-07
Payer: MEDICARE

## 2021-05-07 ENCOUNTER — HOSPITAL ENCOUNTER (OUTPATIENT)
Dept: OCCUPATIONAL THERAPY | Age: 21
Setting detail: THERAPIES SERIES
Discharge: HOME | End: 2021-05-07
Payer: MEDICARE

## 2021-05-07 PROCEDURE — 97112 NEUROMUSCULAR REEDUCATION: CPT

## 2021-05-07 PROCEDURE — 97110 THERAPEUTIC EXERCISES: CPT

## 2021-05-07 PROCEDURE — 97530 THERAPEUTIC ACTIVITIES: CPT

## 2021-05-07 NOTE — FLOWSHEET NOTE
[place] - Outpatient Physical Therapy  Phone: [phone]   Fax: [phone]    Physical Therapy Daily Treatment Note  Date:  2021     Patient Name:  Raghav Leong    :  2000  MRN: [id number]  Medical/Treatment Diagnosis Information:  Diagnosis: Cerebral palsy with spastic diplegia  Treatment Diagnosis: Decreased trunk control impacting ability to complete safe transfers, decreased standing tolerance, LE weakness  Insurance/Certification information:  PT Insurance Information: Medicare & Medicaid  Physician Information:  Referring Practitioner: JOHNNY Hartman  Plan of care signed (Y/N): [x]  Yes []  No     Date of Patient follow up with Physician:      Progress Report: []  Yes  [x]  No     Date Range for reporting period:  Beginning  21   Ending    Progress report due (10 Rx/or 30 days whichever is less): visit #10 or      Recertification due (POC duration/ or 90 days whichever is less): visit #12 or 21     Visit # Insurance Allowable Auth required? Date Range    +  Medical necessity []  Yes  [x]  No n/a     Latex Allergy:  [x]NO      []YES  Preferred Language for Healthcare:   [x]English       []Other:      Functional Scale/Test Evaluation 3/1/2021 4/21/21  60 day  Discharge   STREAM 23                          Pain level:  0/10  Location:  none    SUBJECTIVE:  Doing okay so far today, used stander again yesterday and was only in for about an hour.        OBJECTIVE:  :  Pt 15 mins late then needed to use restroom    Co-treat w/OT for safety and assistance    RESTRICTIONS/PRECAUTIONS: recent Bell's Palsy    Interventions/Exercises:   Therapeutic Exercises (19340) Resistance / level Sets/sec Reps Notes   W/c push ups in preparation for standing                            Neuromuscular Re-ed (76536) /  Gait Training (25390)       Upright posture seated in W/C   X 5 reps holding football, rest of reps completed with L UE bracing on L knee, PT hand assist on sternum and lumbar spine to facilitate                                Gait Training:  Amb in //bars    Bwd walking in //bars      Transfer w/B Lofstrand crutches   Static stand w/B Lofstrand crutches   8 ft    8 ft    Mod A of 2  Mod A of 2   -manual required to advance LEs, pt activation hip extensors to initiate swing phase, demo'd adequate weight-shifting. OT provided assist & w/c follow for safety      -difficulty with placement for adequate support                 Therapeutic Activities (19429) /  Functional Tasks       [facility] reaching across midline in seated   Pt seated in w/c using seat belt to help stay in w/c          [facility] reaching across midline and diagonally   Pt seated in w/c using seat belt to help stay in w/c               STS to std walker            Transfer to w/c from mat table            STS from w/c to mat table - with large bolster placed on table in front of patient to hold onto   Min A, w/c close to mat table to assist in blocking pt's knees. Manual Intervention ([id number])       Supine hamstring stretch 90/90    -completed by PT  -completed by OT                                        Sessions:     5/7:   Upon arrival, pt requested to use restroom, stated he didn't want to attempt to urinate as he had too much in the pocket of his hoodie and would most likely make a mess. Stand pivot w/c to same height mat table w/CGA, improved foot placement throughout but need to better place them prior to scooting back on edge of mat. Blue bungee ab crunches x5 mins w/intermittent rest breaks & time for recover following LOB. Forward reach, outside [place] for gold med ball, [place] reverse pass to PT behind him, then reach around back at sacrum level to receive ball, forward reach outside [place] to return to OT x10 w/intermittent rest and to recover LOB.   Seated knee ext & flex w/assit from ghulam x10 B -min/mod A for hip flex to further reduce friction OT provided min/mod A for balance. Stand pivot mat table to w/c w/min A and reduced eccentric control. Sit to stand at ballet barre x2 min/mod A of 2. Lateral stepping length of bar each way, w/min/mod A for balance, mod A to stabilize stance knee & facilitate WB'ing, CGA to mod A to advance ipsilateral LE. Sit to stand w/ballet barre x5.      5/5: Discussed with pt appropriate amount of time to spend in stander at home, with education on importance of quality of participation rather than quantity/duration; pt verbalized understanding, but may require reinforcement. Pt completed sit pivot transfer from w/c to mat table with Min A x 2. Pt participated in crossing midline for open hand slap onto gerardo disc x10 each side with SBA-min A for trunk control during task. Pt with tendency to lose balance towards R side. Pt required min-mod A for partial stand to place gerardo disc under buttocks. Pt seated on gerardo disc ~10 min to increase trunk strength and stability, with bilateral BUE reaching/shoulder flexion with CGA-mod A for trunk stability and blocking knees to prevent sliding forward. Pt completed a partial stand-> pivot edge of mat to w/c min A x 2 with verbal cues for safety as pt did not notify therapists prior to initiation of transfer. Pt seated at barre in w/c with 2 sit to stands with min A and use of barre and in stance ~2 min x 2 trials with min progressing to mod A with fatigue for trunk support by OT and PT blocking knees/facilitating LE extension; tactile/vcs for upright posture. During first trial pt with shoulder flexion of BUEs to slide up wall. Pt sat in w/c to complete bilateral LE advancement of w/c for strengthening requiring verbal cues and assist to put LEs in position (partial knee extension) with PT blocking feet for proper technique as pt initially used rocking of trunk to advance w/c. Pt completed this for ~10 ft with almost constant cueing to not propel w/c by rocking trunk.  Pt grasping cylindrical handle of 30# bungee cord to maintain 90* elbow flexion for stability of BUEs, followed by mid rows for scapular retraction with light facilitation by PT x10. In // bars, pt completed STS with B UE assist on bars and CGA for trunk control. Pt completed forward stepping length of // bars ~6 ft with mod A for R LE advancement and max A for L LE advancement. PT blocked stance leg during advancement assist of opposite LE with OT assist for trunk control min-mod A and wheelchair follow. Improved initiation of RLE movement on this date noted, specifically in knee flexion/extension and hip flexion. Continued verbal cues for placement of hands in front of body to prepare for use of lofstrand crutches. Pt declined need to toilet at end of session before transport arrived. Pt ended session with cross midline \"high 5\" x6 with BUEs. 4/28:    Pt about 12 mins late then needed to use restroom, delaying start of therapy.  //bars: sit to stand transition with B UE assist and CGA for trunk control. Amb ~6 steps with mod A for R LE advancement and max A for L LE advancement. PT min blocked stance leg during contralateral swing phase with OT assist for trunk control min-mod A and wheelchair follow. Completed x3 with retro stepping after 2nd attempt ~6 steps with pt relying on momentum of trunk rotation for hip extension when stepping back. Continued verbal cues for placement of hands in front of body to prepare for use of lofstrand crutches. Also discussed potential use of posterior walker as pt previously used when ambulatory and due to pt's preference for hands used as [place] behind trunk. In treatment room, pt doffed/donned R AFO seated in wheelchair using technique of abducting hip to bring to side of wheelchair to place foot into AFO and shoe, and then placing foot on foot rest to adhere straps. Decreased time required with this method with pt agreeing to attempt donning both AFOs at home.  Pt transferred to sit edge of mat from wheelchair with min A x2 to block feet and for trunk control during scooting. Continued difficulty with trunk flexion during lowering to sit far back on surface pt is transferring to affecting safety. With PT holding ball in front of pt, pt completed cross and hook style punches across body to opposite side x10 B with emphasis on trunk control and proprioceptive input of ball to improve grading of force; decreased trunk control on L punches due to R trunk weakness with mod-min A for sitting balance compared to CGA-min A on L. Trunk ext w/BUE [place] to trunk flex & shoulder ext to hit ball forcefully x3 -difficulty achieving necessary trunk ext and focused on BUEs [place] instead. Session concluded with mat to wheelchair transfer min A x2 with wheelchair on pt's right.      4/26:  Began session with OT applying foam padding to patient's ankle for cushion so pt can wear AFOs comfortably. Pt transferred to sit edge of mat from wheelchair with min A x2 to block feet and for trunk control during scooting. Grasping 20# bungee cord, pt completed horizontal ab/adduction x10, followed by mid row x10 with emphasis on scapular retraction and trunk control. For continued trunk control during dynamic balance, bungee cord placed around pt's trunk with pt completed hip and thoracic extension against resistance of bungee cord and then returning to upright starting position x8, followed by R and L lateral lean x5 each with min A required for balance. Pt completed trunk rotation to reach behind self to grab bean bag to improve dynamic balance required for pericare while toileting, followed by tossing to bucket, with min A for ~3 instances LOB. Pt instructed on placing flat hands on surface of mat to complete weight bearing during trunk rotation to improve balance. Bean bags were tossed back to patient x3 with instruction  to catch with open hands to reduce tone. Pt returned to wheelchair from edge of mat with min A for transfer.  Pt transitioned to // bars to complete ambulation x4 steps with max A x2 for trunk control and advancing LEs prior to seated rest break due to increased fatigue with lack of AFO support. Pt re-attempted with 3 steps, followed by static standing x60 seconds with max A + mod A with cues to keep arms in front of him to prepare for use of lofstrand crutches and to facilitate trunk engagement and knee extension. Pt sat on gerardo disc placed in wheelchair for trunk control with min-mod A for balance to complete ball toss/catch task with multiple LOB; theraband loop placed around knees to prevent abduction and improve pelvic stability and base of support. UB dressing completed to doff/don sweatshirt with SBA and good trunk control noted during weight shifts. Focus of today's session was to improve dynamic sitting and standing balance. 4/21: Session started performing tasks on STREAM assessment for PN. In supine, pt performed 10 bridges with PT blocking at LEs to prevent hip abd. Pt then completed seated mat exercises with sitting EOB and reaching across midline outside base of support for dynamic sitting balance with min A during loss of balance, but otherwise pt able to self correct to upright position. Pt completed trunk twisting to reach behind him to grab bean bag, and then tossing to bucket, with again min A if loss of balance occurred. Bean bags were tossed back to patient and he was instructed to catch with open hands and throw behind him with both hands overhead while keeping his head up. Pt was moved to the // bars where he completed static standing x 4 trials (lengths: 105 sec, 90 sec, 75 sec, 45 sec). Pt able to complete this with min Ax1, CGAx 1 with cues to keep arms in front of him to prepare for use of lofstrand crutches and knee extension. Lastly, pt kicked into knee extension while sitting in w/c with upright posture. Pt was able to kick into knee extension if assisted with slight hip flexion.      4/20:  Pt transferred w/c to mat with min A x2 with continued difficulty in foot placement and repositioning during and after transfer. Pt completed scooting with good carryover in forward weight shift. Pt participated in trunk control task with forward/back weight shift x10 with verbal cues to maintain contact of hands on thighs to avoid pulling self up by holding edge of mat and engage trunk. Pt then completed lateral weight shifts x10 each side with decreased control leaning to R with physical and verbal cues to engage L obliques for eccentric control and to begin L weight shift prior to end ROM with improved control following. Pt completed STS transfer min A x2 to work on standing with lofstrand crutches ~2.5 minutes with mod A x2 progressing to mod + max A with fatigue for safety; multiple cues required for safe placement of crutches and extension of LEs in addition to trunk control and BUE weight bearing. Pt sat edge of mat to scoot back with difficulty lifting LEs over edge due to decreased active knee extension; pt disengaging trunk to fall into supine with max A + mod A to sit in long sit. Pt trained in knee flexion to utilize LEs for scooting and as assist for trunk control. Pt in long sit with back against wall for trunk support. Pt participated in training to doff/don AFOs with extended time required; pt leaving braces in shoes during doffing with improved ability to don and verbal cues to hold tongue out of the way when donning. Min A to extend R toes due to pt curling toes and inhibiting foot placement in shoe. CO-OP approach utilized for problem solving with discussion of barriers/solutions. Pt scooted to edge of mat with min A and completed edge of mat to wheelchair transfer min A to conclude session. 4/14:  Session initiated with patient transferring to mat with min A x2. Pt with good control during stand but then difficulty moving L LE when pivoting.  Pt moved into supine with cueing on weight bearing through forearm to lower onto side first; required mod A for LE's onto table. In supine, pt completed hip bridges x10 with feet blocked; hip ab/adduction x10 each for both, R, and L with mod A for control of abduction; hamstring stretching 5 x 30 sec  B in 90/90 position; and resisted UE reaching overhead with improved ROM following (completed by OT). Pt completed supine to sit transfer with mod A with feet blocked and min A for scooting to reposition. Pt worked on standing with lofstrand crutches x2 ~1 minute each with mod assist of two for safety; multiple cues required for safe placement of crutches as pt with tendency to keep crutches too far outside his [place]. Pt also cued for extension of LEs, siva at B knees and to raise chest for improved trunk control. Pt then sat EOB and used lofstrand crutches for trunk control training, while reaching to OT's hands x 8 ea ipsilateral and contralateral with CGA/min A for maintaining trunk control. Pt was able to scoot back on the mat table when he felt like he was sliding forward with min A-CGA. Next, patient was in // bars and completed STS with B UEs on // bars and CGA for trunk control. Gait training was completed with mod A for R LE advancement and max A for L LE. Pt able to complete 6-8 steps forward x 2 trials, with PT blocking stance leg and assisting moving leg with OT assisting for trunk control. Pt completed retro stepping with improved control from last session, with cueing at HS and glutes for LE extension vs trunk rotation to force LE posteriorly. Pt finished the session sitting on gerardo disc in w/c to work on trunk control while using cell phone and while tossing/catching a ball. 4/12:  Upon arrival, pt working w/OT. EOB w/shoulders abd & wrist & hands extended & open on table, green gerardo-disc placed under each hand to increase instability. Sit to left S/L, mod A of 2. Right roll to supine supervision.   Verbal & manual cues to posterior knee and ankle for heel side bilaterally. Bridge x10 w/manual stab to knee, bridge w/hip add gerardo-disc squeeze x10 w/minimal man stab to knees. Scooting to right, man stab to knees for placement, mod A to min A for shoulders and trunk. Supine to/from left S/L w/big arms, man stab to knees for eccentric control, VC's to facilitate movement. Scooting to left, man stab to knees for placement, min A for shoulders and trunk. Supine to/from right S/L w/big arms, man stab to knees for eccentric control, VC's to facilitate movement. Supine to long-sitting mod A of 1. Min to mod A of 1 for balance. Mod A of 1 to assist hip & knee flex bilaterally, mod A of 1 for trunk support. Pt reached & unbuckled 1 Velcro strap on each shoe, mod A for trunk balance. Pt doffed & donned R AFO & shoe w/assist from OT. Long-sitting to sitting EOB, min to mod A trunk support, pt maneuvered LEs to left with BUEs until feet over edge. Squat pivot mat table to w/c, min A/CGA of 2. Gait training in //bars and standing w/Lofstrand crutches. 4/9:  Pt used restroom prior to treatment, however had accident and urine got on his clothing. OT provided pt with scrub top change, pt declined scrub bottom change. Pt doffed sheatshirt and donned scrub top with SBA primarily, min A for scrub top setup so pt could kendall correctly. Able to put UEs in pull overhead with SBA. Pt transferred w/c to mat table w/mod A of 1 for safety, pt sat on edge of mat and required mod A of 1 to scoot back. Pt's feet again became caught in wheels under w/c, reducing safety of transfer. Pt remained sitting edge of mat w/SBA/Sup during discussion of planned activities then was instructed to lay supine. Pt transitioned straight backwards with fair descent control but left feet on floor. Total A of 2 to transition pt to supine position. Pt rolled to L to prone position w/min A of 1 for trunk and min A of 1 to cross R over L LE.   Once prone, pt instructed to assume quadruped, pt able to midline outside [place] for a ball, then reaching the ball behind his back and passing it to his opp hand for UE function and trunk rotation, all while maintaining trunk control. Pt blocked pt's R LE to keep in a neutral position. Pt then completed ball toss with OT and was able to maintain his trunk with only min A to block R LE in neutral position and CGA for his trunk. Pt completed mat to wheelchair transfer with min A to L side to transition to parallel bars. 3/31:  Pt completed stand pivot transfer from wheelchair to mat to R side with min A x2; good carry over noted from last session regarding forward lean and weight shift prior to stand, manual required for improved LE placement. Seated edge of mat, pt completed posture training for thoracic extension and maintaining midline during hamstring stretch of RLE and conversation regarding transfers and use of stander at home. Pt reports he has a raised toilet seat with arm rests that he can transfer to without assistance from his mom. Pt completed mat to wheelchair transfer with min A to L side to transition to parallel bars, required manual assist for improved LE placement. At parallel bars, pt completed sit>stand transfer with BUE assist.   For reinforcement of trunk control and dynamic balance during forward weight shift, pt participated in reaching activity with alternating UEs completing contralateral reaching and [place] changes on parallel bar with SBA for trunk control. Progressed to completing in static stance with increased trunk rotation x6 each side with rest break in between sides, followed by ipsilateral reaching behind self x5 each side to improve trunk control and balance required for toileting tasks. BLEs blocked at knees with 1 instance L knee buckling; pt self-corrected into knee extension, but demonstrated difficulty with trunk correction to lean to R side to return to midline.  In seated, pt participated in L><R weight shift to touch alternating shoulders to parallel bars x8 each side, followed by forward><back weight shift x8 with hands placed on knees for UE weight bearing and focus on eccentric control. Pt completed sit>stand with BUE assist using parallel bars to participate in standing without UE support with knees blocked and min-mod A for trunk stability for ~45 seconds prior to sitting on gerardo disc. To conclude session, pt sat on gerardo disc to complete progressively increased head/neck and trunk rotation to alternating sides for trunk control with verbal cues to prevent thoracic flexion. 3/24: Session today began with pt using Nustep while sitting in his w/c x 3 min total, but pt stopped occasionally to talk and needed cueing to continue. Pt then transferred to mat table with stand pivot transfer. Pt was able to stand fully upright, mod A x 2, but then was not table to advance R LE before sitting. Worked on trunk flexion to shift COG forward while scooting hips/buttocks back on mat table as pt has tendency to lean backwards when trying to scoot backwards on a surface. Pt encouraged through all movement today to keep hands flat on table instead of pushing through knuckles. Pt then worked on weight shifting at trunk forward and backward and was given targets to hit with forehead. After the initital task, pt could perform pushing up and scooting his hips backwards onto the table with CGA. With 4 in box placed under pt's feet for stability, pt worked on reaching outside [place] with forward lean both ipsilaterally and contralaterally to cones and then sitting back up and rotating his trunk to the R and L to place the cones behind him. Pt able to do this with CGA to Stacey for trunk control. Pt then worked on standing without walker, pt was in front of pt to assist with knee extension and block feet with OT assisting with hip ext and trunk control. Pt stood a total of 5 times. 3/22: Pt transferred from w/c to EOM.  Pt then completed 1/2 stands to ladder with assist to block feet from moving and help push B knees into extension. Pt then went from EOM>sidelying>supine and completed 10 bridges with blocking at LEs. Pt worked on rolling from supine to L and R, with more difficulty rolling to R. Pt used momentum from UEs but was cued to not reach and pull. Stacey needed to progress LEs to each side. Pt rolled to prone and with PT/OT blocking LEs and assisting at knees (modA-maxA), performed 5 planks for 5-10 second holds. Pt able to better press up with extended arms vs forearms. Pt achieved tall kneeling with a swiss ball with min A at his LEs, then progressed to Qped over the swiss ball with reaching with B UEs. Stacey at LEs progressed to Mod A as pt faitgued. Next pt sat EOB with reaching to magnets, then worked on reaching [place] while holding a small bolster to facilitate open hands. Pt with Stacey needed for these activities to maintain trunk control. Pt then held each end of the bolster and hit a ball while maintaining trunk control. ModA needed to maintain trunk control as pt was quite fatigued by the end of the session. Pt then stood with a std walker x 3 with mod A x 2. Pt transferred back to  with min A for LE placement. 3/9: session began with transferring from w/c to mat table with min Ax 2. Increased time required and cueing to improve safety. Once on the mat table, pt worked on scooting leading with upper body first and performing small abdominal crunch then bridging to move hips. Pt required assistance to keep LEs stable in a hooklying position. Pt also performed rolling to each side R/L x 5, in which he rolled hips first then his upper body. Next pt rolled to prone and pushed through UEs to get into quadruped. Mod Ax 2 required for stability at pelvis and LEs. Pt cued for spine neutral position and then weight shifting to prepare for crawling. Pt able to move R UE and LE forward with mod A, but then was too fatigued and rested.  Pt sat in tall kneeling and was able to attain this position by pushing up on a swiss ball. Pt transitioned next to sitting EOB and shifting weight fwd to stand at standard walker. First he completed partial stands with mod A x 2, with PT/OT blocking knees and feet. OT suggested pt stand at ladder but pt reported he was more comfortable using std walker. Lastly pt laid supine and rotated side to side with min A x 1, with reaching arm diagonally and protracting/retracting hips x 10 B/L. Pt completed a stand/pivot transfer from the mat to the W/C with min A x 1 at the end of the session. Modalities:     Pt. Education:  -patient educated on diagnosis, prognosis and expectations for rehab  -all patient questions were answered    HEP instruction:      NMR and Therapeutic Activities:    [x] (08177 or 50411) Provided verbal/tactile cueing for activities related to improving balance, coordination, kinesthetic sense, posture, motor skill, proprioception and motor activation to allow for proper function of   [x] LE / Core, hip and LE with self care and ADLs  [x] UE / Shoulder complex: cervical, postural, scapular, scapulothoracic and UE control with self care, carrying, lifting, driving, computer work.   [] (22368) Gait Re-education- Provided training and instruction to the patient for proper LE, core and hip recruitment, positioning, and eccentric body weight control with ambulation re-education, including ascending & descending stairs     Home Management Training / Self Care:  [] (73013) Provided self-care/home management training related to activities of daily living and compensatory training, and/or use of adaptive equipment for improvement with: ADLs and compensatory training, meal preparation, safety procedures and instruction in use of adaptive equipment, including bathing, grooming, dressing, personal hygiene, basic household cleaning and chores.      Therapeutic Exercise and NMR EXR  [x] (59119) Provided verbal/tactile cueing for activities related to strengthening, flexibility, endurance, ROM for improvements in  [x] LE / Core stability: LE, hip, and core control with self care, mobility, lifting, ambulation. [] UE / Shoulder complex: cervical, postural, scapular, scapulothoracic and UE control with self care, reaching, carrying, lifting, house/yardwork, driving, computer work.  [] (50838) Provided verbal/tactile cueing for activities related to improving balance, coordination, kinesthetic sense, posture, motor skill, proprioception to assist with   [] LE / Core stability: LE, hip, and core control in self care, mobility, lifting, ambulation and eccentric single leg control. [] UE / Shoulder complex: cervical, scapular, scapulothoracic and UE control with self care, reaching, carrying, lifting, house/yardwork, driving, computer work.   [] (09414) Therapist is in constant attendance of 2 or more patients providing skilled therapy interventions, but not providing any significant amount of measurable one-on-one time to either patient, for improvements in  [] LE / Core stability: LE, hip, and core control in self care, mobility, lifting, ambulation and eccentric single leg control. [] UE / Shoulder complex: cervical, scapular, scapulothoracic and UE control with self care, reaching, carrying, lifting, house/yardwork, driving, computer work.      Home Exercise Program:    [x] (60831) Reviewed/Progressed HEP activities related to strengthening, flexibility, endurance, ROM of   [] LE / Core stability: core, hip and LE for functional self-care, mobility, lifting and ambulation/stair navigation   [] UE / Shoulder complex: cervical, postural, scapular, scapulothoracic and UE control with self care, reaching, carrying, lifting, house/yardwork, driving, computer work  [] (14948)Reviewed/Progressed HEP activities related to improving balance, coordination, kinesthetic sense, posture, motor skill, proprioception of   [] LE: core, hip and LE for self care, mobility, lifting, and ambulation/stair navigation    [] UE / Shoulder complex: cervical, postural,  scapular, scapulothoracic and UE control with self care, reaching, carrying, lifting, house/yardwork, driving, computer work    Manual Treatments:  PROM / STM / Oscillations-Mobs:  G-I, II, III, IV (PA's, Inf., Post.)  [] (07383) Provided manual therapy to mobilize shoulder complex, hip, LE, and/or cervicothoracic/LS spine soft tissue/joints for the purpose of modulating pain, promoting relaxation,  increasing ROM, reducing/eliminating soft tissue swelling/inflammation/restriction, improving soft tissue extensibility and allowing for proper ROM for normal function with   [] LE / Core stability: self care, mobility, lifting and ambulation. [] UE / Shoulder complex: self care, reaching, carrying, lifting, house/yardwork, driving, computer work. Modalities:  [] (49749) Vasopneumatic compression: Utilized vasopneumatic compression to decrease edema / swelling for the purpose of improving mobility and quad tone / recruitment which will allow for increased overall function including but not limited to self-care, transfers, ambulation, and ascending / descending stairs. Charges:  Timed Code Treatment Minutes: 45   Total Treatment Minutes: 90   **CO-treat with OT    [] EVAL - LOW (22964)   [] EVAL - MOD (22836)  [] EVAL - HIGH (66891)  [] RE-EVAL (41428)  [] TE (31707) x      [] Ionto  [x] NMR (99021) x 2      [] Vaso  [] Manual (75957) x      [] Ultrasound  [x] TA x  1     [] Mech Traction (94562)  [] Gait Training x     [] ES (un) (45271):   [] Aquatic therapy x   [] Other:   [] Group:     GOALS:   Patient stated goal: improve ability to complete transfers   []? Progressing: []? Met: []? Not Met: []? Adjusted     Therapist goals for Patient:   Short Term Goals: To be achieved in: 2 weeks  1. Independent in HEP and progression per patient tolerance, in order to prevent re-injury. []? Progressing: [x]?  Met: []? Not Met: []? Adjusted  2. Patient will have a decrease in pain to facilitate improvement in movement, function, and ADLs as indicated by Functional Deficits. []? Progressing: [x]? Met: []? Not Met: []? Adjusted     Long Term Goals: To be achieved in: 6 weeks  1. Patient will report increased standing tolerance to at least 30 minutes in standing frame. []? Progressing: [x]? Met: []? Not Met: []? Adjusted  2. Patient will demonstrate an increase in strength to good shoulder & hip complex, and core activation to allow for proper functional mobility as indicated by patients Functional Deficits. [x]? Progressing: []? Met: []? Not Met: []? Adjusted  3. Patient will return to functional activities including demo'ing safe transfers to/from w/c with mod I.    [x]? Progressing: []? Met: []? Not Met: []? Adjusted  4. Patient will increase STREAM score to 32 or above for increased UE/LE movement in sitting, supine, and standing. [x]? Progressing: []? Met: []? Not Met: []? Adjusted          Overall Progression Towards Functional goals/ Treatment Progress Update:  [] Patient is progressing as expected towards functional goals listed. [x] Progression is slowed due to complexities/Impairments listed. [] Progression has been slowed due to co-morbidities.   [] Plan just implemented, too soon to assess goals progression <30days   [] Goals require adjustment due to lack of progress  [] Patient is not progressing as expected and requires additional follow up with physician  [] Other    Persisting Functional Limitations/Impairments:  []Sleeping [x]Sitting               []Standing [x]Transfers        []Walking []Kneeling               []Stairs []Squatting / bending   [x]ADLs []Reaching  []Lifting  []Housework  []Driving []Job related tasks  []Sports/Recreation []Other:        ASSESSMENT:    Patient able to laterally advance LEs when contralateral knee is stabilized and weight is fully shifted onto LE -max verbal and tactile

## 2021-05-07 NOTE — FLOWSHEET NOTE
[facility] - Outpatient Occupational Therapy      Occupational Therapy Daily Treatment Note  Date:  2021    Patient: Gina Hunt   : 2000   MRN: [id number]  Referring Physician:  Santosh Bond CNP       Medical Diagnosis Information: paraplegia, cerebral palsy                                                  Insurance information: medicare/ medicaid    Date of Injury:   Date of Surgery: rhizotomy when he was about 13    Progress Report: []  Yes  [x]  No     Date Range for reporting period:  Beginning: 3/1/2021  Ending: end of POC    Progress report due (10 Rx/or 30 days whichever is less): visit #17 or 2279    Recertification due (POC duration/ or 90 days whichever is less): visit #20 or 2021    Visit # Insurance Allowable Auth required? Date Range    + 3/8  [place] []  Yes  [x]  No n/a       Latex Allergy:  []No      []Yes  Pacemaker:  [] No       [] Yes     Preferred Language for Healthcare:   [x]English       []other:    Pain level: 0/10     SUBJECTIVE:   Pt reports he spent ~1 hour in stander yesterday. Pt reports mild discomfort in R shoulder on this date, but did not rate. Functional Disability Index:  STREAM: score 23    21: Stroke Rehabilitation Assessment of Movement (STREAM): total- 24/70 (supine- 9/15, sitting- 15/31, standing- 0)    OBJECTIVE: See eval    21: 3 minutes static stance at parallel bars with CGA x2 for trunk control and blocking knees      RESTRICTIONS/PRECAUTIONS: fall risk    Exercises/Interventions: Treatment focused on improving dynamic balance during UE reaching, transfers, and mobility to enhance functional independence. Session initiated with toileting mod I using urinal. Pt completed sit pivot transfer from w/c to mat table with min A x 1.  For trunk control during dynamic sitting balance, pt participated in resisted trunk flexion/extension against 30# bungee cord, completing x10 prior to addition of goal to reach toward to improve amplitude and sequencing of movement. Progressed to reaching for and returning 3# weighted ball during trunk flexion x6. Pt then passed ball overhead with BUEs with verbal cues to avoid trunk and neck flexion during shoulder flexion, and retrieved ball from behind self x8. Using slide discs, pt completed knee flexion/extension x10 each with min A-mod A for facilitation of quad/hamstring activation and continued plantarflexion. CGA with 4 instances min-mod A for sitting balance throughout above tasks. Pt completed sit pivot edge of mat to w/c min A x 2. In stance at barre, pt completed ~8 side steps length of barre to R side with mod-max A x2 for LE advancement and trunk support. After seated rest break, pt completed to L side ~6 steps prior to fatigue; increased difficulty with RLE advancement. Pt participated in taking picture during rest break for coordination of finger flexion/extension while grasping camera and stabilizing in shoulder flexion/elbow extension as this is preferred activity. Session concluded with STS x5 with L hand grasping barre and R hand on arm rest with progressively decreased eccentric control during sitting as task progressed due to fatigue. Pt encouraged to attempt STS and static stance using counter top as [place] at home with w/c placed behind self for safety. Therapeutic Exercises  Resistance / level Sets/sec Reps Notes                                                                                Neuromuscular Re-ed / Therapeutic Activities                                                 Manual Intervention                                                     Modalities:     Patient education:  Plan of care, importance of weight bearing in stander for overall health, home exercise program, goals. Home Exercise Program:   Patient encouraged to start using stander 3 sessions a day and while in stander completing over head reaches .   Patient to try and reach 15 minute intervals in stander. Therapeutic Exercise and NMR:  [x] (28323) Provided verbal/tactile cueing for activities related to strengthening, flexibility, endurance, ROM  for improvements in scapular, scapulothoracic and UE control with self care, reaching, carrying, lifting, house/yardwork, driving/computer work. [x] (00187) Provided verbal/tactile cueing for activities related to improving balance, coordination, kinesthetic sense, posture, motor skill, proprioception  to assist with  scapular, scapulothoracic and UE control with self care, reaching, carrying, lifting, house/yardwork, driving/computer work.   [] Comments:    Therapeutic Activities:    [x] (54047 or 19543) Provided verbal/tactile cueing for activities related to improving balance, coordination, kinesthetic sense, posture, motor skill, proprioception and motor activation to allow for proper function of scapular, scapulothoracic and UE control with self care, carrying, lifting, driving/computer work  [] Comments:    Home Exercise Program:    [x] (88866) Reviewed/Progressed HEP activities related to strengthening, flexibility, endurance, ROM of scapular, scapulothoracic and UE control with self care, reaching, carrying, lifting, house/yardwork, driving/computer work  [] (95135) Reviewed/Progressed HEP activities related to improving balance, coordination, kinesthetic sense, posture, motor skill, proprioception of scapular, scapulothoracic and UE control with self care, reaching, carrying, lifting, house/yardwork, driving/computer work    [] Comments:    Manual Treatments:  PROM / STM / Oscillations-Mobs:  G-I, II, III, IV (PA's, Inf., Post.)  [] (63579) Provided manual therapy to mobilize soft tissue/joints of cervical/CT, scapular GHJ and UE for the purpose of modulating pain, promoting relaxation,  increasing ROM, reducing/eliminating soft tissue swelling/inflammation/restriction, improving soft tissue extensibility and allowing for proper ROM for normal function with self care, reaching, carrying, lifting, house/yardwork, driving/computer work  [] Comments:    ADL Training:  [] (30373) Provided self-care/home management training related to activities of daily living and compensatory training, and/or use of adaptive equipment   [] Comments:     Splinting:  [] Fabrication of:   [] (77671) Orthotic/Prosthetic Management, subsequent encounter  [] (77153) Orthotic management and training (fitting and assessment)  [] Comments:      Charges: co treat with PT for safety and increased intensity of treatment  Timed Code Treatment Minutes: 45   Total Treatment Minutes: 90     [] EVAL (LOW) 22 [id number]   [] OT Re-eval (18364)  [] EVAL (MOD) 36902   [] EVAL (HIGH) 75936       [x] Ru (50773) x   1  [] ZDKMA(78042)  [x] NMR (57564) x  1  [] Estim (attended) (69116)   [] Manual ([id number]) x      [] US (04249)  [x] TA (08687) x 1      [] Paraffin (11816)  [] ADL  ([phone]) x     [] Splint/L code:    [] Estim (unattended) (22 509872)  [] Fluidotherapy (99071)  [] Other:    GOALS:    Patient stated goal: improved trunk control and standing tolerance to increase independence in self care. [x]? Progressing: []? Met: []? Not Met: []? Adjusted     Therapist goals for Patient:   Short Term Goals: To be achieved in: 30 days  1. Pt will be independent with clothing management on toilet or in wheelchair to complete toileting with use of grab bar. [x]? Progressing: []? Met: []? Not Met: []? Adjusted  2. Pt will be stand by assist completing scoot transfers on level surfaces  [x]? Progressing: []? Met: []? Not Met: []? Adjusted  3. Patient will be able to stand up to 30 seconds at grab bar for toileting and lower body dress with min assist.   [x]? Progressing: []? Met: []? Not Met: []? Adjusted     Long Term Goals: To be achieved by discharge  1. Pt will demonstrate an improved stream score of 28. [x]? Progressing: []? Met: []? Not Met: [x]?  Adjusted    Progression Towards Functional goals:  [x] Patient is progressing as expected towards functional goals listed. [] Progression is slowed due to complexities listed. [] Progression has been slowed due to co-morbidities. [] Plan just implemented, too soon to assess goals progression  [] All goals are met  [] Other:     ASSESSMENT:  Patient has demonstrated significant improvement in trunk control during static and dynamic sitting and standing with pt now able to sit EOB unsupported and tolerate long sitting during dynamic tasks. Pt with increased distance of ambulation in // bars with mod A of 2 and PT blocking knee at stance leg and assisting with forward progression of LEs. Pt continues to demonstrate difficulty with hip and glute activation for functional mobility and posture, but is now demonstrating improved initiation of R knee flexion/extension and hip flexion. Treatment/Activity Tolerance:  [x] Patient tolerated treatment well [] Patient limited by fatique  [] Patient limited by pain  [] Patient limited by other medical complications  [] Other:     Prognosis: [x] Good [] Fair  [] Poor    Patient Requires Follow-up: [x] Yes  [] No    PLAN: See eval  [x] Continue per plan of care [] Alter current plan (see comments)  [x] Plan of care initiated (MD cosigned) [] Hold pending MD visit [] Discharge    Electronically signed by:        Shelia Giraldo OTR/L [id number]    Note: If patient does not return for scheduled/ recommended follow up visits, this note will serve as a discharge from care along with most recent update on progress.

## 2021-05-10 ENCOUNTER — HOSPITAL ENCOUNTER (OUTPATIENT)
Dept: OCCUPATIONAL THERAPY | Age: 21
Setting detail: THERAPIES SERIES
Discharge: HOME | End: 2021-05-10
Payer: MEDICARE

## 2021-05-10 ENCOUNTER — HOSPITAL ENCOUNTER (OUTPATIENT)
Dept: PHYSICAL THERAPY | Age: 21
Setting detail: THERAPIES SERIES
Discharge: HOME | End: 2021-05-10
Payer: MEDICARE

## 2021-05-10 PROCEDURE — 97112 NEUROMUSCULAR REEDUCATION: CPT

## 2021-05-10 PROCEDURE — 97530 THERAPEUTIC ACTIVITIES: CPT

## 2021-05-10 PROCEDURE — 97110 THERAPEUTIC EXERCISES: CPT

## 2021-05-10 NOTE — FLOWSHEET NOTE
[facility] - Outpatient Physical Therapy  Phone: [phone]   Fax: [phone]    Physical Therapy Daily Treatment Note  Date:  5/10/2021     Patient Name:  Gaby Adamson    :  2000  MRN: [id number]  Medical/Treatment Diagnosis Information:  Diagnosis: Cerebral palsy with spastic diplegia  Treatment Diagnosis: Decreased trunk control impacting ability to complete safe transfers, decreased standing tolerance, LE weakness  Insurance/Certification information:  PT Insurance Information: Medicare & Medicaid  Physician Information:  Referring Practitioner: JOHNNY Freed  Plan of care signed (Y/N): [x]  Yes []  No     Date of Patient follow up with Physician:      Progress Report: []  Yes  [x]  No     Date Range for reporting period:  Beginning  21   Ending    Progress report due (10 Rx/or 30 days whichever is less): visit #10 or 25     Recertification due (POC duration/ or 90 days whichever is less): visit #12 or 21     Visit # Insurance Allowable Auth required? Date Range    +  Medical necessity []  Yes  [x]  No n/a     Latex Allergy:  [x]NO      []YES  Preferred Language for Healthcare:   [x]English       []Other:      Functional Scale/Test Evaluation 3/1/2021 4/21/21  60 day  Discharge   STREAM 23                          Pain level:  0/10  Location:  none    SUBJECTIVE:      Having some soreness in R shoulder, feels it's related to how he sleeps at night. Care coordinator is coming out on Thursday to discuss in home lift and new w/c. Slide out of bed a couple days ago, was transferring to w/c and locked brakes didn't hold, chair rolled away causing him to fall. No injury. Having some pain in R shoulder, feels it's due to how he's been sleeping lately.       OBJECTIVE:  :  Pt 15 mins late then needed to use restroom    Co-treat w/OT for safety and assistance    RESTRICTIONS/PRECAUTIONS: recent Bell's Palsy    Interventions/Exercises: Therapeutic Exercises (89421) Resistance / level Sets/sec Reps Notes   W/c push ups in preparation for standing                            Neuromuscular Re-ed (98701) /  Gait Training (58145)       Upright posture seated in W/C   X 5 reps holding football, rest of reps completed with L UE bracing on L knee, PT hand assist on sternum and lumbar spine to facilitate                                Gait Training:  Amb in //bars    Bwd walking in //bars      Transfer w/B Lofstrand crutches   Static stand w/B Lofstrand crutches   8 ft    8 ft    Mod A of 2  Mod A of 2   -manual required to advance LEs, pt activation hip extensors to initiate swing phase, demo'd adequate weight-shifting. OT provided assist & w/c follow for safety      -difficulty with placement for adequate support                 Therapeutic Activities (28439) /  Functional Tasks       [facility] reaching across midline in seated   Pt seated in w/c using seat belt to help stay in w/c          [facility] reaching across midline and diagonally   Pt seated in w/c using seat belt to help stay in w/c               STS to std walker            Transfer to w/c from mat table            STS from w/c to mat table - with large bolster placed on table in front of patient to hold onto   Min A, w/c close to mat table to assist in blocking pt's knees. Manual Intervention ([phone])       Supine hamstring stretch 90/90    -completed by PT  -completed by OT                                        Sessions:     5/10:    Squat pivot transfer w/OT, w/c to w/c height mat table w/min A and mod verbal cues for improved technique. Leaning onto RUE, hip/LE ext activation to prime mat table to floor transfer x6 w/manual cues for facilitation. Mat table to floor transfer mod A of 2. Tall kneeling at lowered mat table to work on weight shifting, hip ext, trunk ext, UE ex's for 50 mins. Floor to chair transfer, max A of 2.   Sit to stand transfer to mat table w/o B AFO w/max A reaching/shoulder flexion with CGA-mod A for trunk stability and blocking knees to prevent sliding forward. Pt completed a partial stand-> pivot edge of mat to w/c min A x 2 with verbal cues for safety as pt did not notify therapists prior to initiation of transfer. Pt seated at barre in w/c with 2 sit to stands with min A and use of barre and in stance ~2 min x 2 trials with min progressing to mod A with fatigue for trunk support by OT and PT blocking knees/facilitating LE extension; tactile/vcs for upright posture. During first trial pt with shoulder flexion of BUEs to slide up wall. Pt sat in w/c to complete bilateral LE advancement of w/c for strengthening requiring verbal cues and assist to put LEs in position (partial knee extension) with PT blocking feet for proper technique as pt initially used rocking of trunk to advance w/c. Pt completed this for ~10 ft with almost constant cueing to not propel w/c by rocking trunk. Pt grasping cylindrical handle of 30# bungee cord to maintain 90* elbow flexion for stability of BUEs, followed by mid rows for scapular retraction with light facilitation by PT x10. In // bars, pt completed STS with B UE assist on bars and CGA for trunk control. Pt completed forward stepping length of // bars ~6 ft with mod A for R LE advancement and max A for L LE advancement. PT blocked stance leg during advancement assist of opposite LE with OT assist for trunk control min-mod A and wheelchair follow. Improved initiation of RLE movement on this date noted, specifically in knee flexion/extension and hip flexion. Continued verbal cues for placement of hands in front of body to prepare for use of lofstrand crutches. Pt declined need to toilet at end of session before transport arrived. Pt ended session with cross midline \"high 5\" x6 with BUEs.      4/28:    Pt about 12 mins late then needed to use restroom, delaying start of therapy.  //bars: sit to stand transition with B UE assist and CGA for trunk control. Amb ~6 steps with mod A for R LE advancement and max A for L LE advancement. PT min blocked stance leg during contralateral swing phase with OT assist for trunk control min-mod A and wheelchair follow. Completed x3 with retro stepping after 2nd attempt ~6 steps with pt relying on momentum of trunk rotation for hip extension when stepping back. Continued verbal cues for placement of hands in front of body to prepare for use of lofstrand crutches. Also discussed potential use of posterior walker as pt previously used when ambulatory and due to pt's preference for hands used as [place] behind trunk. In treatment room, pt doffed/donned R AFO seated in wheelchair using technique of abducting hip to bring to side of wheelchair to place foot into AFO and shoe, and then placing foot on foot rest to adhere straps. Decreased time required with this method with pt agreeing to attempt donning both AFOs at home. Pt transferred to sit edge of mat from wheelchair with min A x2 to block feet and for trunk control during scooting. Continued difficulty with trunk flexion during lowering to sit far back on surface pt is transferring to affecting safety. With PT holding ball in front of pt, pt completed cross and hook style punches across body to opposite side x10 B with emphasis on trunk control and proprioceptive input of ball to improve grading of force; decreased trunk control on L punches due to R trunk weakness with mod-min A for sitting balance compared to CGA-min A on L. Trunk ext w/BUE OH to trunk flex & shoulder ext to hit ball forcefully x3 -difficulty achieving necessary trunk ext and focused on BUEs OH instead. Session concluded with mat to wheelchair transfer min A x2 with wheelchair on pt's right.      4/26:  Began session with OT applying foam padding to patient's ankle for cushion so pt can wear AFOs comfortably.  Pt transferred to sit edge of mat from wheelchair with min A x2 to block feet and for trunk control during scooting. Grasping 20# bungee cord, pt completed horizontal ab/adduction x10, followed by mid row x10 with emphasis on scapular retraction and trunk control. For continued trunk control during dynamic balance, bungee cord placed around pt's trunk with pt completed hip and thoracic extension against resistance of bungee cord and then returning to upright starting position x8, followed by R and L lateral lean x5 each with min A required for balance. Pt completed trunk rotation to reach behind self to grab bean bag to improve dynamic balance required for pericare while toileting, followed by tossing to bucket, with min A for ~3 instances LOB. Pt instructed on placing flat hands on surface of mat to complete weight bearing during trunk rotation to improve balance. Bean bags were tossed back to patient x3 with instruction  to catch with open hands to reduce tone. Pt returned to wheelchair from edge of mat with min A for transfer. Pt transitioned to // bars to complete ambulation x4 steps with max A x2 for trunk control and advancing LEs prior to seated rest break due to increased fatigue with lack of AFO support. Pt re-attempted with 3 steps, followed by static standing x60 seconds with max A + mod A with cues to keep arms in front of him to prepare for use of lofstrand crutches and to facilitate trunk engagement and knee extension. Pt sat on gerardo disc placed in wheelchair for trunk control with min-mod A for balance to complete ball toss/catch task with multiple LOB; theraband loop placed around knees to prevent abduction and improve pelvic stability and base of support. UB dressing completed to doff/don sweatshirt with SBA and good trunk control noted during weight shifts. Focus of today's session was to improve dynamic sitting and standing balance.         Modalities:     Pt. Education:  -patient educated on diagnosis, prognosis and expectations for rehab  -all patient questions were answered    HEP instruction:      NMR and Therapeutic Activities:    [x] (54510 or 28249) Provided verbal/tactile cueing for activities related to improving balance, coordination, kinesthetic sense, posture, motor skill, proprioception and motor activation to allow for proper function of   [x] LE / Core, hip and LE with self care and ADLs  [x] UE / Shoulder complex: cervical, postural, scapular, scapulothoracic and UE control with self care, carrying, lifting, driving, computer work.   [] (72412) Gait Re-education- Provided training and instruction to the patient for proper LE, core and hip recruitment, positioning, and eccentric body weight control with ambulation re-education, including ascending & descending stairs     Home Management Training / Self Care:  [] (12468) Provided self-care/home management training related to activities of daily living and compensatory training, and/or use of adaptive equipment for improvement with: ADLs and compensatory training, meal preparation, safety procedures and instruction in use of adaptive equipment, including bathing, grooming, dressing, personal hygiene, basic household cleaning and chores. Therapeutic Exercise and NMR EXR  [x] (57149) Provided verbal/tactile cueing for activities related to strengthening, flexibility, endurance, ROM for improvements in  [x] LE / Core stability: LE, hip, and core control with self care, mobility, lifting, ambulation. [] UE / Shoulder complex: cervical, postural, scapular, scapulothoracic and UE control with self care, reaching, carrying, lifting, house/yardwork, driving, computer work.  [] (37270) Provided verbal/tactile cueing for activities related to improving balance, coordination, kinesthetic sense, posture, motor skill, proprioception to assist with   [] LE / Core stability: LE, hip, and core control in self care, mobility, lifting, ambulation and eccentric single leg control.    [] UE / Shoulder complex: cervical, scapular, scapulothoracic and UE control with self care, reaching, carrying, lifting, house/yardwork, driving, computer work.   [] (00463) Therapist is in constant attendance of 2 or more patients providing skilled therapy interventions, but not providing any significant amount of measurable one-on-one time to either patient, for improvements in  [] LE / Core stability: LE, hip, and core control in self care, mobility, lifting, ambulation and eccentric single leg control. [] UE / Shoulder complex: cervical, scapular, scapulothoracic and UE control with self care, reaching, carrying, lifting, house/yardwork, driving, computer work.      Home Exercise Program:    [x] (24272) Reviewed/Progressed HEP activities related to strengthening, flexibility, endurance, ROM of   [] LE / Core stability: core, hip and LE for functional self-care, mobility, lifting and ambulation/stair navigation   [] UE / Shoulder complex: cervical, postural, scapular, scapulothoracic and UE control with self care, reaching, carrying, lifting, house/yardwork, driving, computer work  [] (45564)Reviewed/Progressed HEP activities related to improving balance, coordination, kinesthetic sense, posture, motor skill, proprioception of   [] LE: core, hip and LE for self care, mobility, lifting, and ambulation/stair navigation    [] UE / Shoulder complex: cervical, postural,  scapular, scapulothoracic and UE control with self care, reaching, carrying, lifting, house/yardwork, driving, computer work    Manual Treatments:  PROM / STM / Oscillations-Mobs:  G-I, II, III, IV (PA's, Inf., Post.)  [] (45092) Provided manual therapy to mobilize shoulder complex, hip, LE, and/or cervicothoracic/LS spine soft tissue/joints for the purpose of modulating pain, promoting relaxation,  increasing ROM, reducing/eliminating soft tissue swelling/inflammation/restriction, improving soft tissue extensibility and allowing for proper ROM for normal function with   [] LE / Core stability: self care, mobility, lifting and ambulation. [] UE / Shoulder complex: self care, reaching, carrying, lifting, house/yardwork, driving, computer work. Modalities:  [] (42347) Vasopneumatic compression: Utilized vasopneumatic compression to decrease edema / swelling for the purpose of improving mobility and quad tone / recruitment which will allow for increased overall function including but not limited to self-care, transfers, ambulation, and ascending / descending stairs. Charges:  Timed Code Treatment Minutes: 45   Total Treatment Minutes: 90   **CO-treat with OT    [] EVAL - LOW (92134)   [] EVAL - MOD (03304)  [] EVAL - HIGH (72970)  [] RE-EVAL (94812)  [] TE (96154) x      [] Ionto  [x] NMR (03365) x 2      [] Vaso  [] Manual (98551) x      [] Ultrasound  [x] TA x  1     [] Mech Traction (17727)  [] Gait Training x     [] ES (un) (33710):   [] Aquatic therapy x   [] Other:   [] Group:     GOALS:   Patient stated goal: improve ability to complete transfers   []? Progressing: []? Met: []? Not Met: []? Adjusted     Therapist goals for Patient:   Short Term Goals: To be achieved in: 2 weeks  1. Independent in HEP and progression per patient tolerance, in order to prevent re-injury. []? Progressing: [x]? Met: []? Not Met: []? Adjusted  2. Patient will have a decrease in pain to facilitate improvement in movement, function, and ADLs as indicated by Functional Deficits. []? Progressing: [x]? Met: []? Not Met: []? Adjusted     Long Term Goals: To be achieved in: 6 weeks  1. Patient will report increased standing tolerance to at least 30 minutes in standing frame. []? Progressing: [x]? Met: []? Not Met: []? Adjusted  2. Patient will demonstrate an increase in strength to good shoulder & hip complex, and core activation to allow for proper functional mobility as indicated by patients Functional Deficits. [x]? Progressing: []? Met: []? Not Met: []? Adjusted  3.  Patient will return to functional activities including demo'ing safe transfers to/from w/c with mod I.    [x]? Progressing: []? Met: []? Not Met: []? Adjusted  4. Patient will increase STREAM score to 32 or above for increased UE/LE movement in sitting, supine, and standing. [x]? Progressing: []? Met: []? Not Met: []? Adjusted          Overall Progression Towards Functional goals/ Treatment Progress Update:  [] Patient is progressing as expected towards functional goals listed. [x] Progression is slowed due to complexities/Impairments listed. [] Progression has been slowed due to co-morbidities. [] Plan just implemented, too soon to assess goals progression <30days   [] Goals require adjustment due to lack of progress  [] Patient is not progressing as expected and requires additional follow up with physician  [] Other    Persisting Functional Limitations/Impairments:  []Sleeping [x]Sitting               []Standing [x]Transfers        []Walking []Kneeling               []Stairs []Squatting / bending   [x]ADLs []Reaching  []Lifting  []Housework  []Driving []Job related tasks  []Sports/Recreation []Other:        ASSESSMENT:       Performing end range B shoulder flex while in quadruped over mat table facilitated extensor kinetic chain. Later, able to utilize extensor contraction to complete quad over mat table to tall kneeling w/o UE or therapist assist for movement, only balance. Pt was morris to perform squat pivot transfers better after instruction & reps of priming mat table to floor transition. Treatment/Activity Tolerance:  [x] Patient able to complete tx  [] Patient limited by fatigue  [] Patient limited by pain  [] Patient limited by other medical complications  [] Other:     Prognosis: [x] Good [] Fair  [] Poor    Patient Requires Follow-up: [x] Yes  [] No    Plan for next treatment session: transfers, seated core strength, will plan to co-treat with OT when possible    PLAN: See eval. PT 2x / week for 6 weeks.    [x] Continue per plan of care [] Alter current plan (see comments)  [] Plan of care initiated [] Hold pending MD visit [] Discharge    Electronically signed by: Villa Lucio PT, DPT    Note: If patient does not return for scheduled/ recommended follow up visits, his note will serve as a discharge from care along with most recent update on progress.

## 2021-05-10 NOTE — FLOWSHEET NOTE
use of band at hips and pulling red band out into extension of shoulder, elbow and upper trunk times 20 reps with significant increase in glute recruitment. Patient then worked on maintaining balance hitting moving targets right/ left ues. Times 20 with 3 LOB needing only min assist to regain midline in tall kneeling. Patient then transferred from tall kneeling to split kneel and then transferred to wheelchair with mod to min assist of two. Patient then worked on sit to stand at [employer] with max assist of two times three reps      Therapeutic Exercises  Resistance / level Sets/sec Reps Notes                                                                                Neuromuscular Re-ed / Therapeutic Activities                                                 Manual Intervention                                                     Modalities:     Patient education:  Plan of care, importance of weight bearing in stander for overall health, home exercise program, goals. Home Exercise Program:   Patient encouraged to start using stander 3 sessions a day and while in stander completing over head reaches . Patient to try and reach 15 minute intervals in stander. Therapeutic Exercise and NMR:  [x] (76596) Provided verbal/tactile cueing for activities related to strengthening, flexibility, endurance, ROM  for improvements in scapular, scapulothoracic and UE control with self care, reaching, carrying, lifting, house/yardwork, driving/computer work. [x] (55595) Provided verbal/tactile cueing for activities related to improving balance, coordination, kinesthetic sense, posture, motor skill, proprioception  to assist with  scapular, scapulothoracic and UE control with self care, reaching, carrying, lifting, house/yardwork, driving/computer work.   [] Comments:    Therapeutic Activities:    [x] (71458 or 37826) Provided verbal/tactile cueing for activities related to improving balance, coordination, kinesthetic sense, posture, motor skill, proprioception and motor activation to allow for proper function of scapular, scapulothoracic and UE control with self care, carrying, lifting, driving/computer work  [] Comments:    Home Exercise Program:    [x] (19059) Reviewed/Progressed HEP activities related to strengthening, flexibility, endurance, ROM of scapular, scapulothoracic and UE control with self care, reaching, carrying, lifting, house/yardwork, driving/computer work  [] (38899) Reviewed/Progressed HEP activities related to improving balance, coordination, kinesthetic sense, posture, motor skill, proprioception of scapular, scapulothoracic and UE control with self care, reaching, carrying, lifting, house/yardwork, driving/computer work    [] Comments:    Manual Treatments:  PROM / STM / Oscillations-Mobs:  G-I, II, III, IV (PA's, Inf., Post.)  [] ([id number]) Provided manual therapy to mobilize soft tissue/joints of cervical/CT, scapular GHJ and UE for the purpose of modulating pain, promoting relaxation,  increasing ROM, reducing/eliminating soft tissue swelling/inflammation/restriction, improving soft tissue extensibility and allowing for proper ROM for normal function with self care, reaching, carrying, lifting, house/yardwork, driving/computer work  [] Comments:    ADL Training:  [] (59257) Provided self-care/home management training related to activities of daily living and compensatory training, and/or use of adaptive equipment   [] Comments:     Splinting:  [] Fabrication of:   [] (53242) Orthotic/Prosthetic Management, subsequent encounter  [] (89997) Orthotic management and training (fitting and assessment)  [] Comments:      Charges: co treat with PT for safety and increased intensity of treatment  Timed Code Treatment Minutes: 45   Total Treatment Minutes: 90     [] EVAL (LOW) 23117   [] OT Re-eval (41254)  [] EVAL (MOD) 48061   [] EVAL (HIGH) 33268       [x] Ru (64841) x   1  [] HASQN(07528)  [x] NMR (11143) x 1  [] Estim (attended) (33879)   [] Manual ([address] [facility]) x      [] US (39881)  [x] TA (04991) x 1      [] Paraffin (48002)  [] ADL  ([phone]) x     [] Splint/L code:    [] Estim (unattended) ([id number])  [] Fluidotherapy (15248)  [] Other:    GOALS:    Patient stated goal: improved trunk control and standing tolerance to increase independence in self care. [x]? Progressing: []? Met: []? Not Met: []? Adjusted     Therapist goals for Patient:   Short Term Goals: To be achieved in: 30 days  1. Pt will be independent with clothing management on toilet or in wheelchair to complete toileting with use of grab bar. [x]? Progressing: []? Met: []? Not Met: []? Adjusted  2. Pt will be stand by assist completing scoot transfers on level surfaces  [x]? Progressing: []? Met: []? Not Met: []? Adjusted  3. Patient will be able to stand up to 30 seconds at grab bar for toileting and lower body dress with min assist.   [x]? Progressing: []? Met: []? Not Met: []? Adjusted     Long Term Goals: To be achieved by discharge  1. Pt will demonstrate an improved stream score of 28. [x]? Progressing: []? Met: []? Not Met: [x]? Adjusted    Progression Towards Functional goals:  [x] Patient is progressing as expected towards functional goals listed. [] Progression is slowed due to complexities listed. [] Progression has been slowed due to co-morbidities. [] Plan just implemented, too soon to assess goals progression  [] All goals are met  [] Other:     ASSESSMENT:  Patient has demonstrated significant improvement in trunk control during static and dynamic sitting and standing with pt now able to sit EOB unsupported and tolerate long sitting during dynamic tasks. Pt with increased distance of ambulation in // bars with mod A of 2 and PT blocking knee at stance leg and assisting with forward progression of LEs.  Pt continues to demonstrate difficulty with hip and glute activation for functional mobility and posture, but is now demonstrating improved initiation of R knee flexion/extension and hip flexion. Treatment/Activity Tolerance:  [x] Patient tolerated treatment well [] Patient limited by fatique  [] Patient limited by pain  [] Patient limited by other medical complications  [] Other:     Prognosis: [x] Good [] Fair  [] Poor    Patient Requires Follow-up: [x] Yes  [] No    PLAN: See eval  [x] Continue per plan of care [] Alter current plan (see comments)  [x] Plan of care initiated (MD cosigned) [] Hold pending MD visit [] Discharge    Electronically signed by:                Note: If patient does not return for scheduled/ recommended follow up visits, this note will serve as a discharge from care along with most recent update on progress.

## 2021-05-12 ENCOUNTER — HOSPITAL ENCOUNTER (OUTPATIENT)
Dept: PHYSICAL THERAPY | Age: 21
Setting detail: THERAPIES SERIES
Discharge: HOME | End: 2021-05-12
Payer: MEDICARE

## 2021-05-12 ENCOUNTER — HOSPITAL ENCOUNTER (OUTPATIENT)
Dept: OCCUPATIONAL THERAPY | Age: 21
Setting detail: THERAPIES SERIES
Discharge: HOME | End: 2021-05-12
Payer: MEDICARE

## 2021-05-12 PROCEDURE — 97112 NEUROMUSCULAR REEDUCATION: CPT

## 2021-05-12 PROCEDURE — 97110 THERAPEUTIC EXERCISES: CPT

## 2021-05-12 PROCEDURE — 97530 THERAPEUTIC ACTIVITIES: CPT

## 2021-05-12 PROCEDURE — 97116 GAIT TRAINING THERAPY: CPT

## 2021-05-12 NOTE — FLOWSHEET NOTE
football, rest of reps completed with L UE bracing on L knee, PT hand assist on sternum and lumbar spine to facilitate                                Gait Training:  Amb in //bars    Bwd walking in //bars      Transfer w/B Lofstrand crutches   Static stand w/B Lofstrand crutches   8 ft    8 ft    Mod A of 2  Mod A of 2   -manual required to advance LEs, pt activation hip extensors to initiate swing phase, demo'd adequate weight-shifting. OT provided assist & w/c follow for safety      -difficulty with placement for adequate support                 Therapeutic Activities (15485) /  Functional Tasks       [facility] reaching across midline in seated   Pt seated in w/c using seat belt to help stay in w/c          [facility] reaching across midline and diagonally   Pt seated in w/c using seat belt to help stay in w/c               STS to std walker            Transfer to w/c from mat table            STS from w/c to mat table - with large bolster placed on table in front of patient to hold onto   Min A, w/c close to mat table to assist in blocking pt's knees. Manual Intervention ([phone])       Supine hamstring stretch 90/90    -completed by PT  -completed by OT                                        Sessions:     5/12:  Squat pivot w/OT w/c to high mat table min/mod A for safety and to facilitate LE use. Sit to supine mod A. Harness for gait training system was donned while supine, then adjusted while long sitting and sitting EOM. Mod A of 2 supine to long sit, min A of 1 for balance long sit to sitting EOM. Pt setup inside NxStep partial weight-bearing gait training system for ambulation. Partial weight adjusted between 80 & 100 lbs depending on pt's ease of LE advancement. Amb 100' while PT & OT guided system for pt. Seated rest.  Amb [de-identified]' w/OT & student OT guiding NxStep system, PT assessing gait, provided mod A for trunk stability final 20 ft.   Seated rest.  Amb 61' w/2 person guiding system & PT providing mod A for trunk stability -pt able to demo increased step length and weight shift when trunk was stabilized. Pt performed dips while in partial [address] system, 2x3, 1x5;  Pt performed bean bag toss with OT, working to facilitate lateral reach outside [place] w/opposit UE then toss into bucket. Pt passed gold med ball [place] to student PT and received to facilitate UE flex at end range and trunk ext while working on dynamic balance. Pt in good spirits after walking, excited to show family, and had increased fatigue. 5/10:    Squat pivot transfer w/OT, w/c to w/c height mat table w/min A and mod verbal cues for improved technique. Leaning onto RUE, hip/LE ext activation to prime mat table to floor transfer x6 w/manual cues for facilitation. Mat table to floor transfer mod A of 2. Tall kneeling at lowered mat table to work on weight shifting, hip ext, trunk ext, UE ex's for 50 mins. Floor to chair transfer, max A of 2. Sit to stand transfer to mat table w/o B AFO w/max A of 2, increased instability of B knees, prevented full stand. Donned B AFO, pt completed transfer mod A of 2 and able to stand for 5 mins to work on hip ext, trunk ext, and LE WB'ing. 5/7:   Upon arrival, pt requested to use restroom, stated he didn't want to attempt to urinate as he had too much in the pocket of his hoodie and would most likely make a mess. Stand pivot w/c to same height mat table w/CGA, improved foot placement throughout but need to better place them prior to scooting back on edge of mat. Blue bungee ab crunches x5 mins w/intermittent rest breaks & time for recover following LOB. Forward reach, outside [place] for gold med ball, [place] reverse pass to PT behind him, then reach around back at sacrum level to receive ball, forward reach outside [place] to return to OT x10 w/intermittent rest and to recover LOB.   Seated knee ext & flex w/assit from gliders x10 B -min/mod A for hip flex to further reduce friction OT provided min/mod A for balance. Stand pivot mat table to w/c w/min A and reduced eccentric control. Sit to stand at ballet barre x2 min/mod A of 2. Lateral stepping length of bar each way, w/min/mod A for balance, mod A to stabilize stance knee & facilitate WB'ing, CGA to mod A to advance ipsilateral LE. Sit to stand w/ballet barre x5.      5/5: Discussed with pt appropriate amount of time to spend in stander at home, with education on importance of quality of participation rather than quantity/duration; pt verbalized understanding, but may require reinforcement. Pt completed sit pivot transfer from w/c to mat table with Min A x 2. Pt participated in crossing midline for open hand slap onto gerardo disc x10 each side with SBA-min A for trunk control during task. Pt with tendency to lose balance towards R side. Pt required min-mod A for partial stand to place gerardo disc under buttocks. Pt seated on gerardo disc ~10 min to increase trunk strength and stability, with bilateral BUE reaching/shoulder flexion with CGA-mod A for trunk stability and blocking knees to prevent sliding forward. Pt completed a partial stand-> pivot edge of mat to w/c min A x 2 with verbal cues for safety as pt did not notify therapists prior to initiation of transfer. Pt seated at barre in w/c with 2 sit to stands with min A and use of barre and in stance ~2 min x 2 trials with min progressing to mod A with fatigue for trunk support by OT and PT blocking knees/facilitating LE extension; tactile/vcs for upright posture. During first trial pt with shoulder flexion of BUEs to slide up wall. Pt sat in w/c to complete bilateral LE advancement of w/c for strengthening requiring verbal cues and assist to put LEs in position (partial knee extension) with PT blocking feet for proper technique as pt initially used rocking of trunk to advance w/c. Pt completed this for ~10 ft with almost constant cueing to not propel w/c by rocking trunk.  Pt grasping cylindrical handle of 30# bungee cord to maintain 90* elbow flexion for stability of BUEs, followed by mid rows for scapular retraction with light facilitation by PT x10. In // bars, pt completed STS with B UE assist on bars and CGA for trunk control. Pt completed forward stepping length of // bars ~6 ft with mod A for R LE advancement and max A for L LE advancement. PT blocked stance leg during advancement assist of opposite LE with OT assist for trunk control min-mod A and wheelchair follow. Improved initiation of RLE movement on this date noted, specifically in knee flexion/extension and hip flexion. Continued verbal cues for placement of hands in front of body to prepare for use of lofstrand crutches. Pt declined need to toilet at end of session before transport arrived. Pt ended session with cross midline \"high 5\" x6 with BUEs. 4/28:    Pt about 12 mins late then needed to use restroom, delaying start of therapy.  //bars: sit to stand transition with B UE assist and CGA for trunk control. Amb ~6 steps with mod A for R LE advancement and max A for L LE advancement. PT min blocked stance leg during contralateral swing phase with OT assist for trunk control min-mod A and wheelchair follow. Completed x3 with retro stepping after 2nd attempt ~6 steps with pt relying on momentum of trunk rotation for hip extension when stepping back. Continued verbal cues for placement of hands in front of body to prepare for use of lofstrand crutches. Also discussed potential use of posterior walker as pt previously used when ambulatory and due to pt's preference for hands used as [place] behind trunk. In treatment room, pt doffed/donned R AFO seated in wheelchair using technique of abducting hip to bring to side of wheelchair to place foot into AFO and shoe, and then placing foot on foot rest to adhere straps. Decreased time required with this method with pt agreeing to attempt donning both AFOs at home.  Pt transferred to sit edge of mat from wheelchair with min A x2 to block feet and for trunk control during scooting. Continued difficulty with trunk flexion during lowering to sit far back on surface pt is transferring to affecting safety. With PT holding ball in front of pt, pt completed cross and hook style punches across body to opposite side x10 B with emphasis on trunk control and proprioceptive input of ball to improve grading of force; decreased trunk control on L punches due to R trunk weakness with mod-min A for sitting balance compared to CGA-min A on L. Trunk ext w/BUE [place] to trunk flex & shoulder ext to hit ball forcefully x3 -difficulty achieving necessary trunk ext and focused on BUEs [place] instead. Session concluded with mat to wheelchair transfer min A x2 with wheelchair on pt's right.      4/26:  Began session with OT applying foam padding to patient's ankle for cushion so pt can wear AFOs comfortably. Pt transferred to sit edge of mat from wheelchair with min A x2 to block feet and for trunk control during scooting. Grasping 20# bungee cord, pt completed horizontal ab/adduction x10, followed by mid row x10 with emphasis on scapular retraction and trunk control. For continued trunk control during dynamic balance, bungee cord placed around pt's trunk with pt completed hip and thoracic extension against resistance of bungee cord and then returning to upright starting position x8, followed by R and L lateral lean x5 each with min A required for balance. Pt completed trunk rotation to reach behind self to grab bean bag to improve dynamic balance required for pericare while toileting, followed by tossing to bucket, with min A for ~3 instances LOB. Pt instructed on placing flat hands on surface of mat to complete weight bearing during trunk rotation to improve balance. Bean bags were tossed back to patient x3 with instruction  to catch with open hands to reduce tone. Pt returned to wheelchair from edge of mat with min A for transfer.  Pt transitioned to // bars to complete ambulation x4 steps with max A x2 for trunk control and advancing LEs prior to seated rest break due to increased fatigue with lack of AFO support. Pt re-attempted with 3 steps, followed by static standing x60 seconds with max A + mod A with cues to keep arms in front of him to prepare for use of lofstrand crutches and to facilitate trunk engagement and knee extension. Pt sat on gerardo disc placed in wheelchair for trunk control with min-mod A for balance to complete ball toss/catch task with multiple LOB; theraband loop placed around knees to prevent abduction and improve pelvic stability and base of support. UB dressing completed to doff/don sweatshirt with SBA and good trunk control noted during weight shifts. Focus of today's session was to improve dynamic sitting and standing balance.         Modalities:     Pt. Education:  -patient educated on diagnosis, prognosis and expectations for rehab  -all patient questions were answered    HEP instruction:      NMR and Therapeutic Activities:    [x] (43920 or 11564) Provided verbal/tactile cueing for activities related to improving balance, coordination, kinesthetic sense, posture, motor skill, proprioception and motor activation to allow for proper function of   [x] LE / Core, hip and LE with self care and ADLs  [x] UE / Shoulder complex: cervical, postural, scapular, scapulothoracic and UE control with self care, carrying, lifting, driving, computer work.   [] (78240) Gait Re-education- Provided training and instruction to the patient for proper LE, core and hip recruitment, positioning, and eccentric body weight control with ambulation re-education, including ascending & descending stairs     Home Management Training / Self Care:  [] (44209) Provided self-care/home management training related to activities of daily living and compensatory training, and/or use of adaptive equipment for improvement with: ADLs and compensatory training, meal preparation, safety procedures and instruction in use of adaptive equipment, including bathing, grooming, dressing, personal hygiene, basic household cleaning and chores. Therapeutic Exercise and NMR EXR  [x] (89081) Provided verbal/tactile cueing for activities related to strengthening, flexibility, endurance, ROM for improvements in  [x] LE / Core stability: LE, hip, and core control with self care, mobility, lifting, ambulation. [] UE / Shoulder complex: cervical, postural, scapular, scapulothoracic and UE control with self care, reaching, carrying, lifting, house/yardwork, driving, computer work.  [] (92467) Provided verbal/tactile cueing for activities related to improving balance, coordination, kinesthetic sense, posture, motor skill, proprioception to assist with   [] LE / Core stability: LE, hip, and core control in self care, mobility, lifting, ambulation and eccentric single leg control. [] UE / Shoulder complex: cervical, scapular, scapulothoracic and UE control with self care, reaching, carrying, lifting, house/yardwork, driving, computer work.   [] (18079) Therapist is in constant attendance of 2 or more patients providing skilled therapy interventions, but not providing any significant amount of measurable one-on-one time to either patient, for improvements in  [] LE / Core stability: LE, hip, and core control in self care, mobility, lifting, ambulation and eccentric single leg control. [] UE / Shoulder complex: cervical, scapular, scapulothoracic and UE control with self care, reaching, carrying, lifting, house/yardwork, driving, computer work.      Home Exercise Program:    [x] (74795) Reviewed/Progressed HEP activities related to strengthening, flexibility, endurance, ROM of   [] LE / Core stability: core, hip and LE for functional self-care, mobility, lifting and ambulation/stair navigation   [] UE / Shoulder complex: cervical, postural, scapular, scapulothoracic and UE control with self care, reaching, carrying, lifting, house/yardwork, driving, computer work  [] (50800)Reviewed/Progressed HEP activities related to improving balance, coordination, kinesthetic sense, posture, motor skill, proprioception of   [] LE: core, hip and LE for self care, mobility, lifting, and ambulation/stair navigation    [] UE / Shoulder complex: cervical, postural,  scapular, scapulothoracic and UE control with self care, reaching, carrying, lifting, house/yardwork, driving, computer work    Manual Treatments:  PROM / STM / Oscillations-Mobs:  G-I, II, III, IV (PA's, Inf., Post.)  [] (73420) Provided manual therapy to mobilize shoulder complex, hip, LE, and/or cervicothoracic/LS spine soft tissue/joints for the purpose of modulating pain, promoting relaxation,  increasing ROM, reducing/eliminating soft tissue swelling/inflammation/restriction, improving soft tissue extensibility and allowing for proper ROM for normal function with   [] LE / Core stability: self care, mobility, lifting and ambulation. [] UE / Shoulder complex: self care, reaching, carrying, lifting, house/yardwork, driving, computer work. Modalities:  [] (12340) Vasopneumatic compression: Utilized vasopneumatic compression to decrease edema / swelling for the purpose of improving mobility and quad tone / recruitment which will allow for increased overall function including but not limited to self-care, transfers, ambulation, and ascending / descending stairs.          Charges:  Timed Code Treatment Minutes: 45   Total Treatment Minutes: 90   **CO-treat with OT    [] EVAL - LOW (76240)   [] EVAL - MOD (38347)  [] EVAL - HIGH (38498)  [] RE-EVAL (17249)  [] TE (72228) x      [] Ionto  [] NMR (69811) x       [] Vaso  [] Manual (56085) x      [] Ultrasound  [x] TA x  1     [] Mech Traction (70147)  [x] Gait Training x  2   [] ES (un) (04339):   [] Aquatic therapy x   [] Other:   [] Group:     GOALS:   Patient stated goal: improve ability to complete transfers   []? Progressing: []? Met: []? Not Met: []? Adjusted     Therapist goals for Patient:   Short Term Goals: To be achieved in: 2 weeks  1. Independent in HEP and progression per patient tolerance, in order to prevent re-injury. []? Progressing: [x]? Met: []? Not Met: []? Adjusted  2. Patient will have a decrease in pain to facilitate improvement in movement, function, and ADLs as indicated by Functional Deficits. []? Progressing: [x]? Met: []? Not Met: []? Adjusted     Long Term Goals: To be achieved in: 6 weeks  1. Patient will report increased standing tolerance to at least 30 minutes in standing frame. []? Progressing: [x]? Met: []? Not Met: []? Adjusted  2. Patient will demonstrate an increase in strength to good shoulder & hip complex, and core activation to allow for proper functional mobility as indicated by patients Functional Deficits. [x]? Progressing: []? Met: []? Not Met: []? Adjusted  3. Patient will return to functional activities including demo'ing safe transfers to/from w/c with mod I.    [x]? Progressing: []? Met: []? Not Met: []? Adjusted  4. Patient will increase STREAM score to 32 or above for increased UE/LE movement in sitting, supine, and standing. [x]? Progressing: []? Met: []? Not Met: []? Adjusted          Overall Progression Towards Functional goals/ Treatment Progress Update:  [] Patient is progressing as expected towards functional goals listed. [x] Progression is slowed due to complexities/Impairments listed. [] Progression has been slowed due to co-morbidities.   [] Plan just implemented, too soon to assess goals progression <30days   [] Goals require adjustment due to lack of progress  [] Patient is not progressing as expected and requires additional follow up with physician  [] Other    Persisting Functional Limitations/Impairments:  []Sleeping [x]Sitting               []Standing [x]Transfers        []Walking []Kneeling               []Stairs []Squatting

## 2021-05-12 NOTE — FLOWSHEET NOTE
[facility] - Outpatient Occupational Therapy      Occupational Therapy Daily Treatment Note  Date:  2021    Patient: Wyatt Salmeron   : 2000   MRN: [id number]  Referring Physician:  Channing Weinstein CNP       Medical Diagnosis Information: paraplegia, cerebral palsy                                                  Insurance information: medicare/ medicaid     Date of Injury:   Date of Surgery: rhizotomy when he was about 13    Progress Report: []  Yes  [x]  No     Date Range for reporting period:  Beginning: 3/1/2021  Ending: end of POC    Progress report due (10 Rx/or 30 days whichever is less): visit #20 or 3/86/3145    Recertification due (POC duration/ or 90 days whichever is less): visit #20 or 2021    Visit # Insurance Allowable Auth required? Date Range    +   [place] []  Yes  [x]  No n/a       Latex Allergy:  []No      []Yes  Pacemaker:  [] No       [] Yes     Preferred Language for Healthcare:   [x]English       []other:    Pain level: 0/10     SUBJECTIVE:   Pt reports he spent ~45 minutes in the stander yesterday. Pt reports mild discomfort in R shoulder on this date, but did not rate numerically. Pt reports he has not been donning his AFOs independently this week, but will attempt during the rest of the week. Functional Disability Index:  STREAM: score 23    21: Stroke Rehabilitation Assessment of Movement (STREAM): total- 24/70 (supine- 9/15, sitting- 15/31, standing- 0/24)    OBJECTIVE: See eval    21: 3 minutes static stance at parallel bars with CGA x2 for trunk control and blocking knees      RESTRICTIONS/PRECAUTIONS: fall risk    Exercises/Interventions: Treatment focused on improving dynamic balance during sitting and standing. Session initiated with w/c to mat transfer with unsuccessful first attempt due to poor knee extension and glute activation. Min A on second attempt.  Pt assumed supine with min A + mod A for trunk control and LE management. Pt completed rolling L/R with min A to don harness for NxStep partial weight gait . Supine to long sit max A due to hamstring tightness and decreased trunk control. Min A to scoot to edge of mat. In NxStep partial gait , pt completed 100 ft + 80 ft + 60 ft with intermittent rest breaks in harness due to fatigue; max verbal cues for knee extension throughout with improved hip control and LE advancement noted with PT assist for trunk stabilization on 2nd and 3rd attempts. Seated break provided between each attempt with pt completing the following UE and trunk strengthening during: bilateral stabilization of disc to catch and toss bean bags, alternating contralateral reach to grasp and toss bean bag, and 4# weighted ball pass and retrieve overhead with verbal and tactile cues for neck and trunk extension. In NxStep harness, pt completed bilateral UE weight bearing to lift feet from ground with improved glute activation noted with trunk extension. Therapeutic Exercises  Resistance / level Sets/sec Reps Notes                                                                                Neuromuscular Re-ed / Therapeutic Activities                                                 Manual Intervention                                                     Modalities:     Patient education:  Plan of care, importance of weight bearing in stander for overall health, home exercise program, goals. Home Exercise Program:   Patient encouraged to start using stander 3 sessions a day and while in stander completing over head reaches . Patient to try and reach 15 minute intervals in stander. Therapeutic Exercise and NMR:  [x] (77733) Provided verbal/tactile cueing for activities related to strengthening, flexibility, endurance, ROM  for improvements in scapular, scapulothoracic and UE control with self care, reaching, carrying, lifting, house/yardwork, driving/computer work.     [x] (24052) Provided verbal/tactile cueing for activities related to improving balance, coordination, kinesthetic sense, posture, motor skill, proprioception  to assist with  scapular, scapulothoracic and UE control with self care, reaching, carrying, lifting, house/yardwork, driving/computer work.   [] Comments:    Therapeutic Activities:    [x] (30109 or 70164) Provided verbal/tactile cueing for activities related to improving balance, coordination, kinesthetic sense, posture, motor skill, proprioception and motor activation to allow for proper function of scapular, scapulothoracic and UE control with self care, carrying, lifting, driving/computer work  [] Comments:    Home Exercise Program:    [x] (56155) Reviewed/Progressed HEP activities related to strengthening, flexibility, endurance, ROM of scapular, scapulothoracic and UE control with self care, reaching, carrying, lifting, house/yardwork, driving/computer work  [] (80761) Reviewed/Progressed HEP activities related to improving balance, coordination, kinesthetic sense, posture, motor skill, proprioception of scapular, scapulothoracic and UE control with self care, reaching, carrying, lifting, house/yardwork, driving/computer work    [] Comments:    Manual Treatments:  PROM / STM / Oscillations-Mobs:  G-I, II, III, IV (PA's, Inf., Post.)  [] (28756) Provided manual therapy to mobilize soft tissue/joints of cervical/CT, scapular GHJ and UE for the purpose of modulating pain, promoting relaxation,  increasing ROM, reducing/eliminating soft tissue swelling/inflammation/restriction, improving soft tissue extensibility and allowing for proper ROM for normal function with self care, reaching, carrying, lifting, house/yardwork, driving/computer work  [] Comments:    ADL Training:  [] (88961) Provided self-care/home management training related to activities of daily living and compensatory training, and/or use of adaptive equipment   [] Comments:     Splinting:  [] Fabrication of:   [] (35585) Orthotic/Prosthetic Management, subsequent encounter  [] (27273) Orthotic management and training (fitting and assessment)  [] Comments:      Charges: co treat with PT for safety and increased intensity of treatment  Timed Code Treatment Minutes: 45   Total Treatment Minutes: 90     [] EVAL (LOW) 17042   [] OT Re-eval (02798)  [] EVAL (MOD) 97422   [] EVAL (HIGH) 71176       [x] Ru (58990) x   1  [] PVFDU(09644)  [x] NMR (84792) x  1  [] Estim (attended) (96678)   [] Manual ([id number]) x      [] US (46009)  [x] TA (93685) x 1      [] Paraffin (67823)  [] ADL  (42078) x     [] Splint/L code:    [] Estim (unattended) (27725)  [] Fluidotherapy (40227)  [] Other:    GOALS:    Patient stated goal: improved trunk control and standing tolerance to increase independence in self care. [x]? Progressing: []? Met: []? Not Met: []? Adjusted     Therapist goals for Patient:   Short Term Goals: To be achieved in: 30 days  1. Pt will be independent with clothing management on toilet or in wheelchair to complete toileting with use of grab bar. [x]? Progressing: []? Met: []? Not Met: []? Adjusted  2. Pt will be stand by assist completing scoot transfers on level surfaces  [x]? Progressing: []? Met: []? Not Met: []? Adjusted  3. Patient will be able to stand up to 30 seconds at grab bar for toileting and lower body dress with min assist.   [x]? Progressing: []? Met: []? Not Met: []? Adjusted     Long Term Goals: To be achieved by discharge  1. Pt will demonstrate an improved stream score of 28. [x]? Progressing: []? Met: []? Not Met: [x]? Adjusted    Progression Towards Functional goals:  [x] Patient is progressing as expected towards functional goals listed. [] Progression is slowed due to complexities listed. [] Progression has been slowed due to co-morbidities.   [] Plan just implemented, too soon to assess goals progression  [] All goals are met  [] Other:     ASSESSMENT:  Patient has demonstrated significant improvement in trunk control during static and dynamic sitting and standing with pt now able to sit EOB unsupported and tolerate long sitting during dynamic tasks. Pt with increased distance of ambulation in // bars with mod A of 2 and PT blocking knee at stance leg and assisting with forward progression of LEs. Pt continues to demonstrate difficulty with hip and glute activation for functional mobility and posture, but is now demonstrating improved initiation of R knee flexion/extension and hip flexion. Treatment/Activity Tolerance:  [x] Patient tolerated treatment well [] Patient limited by fatique  [] Patient limited by pain  [] Patient limited by other medical complications  [] Other:     Prognosis: [x] Good [] Fair  [] Poor    Patient Requires Follow-up: [x] Yes  [] No    PLAN: See eval  [x] Continue per plan of care [] Alter current plan (see comments)  [x] Plan of care initiated (MD cosigned) [] Hold pending MD visit [] Discharge    Electronically signed by:            SHUKRI Lopes/L [id number]      Note: If patient does not return for scheduled/ recommended follow up visits, this note will serve as a discharge from care along with most recent update on progress.

## 2021-05-17 ENCOUNTER — HOSPITAL ENCOUNTER (OUTPATIENT)
Dept: PHYSICAL THERAPY | Age: 21
Setting detail: THERAPIES SERIES
Discharge: HOME | End: 2021-05-17
Payer: MEDICARE

## 2021-05-17 ENCOUNTER — HOSPITAL ENCOUNTER (OUTPATIENT)
Dept: OCCUPATIONAL THERAPY | Age: 21
Setting detail: THERAPIES SERIES
Discharge: HOME | End: 2021-05-17
Payer: MEDICARE

## 2021-05-17 NOTE — PROGRESS NOTES
Occupational Therapy      Occupational Therapy  Cancellation/No-show Note  Patient Name:  Gina Hunt   :  2000   Date:  2021  Cancelled visits to date: 3   No-shows to date:   Patient status for today's appointment patient:  [x]  Cancelled: 21  []  Rescheduled appointment  []  No-show     Reason given by patient:  []  Patient ill  []  Conflicting appointment  [x]  No transportation    []  Conflict with work  []  No reason given  []  Other:     Comments:      Phone call information:   []  Phone call made today to patient at _ time at number provided:      []  Patient answered, conversation as follows:    []  Patient did not answer, message left as follows:  [x]  Phone call not made today: pt called  to cancel appt prior to session. Electronically signed by: ANDRESSA Rodriguez     Therapist was present, directed the patient's care, made skilled judgement, and was responsible for assessment and treatment of the patient.   Shelia Giraldo, OTR/L [id number]

## 2021-05-17 NOTE — PROGRESS NOTES
[address] [facility]    Physical Therapy  Cancellation/No-show Note  Patient Name:  Anna Rosas  :  2000   Date:  2021    Cancelled visits to date: 4  No-shows to date: 0    For today's appointment patient:  [x]  Cancelled 3/10, 3/15, 3/29,   []  Rescheduled appointment  []  No-show     Reason given by patient:  []  Patient ill  []  Conflicting appointment  [x]  No transportation    []  Conflict with work  []  No reason given  []  Other:     Comments:      Phone call information:   []  Phone call made today to patient at _ time at number provided:      []  Patient answered, conversation as follows:    []  Patient did not answer, message left as follows:  []  Phone call not made today  [x]  Phone call not needed - pt contacted us to cancel and provided reason for cancellation.      Electronically signed by:  Damon Link PT, PT DPT

## 2021-05-19 ENCOUNTER — HOSPITAL ENCOUNTER (OUTPATIENT)
Dept: OCCUPATIONAL THERAPY | Age: 21
Setting detail: THERAPIES SERIES
Discharge: HOME | End: 2021-05-19
Payer: MEDICARE

## 2021-05-19 ENCOUNTER — HOSPITAL ENCOUNTER (OUTPATIENT)
Dept: PHYSICAL THERAPY | Age: 21
Setting detail: THERAPIES SERIES
Discharge: HOME | End: 2021-05-19
Payer: MEDICARE

## 2021-05-19 NOTE — FLOWSHEET NOTE
Occupational Therapy  Cancellation/No-show Note  Patient Name:  Nohemy Sosa   :  2000   Date:  2021  Cancelled visits to date: 6   No-shows to date: 0    Patient status for today's appointment patient:  [x]  Cancelled- 3/10/21, 3/15/21, 3/29/21, 21, 21, 21  []  Rescheduled appointment  []  No-show     Reason given by patient:  [x]  Patient ill  []  Conflicting appointment  []  No transportation    []  Conflict with work  []  No reason given  []  Other:     Comments:      Phone call information:   []  Phone call made today to patient at _ time at number provided:      []  Patient answered, conversation as follows:    []  Patient did not answer, message left as follows:  [x]  Phone call not made today: pt called  to cancel appt prior to session.      Electronically signed by:  Helena Billingsley, OTR/L [id number]

## 2021-05-19 NOTE — PROGRESS NOTES
[address] [facility]    Physical Therapy  Cancellation/No-show Note  Patient Name:  Anna Rosas  :  2000   Date:  2021    Cancelled visits to date: 5  No-shows to date: 0    For today's appointment patient:  [x]  Cancelled 3/10, 3/15, 3/29, ,   []  Rescheduled appointment  []  No-show     Reason given by patient:  [x]  Patient ill  []  Conflicting appointment  []  No transportation    []  Conflict with work  []  No reason given  []  Other:     Comments:      Phone call information:   []  Phone call made today to patient at _ time at number provided:      []  Patient answered, conversation as follows:    []  Patient did not answer, message left as follows:  []  Phone call not made today  [x]  Phone call not needed - pt contacted us to cancel and provided reason for cancellation.      Electronically signed by:  Damon Link, PT,DPT

## 2021-05-22 ENCOUNTER — NURSE TRIAGE (OUTPATIENT)
Dept: OTHER | Facility: CLINIC | Age: 21
End: 2021-05-22

## 2021-05-22 NOTE — TELEPHONE ENCOUNTER
Reason for Disposition   Fever present > 3 days (72 hours)    Answer Assessment - Initial Assessment Questions  1. TEMPERATURE: \"What is the most recent temperature? \"  \"How was it measured? \"       103.7 F oral 1030 5/22/21. Gave Advil after fever    2. ONSET: \"When did the fever start? \"       Yesterday 5/21/21    3. SYMPTOMS: \"Do you have any other symptoms besides the fever? \"  (e.g., colds, headache, sore throat, earache, cough, rash, diarrhea, vomiting, abdominal pain)      Nausea, cough, right arm pain for about 1 week, weakness. Denies headache    4. CAUSE: If there are no symptoms, ask: \"What do you think is causing the fever? \"       Unknown    5. CONTACTS: \"Does anyone else in the family have an infection? \"      Denies    6. TREATMENT: \"What have you done so far to treat this fever? \" (e.g., medications)      Advil- got fever to 101 last night    7. IMMUNOCOMPROMISE: \"Do you have of the following: diabetes, HIV positive, splenectomy, cancer chemotherapy, chronic steroid treatment, transplant patient, etc.\"      Denies    8. PREGNANCY: \"Is there any chance you are pregnant? \" \"When was your last menstrual period? \"      n/a  9. TRAVEL: \"Have you traveled out of the country in the last month? \" (e.g., travel history, exposures)      Denies    Protocols used: FEVER-ADULT-AH    Received call from [address] 3 at pre-service [facility] with Red Flag Complaint. Brief description of triage: Pt's mother calling with pt present. Pt experiencing high fever 103.7 F this morning before taking Advil; also having some weakness. Triage indicates for patient to go to [facility] today. Advised pt's mother to call ahead. Pt is wheelchair bound and travel is difficulty; advised that some [facility] can do virtual visits but if symptoms worsen will need to call back/seek care at ED.     Care advice provided, patient verbalizes understanding; denies any other questions or concerns; instructed to call back for any new or worsening

## 2021-05-24 ENCOUNTER — HOSPITAL ENCOUNTER (OUTPATIENT)
Dept: OCCUPATIONAL THERAPY | Age: 21
Setting detail: THERAPIES SERIES
End: 2021-05-24
Payer: MEDICARE

## 2021-05-24 ENCOUNTER — HOSPITAL ENCOUNTER (OUTPATIENT)
Dept: PHYSICAL THERAPY | Age: 21
Setting detail: THERAPIES SERIES
Discharge: HOME | End: 2021-05-24
Payer: MEDICARE

## 2021-05-24 NOTE — PROGRESS NOTES
[address] [facility]    Physical Therapy  Cancellation/No-show Note  Patient Name:  Bonny Dacosta  :  2000   Date:  2021    Cancelled visits to date: 6  No-shows to date: 0    For today's appointment patient:  [x]  Cancelled 3/10, 3/15, 3/29, , ,   []  Rescheduled appointment  []  No-show     Reason given by patient:  [x]  Patient ill  []  Conflicting appointment  []  No transportation    []  Conflict with work  []  No reason given  []  Other:     Comments:      Phone call information:   []  Phone call made today to patient at _ time at number provided:      []  Patient answered, conversation as follows:    []  Patient did not answer, message left as follows:  []  Phone call not made today  [x]  Phone call not needed - pt contacted us to cancel and provided reason for cancellation.      Electronically signed by:  Mila Garcia, PT,DPT

## 2021-05-26 ENCOUNTER — HOSPITAL ENCOUNTER (OUTPATIENT)
Dept: OCCUPATIONAL THERAPY | Age: 21
Setting detail: THERAPIES SERIES
Discharge: HOME | End: 2021-05-26
Payer: MEDICARE

## 2021-05-26 ENCOUNTER — HOSPITAL ENCOUNTER (OUTPATIENT)
Dept: PHYSICAL THERAPY | Age: 21
Setting detail: THERAPIES SERIES
Discharge: HOME | End: 2021-05-26
Payer: MEDICARE

## 2021-05-26 DIAGNOSIS — I10 ESSENTIAL HYPERTENSION: ICD-10-CM

## 2021-05-26 NOTE — FLOWSHEET NOTE
Occupational Therapy  Cancellation/No-show Note  Patient Name:  Jmae Holm   :  2000   Date:  2021  Cancelled visits to date: 7   No-shows to date: 0    Patient status for today's appointment patient:  [x]  Cancelled- 3/10/21, 3/15/21, 3/29/21, 21, 21, 21, 21  []  Rescheduled appointment  []  No-show     Reason given by patient:  [x]  Patient ill  []  Conflicting appointment  []  No transportation    []  Conflict with work  []  No reason given  []  Other:     Comments:      Phone call information:   []  Phone call made today to patient at _ time at number provided:      []  Patient answered, conversation as follows:    []  Patient did not answer, message left as follows:  [x]  Phone call not made today: Physical therapist called home and spoke to pt's mother, pt has [place] and is quarantining. Electronically signed by:  Familia Peace OTS     Therapist was present, directed the patient's care, made skilled judgement, and was responsible for assessment and treatment of the patient.       Neo Leo, OTR/L [id number]

## 2021-05-26 NOTE — PROGRESS NOTES
[address] [facility]    Physical Therapy  Cancellation/No-show Note  Patient Name:  Kathryn Reynolds  :  2000   Date:  2021    Cancelled visits to date: 7  No-shows to date: 0    For today's appointment patient:  [x]  Cancelled 3/10, 3/15, 3/29, , , ,   []  Rescheduled appointment  []  No-show     Reason given by patient:  []  Patient ill  []  Conflicting appointment  []  No transportation    []  Conflict with work  []  No reason given  [x]  Other:     Comments: Called pt's mother today to see how patient was feeling/if he was coming to therapy. Pt's mother states pt tested positive for COVID19 and is in quarantine. Pt's mother states they will call to R/S once he is cleared to come back. Phone call information:   [x]  Phone call made today to patient at _ time at number provided:      [x]  Patient answered, conversation as follows: see above   []  Patient did not answer, message left as follows:  []  Phone call not made today  []  Phone call not needed - pt contacted us to cancel and provided reason for cancellation.      Electronically signed by:  Iraida Munoz, PT,DPT

## 2021-05-27 RX ORDER — HYDROCHLOROTHIAZIDE 25 MG/1
25 TABLET ORAL DAILY
Qty: 30 TABLET | OUTPATIENT
Start: 2021-05-27

## 2021-06-16 DIAGNOSIS — I10 ESSENTIAL HYPERTENSION: ICD-10-CM

## 2021-06-17 RX ORDER — HYDROCHLOROTHIAZIDE 25 MG/1
25 TABLET ORAL DAILY
Qty: 30 TABLET | Refills: 11 | Status: SHIPPED | OUTPATIENT
Start: 2021-06-17 | End: 2022-04-14

## 2021-06-22 ENCOUNTER — HOSPITAL ENCOUNTER (OUTPATIENT)
Dept: PHYSICAL THERAPY | Age: 21
Setting detail: THERAPIES SERIES
Discharge: HOME | End: 2021-06-22
Payer: MEDICARE

## 2021-06-22 ENCOUNTER — HOSPITAL ENCOUNTER (OUTPATIENT)
Dept: OCCUPATIONAL THERAPY | Age: 21
Setting detail: THERAPIES SERIES
Discharge: HOME | End: 2021-06-22
Payer: MEDICARE

## 2021-06-22 PROCEDURE — 97112 NEUROMUSCULAR REEDUCATION: CPT

## 2021-06-22 PROCEDURE — 97110 THERAPEUTIC EXERCISES: CPT

## 2021-06-22 PROCEDURE — 97168 OT RE-EVAL EST PLAN CARE: CPT

## 2021-06-22 PROCEDURE — 97164 PT RE-EVAL EST PLAN CARE: CPT

## 2021-06-22 NOTE — FLOWSHEET NOTE
Information:  Diagnosis: Cerebral palsy with spastic diplegia  Treatment Diagnosis: Decreased trunk control impacting ability to complete safe transfers, decreased standing tolerance, LE weakness  Insurance/Certification information:  PT Insurance Information: Medicare & Medicaid  Physician Information:  Referring Practitioner: JOHNNY Garrett  Plan of care signed (Y/N): [x]  Yes []  No     Date of Patient follow up with Physician:      Progress Report: []  Yes  [x]  No     Date Range for reporting period:  Beginning  4/1/21  Re-eval: 6/22  Ending    Progress report due (10 Rx/or 30 days whichever is less):  1/09    Recertification due (POC duration/ or 90 days whichever is less): 9/22     Visit # Insurance Allowable Auth required? Date Range   12/12 + 6/8 + 0/10 req Medical necessity []  Yes  [x]  No n/a     Latex Allergy:  [x]NO      []YES  Preferred Language for Healthcare:   [x]English       []Other:      Functional Scale/Test Evaluation 3/1/2021 4/21/21  6/22/21 Discharge   STREAM 23 26                        Pain level:  0/10  Location:  none    SUBJECTIVE:  Pt reports he feels more weak since not having PT and having COVID19. Pt has not been using his stander. Today is first time with his braces on since about May. Pt has had GI issues this morning and hasn't eaten anything. Pt reports his grandpa purchased a handicap [employer] with a lift and his mom was able to bring him to therapy today.      OBJECTIVE:  4/28:  Pt 15 mins late then needed to use restroom    Co-treat w/OT for safety and assistance    RESTRICTIONS/PRECAUTIONS: recent Bell's Palsy    Interventions/Exercises:   Therapeutic Exercises (96244) Resistance / level Sets/sec Reps Notes   W/c push ups in preparation for standing                            Neuromuscular Re-ed (69824) /  Gait Training (56049)       Upright posture seated in W/C   X 5 reps holding football, rest of reps completed with L UE bracing on L knee, PT hand assist on sternum and lumbar spine to facilitate                                Gait Training:  Amb in //bars    Bwd walking in //bars      Transfer w/B Lofstrand crutches   Static stand w/B Lofstrand crutches   8 ft    8 ft    Mod A of 2  Mod A of 2   -manual required to advance LEs, pt activation hip extensors to initiate swing phase, demo'd adequate weight-shifting. OT provided assist & w/c follow for safety      -difficulty with placement for adequate support                 Therapeutic Activities (18910) /  Functional Tasks       [facility] reaching across midline in seated   Pt seated in w/c using seat belt to help stay in w/c          [facility] reaching across midline and diagonally   Pt seated in w/c using seat belt to help stay in w/c               STS to std walker            Transfer to w/c from mat table            STS from w/c to mat table - with large bolster placed on table in front of patient to hold onto   Min A, w/c close to mat table to assist in blocking pt's knees. Manual Intervention ([phone])       Supine hamstring stretch 90/90    -completed by PT  -completed by OT                                        Sessions:     6/22: Stand pivot to mat table with min A. STREAM completed for reassessment - pt demo's increased score this date. Pt completed the following exercises seated EOM with 4\" step under feet and PVC pipe with orange TB and OT holding band: bicep curls x 10, mid rows x 10, trunk twists x 5 B; pt then completed PNF flexion with orange band x 1 B, and horiz abd with orange band x 10'. Pt completed LAQ - done bilaterally with upright posture x 5, then done B/L with arms extended behind pt and cues to lift chest and relax neck to avoid forward head.  Pt completed 1 static stand during the session at [facility] walker with mod A to block pt's knees while completing STREAM. Pt then completed squat pivot transfer back to w/c at end of session with min A.     5/12:  Squat pivot w/OT w/c to high mat table had too much in the pocket of his frankie and would most likely make a mess. Stand pivot w/c to same height mat table w/CGA, improved foot placement throughout but need to better place them prior to scooting back on edge of mat. Blue bungee ab crunches x5 mins w/intermittent rest breaks & time for recover following LOB. Forward reach, outside [place] for gold med ball, [place] reverse pass to PT behind him, then reach around back at sacrum level to receive ball, forward reach outside [place] to return to OT x10 w/intermittent rest and to recover LOB. Seated knee ext & flex w/assit from gliders x10 B -min/mod A for hip flex to further reduce friction OT provided min/mod A for balance. Stand pivot mat table to w/c w/min A and reduced eccentric control. Sit to stand at ballet barre x2 min/mod A of 2. Lateral stepping length of bar each way, w/min/mod A for balance, mod A to stabilize stance knee & facilitate WB'ing, CGA to mod A to advance ipsilateral LE. Sit to stand w/ballet barre x5.      5/5: Discussed with pt appropriate amount of time to spend in stander at home, with education on importance of quality of participation rather than quantity/duration; pt verbalized understanding, but may require reinforcement. Pt completed sit pivot transfer from w/c to mat table with Min A x 2. Pt participated in crossing midline for open hand slap onto gerardo disc x10 each side with SBA-min A for trunk control during task. Pt with tendency to lose balance towards R side. Pt required min-mod A for partial stand to place gerardo disc under buttocks. Pt seated on gerardo disc ~10 min to increase trunk strength and stability, with bilateral BUE reaching/shoulder flexion with CGA-mod A for trunk stability and blocking knees to prevent sliding forward. Pt completed a partial stand-> pivot edge of mat to w/c min A x 2 with verbal cues for safety as pt did not notify therapists prior to initiation of transfer.  Pt seated at barre in w/c with 2 sit to stands with min A and use of barre and in stance ~2 min x 2 trials with min progressing to mod A with fatigue for trunk support by OT and PT blocking knees/facilitating LE extension; tactile/vcs for upright posture. During first trial pt with shoulder flexion of BUEs to slide up wall. Pt sat in w/c to complete bilateral LE advancement of w/c for strengthening requiring verbal cues and assist to put LEs in position (partial knee extension) with PT blocking feet for proper technique as pt initially used rocking of trunk to advance w/c. Pt completed this for ~10 ft with almost constant cueing to not propel w/c by rocking trunk. Pt grasping cylindrical handle of 30# bungee cord to maintain 90* elbow flexion for stability of BUEs, followed by mid rows for scapular retraction with light facilitation by PT x10. In // bars, pt completed STS with B UE assist on bars and CGA for trunk control. Pt completed forward stepping length of // bars ~6 ft with mod A for R LE advancement and max A for L LE advancement. PT blocked stance leg during advancement assist of opposite LE with OT assist for trunk control min-mod A and wheelchair follow. Improved initiation of RLE movement on this date noted, specifically in knee flexion/extension and hip flexion. Continued verbal cues for placement of hands in front of body to prepare for use of lofstrand crutches. Pt declined need to toilet at end of session before transport arrived. Pt ended session with cross midline \"high 5\" x6 with BUEs. 4/28:    Pt about 12 mins late then needed to use restroom, delaying start of therapy.  //bars: sit to stand transition with B UE assist and CGA for trunk control. Amb ~6 steps with mod A for R LE advancement and max A for L LE advancement. PT min blocked stance leg during contralateral swing phase with OT assist for trunk control min-mod A and wheelchair follow.  Completed x3 with retro stepping after 2nd attempt ~6 steps with pt relying on momentum of trunk rotation for hip extension when stepping back. Continued verbal cues for placement of hands in front of body to prepare for use of lofstrand crutches. Also discussed potential use of posterior walker as pt previously used when ambulatory and due to pt's preference for hands used as [place] behind trunk. In treatment room, pt doffed/donned R AFO seated in wheelchair using technique of abducting hip to bring to side of wheelchair to place foot into AFO and shoe, and then placing foot on foot rest to adhere straps. Decreased time required with this method with pt agreeing to attempt donning both AFOs at home. Pt transferred to sit edge of mat from wheelchair with min A x2 to block feet and for trunk control during scooting. Continued difficulty with trunk flexion during lowering to sit far back on surface pt is transferring to affecting safety. With PT holding ball in front of pt, pt completed cross and hook style punches across body to opposite side x10 B with emphasis on trunk control and proprioceptive input of ball to improve grading of force; decreased trunk control on L punches due to R trunk weakness with mod-min A for sitting balance compared to CGA-min A on L. Trunk ext w/BUE [place] to trunk flex & shoulder ext to hit ball forcefully x3 -difficulty achieving necessary trunk ext and focused on BUEs [place] instead. Session concluded with mat to wheelchair transfer min A x2 with wheelchair on pt's right.      4/26:  Began session with OT applying foam padding to patient's ankle for cushion so pt can wear AFOs comfortably. Pt transferred to sit edge of mat from wheelchair with min A x2 to block feet and for trunk control during scooting. Grasping 20# bungee cord, pt completed horizontal ab/adduction x10, followed by mid row x10 with emphasis on scapular retraction and trunk control.  For continued trunk control during dynamic balance, bungee cord placed around pt's trunk with pt completed hip and thoracic extension against resistance of bungee cord and then returning to upright starting position x8, followed by R and L lateral lean x5 each with min A required for balance. Pt completed trunk rotation to reach behind self to grab bean bag to improve dynamic balance required for pericare while toileting, followed by tossing to bucket, with min A for ~3 instances LOB. Pt instructed on placing flat hands on surface of mat to complete weight bearing during trunk rotation to improve balance. Bean bags were tossed back to patient x3 with instruction  to catch with open hands to reduce tone. Pt returned to wheelchair from edge of mat with min A for transfer. Pt transitioned to // bars to complete ambulation x4 steps with max A x2 for trunk control and advancing LEs prior to seated rest break due to increased fatigue with lack of AFO support. Pt re-attempted with 3 steps, followed by static standing x60 seconds with max A + mod A with cues to keep arms in front of him to prepare for use of lofstrand crutches and to facilitate trunk engagement and knee extension. Pt sat on gerardo disc placed in wheelchair for trunk control with min-mod A for balance to complete ball toss/catch task with multiple LOB; theraband loop placed around knees to prevent abduction and improve pelvic stability and base of support. UB dressing completed to doff/don sweatshirt with SBA and good trunk control noted during weight shifts. Focus of today's session was to improve dynamic sitting and standing balance.         Modalities:     Pt. Education:  -patient educated on diagnosis, prognosis and expectations for rehab  -all patient questions were answered    HEP instruction:      NMR and Therapeutic Activities:    [x] (98877 or 79826) Provided verbal/tactile cueing for activities related to improving balance, coordination, kinesthetic sense, posture, motor skill, proprioception and motor activation to allow for proper function of   [x] LE / Core, hip and LE with self care and ADLs  [x] UE / Shoulder complex: cervical, postural, scapular, scapulothoracic and UE control with self care, carrying, lifting, driving, computer work.   [] (80221) Gait Re-education- Provided training and instruction to the patient for proper LE, core and hip recruitment, positioning, and eccentric body weight control with ambulation re-education, including ascending & descending stairs     Home Management Training / Self Care:  [] (72278) Provided self-care/home management training related to activities of daily living and compensatory training, and/or use of adaptive equipment for improvement with: ADLs and compensatory training, meal preparation, safety procedures and instruction in use of adaptive equipment, including bathing, grooming, dressing, personal hygiene, basic household cleaning and chores. Therapeutic Exercise and NMR EXR  [x] (09284) Provided verbal/tactile cueing for activities related to strengthening, flexibility, endurance, ROM for improvements in  [x] LE / Core stability: LE, hip, and core control with self care, mobility, lifting, ambulation. [] UE / Shoulder complex: cervical, postural, scapular, scapulothoracic and UE control with self care, reaching, carrying, lifting, house/yardwork, driving, computer work.  [] (93869) Provided verbal/tactile cueing for activities related to improving balance, coordination, kinesthetic sense, posture, motor skill, proprioception to assist with   [] LE / Core stability: LE, hip, and core control in self care, mobility, lifting, ambulation and eccentric single leg control.    [] UE / Shoulder complex: cervical, scapular, scapulothoracic and UE control with self care, reaching, carrying, lifting, house/yardwork, driving, computer work.   [] (20367) Therapist is in constant attendance of 2 or more patients providing skilled therapy interventions, but not providing any significant amount of measurable one-on-one time to either patient, for Towards Functional goals/ Treatment Progress Update:  [] Patient is progressing as expected towards functional goals listed. [x] Progression is slowed due to complexities/Impairments listed. [] Progression has been slowed due to co-morbidities. [] Plan just implemented, too soon to assess goals progression <30days   [] Goals require adjustment due to lack of progress  [] Patient is not progressing as expected and requires additional follow up with physician  [] Other    Persisting Functional Limitations/Impairments:  []Sleeping [x]Sitting               []Standing [x]Transfers        []Walking []Kneeling               []Stairs []Squatting / bending   [x]ADLs []Reaching  []Lifting  []Housework  []Driving []Job related tasks  []Sports/Recreation []Other:        ASSESSMENT:   Reassessment completed this date due to patient's long break since last therapy session. Will plan to resume gait training with partial [address] system next visit if pt able. Treatment/Activity Tolerance:  [x] Patient able to complete tx  [] Patient limited by fatigue  [] Patient limited by pain  [] Patient limited by other medical complications  [] Other:     Prognosis: [x] Good [] Fair  [] Poor    Patient Requires Follow-up: [x] Yes  [] No    Plan for next treatment session: transfers, seated core strength, will plan to co-treat with OT when possible    PLAN: See andres. PT 2x / week for 6 weeks. [x] Continue per plan of care [] Alter current plan (see comments)  [] Plan of care initiated [] Hold pending MD visit [] Discharge    Electronically signed by: Abdiel Ray, PT DPT    Note: If patient does not return for scheduled/ recommended follow up visits, his note will serve as a discharge from care along with most recent update on progress.

## 2021-06-22 NOTE — PLAN OF CARE
Occupational Therapy Re-Certification Plan of Care    Veda Almazan CNP   ,    We had the pleasure of treating the following patient for physical therapy services at [address] [place] [address]. A summary of our findings can be found in the updated assessment below. This includes our plan of care. If you have any questions or concerns regarding these findings, please do not hesitate to contact me at the office phone number checked above. Thank you for the referral.     Physician Signature:________________________________Date:__________________  By signing above (or electronic signature), therapists plan is approved by physician      Functional Outcome: stream total score is 27 supine is 9 sitting 18    Overall Response to Treatment:   [x]Patient is responding well to treatment and improvement is noted with regards  to goals   []Patient should continue to improve in reasonable time if they continue HEP   []Patient has plateaued and is no longer responding to skilled OT intervention    []Patient is getting worse and would benefit from return to referring MD   []Patient unable to adhere to initial POC   []Other:      Date range of Visits: 3/1/2021 to 2021  Total Visits:  17    Recommendation:    []Continue OT2x / wk for6 weeks. []Hold OT, pending MD visit  [facility] - Outpatient Occupational Therapy      Occupational Therapy Daily Treatment Note  Date:  2021    Patient: Xiomy Quezada   : 2000   MRN: [id number]  Referring Physician:  Quinn Hermosillo CNP       Medical Diagnosis Information: paraplegia, cerebral palsy status post covid has not been seen in clinic since                                                   Insurance information: medicare/ medicaid     Date of Injury:   Date of Surgery: rhizotomy when he was about 10    Progress Report: []  Yes  [x]  No     Date Range for reporting period:  2021 to 2021. Patient is status post covid recovery on this date. He has missed treatment since 5/12/2021    Progress report due (10 Rx/or 30 days whichever is less): visit #20 or 6/28/8187    Recertification due (POC duration/ or 90 days whichever is less): visit #20 or 7/21/2021    Visit # Insurance Allowable Auth required? Date Range   12/12 + 5/8  [place] []  Yes  [x]  No n/a       Latex Allergy:  []No      []Yes  Pacemaker:  [] No       [] Yes     Preferred Language for Healthcare:   [x]English       []other:    Pain level: 0     SUBJECTIVE:  Patient reports he has not been in [address] [place] [address] since getting covid . He reports his grandfather just purchased a [employer] for him. He may be getting a job after this therapy session is completed    Functional Disability Index:  STREAM: score 23    4/21/21: Stroke Rehabilitation Assessment of Movement (STREAM): total- 24/70 (supine- 9/15, sitting- 15/31, standing- 0/24)    OBJECTIVE: See eval    4/1/21: 3 minutes static stance at parallel bars with CGA x2 for trunk control and blocking knees      RESTRICTIONS/PRECAUTIONS: fall risk    Exercises/Interventions:  Re eval with stream completed. Patient then worked on transfers with min assist, sit to stand with mod to min assist of two, home program focusing on dynamic sitting balance and scapular stability with use of orange theraband to complete bilateral shoulder abduction, horizontal abduction to adduction , elbow flexion/ extension while seated at the edge of a stable surface to increase trunk demands. Therapeutic Exercises  Resistance / level Sets/sec Reps Notes                                                                                Neuromuscular Re-ed / Therapeutic Activities                                                 Manual Intervention                                                     Modalities:     Patient education:  Plan of care, importance of weight bearing in stander for overall health, home exercise program, goals. Home Exercise Program:   Patient encouraged to start using stander 3 sessions a day and while in stander completing over head reaches . Patient to try and reach 15 minute intervals in stander. Therapeutic Exercise and NMR:  [x] (17253) Provided verbal/tactile cueing for activities related to strengthening, flexibility, endurance, ROM  for improvements in scapular, scapulothoracic and UE control with self care, reaching, carrying, lifting, house/yardwork, driving/computer work. [x] (59310) Provided verbal/tactile cueing for activities related to improving balance, coordination, kinesthetic sense, posture, motor skill, proprioception  to assist with  scapular, scapulothoracic and UE control with self care, reaching, carrying, lifting, house/yardwork, driving/computer work.   [] Comments:    Therapeutic Activities:    [x] (02344 or 52594) Provided verbal/tactile cueing for activities related to improving balance, coordination, kinesthetic sense, posture, motor skill, proprioception and motor activation to allow for proper function of scapular, scapulothoracic and UE control with self care, carrying, lifting, driving/computer work  [] Comments:    Home Exercise Program:    [x] (15597) Reviewed/Progressed HEP activities related to strengthening, flexibility, endurance, ROM of scapular, scapulothoracic and UE control with self care, reaching, carrying, lifting, house/yardwork, driving/computer work  [] (47972) Reviewed/Progressed HEP activities related to improving balance, coordination, kinesthetic sense, posture, motor skill, proprioception of scapular, scapulothoracic and UE control with self care, reaching, carrying, lifting, house/yardwork, driving/computer work    [] Comments:    Manual Treatments:  PROM / STM / Oscillations-Mobs:  G-I, II, III, IV (PA's, Inf., Post.)  [] (48848) Provided manual therapy to mobilize soft tissue/joints of cervical/CT, scapular GHJ and UE for the purpose of modulating pain, promoting relaxation,  increasing ROM, reducing/eliminating soft tissue swelling/inflammation/restriction, improving soft tissue extensibility and allowing for proper ROM for normal function with self care, reaching, carrying, lifting, house/yardwork, driving/computer work  [] Comments:    ADL Training:  [] (86740) Provided self-care/home management training related to activities of daily living and compensatory training, and/or use of adaptive equipment   [] Comments:     Splinting:  [] Fabrication of:   [] (15501) Orthotic/Prosthetic Management, subsequent encounter  [] (87007) Orthotic management and training (fitting and assessment)  [] Comments:      Charges: co treat with PT for safety and increased intensity of treatment  Timed Code Treatment Minutes: 45   Total Treatment Minutes: 90     [] EVAL (LOW) 77805   [x] OT Re-eval (27553)  [] EVAL (MOD) 76427   [] EVAL (HIGH) 06451       [x] Ru (51092) x   1  [] GBFLA(16850)  [x] NMR (58041) x  1  [] Estim (attended) (53076)   [] Manual ([id number]) x      [] US (33662)  [] TA (87176) x     [] Paraffin (57148)  [] ADL  (50598) x     [] Splint/L code:    [] Estim (unattended) (78755)  [] Fluidotherapy (03448)  [] Other:    GOALS:    Patient stated goal: improved trunk control and standing tolerance to increase independence in self care. [x]? Progressing: []? Met: []? Not Met: []? Adjusted     Therapist goals for Patient:   Short Term Goals: To be achieved in: 30 days  1. Pt will be independent with clothing management on toilet or in wheelchair to complete toileting with use of grab bar. [x]? Progressing: []? Met: []? Not Met: []? Adjusted  2. Pt will be stand by assist completing scoot transfers on level surfaces  [x]? Progressing: []? Met: []? Not Met: []? Adjusted  3. Patient will be able to stand up to 30 seconds at grab bar for toileting and lower body dress with min assist.   [x]? Progressing: []? Met: []? Not Met: []? Adjusted     Long Term Goals:  To be achieved by discharge  1. Pt will demonstrate an improved stream score of 28. New goal is 32  []? Progressing: [x]? Met: []? Not Met: [x]? Adjusted    Progression Towards Functional goals:  [x] Patient is progressing as expected towards functional goals listed. [] Progression is slowed due to complexities listed. [] Progression has been slowed due to co-morbidities. [] Plan just implemented, too soon to assess goals progression  [] All goals are met  [] Other:     ASSESSMENT:   Patient has made nice progress in mobility, balance and executive function in spite of covid  Treatment/Activity Tolerance:  [x] Patient tolerated treatment well [] Patient limited by fatique  [] Patient limited by pain  [] Patient limited by other medical complications  [] Other:     Prognosis: [x] Good [] Fair  [] Poor    Patient Requires Follow-up: [x] Yes  [] No    PLAN: See eval  [x] Continue per plan of care [] Alter current plan (see comments)  [x] Plan of care initiated (MD cosigned) [] Hold pending MD visit [] Discharge    Electronically signed by:                 Note: If patient does not return for scheduled/ recommended follow up visits, this note will serve as a discharge from care along with most recent update on progress.

## 2021-06-28 ENCOUNTER — HOSPITAL ENCOUNTER (OUTPATIENT)
Dept: PHYSICAL THERAPY | Age: 21
Setting detail: THERAPIES SERIES
Discharge: HOME | End: 2021-06-28
Payer: MEDICARE

## 2021-06-28 ENCOUNTER — HOSPITAL ENCOUNTER (OUTPATIENT)
Dept: OCCUPATIONAL THERAPY | Age: 21
Setting detail: THERAPIES SERIES
Discharge: HOME | End: 2021-06-28
Payer: MEDICARE

## 2021-06-28 PROCEDURE — 97535 SELF CARE MNGMENT TRAINING: CPT

## 2021-06-28 PROCEDURE — 97112 NEUROMUSCULAR REEDUCATION: CPT

## 2021-06-28 PROCEDURE — 97530 THERAPEUTIC ACTIVITIES: CPT

## 2021-06-28 PROCEDURE — 97110 THERAPEUTIC EXERCISES: CPT

## 2021-06-28 NOTE — FLOWSHEET NOTE
Occupational Therapy Daily Treatment Note  Date:  2021    Patient: Joseph Iverson   : 2000   MRN: [id number]  Referring Physician:  Marisa Linn CNP       Medical Diagnosis Information: paraplegia, cerebral palsy status post covid has not been seen in clinic since                                                   Insurance information: medicare/ medicaid     Date of Injury:   Date of Surgery: rhizotomy when he was about 10    Progress Report: []  Yes  [x]  No     Date Range for reporting period:  2021 to 2021. Patient is status post covid recovery on this date. He has missed treatment since 2021    Progress report due (10 Rx/or 30 days whichever is less): visit #20 or     Recertification due (POC duration/ or 90 days whichever is less): visit #20 or 2021    Visit # Insurance Allowable Auth required? Date Range    +   [place] []  Yes  [x]  No n/a       Latex Allergy:  []No      []Yes  Pacemaker:  [] No       [] Yes     Preferred Language for Healthcare:   [x]English       []other:    Pain level: 0     SUBJECTIVE:  Patient requesting to use toilet upon arrival.     Functional Disability Index:  STREAM: score 23    21: Stroke Rehabilitation Assessment of Movement (STREAM): total- 24/ (supine- 9/15, sitting- 15/31, standing- )    OBJECTIVE: See eval    21: 3 minutes static stance at parallel bars with CGA x2 for trunk control and blocking knees      RESTRICTIONS/PRECAUTIONS: fall risk    Exercises/Interventions: . Patient taken to accessible bathroom. Patient used horizontal grab bar at side of toilet and behind toilet to pull up to stand. Able to maintain stand complete clothing management with contact guard of one person. Patient demonstrated significant improvement in eccentric control with sit to stand.   Patient then worked in [facility] [employer] on pelvic stabilization and lateral weight shifts with forward step progression needing mod assist of two times three each side with improved ability to maintain functional base of support. Patient then positioned in wheelchair facing elelvated mat with ues in position to weight bear through forearms patient completed lift off from wheelchair, transitioned to extended elbows and then back to forearms times three and then completed full stand for about 5 seconds with just stand by assist.    Patient then transferred to mat with contact guard, assumed supine handling lower extremity with straps and verbal cues for technique stand by assist.  Patient then transitioned to sidelying each direction propping on extended elbow and reaching times 5 each side. He then transitioned to sit edge of mat and transferred to chair with stand by assist    Therapeutic Exercises  Resistance / level Sets/sec Reps Notes                                                                                Neuromuscular Re-ed / Therapeutic Activities                                                 Manual Intervention                                                     Modalities:     Patient education:  Plan of care, importance of weight bearing in stander for overall health, home exercise program, goals. Home Exercise Program:   Patient encouraged to start using stander 3 sessions a day and while in stander completing over head reaches . Patient to try and reach 15 minute intervals in stander. Therapeutic Exercise and NMR:  [x] (10701) Provided verbal/tactile cueing for activities related to strengthening, flexibility, endurance, ROM  for improvements in scapular, scapulothoracic and UE control with self care, reaching, carrying, lifting, house/yardwork, driving/computer work.     [x] (44037) Provided verbal/tactile cueing for activities related to improving balance, coordination, kinesthetic sense, posture, motor skill, proprioception  to assist with  scapular, scapulothoracic and UE control with self care, reaching, carrying, lifting, house/yardwork, driving/computer work.   [] Comments:    Therapeutic Activities:    [x] (66060 or 15641) Provided verbal/tactile cueing for activities related to improving balance, coordination, kinesthetic sense, posture, motor skill, proprioception and motor activation to allow for proper function of scapular, scapulothoracic and UE control with self care, carrying, lifting, driving/computer work  [] Comments:    Home Exercise Program:    [x] (48636) Reviewed/Progressed HEP activities related to strengthening, flexibility, endurance, ROM of scapular, scapulothoracic and UE control with self care, reaching, carrying, lifting, house/yardwork, driving/computer work  [] (51054) Reviewed/Progressed HEP activities related to improving balance, coordination, kinesthetic sense, posture, motor skill, proprioception of scapular, scapulothoracic and UE control with self care, reaching, carrying, lifting, house/yardwork, driving/computer work    [] Comments:    Manual Treatments:  PROM / STM / Oscillations-Mobs:  G-I, II, III, IV (PA's, Inf., Post.)  [] (09283) Provided manual therapy to mobilize soft tissue/joints of cervical/CT, scapular GHJ and UE for the purpose of modulating pain, promoting relaxation,  increasing ROM, reducing/eliminating soft tissue swelling/inflammation/restriction, improving soft tissue extensibility and allowing for proper ROM for normal function with self care, reaching, carrying, lifting, house/yardwork, driving/computer work  [] Comments:    ADL Training:  [] (61436) Provided self-care/home management training related to activities of daily living and compensatory training, and/or use of adaptive equipment   [] Comments:     Splinting:  [] Fabrication of:   [] (56876) Orthotic/Prosthetic Management, subsequent encounter  [] (19258) Orthotic management and training (fitting and assessment)  [] Comments:      Charges: co treat with PT for safety and increased intensity of treatment  Timed Code Treatment Minutes: 45   Total Treatment Minutes: 90     [] EVAL (LOW) 21037   [] OT Re-eval (82602)  [] EVAL (MOD) 11385   [] EVAL (HIGH) 38800       [x] Ru (30807) x   1  [] WZSEJ(06995)  [x] NMR (51353) x  1  [] Estim (attended) (06463)   [] Manual ([id number]) x      [] LJ (81938)  [] TA (85465) x     [] Paraffin (86302)  [x] ADL  (27110) x     [] Splint/L code:    [] Estim (unattended) (05089)  [] Fluidotherapy (25041)  [] Other:    GOALS:    Patient stated goal: improved trunk control and standing tolerance to increase independence in self care. [x]? Progressing: []? Met: []? Not Met: []? Adjusted     Therapist goals for Patient:   Short Term Goals: To be achieved in: 30 days  1. Pt will be independent with clothing management on toilet or in wheelchair to complete toileting with use of grab bar. [x]? Progressing: []? Met: []? Not Met: []? Adjusted  2. Pt will be stand by assist completing scoot transfers on level surfaces  [x]? Progressing: []? Met: []? Not Met: []? Adjusted  3. Patient will be able to stand up to 30 seconds at grab bar for toileting and lower body dress with min assist.   [x]? Progressing: []? Met: []? Not Met: []? Adjusted     Long Term Goals: To be achieved by discharge  1. Pt will demonstrate an improved stream score of 28. New goal is 32  []? Progressing: [x]? Met: []? Not Met: [x]? Adjusted    Progression Towards Functional goals:  [x] Patient is progressing as expected towards functional goals listed. [] Progression is slowed due to complexities listed. [] Progression has been slowed due to co-morbidities.   [] Plan just implemented, too soon to assess goals progression  [] All goals are met  [] Other:     ASSESSMENT:   Patient has made nice progress in mobility, balance and executive function in spite of covid  Treatment/Activity Tolerance:  [x] Patient tolerated treatment well [] Patient limited by jaron  [] Patient limited by pain  [] Patient limited by other medical complications  [] Other:     Prognosis: [x] Good [] Fair  [] Poor    Patient Requires Follow-up: [x] Yes  [] No    PLAN: See eval  [x] Continue per plan of care [] Alter current plan (see comments)  [x] Plan of care initiated (MD cosigned) [] Hold pending MD visit [] Discharge    Electronically signed by:                 Note: If patient does not return for scheduled/ recommended follow up visits, this note will serve as a discharge from care along with most recent update on progress.

## 2021-06-28 NOTE — FLOWSHEET NOTE
assist on sternum and lumbar spine to facilitate                                Gait Training:  Amb in //bars    Bwd walking in //bars      Transfer w/B Lofstrand crutches   Static stand w/B Lofstrand crutches   8 ft    8 ft    Mod A of 2  Mod A of 2   -manual required to advance LEs, pt activation hip extensors to initiate swing phase, demo'd adequate weight-shifting. OT provided assist & w/c follow for safety      -difficulty with placement for adequate support                 Therapeutic Activities (76736) /  Functional Tasks       [facility] reaching across midline in seated   Pt seated in w/c using seat belt to help stay in w/c          [facility] reaching across midline and diagonally   Pt seated in w/c using seat belt to help stay in w/c               STS to std walker            Transfer to w/c from mat table            STS from w/c to mat table - with large bolster placed on table in front of patient to hold onto   Min A, w/c close to mat table to assist in blocking pt's knees. Manual Intervention ([id number] [facility])       Supine hamstring stretch 90/90    -completed by PT  -completed by OT                                        Sessions:     6/28: Pt assisted by both PT and OT in restroom at beginning of session. Pt completed multiple STS from [facility] with use of grab bars in restroom and SBA/CGA. Pt braced legs on toilet and used 1 HR on bar to balance self while completing LE dressing with minimal assistance only to get shorts out from underneath pt's feet. Pt also able to complete bridge in w/c to pull pants down. Pt able to complete STS in // bars with CGA and then completed stepping to target x 5 B/LE. Pt required less assistance to complete hip extension back to starting position. Mod to Max A to move leg fwd with other therapist blocking stance leg. At mat table, pt sat in w/c and leaned forward on forearms.  Pt then pushed through forearms and used glutes and quads to lift bottom from chair with therapists assisting to block knees and lift buttocks if needed. Pt able to transition from forearms to open palms with elbows extended and then to standing x 2. Pt transferred to the mat table to progress from seated to sidelying and was prompted to lift legs 1 at a time as he rolled to sidelying then supine. Pt worked on rolling to his R side, and then propping up on R hand to reach for points on the wall and therapists hand with his left. Pt was cued to lift his chest and reach while depressing his R shoulder. Pt performed on both sides with multiple reps of reaching while propped in the position. In supine, pt kicked each LE separately into extension against resistance from therapist, beginning with full ROM and progressing to extension pulses. Pt ended session by transferring back to w/c with min A/CGA. 6/22: Stand pivot to mat table with min A. STREAM completed for reassessment - pt rivera's increased score this date. Pt completed the following exercises seated EOM with 4\" step under feet and PVC pipe with orange TB and OT holding band: bicep curls x 10, mid rows x 10, trunk twists x 5 B; pt then completed PNF flexion with orange band x 1 B, and horiz abd with orange band x 10'. Pt completed LAQ - done bilaterally with upright posture x 5, then done B/L with arms extended behind pt and cues to lift chest and relax neck to avoid forward head. Pt completed 1 static stand during the session at [facility] walker with mod A to block pt's knees while completing STREAM. Pt then completed squat pivot transfer back to w/c at end of session with min A.     5/12:  Squat pivot w/OT w/c to high mat table min/mod A for safety and to facilitate LE use. Sit to supine mod A. [place] for gait training system was donned while supine, then adjusted while long sitting and sitting EOM. Mod A of 2 supine to long sit, min A of 1 for balance long sit to sitting EOM.   Pt setup inside [facility] partial weight-bearing gait training system for ambulation. Partial weight adjusted between 80 & 100 lbs depending on pt's ease of LE advancement. Amb 100' while PT & OT guided system for pt. Seated rest.  Amb [de-identified]' w/OT & student OT guiding NxStep system, PT assessing gait, provided mod A for trunk stability final 20 ft. Seated rest.  Amb 61' w/2 person guiding system & PT providing mod A for trunk stability -pt able to demo increased step length and weight shift when trunk was stabilized. Pt performed dips while in partial [address] system, 2x3, 1x5;  Pt performed bean bag toss with OT, working to facilitate lateral reach outside [place] w/opposit UE then toss into bucket. Pt passed gold med ball [place] to student PT and received to facilitate UE flex at end range and trunk ext while working on dynamic balance. Pt in good spirits after walking, excited to show family, and had increased fatigue. 5/10:    Squat pivot transfer w/OT, w/c to w/c height mat table w/min A and mod verbal cues for improved technique. Leaning onto RUE, hip/LE ext activation to prime mat table to floor transfer x6 w/manual cues for facilitation. Mat table to floor transfer mod A of 2. Tall kneeling at lowered mat table to work on weight shifting, hip ext, trunk ext, UE ex's for 50 mins. Floor to chair transfer, max A of 2. Sit to stand transfer to mat table w/o B AFO w/max A of 2, increased instability of B knees, prevented full stand. Donned B AFO, pt completed transfer mod A of 2 and able to stand for 5 mins to work on hip ext, trunk ext, and LE WB'ing. 5/7:   Upon arrival, pt requested to use restroom, stated he didn't want to attempt to urinate as he had too much in the pocket of his hoodie and would most likely make a mess. Stand pivot w/c to same height mat table w/CGA, improved foot placement throughout but need to better place them prior to scooting back on edge of mat. Blue bungee ab crunches x5 mins w/intermittent rest breaks & time for recover following LOB. advancement of w/c for strengthening requiring verbal cues and assist to put LEs in position (partial knee extension) with PT blocking feet for proper technique as pt initially used rocking of trunk to advance w/c. Pt completed this for ~10 ft with almost constant cueing to not propel w/c by rocking trunk. Pt grasping cylindrical handle of 30# bungee cord to maintain 90* elbow flexion for stability of BUEs, followed by mid rows for scapular retraction with light facilitation by PT x10. In // bars, pt completed STS with B UE assist on bars and CGA for trunk control. Pt completed forward stepping length of // bars ~6 ft with mod A for R LE advancement and max A for L LE advancement. PT blocked stance leg during advancement assist of opposite LE with OT assist for trunk control min-mod A and wheelchair follow. Improved initiation of RLE movement on this date noted, specifically in knee flexion/extension and hip flexion. Continued verbal cues for placement of hands in front of body to prepare for use of lofstrand crutches. Pt declined need to toilet at end of session before transport arrived. Pt ended session with cross midline \"high 5\" x6 with BUEs. 4/28:    Pt about 12 mins late then needed to use restroom, delaying start of therapy.  //bars: sit to stand transition with B UE assist and CGA for trunk control. Amb ~6 steps with mod A for R LE advancement and max A for L LE advancement. PT min blocked stance leg during contralateral swing phase with OT assist for trunk control min-mod A and wheelchair follow. Completed x3 with retro stepping after 2nd attempt ~6 steps with pt relying on momentum of trunk rotation for hip extension when stepping back. Continued verbal cues for placement of hands in front of body to prepare for use of lofstrand crutches. Also discussed potential use of posterior walker as pt previously used when ambulatory and due to pt's preference for hands used as [place] behind trunk.  In treatment with min A for ~3 instances LOB. Pt instructed on placing flat hands on surface of mat to complete weight bearing during trunk rotation to improve balance. Bean bags were tossed back to patient x3 with instruction  to catch with open hands to reduce tone. Pt returned to wheelchair from edge of mat with min A for transfer. Pt transitioned to // bars to complete ambulation x4 steps with max A x2 for trunk control and advancing LEs prior to seated rest break due to increased fatigue with lack of AFO support. Pt re-attempted with 3 steps, followed by static standing x60 seconds with max A + mod A with cues to keep arms in front of him to prepare for use of lofstrand crutches and to facilitate trunk engagement and knee extension. Pt sat on gerardo disc placed in wheelchair for trunk control with min-mod A for balance to complete ball toss/catch task with multiple LOB; theraband loop placed around knees to prevent abduction and improve pelvic stability and base of support. UB dressing completed to doff/don sweatshirt with SBA and good trunk control noted during weight shifts. Focus of today's session was to improve dynamic sitting and standing balance.         Modalities:     Pt. Education:  -patient educated on diagnosis, prognosis and expectations for rehab  -all patient questions were answered    HEP instruction:      NMR and Therapeutic Activities:    [x] (07126 or 00445) Provided verbal/tactile cueing for activities related to improving balance, coordination, kinesthetic sense, posture, motor skill, proprioception and motor activation to allow for proper function of   [x] LE / Core, hip and LE with self care and ADLs  [x] UE / Shoulder complex: cervical, postural, scapular, scapulothoracic and UE control with self care, carrying, lifting, driving, computer work.   [] (87004) Gait Re-education- Provided training and instruction to the patient for proper LE, core and hip recruitment, positioning, and eccentric body weight control with ambulation re-education, including ascending & descending stairs     Home Management Training / Self Care:  [] (84632) Provided self-care/home management training related to activities of daily living and compensatory training, and/or use of adaptive equipment for improvement with: ADLs and compensatory training, meal preparation, safety procedures and instruction in use of adaptive equipment, including bathing, grooming, dressing, personal hygiene, basic household cleaning and chores. Therapeutic Exercise and NMR EXR  [x] (44466) Provided verbal/tactile cueing for activities related to strengthening, flexibility, endurance, ROM for improvements in  [x] LE / Core stability: LE, hip, and core control with self care, mobility, lifting, ambulation. [] UE / Shoulder complex: cervical, postural, scapular, scapulothoracic and UE control with self care, reaching, carrying, lifting, house/yardwork, driving, computer work.  [] (01851) Provided verbal/tactile cueing for activities related to improving balance, coordination, kinesthetic sense, posture, motor skill, proprioception to assist with   [] LE / Core stability: LE, hip, and core control in self care, mobility, lifting, ambulation and eccentric single leg control. [] UE / Shoulder complex: cervical, scapular, scapulothoracic and UE control with self care, reaching, carrying, lifting, house/yardwork, driving, computer work.   [] (84761) Therapist is in constant attendance of 2 or more patients providing skilled therapy interventions, but not providing any significant amount of measurable one-on-one time to either patient, for improvements in  [] LE / Core stability: LE, hip, and core control in self care, mobility, lifting, ambulation and eccentric single leg control. [] UE / Shoulder complex: cervical, scapular, scapulothoracic and UE control with self care, reaching, carrying, lifting, house/yardwork, driving, computer work.      Home Exercise Program:    [x] (05902) Reviewed/Progressed HEP activities related to strengthening, flexibility, endurance, ROM of   [] LE / Core stability: core, hip and LE for functional self-care, mobility, lifting and ambulation/stair navigation   [] UE / Shoulder complex: cervical, postural, scapular, scapulothoracic and UE control with self care, reaching, carrying, lifting, house/yardwork, driving, computer work  [] (04471)Reviewed/Progressed HEP activities related to improving balance, coordination, kinesthetic sense, posture, motor skill, proprioception of   [] LE: core, hip and LE for self care, mobility, lifting, and ambulation/stair navigation    [] UE / Shoulder complex: cervical, postural,  scapular, scapulothoracic and UE control with self care, reaching, carrying, lifting, house/yardwork, driving, computer work    Manual Treatments:  PROM / STM / Oscillations-Mobs:  G-I, II, III, IV (PA's, Inf., Post.)  [] (62389) Provided manual therapy to mobilize shoulder complex, hip, LE, and/or cervicothoracic/LS spine soft tissue/joints for the purpose of modulating pain, promoting relaxation,  increasing ROM, reducing/eliminating soft tissue swelling/inflammation/restriction, improving soft tissue extensibility and allowing for proper ROM for normal function with   [] LE / Core stability: self care, mobility, lifting and ambulation. [] UE / Shoulder complex: self care, reaching, carrying, lifting, house/yardwork, driving, computer work. Modalities:  [] (26290) Vasopneumatic compression: Utilized vasopneumatic compression to decrease edema / swelling for the purpose of improving mobility and quad tone / recruitment which will allow for increased overall function including but not limited to self-care, transfers, ambulation, and ascending / descending stairs.          Charges:  Timed Code Treatment Minutes: 40   Total Treatment Minutes: 85   **CO-treat with OT    [] EVAL - LOW (80364)   [] EVAL - MOD (33636)  [] EVAL - HIGH (50748)  [] RE-EVAL (79428)  [x] TE (07522) x  1    [] Ionto  [] NMR (49040) x       [] Vaso  [] Manual (64201) x      [] Ultrasound  [x] TA x  2     [] Mech Traction (13722)  [] Gait Training x     [] ES (un) (03317):   [] Aquatic therapy x   [] Other:   [] Group:     GOALS:   Patient stated goal: improve ability to complete transfers   []? Progressing: []? Met: []? Not Met: []? Adjusted     Therapist goals for Patient:   Short Term Goals: To be achieved in: 2 weeks  1. Independent in HEP and progression per patient tolerance, in order to prevent re-injury. []? Progressing: [x]? Met: []? Not Met: []? Adjusted  2. Patient will have a decrease in pain to facilitate improvement in movement, function, and ADLs as indicated by Functional Deficits. []? Progressing: [x]? Met: []? Not Met: []? Adjusted     Long Term Goals: To be achieved in: 6 weeks  1. Patient will report increased standing tolerance to at least 30 minutes in standing frame. []? Progressing: [x]? Met: []? Not Met: []? Adjusted  2. Patient will demonstrate an increase in strength to good shoulder & hip complex, and core activation to allow for proper functional mobility as indicated by patients Functional Deficits. [x]? Progressing: []? Met: []? Not Met: []? Adjusted  3. Patient will return to functional activities including demo'ing safe transfers to/from w/c with mod I.    [x]? Progressing: []? Met: []? Not Met: []? Adjusted  4. Patient will increase STREAM score to 32 or above for increased UE/LE movement in sitting, supine, and standing. [x]? Progressing: []? Met: []? Not Met: []? Adjusted          Overall Progression Towards Functional goals/ Treatment Progress Update:  [] Patient is progressing as expected towards functional goals listed. [x] Progression is slowed due to complexities/Impairments listed. [] Progression has been slowed due to co-morbidities.   [] Plan just implemented, too soon to assess goals progression <30days   [] Goals require adjustment due to lack of progress  [] Patient is not progressing as expected and requires additional follow up with physician  [] Other    Persisting Functional Limitations/Impairments:  []Sleeping [x]Sitting               []Standing [x]Transfers        []Walking []Kneeling               []Stairs []Squatting / bending   [x]ADLs []Reaching  []Lifting  []Housework  []Driving []Job related tasks  []Sports/Recreation []Other:        ASSESSMENT:   Pt with much improved ability to complete STS transfers this date, requiring less assistance than at previous sessions. Pt able to complete toileting task with primarily CGA for safety while bracing legs against the toilet and using grab bars in restroom. Overall, pt demonstrates greatly improved trunk control since initial evaluation. Pt also able to actively engage both hamstrings and glutes more this session than at previous sessions. Will continue to improve functional movement to promote independence and safety. Treatment/Activity Tolerance:  [x] Patient able to complete tx  [] Patient limited by fatigue  [] Patient limited by pain  [] Patient limited by other medical complications  [] Other:     Prognosis: [x] Good [] Fair  [] Poor    Patient Requires Follow-up: [x] Yes  [] No    Plan for next treatment session: transfers, seated core strength, will plan to co-treat with OT when possible    PLAN: See eval. PT 2x / week for 6 weeks. [x] Continue per plan of care [] Alter current plan (see comments)  [] Plan of care initiated [] Hold pending MD visit [] Discharge    Electronically signed by: Marzella Hammans, PT DPT    Note: If patient does not return for scheduled/ recommended follow up visits, his note will serve as a discharge from care along with most recent update on progress.

## 2021-07-06 ENCOUNTER — HOSPITAL ENCOUNTER (OUTPATIENT)
Dept: OCCUPATIONAL THERAPY | Age: 21
Setting detail: THERAPIES SERIES
Discharge: HOME | End: 2021-07-06
Payer: MEDICARE

## 2021-07-06 ENCOUNTER — HOSPITAL ENCOUNTER (OUTPATIENT)
Dept: PHYSICAL THERAPY | Age: 21
Setting detail: THERAPIES SERIES
Discharge: HOME | End: 2021-07-06
Payer: MEDICARE

## 2021-07-06 PROCEDURE — 97112 NEUROMUSCULAR REEDUCATION: CPT

## 2021-07-06 PROCEDURE — 97530 THERAPEUTIC ACTIVITIES: CPT

## 2021-07-06 PROCEDURE — 97110 THERAPEUTIC EXERCISES: CPT

## 2021-07-06 NOTE — FLOWSHEET NOTE
[facility] - Outpatient Physical Therapy  Phone: [phone]   Fax: [phone]    Physical Therapy Daily Treatment Note  Date:  2021     Patient Name:  Linda Earl    :  2000  MRN: [id number]  Medical/Treatment Diagnosis Information:  Diagnosis: Cerebral palsy with spastic diplegia  Treatment Diagnosis: Decreased trunk control impacting ability to complete safe transfers, decreased standing tolerance, LE weakness  Insurance/Certification information:  PT Insurance Information: Medicare & Medicaid  Physician Information:  Referring Practitioner: JOHNNY Jerome  Plan of care signed (Y/N): [x]  Yes []  No     Date of Patient follow up with Physician:      Progress Report: []  Yes  [x]  No     Date Range for reporting period:  Beginning  21  Re-eval:   Ending    Progress report due (10 Rx/or 30 days whichever is less):      Recertification due (POC duration/ or 90 days whichever is less):      Visit # Insurance Allowable Auth required? Date Range    +  + 010 larry Medical necessity []  Yes  [x]  No n/a     Latex Allergy:  [x]NO      []YES  Preferred Language for Healthcare:   [x]English       []Other:      Functional Scale/Test Evaluation 3/1/2021 4/21/21  6/22/21 Discharge   STREAM                         Pain level:  0/10  Location:  none    SUBJECTIVE:  Pt reports he fell the other day. He was trying to stand but once he stood up, he lost his balance and there was nothing in front of him to grab onto.      OBJECTIVE:  :  Pt 15 mins late then needed to use restroom    Co-treat w/OT for safety and assistance    RESTRICTIONS/PRECAUTIONS: recent Bell's Palsy    Interventions/Exercises:   Therapeutic Exercises (35573) Resistance / level Sets/sec Reps Notes   W/c push ups in preparation for standing                            Neuromuscular Re-ed (06245) /  Gait Training (84717)       Upright posture seated in W/C   X 5 reps holding football, rest of reps completed with L UE bracing on L knee, PT hand assist on sternum and lumbar spine to facilitate                                Gait Training:  Amb in //bars    Bwd walking in //bars      Transfer w/B Lofstrand crutches   Static stand w/B Lofstrand crutches   8 ft    8 ft    Mod A of 2  Mod A of 2   -manual required to advance LEs, pt activation hip extensors to initiate swing phase, demo'd adequate weight-shifting. OT provided assist & w/c follow for safety      -difficulty with placement for adequate support                 Therapeutic Activities (65601) /  Functional Tasks       [facility] reaching across midline in seated   Pt seated in w/c using seat belt to help stay in w/c          [facility] reaching across midline and diagonally   Pt seated in w/c using seat belt to help stay in w/c               STS to std walker            Transfer to w/c from mat table            STS from w/c to mat table - with large bolster placed on table in front of patient to hold onto   Min A, w/c close to mat table to assist in blocking pt's knees. Manual Intervention ([phone])       Supine hamstring stretch 90/90    -completed by PT  -completed by OT                                        Sessions:     7/6: Session began by assisting pt in restroom again. Pt used grab bars around toilet to pull to standing with CGA and then leaned forward on wall while completing toileting. Pt sat down in w/c, then stood again to pull up pants. Session then moved to turf room. Pt transferred to mat table there with CGA. Pat sat EOB and was cued to lift chest for upright posture and worked on pushing sled away. Sled had two 25# plates loaded onto it. Pt also worked on initiating a stand while pushing into sled and patient had a few successful trials in which he was able to clear his buttocks from the mat.  Pt worked on trunk control in which he was holding a 4# medicine ball and reaching overhead, out in front, and down to the floor over several trials with elbows extended, chest lifted, and hands spread wide on the ball. Pt also worked on trunk control and lengthening reaching across midline towards a target with stabilizing on table using his ipsilateral hand. PT/OT then placed pt into harness for use with the MobileDevHQStep system. Once centered in system, pt stood and worked on stepping fwd and retro in place. Pt completed 2-3 trials of stepping with each LE and then took a seated rest break. Upon the second trial of standing, his weight was shifted too far anteriorly and pt was unable to advance either LE. Pt sat again and was too fatigued to continue so the session was ended. 6/28: Pt assisted by both PT and OT in restroom at beginning of session. Pt completed multiple STS from [facility] with use of grab bars in restroom and SBA/CGA. Pt braced legs on toilet and used 1 HR on bar to balance self while completing LE dressing with minimal assistance only to get shorts out from underneath pt's feet. Pt also able to complete bridge in w/c to pull pants down. Pt able to complete STS in // bars with CGA and then completed stepping to target x 5 B/LE. Pt required less assistance to complete hip extension back to starting position. Mod to Max A to move leg fwd with other therapist blocking stance leg. At mat table, pt sat in w/c and leaned forward on forearms. Pt then pushed through forearms and used glutes and quads to lift bottom from chair with therapists assisting to block knees and lift buttocks if needed. Pt able to transition from forearms to open palms with elbows extended and then to standing x 2. Pt transferred to the mat table to progress from seated to sidelying and was prompted to lift legs 1 at a time as he rolled to sidelying then supine. Pt worked on rolling to his R side, and then propping up on R hand to reach for points on the wall and therapists hand with his left.  Pt was cued to lift his chest and reach while depressing his R shoulder. Pt performed on both sides with multiple reps of reaching while propped in the position. In supine, pt kicked each LE separately into extension against resistance from therapist, beginning with full ROM and progressing to extension pulses. Pt ended session by transferring back to w/c with min A/CGA. 6/22: Stand pivot to mat table with min A. STREAM completed for reassessment - pt demo's increased score this date. Pt completed the following exercises seated EOM with 4\" step under feet and PVC pipe with orange TB and OT holding band: bicep curls x 10, mid rows x 10, trunk twists x 5 B; pt then completed PNF flexion with orange band x 1 B, and horiz abd with orange band x 10'. Pt completed LAQ - done bilaterally with upright posture x 5, then done B/L with arms extended behind pt and cues to lift chest and relax neck to avoid forward head. [facility] mod A to block pt's knees while completing STREAM. Pt then completed squat pivot transfer back to w/c at end of session with min A.     5/12:  Squat pivot w/OT w/c to high mat table min/mod A for safety and to facilitate LE use. Sit to supine mod A. Harness for gait training system was donned while supine, then adjusted while long sitting and sitting EOM. Mod A of 2 supine to long sit, min A of 1 for balance long sit to sitting EOM. Pt setup inside NxStep partial weight-bearing gait training system for ambulation. Partial weight adjusted between 80 & 100 lbs depending on pt's ease of LE advancement. Amb 100' while PT & OT guided system for pt. Seated rest.  Amb [de-identified]' w/OT & student OT guiding NxStep system, PT assessing gait, provided mod A for trunk stability final 20 ft. Seated rest.  Amb 61' w/2 person guiding system & PT providing mod A for trunk stability -pt able to demo increased step length and weight shift when trunk was stabilized.   Pt performed dips while in partial WB'ing system, 2x3, 1x5;  Pt performed bean bag toss with OT, working to facilitate lateral reach outside [place] w/opposit UE then toss into bucket. Pt passed gold med ball [place] to student PT and received to facilitate UE flex at end range and trunk ext while working on dynamic balance. Pt in good spirits after walking, excited to show family, and had increased fatigue. 5/10:    Squat pivot transfer w/OT, w/c to w/c height mat table w/min A and mod verbal cues for improved technique. Leaning onto RUE, hip/LE ext activation to prime mat table to floor transfer x6 w/manual cues for facilitation. Mat table to floor transfer mod A of 2. Tall kneeling at lowered mat table to work on weight shifting, hip ext, trunk ext, UE ex's for 50 mins. Floor to chair transfer, max A of 2. Sit to stand transfer to mat table w/o B AFO w/max A of 2, increased instability of B knees, prevented full stand. Donned B AFO, pt completed transfer mod A of 2 and able to stand for 5 mins to work on hip ext, trunk ext, and LE WB'ing. 5/7:   Upon arrival, pt requested to use restroom, stated he didn't want to attempt to urinate as he had too much in the pocket of his hoodie and would most likely make a mess. Stand pivot w/c to same height mat table w/CGA, improved foot placement throughout but need to better place them prior to scooting back on edge of mat. Blue bungee ab crunches x5 mins w/intermittent rest breaks & time for recover following LOB. Forward reach, outside [place] for gold med ball, [place] reverse pass to PT behind him, then reach around back at sacrum level to receive ball, forward reach outside [place] to return to OT x10 w/intermittent rest and to recover LOB. Seated knee ext & flex w/assit from gliders x10 B -min/mod A for hip flex to further reduce friction OT provided min/mod A for balance. Stand pivot mat table to w/c w/min A and reduced eccentric control. Sit to stand at ballet barre x2 min/mod A of 2.   Lateral stepping length of bar each way, w/min/mod A for balance, mod A to stabilize stance knee & facilitate WB'ing, CGA to mod A to advance ipsilateral LE. Sit to stand w/ballet barre x5.      5/5: Discussed with pt appropriate amount of time to spend in stander at home, with education on importance of quality of participation rather than quantity/duration; pt verbalized understanding, but may require reinforcement. Pt completed sit pivot transfer from w/c to mat table with Min A x 2. Pt participated in crossing midline for open hand slap onto gerardo disc x10 each side with SBA-min A for trunk control during task. Pt with tendency to lose balance towards R side. Pt required min-mod A for partial stand to place gerardo disc under buttocks. Pt seated on gerardo disc ~10 min to increase trunk strength and stability, with bilateral BUE reaching/shoulder flexion with CGA-mod A for trunk stability and blocking knees to prevent sliding forward. Pt completed a partial stand-> pivot edge of mat to w/c min A x 2 with verbal cues for safety as pt did not notify therapists prior to initiation of transfer. Pt seated at barre in w/c with 2 sit to stands with min A and use of barre and in stance ~2 min x 2 trials with min progressing to mod A with fatigue for trunk support by OT and PT blocking knees/facilitating LE extension; tactile/vcs for upright posture. During first trial pt with shoulder flexion of BUEs to slide up wall. Pt sat in w/c to complete bilateral LE advancement of w/c for strengthening requiring verbal cues and assist to put LEs in position (partial knee extension) with PT blocking feet for proper technique as pt initially used rocking of trunk to advance w/c. Pt completed this for ~10 ft with almost constant cueing to not propel w/c by rocking trunk. Pt grasping cylindrical handle of 30# bungee cord to maintain 90* elbow flexion for stability of BUEs, followed by mid rows for scapular retraction with light facilitation by PT x10.  In // bars, pt completed STS with B UE assist on bars and CGA for trunk control. Pt completed forward stepping length of // bars ~6 ft with mod A for R LE advancement and max A for L LE advancement. PT blocked stance leg during advancement assist of opposite LE with OT assist for trunk control min-mod A and wheelchair follow. Improved initiation of RLE movement on this date noted, specifically in knee flexion/extension and hip flexion. Continued verbal cues for placement of hands in front of body to prepare for use of lofstrand crutches. Pt declined need to toilet at end of session before transport arrived. Pt ended session with cross midline \"high 5\" x6 with BUEs. 4/28:    Pt about 12 mins late then needed to use restroom, delaying start of therapy.  //bars: sit to stand transition with B UE assist and CGA for trunk control. Amb ~6 steps with mod A for R LE advancement and max A for L LE advancement. PT min blocked stance leg during contralateral swing phase with OT assist for trunk control min-mod A and wheelchair follow. Completed x3 with retro stepping after 2nd attempt ~6 steps with pt relying on momentum of trunk rotation for hip extension when stepping back. Continued verbal cues for placement of hands in front of body to prepare for use of lofstrand crutches. Also discussed potential use of posterior walker as pt previously used when ambulatory and due to pt's preference for hands used as [place] behind trunk. In treatment room, pt doffed/donned R AFO seated in wheelchair using technique of abducting hip to bring to side of wheelchair to place foot into AFO and shoe, and then placing foot on foot rest to adhere straps. Decreased time required with this method with pt agreeing to attempt donning both AFOs at home. Pt transferred to sit edge of mat from wheelchair with min A x2 to block feet and for trunk control during scooting.  Continued difficulty with trunk flexion during lowering to sit far back on surface pt is transferring to affecting safety. With PT holding ball in front of pt, pt completed cross and hook style punches across body to opposite side x10 B with emphasis on trunk control and proprioceptive input of ball to improve grading of force; decreased trunk control on L punches due to R trunk weakness with mod-min A for sitting balance compared to CGA-min A on L. Trunk ext w/BUE [place] to trunk flex & shoulder ext to hit ball forcefully x3 -difficulty achieving necessary trunk ext and focused on BUEs [place] instead. Session concluded with mat to wheelchair transfer min A x2 with wheelchair on pt's right.      4/26:  Began session with OT applying foam padding to patient's ankle for cushion so pt can wear AFOs comfortably. Pt transferred to sit edge of mat from wheelchair with min A x2 to block feet and for trunk control during scooting. Grasping 20# bungee cord, pt completed horizontal ab/adduction x10, followed by mid row x10 with emphasis on scapular retraction and trunk control. For continued trunk control during dynamic balance, bungee cord placed around pt's trunk with pt completed hip and thoracic extension against resistance of bungee cord and then returning to upright starting position x8, followed by R and L lateral lean x5 each with min A required for balance. Pt completed trunk rotation to reach behind self to grab bean bag to improve dynamic balance required for pericare while toileting, followed by tossing to bucket, with min A for ~3 instances LOB. Pt instructed on placing flat hands on surface of mat to complete weight bearing during trunk rotation to improve balance. Bean bags were tossed back to patient x3 with instruction  to catch with open hands to reduce tone. Pt returned to wheelchair from edge of mat with min A for transfer.  Pt transitioned to // bars to complete ambulation x4 steps with max A x2 for trunk control and advancing LEs prior to seated rest break due to increased fatigue with lack of AFO support. Pt re-attempted with 3 steps, followed by static standing x60 seconds with max A + mod A with cues to keep arms in front of him to prepare for use of lofstrand crutches and to facilitate trunk engagement and knee extension. Pt sat on gerardo disc placed in wheelchair for trunk control with min-mod A for balance to complete ball toss/catch task with multiple LOB; theraband loop placed around knees to prevent abduction and improve pelvic stability and base of support. UB dressing completed to doff/don sweatshirt with SBA and good trunk control noted during weight shifts. Focus of today's session was to improve dynamic sitting and standing balance.         Modalities:     Pt. Education:  -patient educated on diagnosis, prognosis and expectations for rehab  -all patient questions were answered    HEP instruction:      NMR and Therapeutic Activities:    [x] (40594 or 75631) Provided verbal/tactile cueing for activities related to improving balance, coordination, kinesthetic sense, posture, motor skill, proprioception and motor activation to allow for proper function of   [x] LE / Core, hip and LE with self care and ADLs  [x] UE / Shoulder complex: cervical, postural, scapular, scapulothoracic and UE control with self care, carrying, lifting, driving, computer work.   [] (52082) Gait Re-education- Provided training and instruction to the patient for proper LE, core and hip recruitment, positioning, and eccentric body weight control with ambulation re-education, including ascending & descending stairs     Home Management Training / Self Care:  [] (34123) Provided self-care/home management training related to activities of daily living and compensatory training, and/or use of adaptive equipment for improvement with: ADLs and compensatory training, meal preparation, safety procedures and instruction in use of adaptive equipment, including bathing, grooming, dressing, personal hygiene, basic household cleaning and Independent in HEP and progression per patient tolerance, in order to prevent re-injury. []? Progressing: [x]? Met: []? Not Met: []? Adjusted  2. Patient will have a decrease in pain to facilitate improvement in movement, function, and ADLs as indicated by Functional Deficits. []? Progressing: [x]? Met: []? Not Met: []? Adjusted     Long Term Goals: To be achieved in: 6 weeks  1. Patient will report increased standing tolerance to at least 30 minutes in standing frame. []? Progressing: [x]? Met: []? Not Met: []? Adjusted  2. Patient will demonstrate an increase in strength to good shoulder & hip complex, and core activation to allow for proper functional mobility as indicated by patients Functional Deficits. [x]? Progressing: []? Met: []? Not Met: []? Adjusted  3. Patient will return to functional activities including demo'ing safe transfers to/from w/c with mod I.    [x]? Progressing: []? Met: []? Not Met: []? Adjusted  4. Patient will increase STREAM score to 32 or above for increased UE/LE movement in sitting, supine, and standing. [x]? Progressing: []? Met: []? Not Met: []? Adjusted          Overall Progression Towards Functional goals/ Treatment Progress Update:  [] Patient is progressing as expected towards functional goals listed. [x] Progression is slowed due to complexities/Impairments listed. [] Progression has been slowed due to co-morbidities.   [] Plan just implemented, too soon to assess goals progression <30days   [] Goals require adjustment due to lack of progress  [] Patient is not progressing as expected and requires additional follow up with physician  [] Other    Persisting Functional Limitations/Impairments:  []Sleeping [x]Sitting               []Standing [x]Transfers        []Walking []Kneeling               []Stairs []Squatting / bending   [x]ADLs []Reaching  []Lifting  []Housework  []Driving []Job related tasks  []Sports/Recreation []Other:        ASSESSMENT:   Pt able to complete several STS this date throughout the session with CGA and pt pulling up on grab bars in restroom or bar on NxStep system. Pt also demo's improved trunk control. If he lost his balance while reaching across midline, he was able to catch himself and then push up using his forearm which he had not been able to do previously. Next session, will plan to complete more stepping with harness and NxStep system before pt becomes too fatigued. Treatment/Activity Tolerance:  [x] Patient able to complete tx  [] Patient limited by fatigue  [] Patient limited by pain  [] Patient limited by other medical complications  [] Other:     Prognosis: [x] Good [] Fair  [] Poor    Patient Requires Follow-up: [x] Yes  [] No    Plan for next treatment session: transfers, seated core strength, will plan to co-treat with OT when possible    PLAN: See andres. PT 2x / week for 6 weeks. [x] Continue per plan of care [] Alter current plan (see comments)  [] Plan of care initiated [] Hold pending MD visit [] Discharge    Electronically signed by: Elan Mondragon PT DPT    Note: If patient does not return for scheduled/ recommended follow up visits, his note will serve as a discharge from care along with most recent update on progress.

## 2021-07-06 NOTE — FLOWSHEET NOTE
Occupational Therapy Daily Treatment Note  Date:  2021    Patient: Mando Courtney   : 2000   MRN: [id number]  Referring Physician:  Jay Jurado CNP       Medical Diagnosis Information: paraplegia, cerebral palsy status post covid has not been seen in clinic since                                                   Insurance information: medicare/ medicaid     Date of Injury:   Date of Surgery: rhizotomy when he was about 10    Progress Report: []  Yes  [x]  No     Date Range for reporting period:  2021 to 2021. Patient is status post covid recovery on this date. He has missed treatment since 2021    Progress report due (10 Rx/or 30 days whichever is less): visit #20 or     Recertification due (POC duration/ or 90 days whichever is less): visit #20 or 2021    Visit # Insurance Allowable Auth required? Date Range    +   [place] []  Yes  [x]  No n/a       Latex Allergy:  []No      []Yes  Pacemaker:  [] No       [] Yes     Preferred Language for Healthcare:   [x]English       []other:    Pain level: 0     SUBJECTIVE:  Patient requesting to use toilet upon arrival.     Functional Disability Index:  STREAM: score 23    21: Stroke Rehabilitation Assessment of Movement (STREAM): total- 24/ (supine- 9/15, sitting- 15/31, standing- )    OBJECTIVE: See eval    21: 3 minutes static stance at parallel bars with CGA x2 for trunk control and blocking knees      RESTRICTIONS/PRECAUTIONS: fall risk    Exercises/Interventions:  Session began by assisting pt in restroom again. Pt used grab bars around toilet to pull to standing with CGA and then leaned forward on wall while completing toileting. Pt sat down in w/c, then stood again to pull up pants. Session then moved to [facility] room. Pt transferred to mat table there with CGA. Pat sat EOB and was cued to lift chest for upright posture and worked on pushing sled away. Sled had two 25# plates loaded onto it. Pt also worked on initiating a stand while pushing into sled and patient had a few successful trials in which he was able to clear his buttocks from the mat. Pt worked on trunk control in which he was holding a 4# medicine ball and reaching overhead, out in front, and down to the floor over several trials with elbows extended, chest lifted, and hands spread wide on the ball. Pt also worked on trunk control and lengthening reaching across midline towards a target with stabilizing on table using his ipsilateral hand. PT/OT then placed pt into harness for use with the [employer] system. Once centered in system, pt stood and worked on stepping fwd and retro in place. Pt completed 2-3 trials of stepping with each LE and then took a seated rest break. Upon the second trial of standing, his weight was shifted too far anteriorly and pt was unable to advance either LE. Pt sat again and was too fatigued to continue so the session was ended. Therapeutic Exercises  Resistance / level Sets/sec Reps Notes                                                                                Neuromuscular Re-ed / Therapeutic Activities                                                 Manual Intervention                                                     Modalities:     Patient education:  Plan of care, importance of weight bearing in stander for overall health, home exercise program, goals. Home Exercise Program:   Patient encouraged to start using stander 3 sessions a day and while in stander completing over head reaches . Patient to try and reach 15 minute intervals in stander. Therapeutic Exercise and NMR:  [x] (33675) Provided verbal/tactile cueing for activities related to strengthening, flexibility, endurance, ROM  for improvements in scapular, scapulothoracic and UE control with self care, reaching, carrying, lifting, house/yardwork, driving/computer work.     [x] (22959) Provided verbal/tactile cueing for activities related to improving balance, coordination, kinesthetic sense, posture, motor skill, proprioception  to assist with  scapular, scapulothoracic and UE control with self care, reaching, carrying, lifting, house/yardwork, driving/computer work.   [] Comments:    Therapeutic Activities:    [x] (61783 or 95138) Provided verbal/tactile cueing for activities related to improving balance, coordination, kinesthetic sense, posture, motor skill, proprioception and motor activation to allow for proper function of scapular, scapulothoracic and UE control with self care, carrying, lifting, driving/computer work  [] Comments:    Home Exercise Program:    [x] (37501) Reviewed/Progressed HEP activities related to strengthening, flexibility, endurance, ROM of scapular, scapulothoracic and UE control with self care, reaching, carrying, lifting, house/yardwork, driving/computer work  [] (92891) Reviewed/Progressed HEP activities related to improving balance, coordination, kinesthetic sense, posture, motor skill, proprioception of scapular, scapulothoracic and UE control with self care, reaching, carrying, lifting, house/yardwork, driving/computer work    [] Comments:    Manual Treatments:  PROM / STM / Oscillations-Mobs:  G-I, II, III, IV (PA's, Inf., Post.)  [] (22281) Provided manual therapy to mobilize soft tissue/joints of cervical/CT, scapular GHJ and UE for the purpose of modulating pain, promoting relaxation,  increasing ROM, reducing/eliminating soft tissue swelling/inflammation/restriction, improving soft tissue extensibility and allowing for proper ROM for normal function with self care, reaching, carrying, lifting, house/yardwork, driving/computer work  [] Comments:    ADL Training:  [] (29237) Provided self-care/home management training related to activities of daily living and compensatory training, and/or use of adaptive equipment   [] Comments:     Splinting:  [] Fabrication of:   [] (41836) Orthotic/Prosthetic Management, subsequent encounter  [] (66816) Orthotic management and training (fitting and assessment)  [] Comments:      Charges: co treat with PT for safety and increased intensity of treatment  Timed Code Treatment Minutes: 45   Total Treatment Minutes: 90     [] EVAL (LOW) 48336   [] OT Re-eval (09765)  [] EVAL (MOD) 61340   [] EVAL (HIGH) 46055       [x] Ru (61223) x   1  [] QXRIR(06807)  [x] NMR (27833) x  1  [] Estim (attended) (27255)   [] Manual ([id number]) x      [] US (90896)  [] TA (09039) x     [] Paraffin (26020)  [x] ADL  (89238) x  1   [] Splint/L code:    [] Estim (unattended) (25758)  [] Fluidotherapy (41459)  [] Other:    GOALS:    Patient stated goal: improved trunk control and standing tolerance to increase independence in self care. [x]? Progressing: []? Met: []? Not Met: []? Adjusted     Therapist goals for Patient:   Short Term Goals: To be achieved in: 30 days  1. Pt will be independent with clothing management on toilet or in wheelchair to complete toileting with use of grab bar. [x]? Progressing: []? Met: []? Not Met: []? Adjusted  2. Pt will be stand by assist completing scoot transfers on level surfaces  [x]? Progressing: []? Met: []? Not Met: []? Adjusted  3. Patient will be able to stand up to 30 seconds at grab bar for toileting and lower body dress with min assist.   [x]? Progressing: []? Met: []? Not Met: []? Adjusted     Long Term Goals: To be achieved by discharge  1. Pt will demonstrate an improved stream score of 28. New goal is 32  []? Progressing: [x]? Met: []? Not Met: [x]? Adjusted    Progression Towards Functional goals:  [x] Patient is progressing as expected towards functional goals listed. [] Progression is slowed due to complexities listed. [] Progression has been slowed due to co-morbidities.   [] Plan just implemented, too soon to assess goals progression  [] All goals are met  [] Other:     ASSESSMENT:   Patient has made nice progress in mobility, balance and executive function in spite of covid  Treatment/Activity Tolerance:  [x] Patient tolerated treatment well [] Patient limited by fatique  [] Patient limited by pain  [] Patient limited by other medical complications  [] Other:     Prognosis: [x] Good [] Fair  [] Poor    Patient Requires Follow-up: [x] Yes  [] No    PLAN: See eval  [x] Continue per plan of care [] Alter current plan (see comments)  [x] Plan of care initiated (MD cosigned) [] Hold pending MD visit [] Discharge    Electronically signed by:                 Note: If patient does not return for scheduled/ recommended follow up visits, this note will serve as a discharge from care along with most recent update on progress.

## 2021-07-08 ENCOUNTER — HOSPITAL ENCOUNTER (OUTPATIENT)
Dept: PHYSICAL THERAPY | Age: 21
Setting detail: THERAPIES SERIES
Discharge: HOME | End: 2021-07-08
Payer: MEDICARE

## 2021-07-08 ENCOUNTER — HOSPITAL ENCOUNTER (OUTPATIENT)
Dept: OCCUPATIONAL THERAPY | Age: 21
Setting detail: THERAPIES SERIES
Discharge: HOME | End: 2021-07-08
Payer: MEDICARE

## 2021-07-08 PROCEDURE — 97112 NEUROMUSCULAR REEDUCATION: CPT

## 2021-07-08 PROCEDURE — 97116 GAIT TRAINING THERAPY: CPT

## 2021-07-08 PROCEDURE — 97110 THERAPEUTIC EXERCISES: CPT

## 2021-07-08 PROCEDURE — 97530 THERAPEUTIC ACTIVITIES: CPT

## 2021-07-08 PROCEDURE — 97535 SELF CARE MNGMENT TRAINING: CPT

## 2021-07-08 NOTE — FLOWSHEET NOTE
[facility] - Outpatient Physical Therapy  Phone: [phone]   Fax: [phone]    Physical Therapy Daily Treatment Note  Date:  2021     Patient Name:  Laura Garcia    :  2000  MRN: [id number]  Medical/Treatment Diagnosis Information:  Diagnosis: Cerebral palsy with spastic diplegia  Treatment Diagnosis: Decreased trunk control impacting ability to complete safe transfers, decreased standing tolerance, LE weakness  Insurance/Certification information:  PT Insurance Information: Medicare & Medicaid  Physician Information:  Referring Practitioner: JOHNNY Velázquez  Plan of care signed (Y/N): [x]  Yes []  No     Date of Patient follow up with Physician:      Progress Report: []  Yes  [x]  No     Date Range for reporting period:  Beginning  21  Re-eval:   Ending    Progress report due (10 Rx/or 30 days whichever is less):      Recertification due (POC duration/ or 90 days whichever is less):      Visit # Insurance Allowable Auth required? Date Range    +  + 1/10 larry Medical necessity []  Yes  [x]  No n/a     Latex Allergy:  [x]NO      []YES  Preferred Language for Healthcare:   [x]English       []Other:      Functional Scale/Test Evaluation 3/1/2021 4/21/21  6/22/21 Discharge   STREAM 23  26                        Pain level:  0/10  Location:  none    SUBJECTIVE:  Pt states today that he doesn't wear his braces at home all the time because the L AFO rubs his ankle and is painful. OBJECTIVE:  : Session started 10 min after scheduled time, pt had to use the restroom. PT offered to help pt with standing, but pt declined, stating he would rather use the urinal to save some energy for the session.     :  Pt 15 mins late then needed to use restroom    Co-treat w/OT for safety and assistance    RESTRICTIONS/PRECAUTIONS: recent Bell's Palsy    Interventions/Exercises:   Therapeutic Exercises (01860) Resistance / level Sets/sec Reps Notes W/c push ups in preparation for standing                            Neuromuscular Re-ed (91673) /  Gait Training (18035)       Upright posture seated in W/C   X 5 reps holding football, rest of reps completed with L UE bracing on L knee, PT hand assist on sternum and lumbar spine to facilitate                                Gait Training:  Amb in //bars    Bwd walking in //bars      Transfer w/B Lofstrand crutches   Static stand w/B Lofstrand crutches   8 ft    8 ft    Mod A of 2  Mod A of 2   -manual required to advance LEs, pt activation hip extensors to initiate swing phase, demo'd adequate weight-shifting. OT provided assist & w/c follow for safety      -difficulty with placement for adequate support                 Therapeutic Activities (17669) /  Functional Tasks       [facility] reaching across midline in seated   Pt seated in w/c using seat belt to help stay in w/c          [facility] reaching across midline and diagonally   Pt seated in w/c using seat belt to help stay in w/c               STS to std walker            Transfer to w/c from mat table            STS from w/c to mat table - with large bolster placed on table in front of patient to hold onto   Min A, w/c close to mat table to assist in blocking pt's knees. Manual Intervention ([phone])       Supine hamstring stretch 90/90    -completed by PT  -completed by OT                                        Sessions:     7/8:  Session focused on increasing trunk control for safe transfers, transitional movement patterns, ADL. Patient transferred w/c to mat with SBA/CGA. From short sit at edge of mat with feet on 4 inch step for increased weight bearing patient worked on pelvic stabilization, lateral weight shifts reaching in a variety of planes needing SBA/CGA to lengthen trunk and stabilize. Patient then assumed supine and worked on segmental rolling for facilitation of lower trunk and min assist of one.  Patient then transitioned to prone and completed plank with mod assist of two. Patient then attempted transition to quadriped with mod assist of two due to fatigue, modified it with ball under abdomen. Patient completed shoulder abduction in prone for scapular stabilization and increasing thoracic extension needing mod of two. Patient then transitioned back to long sit and vertical roll placed along spine to increase thoracic extension and worked on shoulder abduction in long sit. .  Patient then scooted out to edge of mat and was placed in harness to work on ambulation and completed about 20 feet in harness with mod of one and stand by of one.      7/6: Session began by assisting pt in restroom again. Pt used grab bars around toilet to pull to standing with CGA and then leaned forward on wall while completing toileting. Pt sat down in w/c, then stood again to pull up pants. Session then moved to [facility] room. Pt transferred to mat table there with CGA. Pat sat EOB and was cued to lift chest for upright posture and worked on pushing sled away. Sled had two 25# plates loaded onto it. Pt also worked on initiating a stand while pushing into sled and patient had a few successful trials in which he was able to clear his buttocks from the mat. Pt worked on trunk control in which he was holding a 4# medicine ball and reaching overhead, out in front, and down to the floor over several trials with elbows extended, chest lifted, and hands spread wide on the ball. Pt also worked on trunk control and lengthening reaching across midline towards a target with stabilizing on table using his ipsilateral hand. PT/OT then placed pt into harness for use with the [employer] system. Once centered in system, pt stood and worked on stepping fwd and retro in place. Pt completed 2-3 trials of stepping with each LE and then took a seated rest break. Upon the second trial of standing, his weight was shifted too far anteriorly and pt was unable to advance either LE.  Pt sat again and was too fatigued to continue so the session was ended. 6/28: Pt assisted by both PT and OT in restroom at beginning of session. Pt completed multiple STS from [facility] with use of grab bars in restroom and SBA/CGA. Pt braced legs on toilet and used 1 HR on bar to balance self while completing LE dressing with minimal assistance only to get shorts out from underneath pt's feet. Pt also able to complete bridge in w/c to pull pants down. Pt able to complete STS in // bars with CGA and then completed stepping to target x 5 B/LE. Pt required less assistance to complete hip extension back to starting position. Mod to Max A to move leg fwd with other therapist blocking stance leg. At mat table, pt sat in w/c and leaned forward on forearms. Pt then pushed through forearms and used glutes and quads to lift bottom from chair with therapists assisting to block knees and lift buttocks if needed. Pt able to transition from forearms to open palms with elbows extended and then to standing x 2. Pt transferred to the mat table to progress from seated to sidelying and was prompted to lift legs 1 at a time as he rolled to sidelying then supine. Pt worked on rolling to his R side, and then propping up on R hand to reach for points on the wall and therapists hand with his left. Pt was cued to lift his chest and reach while depressing his R shoulder. Pt performed on both sides with multiple reps of reaching while propped in the position. In supine, pt kicked each LE separately into extension against resistance from therapist, beginning with full ROM and progressing to extension pulses. Pt ended session by transferring back to w/c with min A/CGA. 6/22: Stand pivot to mat table with min A. STREAM completed for reassessment - pt rivera's increased score this date.  Pt completed the following exercises seated EOM with 4\" step under feet and PVC pipe with orange TB and OT holding band: bicep curls x 10, mid rows x 10, trunk twists x 5 B; pt then completed PNF flexion with orange band x 1 B, and horiz abd with orange band x 10'. Pt completed LAQ - done bilaterally with upright posture x 5, then done B/L with arms extended behind pt and cues to lift chest and relax neck to avoid forward head. Pt completed 1 static stand during the session at [facility] walker with mod A to block pt's knees while completing STREAM. Pt then completed squat pivot transfer back to w/c at end of session with min A.     5/12:  Squat pivot w/OT w/c to high mat table min/mod A for safety and to facilitate LE use. Sit to supine mod A. Harness for gait training system was donned while supine, then adjusted while long sitting and sitting EOM. Mod A of 2 supine to long sit, min A of 1 for balance long sit to sitting EOM. Pt setup inside NxStep partial weight-bearing gait training system for ambulation. Partial weight adjusted between 80 & 100 lbs depending on pt's ease of LE advancement. Amb 100' while PT & OT guided system for pt. Seated rest.  Amb [de-identified]' w/OT & student OT guiding NxStep system, PT assessing gait, provided mod A for trunk stability final 20 ft. Seated rest.  Amb 61' w/2 person guiding system & PT providing mod A for trunk stability -pt able to demo increased step length and weight shift when trunk was stabilized. Pt performed dips while in partial [address] system, 2x3, 1x5;  Pt performed bean bag toss with OT, working to facilitate lateral reach outside [place] w/opposit UE then toss into bucket. Pt passed gold med ball [place] to student PT and received to facilitate UE flex at end range and trunk ext while working on dynamic balance. Pt in good spirits after walking, excited to show family, and had increased fatigue. 5/10:    Squat pivot transfer w/OT, w/c to w/c height mat table w/min A and mod verbal cues for improved technique. Leaning onto RUE, hip/LE ext activation to prime mat table to floor transfer x6 w/manual cues for facilitation.   Mat table to floor transfer mod A of 2. Tall kneeling at lowered mat table to work on weight shifting, hip ext, trunk ext, UE ex's for 50 mins. Floor to chair transfer, max A of 2. Sit to stand transfer to mat table w/o B AFO w/max A of 2, increased instability of B knees, prevented full stand. Donned B AFO, pt completed transfer mod A of 2 and able to stand for 5 mins to work on hip ext, trunk ext, and LE WB'ing. 5/7:   Upon arrival, pt requested to use restroom, stated he didn't want to attempt to urinate as he had too much in the pocket of his hoodie and would most likely make a mess. Stand pivot w/c to same height mat table w/CGA, improved foot placement throughout but need to better place them prior to scooting back on edge of mat. Blue bungee ab crunches x5 mins w/intermittent rest breaks & time for recover following LOB. Forward reach, outside [place] for gold med ball, [place] reverse pass to PT behind him, then reach around back at sacrum level to receive ball, forward reach outside [place] to return to OT x10 w/intermittent rest and to recover LOB. Seated knee ext & flex w/assit from gliders x10 B -min/mod A for hip flex to further reduce friction OT provided min/mod A for balance. Stand pivot mat table to w/c w/min A and reduced eccentric control. Sit to stand at ballet barre x2 min/mod A of 2. Lateral stepping length of bar each way, w/min/mod A for balance, mod A to stabilize stance knee & facilitate WB'ing, CGA to mod A to advance ipsilateral LE. Sit to stand w/ballet barre x5.    5/5: Discussed with pt appropriate amount of time to spend in stander at home, with education on importance of quality of participation rather than quantity/duration; pt verbalized understanding, but may require reinforcement. Pt completed sit pivot transfer from w/c to mat table with Min A x 2. Pt participated in crossing midline for open hand slap onto gerardo disc x10 each side with SBA-min A for trunk control during task.   Pt with tendency to lose balance towards R side. Pt required min-mod A for partial stand to place gerardo disc under buttocks. Pt seated on gerardo disc ~10 min to increase trunk strength and stability, with bilateral BUE reaching/shoulder flexion with CGA-mod A for trunk stability and blocking knees to prevent sliding forward. Pt completed a partial stand-> pivot edge of mat to w/c min A x 2 with verbal cues for safety as pt did not notify therapists prior to initiation of transfer. Pt seated at barre in w/c with 2 sit to stands with min A and use of barre and in stance ~2 min x 2 trials with min progressing to mod A with fatigue for trunk support by OT and PT blocking knees/facilitating LE extension; tactile/vcs for upright posture. During first trial pt with shoulder flexion of BUEs to slide up wall. Pt sat in w/c to complete bilateral LE advancement of w/c for strengthening requiring verbal cues and assist to put LEs in position (partial knee extension) with PT blocking feet for proper technique as pt initially used rocking of trunk to advance w/c. Pt completed this for ~10 ft with almost constant cueing to not propel w/c by rocking trunk. Pt grasping cylindrical handle of 30# bungee cord to maintain 90* elbow flexion for stability of BUEs, followed by mid rows for scapular retraction with light facilitation by PT x10. In // bars, pt completed STS with B UE assist on bars and CGA for trunk control. Pt completed forward stepping length of // bars ~6 ft with mod A for R LE advancement and max A for L LE advancement. PT blocked stance leg during advancement assist of opposite LE with OT assist for trunk control min-mod A and wheelchair follow. Improved initiation of RLE movement on this date noted, specifically in knee flexion/extension and hip flexion. Continued verbal cues for placement of hands in front of body to prepare for use of lofstrand crutches. Pt declined need to toilet at end of session before transport arrived.  Pt ended session with cross midline \"high 5\" x6 with BUEs. 4/28:    Pt about 12 mins late then needed to use restroom, delaying start of therapy.  //bars: sit to stand transition with B UE assist and CGA for trunk control. Amb ~6 steps with mod A for R LE advancement and max A for L LE advancement. PT min blocked stance leg during contralateral swing phase with OT assist for trunk control min-mod A and wheelchair follow. Completed x3 with retro stepping after 2nd attempt ~6 steps with pt relying on momentum of trunk rotation for hip extension when stepping back. Continued verbal cues for placement of hands in front of body to prepare for use of lofstrand crutches. Also discussed potential use of posterior walker as pt previously used when ambulatory and due to pt's preference for hands used as [place] behind trunk. In treatment room, pt doffed/donned R AFO seated in wheelchair using technique of abducting hip to bring to side of wheelchair to place foot into AFO and shoe, and then placing foot on foot rest to adhere straps. Decreased time required with this method with pt agreeing to attempt donning both AFOs at home. Pt transferred to sit edge of mat from wheelchair with min A x2 to block feet and for trunk control during scooting. Continued difficulty with trunk flexion during lowering to sit far back on surface pt is transferring to affecting safety. With PT holding ball in front of pt, pt completed cross and hook style punches across body to opposite side x10 B with emphasis on trunk control and proprioceptive input of ball to improve grading of force; decreased trunk control on L punches due to R trunk weakness with mod-min A for sitting balance compared to CGA-min A on L. Trunk ext w/BUE [place] to trunk flex & shoulder ext to hit ball forcefully x3 -difficulty achieving necessary trunk ext and focused on BUEs [place] instead.   Session concluded with mat to wheelchair transfer min A x2 with wheelchair on pt's right.      4/26:  Began session with OT applying foam padding to patient's ankle for cushion so pt can wear AFOs comfortably. Pt transferred to sit edge of mat from wheelchair with min A x2 to block feet and for trunk control during scooting. Grasping 20# bungee cord, pt completed horizontal ab/adduction x10, followed by mid row x10 with emphasis on scapular retraction and trunk control. For continued trunk control during dynamic balance, bungee cord placed around pt's trunk with pt completed hip and thoracic extension against resistance of bungee cord and then returning to upright starting position x8, followed by R and L lateral lean x5 each with min A required for balance. Pt completed trunk rotation to reach behind self to grab bean bag to improve dynamic balance required for pericare while toileting, followed by tossing to bucket, with min A for ~3 instances LOB. Pt instructed on placing flat hands on surface of mat to complete weight bearing during trunk rotation to improve balance. Bean bags were tossed back to patient x3 with instruction  to catch with open hands to reduce tone. Pt returned to wheelchair from edge of mat with min A for transfer. Pt transitioned to // bars to complete ambulation x4 steps with max A x2 for trunk control and advancing LEs prior to seated rest break due to increased fatigue with lack of AFO support. Pt re-attempted with 3 steps, followed by static standing x60 seconds with max A + mod A with cues to keep arms in front of him to prepare for use of lofstrand crutches and to facilitate trunk engagement and knee extension. Pt sat on gerardo disc placed in wheelchair for trunk control with min-mod A for balance to complete ball toss/catch task with multiple LOB; theraband loop placed around knees to prevent abduction and improve pelvic stability and base of support. UB dressing completed to doff/don sweatshirt with SBA and good trunk control noted during weight shifts.  Focus of today's session was to improve dynamic sitting and standing balance. Modalities:     Pt. Education:  -patient educated on diagnosis, prognosis and expectations for rehab  -all patient questions were answered    HEP instruction:      NMR and Therapeutic Activities:    [x] (37021 or 24920) Provided verbal/tactile cueing for activities related to improving balance, coordination, kinesthetic sense, posture, motor skill, proprioception and motor activation to allow for proper function of   [x] LE / Core, hip and LE with self care and ADLs  [x] UE / Shoulder complex: cervical, postural, scapular, scapulothoracic and UE control with self care, carrying, lifting, driving, computer work.   [] (66353) Gait Re-education- Provided training and instruction to the patient for proper LE, core and hip recruitment, positioning, and eccentric body weight control with ambulation re-education, including ascending & descending stairs     Home Management Training / Self Care:  [] (12118) Provided self-care/home management training related to activities of daily living and compensatory training, and/or use of adaptive equipment for improvement with: ADLs and compensatory training, meal preparation, safety procedures and instruction in use of adaptive equipment, including bathing, grooming, dressing, personal hygiene, basic household cleaning and chores. Therapeutic Exercise and NMR EXR  [x] (26494) Provided verbal/tactile cueing for activities related to strengthening, flexibility, endurance, ROM for improvements in  [x] LE / Core stability: LE, hip, and core control with self care, mobility, lifting, ambulation. [x] UE / Shoulder complex: cervical, postural, scapular, scapulothoracic and UE control with self care, reaching, carrying, lifting, house/yardwork, driving, computer work.   [x] (84922) Provided verbal/tactile cueing for activities related to improving balance, coordination, kinesthetic sense, posture, motor skill, proprioception to assist with [x] LE / Core stability: LE, hip, and core control in self care, mobility, lifting, ambulation and eccentric single leg control. [x] UE / Shoulder complex: cervical, scapular, scapulothoracic and UE control with self care, reaching, carrying, lifting, house/yardwork, driving, computer work.   [] (58866) Therapist is in constant attendance of 2 or more patients providing skilled therapy interventions, but not providing any significant amount of measurable one-on-one time to either patient, for improvements in  [] LE / Core stability: LE, hip, and core control in self care, mobility, lifting, ambulation and eccentric single leg control. [] UE / Shoulder complex: cervical, scapular, scapulothoracic and UE control with self care, reaching, carrying, lifting, house/yardwork, driving, computer work.      Home Exercise Program:    [] (79776) Reviewed/Progressed HEP activities related to strengthening, flexibility, endurance, ROM of   [] LE / Core stability: core, hip and LE for functional self-care, mobility, lifting and ambulation/stair navigation   [] UE / Shoulder complex: cervical, postural, scapular, scapulothoracic and UE control with self care, reaching, carrying, lifting, house/yardwork, driving, computer work  [] (25763)Reviewed/Progressed HEP activities related to improving balance, coordination, kinesthetic sense, posture, motor skill, proprioception of   [] LE: core, hip and LE for self care, mobility, lifting, and ambulation/stair navigation    [] UE / Shoulder complex: cervical, postural,  scapular, scapulothoracic and UE control with self care, reaching, carrying, lifting, house/yardwork, driving, computer work    Manual Treatments:  PROM / STM / Oscillations-Mobs:  G-I, II, III, IV (PA's, Inf., Post.)  [] (40081) Provided manual therapy to mobilize shoulder complex, hip, LE, and/or cervicothoracic/LS spine soft tissue/joints for the purpose of modulating pain, promoting relaxation,  increasing ROM, reducing/eliminating soft tissue swelling/inflammation/restriction, improving soft tissue extensibility and allowing for proper ROM for normal function with   [] LE / Core stability: self care, mobility, lifting and ambulation. [] UE / Shoulder complex: self care, reaching, carrying, lifting, house/yardwork, driving, computer work. Modalities:  [] (07078) Vasopneumatic compression: Utilized vasopneumatic compression to decrease edema / swelling for the purpose of improving mobility and quad tone / recruitment which will allow for increased overall function including but not limited to self-care, transfers, ambulation, and ascending / descending stairs. Charges:  Timed Code Treatment Minutes: 40   Total Treatment Minutes: 80   **CO-treat with OT    [] EVAL - LOW (66122)   [] EVAL - MOD (36813)  [] EVAL - HIGH (46664)  [] RE-EVAL (13302)  [] TE (16459) x       [] Ionto  [x] NMR (07534) x  1     [] Vaso  [] Manual (69740) x      [] Ultrasound  [x] TA x  1     [] Mech Traction (01573)  [x] Gait Training x  1   [] ES (un) (11025):   [] Aquatic therapy x   [] Other:   [] Group:     GOALS:   Patient stated goal: improve ability to complete transfers   []? Progressing: []? Met: []? Not Met: []? Adjusted     Therapist goals for Patient:   Short Term Goals: To be achieved in: 2 weeks  1. Independent in HEP and progression per patient tolerance, in order to prevent re-injury. []? Progressing: [x]? Met: []? Not Met: []? Adjusted  2. Patient will have a decrease in pain to facilitate improvement in movement, function, and ADLs as indicated by Functional Deficits. []? Progressing: [x]? Met: []? Not Met: []? Adjusted     Long Term Goals: To be achieved in: 6 weeks  1. Patient will report increased standing tolerance to at least 30 minutes in standing frame. []? Progressing: [x]? Met: []? Not Met: []? Adjusted  2.  Patient will demonstrate an increase in strength to good shoulder & hip complex, and core activation to allow for proper functional mobility as indicated by patients Functional Deficits. [x]? Progressing: []? Met: []? Not Met: []? Adjusted  3. Patient will return to functional activities including demo'ing safe transfers to/from w/c with mod I.    [x]? Progressing: []? Met: []? Not Met: []? Adjusted  4. Patient will increase STREAM score to 32 or above for increased UE/LE movement in sitting, supine, and standing. [x]? Progressing: []? Met: []? Not Met: []? Adjusted          Overall Progression Towards Functional goals/ Treatment Progress Update:  [] Patient is progressing as expected towards functional goals listed. [x] Progression is slowed due to complexities/Impairments listed. [] Progression has been slowed due to co-morbidities. [] Plan just implemented, too soon to assess goals progression <30days   [] Goals require adjustment due to lack of progress  [] Patient is not progressing as expected and requires additional follow up with physician  [] Other    Persisting Functional Limitations/Impairments:  []Sleeping [x]Sitting               []Standing [x]Transfers        []Walking []Kneeling               []Stairs []Squatting / bending   [x]ADLs []Reaching  []Lifting  []Housework  []Driving []Job related tasks  []Sports/Recreation []Other:        ASSESSMENT:  Utilized harness again at end of session so pt was quite fatigued. Pt continues to demonstrate improved trunk control in sitting and reaching outside of his base of support. These improvements will assist the pt in becoming more independent with transfers therefore increasing his safety.      Treatment/Activity Tolerance:  [x] Patient able to complete tx  [] Patient limited by fatigue  [] Patient limited by pain  [] Patient limited by other medical complications  [] Other:     Prognosis: [x] Good [] Fair  [] Poor    Patient Requires Follow-up: [x] Yes  [] No    Plan for next treatment session: transfers, seated core strength, will plan to co-treat with OT when possible    PLAN: See [facility]. PT 2x / week for 6 weeks. [x] Continue per plan of care [] Alter current plan (see comments)  [] Plan of care initiated [] Hold pending MD visit [] Discharge    Electronically signed by: Swathi Howard, PT DPT    Note: If patient does not return for scheduled/ recommended follow up visits, his note will serve as a discharge from care along with most recent update on progress.

## 2021-07-08 NOTE — FLOWSHEET NOTE
Occupational Therapy Daily Treatment Note  Date:  2021    Patient: Maria D Hollins   : 2000   MRN: [id number]  Referring Physician:  Britt Alfaro CNP       Medical Diagnosis Information: paraplegia, cerebral palsy status post covid has not been seen in clinic since                                                   Insurance information: medicare/ medicaid     Date of Injury:   Date of Surgery: rhizotomy when he was about 10    Progress Report: []  Yes  [x]  No     Date Range for reporting period:  2021 to 2021. Patient is status post covid recovery on this date. He has missed treatment since 2021    Progress report due (10 Rx/or 30 days whichever is less): visit #20 or     Recertification due (POC duration/ or 90 days whichever is less): visit #20 or 2021    Visit # Insurance Allowable Auth required? Date Range    +   [place] []  Yes  [x]  No n/a       Latex Allergy:  []No      []Yes  Pacemaker:  [] No       [] Yes     Preferred Language for Healthcare:   [x]English       []other:    Pain level: 0     SUBJECTIVE:  Patient requesting to use toilet upon arrival.     Functional Disability Index:  STREAM: score 23    21: Stroke Rehabilitation Assessment of Movement (STREAM): total- 24/ (supine- 9/15, sitting- 15/31, standing- )    OBJECTIVE: See eval    21: 3 minutes static stance at parallel bars with CGA x2 for trunk control and blocking knees      RESTRICTIONS/PRECAUTIONS: fall risk    Exercises/Interventions:  Session focused on increasing trunk control for safe transfers, transitional movement patterns, ADL . Patient transferred wheelchair to mat with stand by/ contact guard. From short sit at edge of mat with feet on stool for increased weight bearing patient worked on pelvic stabilization, lateral weight shifts reaching in a variety of planes needing stand by to contact guard to lengthen trunk and stabilize.   Patient then assumed supine and worked on segmental rolling for facilitation of lower trunk and min assist of one. Patient then transitioned to prone and in prone completed plank with mod assist of two. Patient then attempted transition to quadriped with mod assist of two due to fatigue, modified it with ball under abdomen. Patient completed shoulder abduction in prone for scapular stabilization and increasing thoracic extension needing mod of two. Patient then transitioned back to long sit and vertical roll placed along spine to increase thoracic extension and worked on shoulder abduction in long sit. .  Patient then scooted out to edge of mat and was placed in harness to work on ambulation and completed about 20 feet in harness with mod of one and stand by of one. Patient completed session with work on shoulder abduction in wheelchair and elbow extension with use of red theraband times 10 reps each. Propelled independently to parking lot after session    Therapeutic Exercises  Resistance / level Sets/sec Reps Notes                                                                                Neuromuscular Re-ed / Therapeutic Activities                                                 Manual Intervention                                                     Modalities:     Patient education:  Plan of care, importance of weight bearing in stander for overall health, home exercise program, goals. Home Exercise Program:   Patient encouraged to start using stander 3 sessions a day and while in stander completing over head reaches . Patient to try and reach 15 minute intervals in stander. Therapeutic Exercise and NMR:  [x] (22038) Provided verbal/tactile cueing for activities related to strengthening, flexibility, endurance, ROM  for improvements in scapular, scapulothoracic and UE control with self care, reaching, carrying, lifting, house/yardwork, driving/computer work.     [x] (91862) Provided verbal/tactile cueing for activities related to improving balance, coordination, kinesthetic sense, posture, motor skill, proprioception  to assist with  scapular, scapulothoracic and UE control with self care, reaching, carrying, lifting, house/yardwork, driving/computer work.   [] Comments:    Therapeutic Activities:    [x] (71900 or 60985) Provided verbal/tactile cueing for activities related to improving balance, coordination, kinesthetic sense, posture, motor skill, proprioception and motor activation to allow for proper function of scapular, scapulothoracic and UE control with self care, carrying, lifting, driving/computer work  [] Comments:    Home Exercise Program:    [x] (65933) Reviewed/Progressed HEP activities related to strengthening, flexibility, endurance, ROM of scapular, scapulothoracic and UE control with self care, reaching, carrying, lifting, house/yardwork, driving/computer work  [] (08689) Reviewed/Progressed HEP activities related to improving balance, coordination, kinesthetic sense, posture, motor skill, proprioception of scapular, scapulothoracic and UE control with self care, reaching, carrying, lifting, house/yardwork, driving/computer work    [] Comments:    Manual Treatments:  PROM / STM / Oscillations-Mobs:  G-I, II, III, IV (PA's, Inf., Post.)  [] (29971) Provided manual therapy to mobilize soft tissue/joints of cervical/CT, scapular GHJ and UE for the purpose of modulating pain, promoting relaxation,  increasing ROM, reducing/eliminating soft tissue swelling/inflammation/restriction, improving soft tissue extensibility and allowing for proper ROM for normal function with self care, reaching, carrying, lifting, house/yardwork, driving/computer work  [] Comments:    ADL Training:  [] (76071) Provided self-care/home management training related to activities of daily living and compensatory training, and/or use of adaptive equipment   [] Comments:     Splinting:  [] Fabrication of:   [] (67487) Orthotic/Prosthetic Management, subsequent encounter  [] (44815) Orthotic management and training (fitting and assessment)  [] Comments:      Charges: co treat with PT for safety and increased intensity of treatment  Timed Code Treatment Minutes: 45   Total Treatment Minutes: 90     [] EVAL (LOW) 81707   [] OT Re-eval (76967)  [] EVAL (MOD) 88140   [] EVAL (HIGH) 39404       [x] Ru (00503) x   1  [] IYXVV(62500)  [x] NMR (93815) x  1  [] Estim (attended) (17191)   [] Manual ([id number]) x      [] US (65460)  [] TA (76670) x     [] Paraffin (17232)  [x] ADL  (59487) x  1   [] Splint/L code:    [] Estim (unattended) (30784)  [] Fluidotherapy (52582)  [] Other:    GOALS:    Patient stated goal: improved trunk control and standing tolerance to increase independence in self care. [x]? Progressing: []? Met: []? Not Met: []? Adjusted     Therapist goals for Patient:   Short Term Goals: To be achieved in: 30 days  1. Pt will be independent with clothing management on toilet or in wheelchair to complete toileting with use of grab bar. [x]? Progressing: []? Met: []? Not Met: []? Adjusted  2. Pt will be stand by assist completing scoot transfers on level surfaces  [x]? Progressing: []? Met: []? Not Met: []? Adjusted  3. Patient will be able to stand up to 30 seconds at grab bar for toileting and lower body dress with min assist.   [x]? Progressing: []? Met: []? Not Met: []? Adjusted     Long Term Goals: To be achieved by discharge  1. Pt will demonstrate an improved stream score of 28. New goal is 32  []? Progressing: [x]? Met: []? Not Met: [x]? Adjusted    Progression Towards Functional goals:  [x] Patient is progressing as expected towards functional goals listed. [] Progression is slowed due to complexities listed. [] Progression has been slowed due to co-morbidities.   [] Plan just implemented, too soon to assess goals progression  [] All goals are met  [] Other:     ASSESSMENT:   Patient has made nice progress in mobility, balance and executive function in spite of covid  Treatment/Activity Tolerance:  [x] Patient tolerated treatment well [] Patient limited by fatique  [] Patient limited by pain  [] Patient limited by other medical complications  [] Other:     Prognosis: [x] Good [] Fair  [] Poor    Patient Requires Follow-up: [x] Yes  [] No    PLAN: See eval  [x] Continue per plan of care [] Alter current plan (see comments)  [x] Plan of care initiated (MD cosigned) [] Hold pending MD visit [] Discharge    Electronically signed by:                 Note: If patient does not return for scheduled/ recommended follow up visits, this note will serve as a discharge from care along with most recent update on progress.

## 2021-07-12 ENCOUNTER — HOSPITAL ENCOUNTER (OUTPATIENT)
Dept: PHYSICAL THERAPY | Age: 21
Setting detail: THERAPIES SERIES
Discharge: HOME | End: 2021-07-12
Payer: MEDICARE

## 2021-07-12 ENCOUNTER — HOSPITAL ENCOUNTER (OUTPATIENT)
Dept: OCCUPATIONAL THERAPY | Age: 21
Setting detail: THERAPIES SERIES
Discharge: HOME | End: 2021-07-12
Payer: MEDICARE

## 2021-07-12 PROCEDURE — 97530 THERAPEUTIC ACTIVITIES: CPT

## 2021-07-12 PROCEDURE — 97112 NEUROMUSCULAR REEDUCATION: CPT

## 2021-07-12 PROCEDURE — 97110 THERAPEUTIC EXERCISES: CPT

## 2021-07-12 PROCEDURE — 97116 GAIT TRAINING THERAPY: CPT

## 2021-07-12 NOTE — FLOWSHEET NOTE
standing                            Neuromuscular Re-ed (75170) /  Gait Training (08248)       Upright posture seated in W/C   X 5 reps holding football, rest of reps completed with L UE bracing on L knee, PT hand assist on sternum and lumbar spine to facilitate                                Gait Training:  Amb in //bars    Bwd walking in //bars      Transfer w/B Lofstrand crutches   Static stand w/B Lofstrand crutches   8 ft    8 ft    Mod A of 2  Mod A of 2   -manual required to advance LEs, pt activation hip extensors to initiate swing phase, demo'd adequate weight-shifting. OT provided assist & w/c follow for safety      -difficulty with placement for adequate support                 Therapeutic Activities (87192) /  Functional Tasks       [facility] reaching across midline in seated   Pt seated in w/c using seat belt to help stay in w/c          [facility] reaching across midline and diagonally   Pt seated in w/c using seat belt to help stay in w/c               STS to std walker            Transfer to w/c from mat table            STS from w/c to mat table - with large bolster placed on table in front of patient to hold onto   Min A, w/c close to mat table to assist in blocking pt's knees. Manual Intervention ([phone])       Supine hamstring stretch 90/90    -completed by PT  -completed by OT                                        Sessions:     7/12: Session initiated with pt completing squat pivot transfer from w/c to sitting EOM with CGA. In short sit at [facility], pt completed bilateral wb for partial STS x7 to lift glutes off mat to position over gerardo disc with min A x2 for positioning. From short sit, pt completed STS transfer x4 with min A x2 for proper positioning on dynadisc to challenge [place]. Pt sat on gerardo disc for trunk control exercise, with pt instructed in using BLE to provide support, with multiple instances of LOB into lateral lean.  Pt completed UE reaching activity of playing Connect 4 with an emphasis on trunk lengthening, crossing midline, lateral pinch, and eccentric control to challenge trunk control, with pt requiring verbal cues to bend at waist rather than shift buttocks forward; verbal cues for problem solving. Patient then scooted out to edge of mat and completed STS to don harness to work on ambulation. Completed ~20 feet x2 in harness with mod x2 for LE advancement and cues for hand placement. Pt transitioned to sitting in w/c with assist of harness > sitting EOM. Pt completed sit>supine transfer with SBA. In supine, pt received manual stretch of hamstrings and adductors for decreased tightness. Pt completed supine>sit transfer with mod A + min A. Session concluded with pt completing squat pivot transfer from EOM to w/c with min A d/t fatigue and pt exiting clinic in w/c.      7/8:  Session focused on increasing trunk control for safe transfers, transitional movement patterns, ADL. Patient transferred w/c to mat with SBA/CGA. From short sit at edge of mat with feet on 4 inch step for increased weight bearing patient worked on pelvic stabilization, lateral weight shifts reaching in a variety of planes needing SBA/CGA to lengthen trunk and stabilize. Patient then assumed supine and worked on segmental rolling for facilitation of lower trunk and min assist of one. Patient then transitioned to prone and completed plank with mod assist of two. Patient then attempted transition to quadriped with mod assist of two due to fatigue, modified it with ball under abdomen. Patient completed shoulder abduction in prone for scapular stabilization and increasing thoracic extension needing mod of two. Patient then transitioned back to long sit and vertical roll placed along spine to increase thoracic extension and worked on shoulder abduction in long sit. .  Patient then scooted out to edge of mat and was placed in harness to work on ambulation and completed about 20 feet in harness with mod of one and stand by of one.      7/6: Session began by assisting pt in restroom again. Pt used grab bars around toilet to pull to standing with CGA and then leaned forward on wall while completing toileting. Pt sat down in w/c, then stood again to pull up pants. Session then moved to [facility] room. Pt transferred to mat table there with CGA. Pat sat EOB and was cued to lift chest for upright posture and worked on pushing sled away. Sled had two 25# plates loaded onto it. Pt also worked on initiating a stand while pushing into sled and patient had a few successful trials in which he was able to clear his buttocks from the mat. Pt worked on trunk control in which he was holding a 4# medicine ball and reaching overhead, out in front, and down to the floor over several trials with elbows extended, chest lifted, and hands spread wide on the ball. Pt also worked on trunk control and lengthening reaching across midline towards a target with stabilizing on table using his ipsilateral hand. PT/OT then placed pt into harness for use with the [employer] system. Once centered in system, pt stood and worked on stepping fwd and retro in place. Pt completed 2-3 trials of stepping with each LE and then took a seated rest break. Upon the second trial of standing, his weight was shifted too far anteriorly and pt was unable to advance either LE. Pt sat again and was too fatigued to continue so the session was ended. 6/28: Pt assisted by both PT and OT in restroom at beginning of session. Pt completed multiple STS from [facility] with use of grab bars in restroom and SBA/CGA. Pt braced legs on toilet and used 1 HR on bar to balance self while completing LE dressing with minimal assistance only to get shorts out from underneath pt's feet. Pt also able to complete bridge in w/c to pull pants down. Pt able to complete STS in // bars with CGA and then completed stepping to target x 5 B/LE.  Pt required less assistance to complete hip extension back to starting position. Mod to Max A to move leg fwd with other therapist blocking stance leg. At mat table, pt sat in w/c and leaned forward on forearms. Pt then pushed through forearms and used glutes and quads to lift bottom from chair with therapists assisting to block knees and lift buttocks if needed. Pt able to transition from forearms to open palms with elbows extended and then to standing x 2. Pt transferred to the mat table to progress from seated to sidelying and was prompted to lift legs 1 at a time as he rolled to sidelying then supine. Pt worked on rolling to his R side, and then propping up on R hand to reach for points on the wall and therapists hand with his left. Pt was cued to lift his chest and reach while depressing his R shoulder. Pt performed on both sides with multiple reps of reaching while propped in the position. In supine, pt kicked each LE separately into extension against resistance from therapist, beginning with full ROM and progressing to extension pulses. Pt ended session by transferring back to w/c with min A/CGA. 6/22: Stand pivot to mat table with min A. STREAM completed for reassessment - pt demo's increased score this date. Pt completed the following exercises seated EOM with 4\" step under feet and PVC pipe with orange TB and OT holding band: bicep curls x 10, mid rows x 10, trunk twists x 5 B; pt then completed PNF flexion with orange band x 1 B, and horiz abd with orange band x 10'. Pt completed LAQ - done bilaterally with upright posture x 5, then done B/L with arms extended behind pt and cues to lift chest and relax neck to avoid forward head. Pt completed 1 static stand during the session at [facility] walker with mod A to block pt's knees while completing STREAM. Pt then completed squat pivot transfer back to w/c at end of session with min A.     5/12:  Squat pivot w/OT w/c to high mat table min/mod A for safety and to facilitate LE use. Sit to supine mod A.   Harness for gait training system was donned while supine, then adjusted while long sitting and sitting EOM. Mod A of 2 supine to long sit, min A of 1 for balance long sit to sitting EOM. Pt setup inside NxStep partial weight-bearing gait training system for ambulation. Partial weight adjusted between 80 & 100 lbs depending on pt's ease of LE advancement. Amb 100' while PT & OT guided system for pt. Seated rest.  Amb [de-identified]' w/OT & student OT guiding NxStep system, PT assessing gait, provided mod A for trunk stability final 20 ft. Seated rest.  Amb 61' w/2 person guiding system & PT providing mod A for trunk stability -pt able to demo increased step length and weight shift when trunk was stabilized. Pt performed dips while in partial [address] system, 2x3, 1x5;  Pt performed bean bag toss with OT, working to facilitate lateral reach outside [place] w/opposit UE then toss into bucket. Pt passed gold med ball [place] to student PT and received to facilitate UE flex at end range and trunk ext while working on dynamic balance. Pt in good spirits after walking, excited to show family, and had increased fatigue. 5/10:    Squat pivot transfer w/OT, w/c to w/c height mat table w/min A and mod verbal cues for improved technique. Leaning onto RUE, hip/LE ext activation to prime mat table to floor transfer x6 w/manual cues for facilitation. Mat table to floor transfer mod A of 2. Tall kneeling at lowered mat table to work on weight shifting, hip ext, trunk ext, UE ex's for 50 mins. Floor to chair transfer, max A of 2. Sit to stand transfer to mat table w/o B AFO w/max A of 2, increased instability of B knees, prevented full stand. Donned B AFO, pt completed transfer mod A of 2 and able to stand for 5 mins to work on hip ext, trunk ext, and LE WB'ing. 5/7:   Upon arrival, pt requested to use restroom, stated he didn't want to attempt to urinate as he had too much in the pocket of his hoodie and would most likely make a mess.   Stand pivot w/c to same height mat table w/CGA, improved foot placement throughout but need to better place them prior to scooting back on edge of mat. Blue bungee ab crunches x5 mins w/intermittent rest breaks & time for recover following LOB. Forward reach, outside [place] for gold med ball, [place] reverse pass to PT behind him, then reach around back at sacrum level to receive ball, forward reach outside [place] to return to OT x10 w/intermittent rest and to recover LOB. Seated knee ext & flex w/assit from gliders x10 B -min/mod A for hip flex to further reduce friction OT provided min/mod A for balance. Stand pivot mat table to w/c w/min A and reduced eccentric control. Sit to stand at ballet barre x2 min/mod A of 2. Lateral stepping length of bar each way, w/min/mod A for balance, mod A to stabilize stance knee & facilitate WB'ing, CGA to mod A to advance ipsilateral LE. Sit to stand w/ballet barre x5.    5/5: Discussed with pt appropriate amount of time to spend in stander at home, with education on importance of quality of participation rather than quantity/duration; pt verbalized understanding, but may require reinforcement. Pt completed sit pivot transfer from w/c to mat table with Min A x 2. Pt participated in crossing midline for open hand slap onto gerardo disc x10 each side with SBA-min A for trunk control during task. Pt with tendency to lose balance towards R side. Pt required min-mod A for partial stand to place gerardo disc under buttocks. Pt seated on gerardo disc ~10 min to increase trunk strength and stability, with bilateral BUE reaching/shoulder flexion with CGA-mod A for trunk stability and blocking knees to prevent sliding forward. Pt completed a partial stand-> pivot edge of mat to w/c min A x 2 with verbal cues for safety as pt did not notify therapists prior to initiation of transfer.  Pt seated at barre in w/c with 2 sit to stands with min A and use of barre and in stance ~2 min x 2 trials with min progressing to mod A with fatigue for trunk support by OT and PT blocking knees/facilitating LE extension; tactile/vcs for upright posture. During first trial pt with shoulder flexion of BUEs to slide up wall. Pt sat in w/c to complete bilateral LE advancement of w/c for strengthening requiring verbal cues and assist to put LEs in position (partial knee extension) with PT blocking feet for proper technique as pt initially used rocking of trunk to advance w/c. Pt completed this for ~10 ft with almost constant cueing to not propel w/c by rocking trunk. Pt grasping cylindrical handle of 30# bungee cord to maintain 90* elbow flexion for stability of BUEs, followed by mid rows for scapular retraction with light facilitation by PT x10. In // bars, pt completed STS with B UE assist on bars and CGA for trunk control. Pt completed forward stepping length of // bars ~6 ft with mod A for R LE advancement and max A for L LE advancement. PT blocked stance leg during advancement assist of opposite LE with OT assist for trunk control min-mod A and wheelchair follow. Improved initiation of RLE movement on this date noted, specifically in knee flexion/extension and hip flexion. Continued verbal cues for placement of hands in front of body to prepare for use of lofstrand crutches. Pt declined need to toilet at end of session before transport arrived. Pt ended session with cross midline \"high 5\" x6 with BUEs. 4/28:    Pt about 12 mins late then needed to use restroom, delaying start of therapy.  //bars: sit to stand transition with B UE assist and CGA for trunk control. Amb ~6 steps with mod A for R LE advancement and max A for L LE advancement. PT min blocked stance leg during contralateral swing phase with OT assist for trunk control min-mod A and wheelchair follow. Completed x3 with retro stepping after 2nd attempt ~6 steps with pt relying on momentum of trunk rotation for hip extension when stepping back.  Continued verbal cues for placement of hands in front of body to prepare for use of lofstrand crutches. Also discussed potential use of posterior walker as pt previously used when ambulatory and due to pt's preference for hands used as [place] behind trunk. In treatment room, pt doffed/donned R AFO seated in wheelchair using technique of abducting hip to bring to side of wheelchair to place foot into AFO and shoe, and then placing foot on foot rest to adhere straps. Decreased time required with this method with pt agreeing to attempt donning both AFOs at home. Pt transferred to sit edge of mat from wheelchair with min A x2 to block feet and for trunk control during scooting. Continued difficulty with trunk flexion during lowering to sit far back on surface pt is transferring to affecting safety. With PT holding ball in front of pt, pt completed cross and hook style punches across body to opposite side x10 B with emphasis on trunk control and proprioceptive input of ball to improve grading of force; decreased trunk control on L punches due to R trunk weakness with mod-min A for sitting balance compared to CGA-min A on L. Trunk ext w/BUE [place] to trunk flex & shoulder ext to hit ball forcefully x3 -difficulty achieving necessary trunk ext and focused on BUEs [place] instead. Session concluded with mat to wheelchair transfer min A x2 with wheelchair on pt's right.      4/26:  Began session with OT applying foam padding to patient's ankle for cushion so pt can wear AFOs comfortably. Pt transferred to sit edge of mat from wheelchair with min A x2 to block feet and for trunk control during scooting. Grasping 20# bungee cord, pt completed horizontal ab/adduction x10, followed by mid row x10 with emphasis on scapular retraction and trunk control.  For continued trunk control during dynamic balance, bungee cord placed around pt's trunk with pt completed hip and thoracic extension against resistance of bungee cord and then returning to upright starting position x8, followed by R and L lateral lean x5 each with min A required for balance. Pt completed trunk rotation to reach behind self to grab bean bag to improve dynamic balance required for pericare while toileting, followed by tossing to bucket, with min A for ~3 instances LOB. Pt instructed on placing flat hands on surface of mat to complete weight bearing during trunk rotation to improve balance. Bean bags were tossed back to patient x3 with instruction  to catch with open hands to reduce tone. Pt returned to wheelchair from edge of mat with min A for transfer. Pt transitioned to // bars to complete ambulation x4 steps with max A x2 for trunk control and advancing LEs prior to seated rest break due to increased fatigue with lack of AFO support. Pt re-attempted with 3 steps, followed by static standing x60 seconds with max A + mod A with cues to keep arms in front of him to prepare for use of lofstrand crutches and to facilitate trunk engagement and knee extension. Pt sat on gerardo disc placed in wheelchair for trunk control with min-mod A for balance to complete ball toss/catch task with multiple LOB; theraband loop placed around knees to prevent abduction and improve pelvic stability and base of support. UB dressing completed to doff/don sweatshirt with SBA and good trunk control noted during weight shifts. Focus of today's session was to improve dynamic sitting and standing balance.         Modalities:     Pt. Education:  -patient educated on diagnosis, prognosis and expectations for rehab  -all patient questions were answered    HEP instruction:      NMR and Therapeutic Activities:    [x] (41807 or 16807) Provided verbal/tactile cueing for activities related to improving balance, coordination, kinesthetic sense, posture, motor skill, proprioception and motor activation to allow for proper function of   [x] LE / Core, hip and LE with self care and ADLs  [x] UE / Shoulder complex: cervical, postural, scapular, scapulothoracic and UE control with self care, carrying, lifting, driving, computer work.   [] (48243) Gait Re-education- Provided training and instruction to the patient for proper LE, core and hip recruitment, positioning, and eccentric body weight control with ambulation re-education, including ascending & descending stairs     Home Management Training / Self Care:  [] (10231) Provided self-care/home management training related to activities of daily living and compensatory training, and/or use of adaptive equipment for improvement with: ADLs and compensatory training, meal preparation, safety procedures and instruction in use of adaptive equipment, including bathing, grooming, dressing, personal hygiene, basic household cleaning and chores. Therapeutic Exercise and NMR EXR  [x] (41454) Provided verbal/tactile cueing for activities related to strengthening, flexibility, endurance, ROM for improvements in  [x] LE / Core stability: LE, hip, and core control with self care, mobility, lifting, ambulation. [x] UE / Shoulder complex: cervical, postural, scapular, scapulothoracic and UE control with self care, reaching, carrying, lifting, house/yardwork, driving, computer work. [x] (24767) Provided verbal/tactile cueing for activities related to improving balance, coordination, kinesthetic sense, posture, motor skill, proprioception to assist with   [x] LE / Core stability: LE, hip, and core control in self care, mobility, lifting, ambulation and eccentric single leg control.    [x] UE / Shoulder complex: cervical, scapular, scapulothoracic and UE control with self care, reaching, carrying, lifting, house/yardwork, driving, computer work.   [] (08521) Therapist is in constant attendance of 2 or more patients providing skilled therapy interventions, but not providing any significant amount of measurable one-on-one time to either patient, for improvements in  [] LE / Core stability: LE, hip, and core control in self care, mobility, lifting, ambulation and eccentric single leg control. [] UE / Shoulder complex: cervical, scapular, scapulothoracic and UE control with self care, reaching, carrying, lifting, house/yardwork, driving, computer work. Home Exercise Program:    [] (51234) Reviewed/Progressed HEP activities related to strengthening, flexibility, endurance, ROM of   [] LE / Core stability: core, hip and LE for functional self-care, mobility, lifting and ambulation/stair navigation   [] UE / Shoulder complex: cervical, postural, scapular, scapulothoracic and UE control with self care, reaching, carrying, lifting, house/yardwork, driving, computer work  [] (40234)Reviewed/Progressed HEP activities related to improving balance, coordination, kinesthetic sense, posture, motor skill, proprioception of   [] LE: core, hip and LE for self care, mobility, lifting, and ambulation/stair navigation    [] UE / Shoulder complex: cervical, postural,  scapular, scapulothoracic and UE control with self care, reaching, carrying, lifting, house/yardwork, driving, computer work    Manual Treatments:  PROM / STM / Oscillations-Mobs:  G-I, II, III, IV (PA's, Inf., Post.)  [] (13867) Provided manual therapy to mobilize shoulder complex, hip, LE, and/or cervicothoracic/LS spine soft tissue/joints for the purpose of modulating pain, promoting relaxation,  increasing ROM, reducing/eliminating soft tissue swelling/inflammation/restriction, improving soft tissue extensibility and allowing for proper ROM for normal function with   [] LE / Core stability: self care, mobility, lifting and ambulation. [] UE / Shoulder complex: self care, reaching, carrying, lifting, house/yardwork, driving, computer work.      Modalities:  [] (84056) Vasopneumatic compression: Utilized vasopneumatic compression to decrease edema / swelling for the purpose of improving mobility and quad tone / recruitment which will allow for increased overall function including but not limited to self-care, transfers, ambulation, and ascending / descending stairs. Charges:  Timed Code Treatment Minutes: 45   Total Treatment Minutes: 90   **CO-treat with OT    [] EVAL - LOW (37205)   [] EVAL - MOD (49116)  [] EVAL - HIGH (54891)  [] RE-EVAL (68210)  [] TE (99280) x       [] Ionto  [] NMR (77751) x      [] Vaso  [] Manual (32126) x      [] Ultrasound  [x] TA x  1     [] Mech Traction (27095)  [x] Gait Training x  2   [] ES (un) (29832):   [] Aquatic therapy x   [] Other:   [] Group:     GOALS:   Patient stated goal: improve ability to complete transfers   []? Progressing: []? Met: []? Not Met: []? Adjusted     Therapist goals for Patient:   Short Term Goals: To be achieved in: 2 weeks  1. Independent in HEP and progression per patient tolerance, in order to prevent re-injury. []? Progressing: [x]? Met: []? Not Met: []? Adjusted  2. Patient will have a decrease in pain to facilitate improvement in movement, function, and ADLs as indicated by Functional Deficits. []? Progressing: [x]? Met: []? Not Met: []? Adjusted     Long Term Goals: To be achieved in: 6 weeks  1. Patient will report increased standing tolerance to at least 30 minutes in standing frame. []? Progressing: [x]? Met: []? Not Met: []? Adjusted  2. Patient will demonstrate an increase in strength to good shoulder & hip complex, and core activation to allow for proper functional mobility as indicated by patients Functional Deficits. [x]? Progressing: []? Met: []? Not Met: []? Adjusted  3. Patient will return to functional activities including demo'ing safe transfers to/from w/c with mod I.    [x]? Progressing: []? Met: []? Not Met: []? Adjusted  4. Patient will increase STREAM score to 32 or above for increased UE/LE movement in sitting, supine, and standing. [x]? Progressing: []? Met: []? Not Met: []?  Adjusted          Overall Progression Towards Functional goals/ Treatment Progress Update:  [] Patient is progressing as expected towards functional goals listed. [x] Progression is slowed due to complexities/Impairments listed. [] Progression has been slowed due to co-morbidities. [] Plan just implemented, too soon to assess goals progression <30days   [] Goals require adjustment due to lack of progress  [] Patient is not progressing as expected and requires additional follow up with physician  [] Other    Persisting Functional Limitations/Impairments:  []Sleeping [x]Sitting               []Standing [x]Transfers        []Walking []Kneeling               []Stairs []Squatting / bending   [x]ADLs []Reaching  []Lifting  []Housework  []Driving []Job related tasks  []Sports/Recreation []Other:        ASSESSMENT:  Ambulated with harness again this date with 1 seated rest break during. Pt required more assist with advancement of R LE and was cued frequently for glute and quad contraction as well as weight shift to opp LE prior to stepping. Pt Has been demonstrating improved core strength consistently during the sessions and would benefit from continued therapy. Treatment/Activity Tolerance:  [x] Patient able to complete tx  [] Patient limited by fatigue  [] Patient limited by pain  [] Patient limited by other medical complications  [] Other:     Prognosis: [x] Good [] Fair  [] Poor    Patient Requires Follow-up: [x] Yes  [] No    Plan for next treatment session: transfers, seated core strength, will plan to co-treat with OT when possible    PLAN: See andres. PT 2x / week for 6 weeks. [x] Continue per plan of care [] Alter current plan (see comments)  [] Plan of care initiated [] Hold pending MD visit [] Discharge    Electronically signed by: Tyler Baltazar PT DPT    Note: If patient does not return for scheduled/ recommended follow up visits, his note will serve as a discharge from care along with most recent update on progress.

## 2021-07-12 NOTE — FLOWSHEET NOTE
[address] [facility] Physical Therapy  Phone: [phone] -  Fax: [phone]    Occupational Therapy Daily Treatment Note  Date:  2021    Patient: Cristian Lopez   : 2000   MRN: [id number]  Referring Physician:  Saida Butler CNP       Medical Diagnosis Information: paraplegia, cerebral palsy status post covid has not been seen in clinic since                                        Insurance information: medicare/ medicaid     Date of Injury:   Date of Surgery: rhizotomy when he was about 10    Progress Report: []  Yes  [x]  No     Date Range for reporting period:  2021 to 2021. Patient is status post covid recovery and missed treatments since 2021. Progress report due (10 Rx/or 30 days whichever is less): visit #20 or 5802    Recertification due (POC duration/ or 90 days whichever is less): visit #20 or 2021    Visit # Insurance Allowable Auth required? Date Range    +   [place] []  Yes  [x]  No n/a       Latex Allergy:  []No      []Yes  Pacemaker:  [] No       [] Yes     Preferred Language for Healthcare:   [x]English       []other:    Pain level: 0     SUBJECTIVE:  Patient requesting to use toilet upon arrival. Pt reports completing resistive UE exercise at home with theraband. Functional Disability Index:  STREAM: score 23    21: Stroke Rehabilitation Assessment of Movement (STREAM): total- 24/70 (supine- 9/15, sitting- 15, standing- 0/24)    OBJECTIVE: See eval    21: 3 minutes static stance at parallel bars with CGA x2 for trunk control and blocking knees      RESTRICTIONS/PRECAUTIONS: fall risk    Exercises/Interventions: Treatment focused on trunk lengthening and balance. Session initiated with pt completing squat pivot transfer from w/c to sitting EOM with CGA. In short sit at [facility], pt completed bilateral wb for partial STS x7 to lift glutes off mat to position over gerardo disc with min A x2 for positioning. From short sit, pt completed STS transfer x4 with min A x2 for proper positioning on dynadisc to challenge [place]. Pt sat on gerardo disc for trunk control exercise, with pt instructed in using BLE to provide support, with multiple instances of LOB into lateral lean. Pt completed UE reaching activity of playing Connect 4 with an emphasis on trunk lengthening, crossing midline, lateral pinch, and eccentric control to challenge trunk control, with pt requiring verbal cues to bend at waist rather than shift buttocks forward; verbal cues for problem solving. Patient then scooted out to edge of mat and completed STS to don harness to work on ambulation. Completed ~20 feet x2 in harness with mod x2 for LE advancement and cues for hand placement. Pt transitioned to sitting in w/c with assist of harness > sitting EOM. Pt completed sit>supine transfer with SBA. In supine, pt received manual stretch of hamstrings and adductors for decreased tightness. Pt completed supine>sit transfer with mod A + min A. Session concluded with pt completing squat pivot transfer from EOM to w/c with min A d/t fatigue and pt exiting clinic in w/c. Therapeutic Exercises  Resistance / level Sets/sec Reps Notes                                                                                Neuromuscular Re-ed / Therapeutic Activities                                                 Manual Intervention                                                     Modalities:     Patient education:  Plan of care, importance of weight bearing in stander for overall health, home exercise program, goals. Home Exercise Program:   Patient encouraged to start using stander 3 sessions a day and while in stander completing over head reaches . Patient to try and reach 15 minute intervals in stander.       Therapeutic Exercise and NMR:  [x] (02500) Provided verbal/tactile cueing for activities related to strengthening, flexibility, endurance, ROM  for improvements in scapular, scapulothoracic and UE control with self care, reaching, carrying, lifting, house/yardwork, driving/computer work. [x] (25794) Provided verbal/tactile cueing for activities related to improving balance, coordination, kinesthetic sense, posture, motor skill, proprioception  to assist with  scapular, scapulothoracic and UE control with self care, reaching, carrying, lifting, house/yardwork, driving/computer work.   [] Comments:    Therapeutic Activities:    [x] (30419 or 89888) Provided verbal/tactile cueing for activities related to improving balance, coordination, kinesthetic sense, posture, motor skill, proprioception and motor activation to allow for proper function of scapular, scapulothoracic and UE control with self care, carrying, lifting, driving/computer work  [] Comments:    Home Exercise Program:    [x] (15129) Reviewed/Progressed HEP activities related to strengthening, flexibility, endurance, ROM of scapular, scapulothoracic and UE control with self care, reaching, carrying, lifting, house/yardwork, driving/computer work  [] (28248) Reviewed/Progressed HEP activities related to improving balance, coordination, kinesthetic sense, posture, motor skill, proprioception of scapular, scapulothoracic and UE control with self care, reaching, carrying, lifting, house/yardwork, driving/computer work    [] Comments:    Manual Treatments:  PROM / STM / Oscillations-Mobs:  G-I, II, III, IV (PA's, Inf., Post.)  [] (20105) Provided manual therapy to mobilize soft tissue/joints of cervical/CT, scapular GHJ and UE for the purpose of modulating pain, promoting relaxation,  increasing ROM, reducing/eliminating soft tissue swelling/inflammation/restriction, improving soft tissue extensibility and allowing for proper ROM for normal function with self care, reaching, carrying, lifting, house/yardwork, driving/computer work  [] Comments:    ADL Training:  [] (02739) Provided self-care/home management training related to activities of daily living and compensatory training, and/or use of adaptive equipment   [] Comments:     Splinting:  [] Fabrication of:   [] (33344) Orthotic/Prosthetic Management, subsequent encounter  [] (66045) Orthotic management and training (fitting and assessment)  [] Comments:      Charges: co treat with PT for safety and increased intensity of treatment  Timed Code Treatment Minutes: 45   Total Treatment Minutes: 90     [] EVAL (LOW) 31117   [] OT Re-eval (15673)  [] EVAL (MOD) 92575   [] EVAL (HIGH) 79899       [x] Ru (19756) x   1  [] PJDCC(23765)  [x] NMR (36821) x  1  [] Estim (attended) (91032)   [] Manual ([id number]) x      [] US (14575)  [x] TA (74160) x  1   [] Paraffin (56771)  [] ADL  ([phone]) x     [] Splint/L code:    [] Estim (unattended) (22 [id number])  [] Fluidotherapy (29087)  [] Other:    GOALS:    Patient stated goal: improved trunk control and standing tolerance to increase independence in self care. [x]? Progressing: []? Met: []? Not Met: []? Adjusted     Therapist goals for Patient:   Short Term Goals: To be achieved in: 30 days  1. Pt will be independent with clothing management on toilet or in wheelchair to complete toileting with use of grab bar. [x]? Progressing: []? Met: []? Not Met: []? Adjusted  2. Pt will be stand by assist completing scoot transfers on level surfaces  [x]? Progressing: []? Met: []? Not Met: []? Adjusted  3. Patient will be able to stand up to 30 seconds at grab bar for toileting and lower body dress with min assist.   [x]? Progressing: []? Met: []? Not Met: []? Adjusted     Long Term Goals: To be achieved by discharge  1. Pt will demonstrate an improved stream score of 28. New goal is 32  []? Progressing: [x]? Met: []? Not Met: [x]? Adjusted    Progression Towards Functional goals:  [x] Patient is progressing as expected towards functional goals listed. [] Progression is slowed due to complexities listed.   [] Progression has been slowed due to co-morbidities. [] Plan just implemented, too soon to assess goals progression  [] All goals are met  [] Other:     ASSESSMENT:   Patient has made nice progress in mobility, balance and executive function in spite of covid  Treatment/Activity Tolerance:  [x] Patient tolerated treatment well [] Patient limited by fatigue  [] Patient limited by pain  [] Patient limited by other medical complications  [] Other:     Prognosis: [x] Good [] Fair  [] Poor    Patient Requires Follow-up: [x] Yes  [] No    PLAN: See eval  [x] Continue per plan of care [] Alter current plan (see comments)  [] Plan of care initiated (MD cosigned) [] Hold pending MD visit [] Discharge    Electronically signed by: Yogesh Zacarias, OTS     Therapist was present, directed the patient's care, made skilled judgement, and was responsible for assessment and treatment of the patient. Elisa Curiel, OTR/L [id number]                  Note: If patient does not return for scheduled/ recommended follow up visits, this note will serve as a discharge from care along with most recent update on progress.

## 2021-07-14 ENCOUNTER — HOSPITAL ENCOUNTER (OUTPATIENT)
Dept: OCCUPATIONAL THERAPY | Age: 21
Setting detail: THERAPIES SERIES
Discharge: HOME | End: 2021-07-14
Payer: MEDICARE

## 2021-07-14 ENCOUNTER — HOSPITAL ENCOUNTER (OUTPATIENT)
Dept: PHYSICAL THERAPY | Age: 21
Setting detail: THERAPIES SERIES
Discharge: HOME | End: 2021-07-14
Payer: MEDICARE

## 2021-07-14 PROCEDURE — 97530 THERAPEUTIC ACTIVITIES: CPT

## 2021-07-14 PROCEDURE — 97535 SELF CARE MNGMENT TRAINING: CPT

## 2021-07-14 PROCEDURE — 97112 NEUROMUSCULAR REEDUCATION: CPT

## 2021-07-14 PROCEDURE — 97110 THERAPEUTIC EXERCISES: CPT

## 2021-07-14 NOTE — FLOWSHEET NOTE
[address] [facility] Physical Therapy  Phone: [phone] -  Fax: [phone]    Occupational Therapy Daily Treatment Note  Date:  2021    Patient: Laura Garcia   : 2000   MRN: [id number]  Referring Physician:  Jean Carlos Stephens CNP       Medical Diagnosis Information: paraplegia, cerebral palsy status post covid has not been seen in clinic since                                        Insurance information: medicare/ medicaid     Date of Injury:   Date of Surgery: rhizotomy when he was about 10    Progress Report: []  Yes  [x]  No     Date Range for reporting period:  2021 to 2021. Patient is status post covid recovery and missed treatments since 2021. Progress report due (10 Rx/or 30 days whichever is less): visit #20 or     Recertification due (POC duration/ or 90 days whichever is less): visit #20 or 2021    Visit # Insurance Allowable Auth required? Date Range    + 10/16  [place] []  Yes  [x]  No n/a       Latex Allergy:  []No      []Yes  Pacemaker:  [] No       [] Yes     Preferred Language for Healthcare:   [x]English       []other:    Pain level: 0     SUBJECTIVE:  Patient requesting to use toilet upon arrival. Pt reports using stander yesterday. Pt presents in different shoes, reporting they are more narrow, but more comfortable to use with his AFOs. Functional Disability Index:  STREAM: score 23    21: Stroke Rehabilitation Assessment of Movement (STREAM): total- 24/ (supine- 9/15, sitting- 15/31, standing- 0)    OBJECTIVE: See eval    21: 3 minutes static stance at parallel bars with CGA x2 for trunk control and blocking knees      RESTRICTIONS/PRECAUTIONS: fall risk    Exercises/Interventions: Treatment focused on improving independence in LE dressing, trunk control, and standing balance and tolerance for functional mobility.  Session initiated with toileting using urinal without assist. Pt doffed L AFO and shoe with SBA, swinging LLE to outside of leg rests to decrease shoulder and trunk flexion required for task. Pt then problem solved with therapist to complete donning of L AFO and shoe with pt reporting he has not practiced task since last attempt in therapy. Shoe modified with splint hook at base and top of tongue with softstrap to adhere due to pt difficulty managing tongue of shoe while threading foot in. Verbal cues to place toes in prior to heel. Pt received hamstring stretch. Pt completed knee extension, abduction, and adduction with only adduction completed without assist. At [facility], pt completed STS x6 (not consecutive) with verbal cues to scoot forward in w/c and push up from arm rests with CGA-min A; verbal cues for eccentric control when sitting. Pt also completed side step length of bar x1 each side with mod-max A x2 for LE advancement, blocking knees, and facilitating glute contraction. Therapeutic Exercises  Resistance / level Sets/sec Reps Notes                                                                                Neuromuscular Re-ed / Therapeutic Activities                                                 Manual Intervention                                                     Modalities:     Patient education:  Plan of care, importance of weight bearing in stander for overall health, home exercise program, goals. Home Exercise Program:   Patient encouraged to start using stander 3 sessions a day and while in stander completing over head reaches . Patient to try and reach 15 minute intervals in stander. Therapeutic Exercise and NMR:  [x] (57659) Provided verbal/tactile cueing for activities related to strengthening, flexibility, endurance, ROM  for improvements in scapular, scapulothoracic and UE control with self care, reaching, carrying, lifting, house/yardwork, driving/computer work.     [x] (63485) Provided verbal/tactile cueing for activities related to improving balance, coordination, kinesthetic sense, posture, motor skill, proprioception  to assist with  scapular, scapulothoracic and UE control with self care, reaching, carrying, lifting, house/yardwork, driving/computer work.   [] Comments:    Therapeutic Activities:    [x] (25721 or 24386) Provided verbal/tactile cueing for activities related to improving balance, coordination, kinesthetic sense, posture, motor skill, proprioception and motor activation to allow for proper function of scapular, scapulothoracic and UE control with self care, carrying, lifting, driving/computer work  [] Comments:    Home Exercise Program:    [x] (21922) Reviewed/Progressed HEP activities related to strengthening, flexibility, endurance, ROM of scapular, scapulothoracic and UE control with self care, reaching, carrying, lifting, house/yardwork, driving/computer work  [] (27335) Reviewed/Progressed HEP activities related to improving balance, coordination, kinesthetic sense, posture, motor skill, proprioception of scapular, scapulothoracic and UE control with self care, reaching, carrying, lifting, house/yardwork, driving/computer work    [] Comments:    Manual Treatments:  PROM / STM / Oscillations-Mobs:  G-I, II, III, IV (PA's, Inf., Post.)  [] (06144) Provided manual therapy to mobilize soft tissue/joints of cervical/CT, scapular GHJ and UE for the purpose of modulating pain, promoting relaxation,  increasing ROM, reducing/eliminating soft tissue swelling/inflammation/restriction, improving soft tissue extensibility and allowing for proper ROM for normal function with self care, reaching, carrying, lifting, house/yardwork, driving/computer work  [] Comments:    ADL Training:  [] (17616) Provided self-care/home management training related to activities of daily living and compensatory training, and/or use of adaptive equipment   [] Comments:     Splinting:  [] Fabrication of:   [] (50604) Orthotic/Prosthetic Management, subsequent encounter  [] (30169) Orthotic management and training (fitting and assessment)  [] Comments:      Charges: co treat with PT for safety and increased intensity of treatment  Timed Code Treatment Minutes: 44   Total Treatment Minutes: 88     [] EVAL (LOW) 57790   [] OT Re-eval (26022)  [] EVAL (MOD) 79226   [] EVAL (HIGH) 74493       [x] Ru (13494) x   1  [] QYDUD(41718)  [] NMR (55178) x    [] Estim (attended) (29242)   [] Manual ([id number]) x      [] US (30146)  [x] TA (36966) x  1   [] Paraffin (57244)  [x] ADL  (93487) x  1   [] Splint/L code:    [] Estim (unattended) (22 334885)  [] Fluidotherapy (55099)  [] Other:    GOALS:    Patient stated goal: improved trunk control and standing tolerance to increase independence in self care. [x]? Progressing: []? Met: []? Not Met: []? Adjusted     Therapist goals for Patient:   Short Term Goals: To be achieved in: 30 days  1. Pt will be independent with clothing management on toilet or in wheelchair to complete toileting with use of grab bar. [x]? Progressing: []? Met: []? Not Met: []? Adjusted  2. Pt will be stand by assist completing scoot transfers on level surfaces  [x]? Progressing: []? Met: []? Not Met: []? Adjusted  3. Patient will be able to stand up to 30 seconds at grab bar for toileting and lower body dress with min assist.   [x]? Progressing: []? Met: []? Not Met: []? Adjusted     Long Term Goals: To be achieved by discharge  1. Pt will demonstrate an improved stream score of 28. New goal is 32  []? Progressing: [x]? Met: []? Not Met: [x]? Adjusted    Progression Towards Functional goals:  [x] Patient is progressing as expected towards functional goals listed. [] Progression is slowed due to complexities listed. [] Progression has been slowed due to co-morbidities.   [] Plan just implemented, too soon to assess goals progression  [] All goals are met  [] Other:     ASSESSMENT:   Patient has made progress in mobility, balance and executive function. Treatment/Activity Tolerance:  [x] Patient tolerated treatment well [] Patient limited by fatigue  [] Patient limited by pain  [] Patient limited by other medical complications  [] Other:     Prognosis: [x] Good [] Fair  [] Poor    Patient Requires Follow-up: [x] Yes  [] No    PLAN: See eval  [x] Continue per plan of care [] Alter current plan (see comments)  [] Plan of care initiated (MD cosigned) [] Hold pending MD visit [] Discharge    Electronically signed by:   Diaz Lomeli OTR/VINH [id number]                  Note: If patient does not return for scheduled/ recommended follow up visits, this note will serve as a discharge from care along with most recent update on progress.

## 2021-07-14 NOTE — FLOWSHEET NOTE
[facility] - Outpatient Physical Therapy  Phone: [phone]   Fax: [phone]    Physical Therapy Daily Treatment Note  Date:  2021     Patient Name:  Pretty Olivares    :  2000  MRN: [id number]  Medical/Treatment Diagnosis Information:  Diagnosis: Cerebral palsy with spastic diplegia  Treatment Diagnosis: Decreased trunk control impacting ability to complete safe transfers, decreased standing tolerance, LE weakness  Insurance/Certification information:  PT Insurance Information: Medicare & Medicaid  Physician Information:  Referring Practitioner: JOHNNY Heart  Plan of care signed (Y/N): [x]  Yes []  No     Date of Patient follow up with Physician:      Progress Report: []  Yes  [x]  No     Date Range for reporting period:  Beginning  21  Re-eval:   Ending    Progress report due (10 Rx/or 30 days whichever is less):      Recertification due (POC duration/ or 90 days whichever is less):      Visit # Insurance Allowable Auth required? Date Range    +  + 3/10 larry Medical necessity []  Yes  [x]  No n/a     Latex Allergy:  [x]NO      []YES  Preferred Language for Healthcare:   [x]English       []Other:      Functional Scale/Test Evaluation 3/1/2021 4/21/21  6/22/21 Discharge   STREAM 23  26                        Pain level:  0/10  Location:  none    SUBJECTIVE:   Was able to  stander since last session, last about an hour. Wanted to  it again this am but ran out of time. Found some older shoes that are a little smaller to make moving around easier for him. Hasn't practiced donning and doffing AFO's & shoes recently. States he goes to get fitted for new manual wheelchair [facility] month [facility], isn't sure if he's getting a w/c then or just fitted. OBJECTIVE:  : Session started 10 min after scheduled time, pt had to use the restroom.  PT offered to help pt with standing, but pt declined, stating he would rather use the urinal to save some energy for the session. 4/28:  Pt 15 mins late then needed to use restroom    Co-treat w/OT for safety and assistance    RESTRICTIONS/PRECAUTIONS: recent Bell's Palsy    Interventions/Exercises:   Therapeutic Exercises (37961) Resistance / level Sets/sec Reps Notes   W/c push ups in preparation for standing                            Neuromuscular Re-ed (43670) /  Gait Training (11255)       Upright posture seated in W/C   X 5 reps holding football, rest of reps completed with L UE bracing on L knee, PT hand assist on sternum and lumbar spine to facilitate                                Gait Training:  Amb in //bars    Bwd walking in //bars      Transfer w/B Lofstrand crutches   Static stand w/B Lofstrand crutches   8 ft    8 ft    Mod A of 2  Mod A of 2   -manual required to advance LEs, pt activation hip extensors to initiate swing phase, demo'd adequate weight-shifting. OT provided assist & w/c follow for safety      -difficulty with placement for adequate support                 Therapeutic Activities (15503) /  Functional Tasks       [facility] reaching across midline in seated   Pt seated in w/c using seat belt to help stay in w/c          [facility] reaching across midline and diagonally   Pt seated in w/c using seat belt to help stay in w/c               STS to std walker            Transfer to w/c from mat table            STS from w/c to mat table - with large bolster placed on table in front of patient to hold onto   Min A, w/c close to mat table to assist in blocking pt's knees. Manual Intervention ([phone])       Supine hamstring stretch 90/90    -completed by PT  -completed by OT                                        Sessions:     7/14:  Patient doffed L AFO and shoe, required significant time and frequent cues to complete. Had significant difficulty donning AFO and shoe despite several verbal and manual cues from OT.   OT modified shoe to assist pt in keeping tongue open to allow foot better access into shoe. Pt continued to have difficulty donning shoe and eventually took AFO out of shoe to kendall it. Was only able to kendall shoe with assist from OT. Patient required Supervision about 90% of the time during task, SBA for remainder of time due to significant forward trunk flexion. Pt did utilize seat belt on w/c about prison through task. At [facility]: side stepped length of bar x1 lap w/PT assisting advancement of LEs and OT providing support. Manual blocking of knees was required with increased step length to maintain safety. Sit to stands at [facility] x10 w/mod A of 2.    7/12: Session initiated with pt completing squat pivot transfer from w/c to sitting EOM with CGA. In short sit at [facility], pt completed bilateral wb for partial STS x7 to lift glutes off mat to position over gerardo disc with min A x2 for positioning. From short sit, pt completed STS transfer x4 with min A x2 for proper positioning on dynadisc to challenge [place]. Pt sat on gerardo disc for trunk control exercise, with pt instructed in using BLE to provide support, with multiple instances of LOB into lateral lean. Pt completed UE reaching activity of playing Connect 4 with an emphasis on trunk lengthening, crossing midline, lateral pinch, and eccentric control to challenge trunk control, with pt requiring verbal cues to bend at waist rather than shift buttocks forward; verbal cues for problem solving. Patient then scooted out to edge of mat and completed STS to don harness to work on ambulation. Completed ~20 feet x2 in harness with mod x2 for LE advancement and cues for hand placement. Pt transitioned to sitting in w/c with assist of harness > sitting EOM. Pt completed sit>supine transfer with SBA. In supine, pt received manual stretch of hamstrings and adductors for decreased tightness. Pt completed supine>sit transfer with mod A + min A.  Session concluded with pt completing squat pivot transfer from EOM to w/c with min A d/t fatigue and pt exiting clinic in w/c.      7/8:  Session focused on increasing trunk control for safe transfers, transitional movement patterns, ADL. Patient transferred w/c to mat with SBA/CGA. From short sit at edge of mat with feet on 4 inch step for increased weight bearing patient worked on pelvic stabilization, lateral weight shifts reaching in a variety of planes needing SBA/CGA to lengthen trunk and stabilize. Patient then assumed supine and worked on segmental rolling for facilitation of lower trunk and min assist of one. Patient then transitioned to prone and completed plank with mod assist of two. Patient then attempted transition to quadriped with mod assist of two due to fatigue, modified it with ball under abdomen. Patient completed shoulder abduction in prone for scapular stabilization and increasing thoracic extension needing mod of two. Patient then transitioned back to long sit and vertical roll placed along spine to increase thoracic extension and worked on shoulder abduction in long sit. .  Patient then scooted out to edge of mat and was placed in harness to work on ambulation and completed about 20 feet in harness with mod of one and stand by of one.      7/6: Session began by assisting pt in restroom again. Pt used grab bars around toilet to pull to standing with CGA and then leaned forward on wall while completing toileting. Pt sat down in w/c, then stood again to pull up pants. Session then moved to tur room. Pt transferred to mat table there with CGA. Pat sat EOB and was cued to lift chest for upright posture and worked on pushing sled away. Sled had two 25# plates loaded onto it. Pt also worked on initiating a stand while pushing into sled and patient had a few successful trials in which he was able to clear his buttocks from the mat.  Pt worked on trunk control in which he was holding a 4# medicine ball and reaching overhead, out in front, and down to the floor over several trials with elbows extended, chest lifted, and hands spread wide on the ball. Pt also worked on trunk control and lengthening reaching across midline towards a target with stabilizing on table using his ipsilateral hand. PT/OT then placed pt into harness for use with the NxStep system. Once centered in system, pt stood and worked on stepping fwd and retro in place. Pt completed 2-3 trials of stepping with each LE and then took a seated rest break. Upon the second trial of standing, his weight was shifted too far anteriorly and pt was unable to advance either LE. Pt sat again and was too fatigued to continue so the session was ended. 6/28: Pt assisted by both PT and OT in restroom at beginning of session. Pt completed multiple STS from [facility] with use of grab bars in restroom and SBA/CGA. Pt braced legs on toilet and used 1 HR on bar to balance self while completing LE dressing with minimal assistance only to get shorts out from underneath pt's feet. Pt also able to complete bridge in w/c to pull pants down. Pt able to complete STS in // bars with CGA and then completed stepping to target x 5 B/LE. Pt required less assistance to complete hip extension back to starting position. Mod to Max A to move leg fwd with other therapist blocking stance leg. At mat table, pt sat in w/c and leaned forward on forearms. Pt then pushed through forearms and used glutes and quads to lift bottom from chair with therapists assisting to block knees and lift buttocks if needed. Pt able to transition from forearms to open palms with elbows extended and then to standing x 2. Pt transferred to the mat table to progress from seated to sidelying and was prompted to lift legs 1 at a time as he rolled to sidelying then supine. Pt worked on rolling to his R side, and then propping up on R hand to reach for points on the wall and therapists hand with his left.  Pt was cued to lift his chest and reach while depressing his R shoulder. Pt performed on both sides with multiple reps of reaching while propped in the position. In supine, pt kicked each LE separately into extension against resistance from therapist, beginning with full ROM and progressing to extension pulses. Pt ended session by transferring back to w/c with min A/CGA. 6/22: Stand pivot to mat table with min A. STREAM completed for reassessment - pt demo's increased score this date. Pt completed the following exercises seated EOM with 4\" step under feet and PVC pipe with orange TB and OT holding band: bicep curls x 10, mid rows x 10, trunk twists x 5 B; pt then completed PNF flexion with orange band x 1 B, and horiz abd with orange band x 10'. Pt completed LAQ - done bilaterally with upright posture x 5, then done B/L with arms extended behind pt and cues to lift chest and relax neck to avoid forward head. Pt completed 1 static stand during the session at [facility] walker with mod A to block pt's knees while completing STREAM. Pt then completed squat pivot transfer back to w/c at end of session with min A.     5/12:  Squat pivot w/OT w/c to high mat table min/mod A for safety and to facilitate LE use. Sit to supine mod A. Harness for gait training system was donned while supine, then adjusted while long sitting and sitting EOM. Mod A of 2 supine to long sit, min A of 1 for balance long sit to sitting EOM. Pt setup inside NxStep partial weight-bearing gait training system for ambulation. Partial weight adjusted between 80 & 100 lbs depending on pt's ease of LE advancement. Amb 100' while PT & OT guided system for pt. Seated rest.  Amb [de-identified]' w/OT & student OT guiding NxStep system, PT assessing gait, provided mod A for trunk stability final 20 ft. Seated rest.  Amb 61' w/2 person guiding system & PT providing mod A for trunk stability -pt able to demo increased step length and weight shift when trunk was stabilized.   Pt performed dips while in partial WB'ing system, 2x3, 1x5;  Pt performed bean bag toss with OT, working to facilitate lateral reach outside [place] w/opposit UE then toss into bucket. Pt passed gold med ball [place] to student PT and received to facilitate UE flex at end range and trunk ext while working on dynamic balance. Pt in good spirits after walking, excited to show family, and had increased fatigue. 5/10:    Squat pivot transfer w/OT, w/c to w/c height mat table w/min A and mod verbal cues for improved technique. Leaning onto RUE, hip/LE ext activation to prime mat table to floor transfer x6 w/manual cues for facilitation. Mat table to floor transfer mod A of 2. Tall kneeling at lowered mat table to work on weight shifting, hip ext, trunk ext, UE ex's for 50 mins. Floor to chair transfer, max A of 2. Sit to stand transfer to mat table w/o B AFO w/max A of 2, increased instability of B knees, prevented full stand. Donned B AFO, pt completed transfer mod A of 2 and able to stand for 5 mins to work on hip ext, trunk ext, and LE WB'ing. 5/7:   Upon arrival, pt requested to use restroom, stated he didn't want to attempt to urinate as he had too much in the pocket of his hoodie and would most likely make a mess. Stand pivot w/c to same height mat table w/CGA, improved foot placement throughout but need to better place them prior to scooting back on edge of mat. Blue bungee ab crunches x5 mins w/intermittent rest breaks & time for recover following LOB. Forward reach, outside [place] for gold med ball, [place] reverse pass to PT behind him, then reach around back at sacrum level to receive ball, forward reach outside [place] to return to OT x10 w/intermittent rest and to recover LOB. Seated knee ext & flex w/assit from gliders x10 B -min/mod A for hip flex to further reduce friction OT provided min/mod A for balance. Stand pivot mat table to w/c w/min A and reduced eccentric control. Sit to stand at ballet barre x2 min/mod A of 2.   Lateral stepping length of bar each way, w/min/mod A for balance, mod A to stabilize stance knee & facilitate WB'ing, CGA to mod A to advance ipsilateral LE. Sit to stand w/ballet barre x5.    5/5: Discussed with pt appropriate amount of time to spend in stander at home, with education on importance of quality of participation rather than quantity/duration; pt verbalized understanding, but may require reinforcement. Pt completed sit pivot transfer from w/c to mat table with Min A x 2. Pt participated in crossing midline for open hand slap onto gerardo disc x10 each side with SBA-min A for trunk control during task. Pt with tendency to lose balance towards R side. Pt required min-mod A for partial stand to place gerardo disc under buttocks. Pt seated on gerardo disc ~10 min to increase trunk strength and stability, with bilateral BUE reaching/shoulder flexion with CGA-mod A for trunk stability and blocking knees to prevent sliding forward. Pt completed a partial stand-> pivot edge of mat to w/c min A x 2 with verbal cues for safety as pt did not notify therapists prior to initiation of transfer. Pt seated at barre in w/c with 2 sit to stands with min A and use of barre and in stance ~2 min x 2 trials with min progressing to mod A with fatigue for trunk support by OT and PT blocking knees/facilitating LE extension; tactile/vcs for upright posture. During first trial pt with shoulder flexion of BUEs to slide up wall. Pt sat in w/c to complete bilateral LE advancement of w/c for strengthening requiring verbal cues and assist to put LEs in position (partial knee extension) with PT blocking feet for proper technique as pt initially used rocking of trunk to advance w/c. Pt completed this for ~10 ft with almost constant cueing to not propel w/c by rocking trunk. Pt grasping cylindrical handle of 30# bungee cord to maintain 90* elbow flexion for stability of BUEs, followed by mid rows for scapular retraction with light facilitation by PT x10.  In // bars, pt completed STS with B UE assist on bars and CGA for trunk control. Pt completed forward stepping length of // bars ~6 ft with mod A for R LE advancement and max A for L LE advancement. PT blocked stance leg during advancement assist of opposite LE with OT assist for trunk control min-mod A and wheelchair follow. Improved initiation of RLE movement on this date noted, specifically in knee flexion/extension and hip flexion. Continued verbal cues for placement of hands in front of body to prepare for use of lofstrand crutches. Pt declined need to toilet at end of session before transport arrived. Pt ended session with cross midline \"high 5\" x6 with BUEs. 4/28:    Pt about 12 mins late then needed to use restroom, delaying start of therapy.  //bars: sit to stand transition with B UE assist and CGA for trunk control. Amb ~6 steps with mod A for R LE advancement and max A for L LE advancement. PT min blocked stance leg during contralateral swing phase with OT assist for trunk control min-mod A and wheelchair follow. Completed x3 with retro stepping after 2nd attempt ~6 steps with pt relying on momentum of trunk rotation for hip extension when stepping back. Continued verbal cues for placement of hands in front of body to prepare for use of lofstrand crutches. Also discussed potential use of posterior walker as pt previously used when ambulatory and due to pt's preference for hands used as [place] behind trunk. In treatment room, pt doffed/donned R AFO seated in wheelchair using technique of abducting hip to bring to side of wheelchair to place foot into AFO and shoe, and then placing foot on foot rest to adhere straps. Decreased time required with this method with pt agreeing to attempt donning both AFOs at home. Pt transferred to sit edge of mat from wheelchair with min A x2 to block feet and for trunk control during scooting.  Continued difficulty with trunk flexion during lowering to sit far back on surface pt is transferring to affecting safety. With PT holding ball in front of pt, pt completed cross and hook style punches across body to opposite side x10 B with emphasis on trunk control and proprioceptive input of ball to improve grading of force; decreased trunk control on L punches due to R trunk weakness with mod-min A for sitting balance compared to CGA-min A on L. Trunk ext w/BUE [place] to trunk flex & shoulder ext to hit ball forcefully x3 -difficulty achieving necessary trunk ext and focused on BUEs [place] instead. Session concluded with mat to wheelchair transfer min A x2 with wheelchair on pt's right.      4/26:  Began session with OT applying foam padding to patient's ankle for cushion so pt can wear AFOs comfortably. Pt transferred to sit edge of mat from wheelchair with min A x2 to block feet and for trunk control during scooting. Grasping 20# bungee cord, pt completed horizontal ab/adduction x10, followed by mid row x10 with emphasis on scapular retraction and trunk control. For continued trunk control during dynamic balance, bungee cord placed around pt's trunk with pt completed hip and thoracic extension against resistance of bungee cord and then returning to upright starting position x8, followed by R and L lateral lean x5 each with min A required for balance. Pt completed trunk rotation to reach behind self to grab bean bag to improve dynamic balance required for pericare while toileting, followed by tossing to bucket, with min A for ~3 instances LOB. Pt instructed on placing flat hands on surface of mat to complete weight bearing during trunk rotation to improve balance. Bean bags were tossed back to patient x3 with instruction  to catch with open hands to reduce tone. Pt returned to wheelchair from edge of mat with min A for transfer.  Pt transitioned to // bars to complete ambulation x4 steps with max A x2 for trunk control and advancing LEs prior to seated rest break due to increased fatigue with lack of AFO support. Pt re-attempted with 3 steps, followed by static standing x60 seconds with max A + mod A with cues to keep arms in front of him to prepare for use of lofstrand crutches and to facilitate trunk engagement and knee extension. Pt sat on gerardo disc placed in wheelchair for trunk control with min-mod A for balance to complete ball toss/catch task with multiple LOB; theraband loop placed around knees to prevent abduction and improve pelvic stability and base of support. UB dressing completed to doff/don sweatshirt with SBA and good trunk control noted during weight shifts. Focus of today's session was to improve dynamic sitting and standing balance.         Modalities:     Pt. Education:  -patient educated on diagnosis, prognosis and expectations for rehab  -all patient questions were answered    HEP instruction:      NMR and Therapeutic Activities:    [x] (96185 or 72662) Provided verbal/tactile cueing for activities related to improving balance, coordination, kinesthetic sense, posture, motor skill, proprioception and motor activation to allow for proper function of   [x] LE / Core, hip and LE with self care and ADLs  [x] UE / Shoulder complex: cervical, postural, scapular, scapulothoracic and UE control with self care, carrying, lifting, driving, computer work.   [] (28469) Gait Re-education- Provided training and instruction to the patient for proper LE, core and hip recruitment, positioning, and eccentric body weight control with ambulation re-education, including ascending & descending stairs     Home Management Training / Self Care:  [] (92730) Provided self-care/home management training related to activities of daily living and compensatory training, and/or use of adaptive equipment for improvement with: ADLs and compensatory training, meal preparation, safety procedures and instruction in use of adaptive equipment, including bathing, grooming, dressing, personal hygiene, basic household cleaning and chores. Therapeutic Exercise and NMR EXR  [x] (22804) Provided verbal/tactile cueing for activities related to strengthening, flexibility, endurance, ROM for improvements in  [x] LE / Core stability: LE, hip, and core control with self care, mobility, lifting, ambulation. [x] UE / Shoulder complex: cervical, postural, scapular, scapulothoracic and UE control with self care, reaching, carrying, lifting, house/yardwork, driving, computer work. [x] (33625) Provided verbal/tactile cueing for activities related to improving balance, coordination, kinesthetic sense, posture, motor skill, proprioception to assist with   [x] LE / Core stability: LE, hip, and core control in self care, mobility, lifting, ambulation and eccentric single leg control. [x] UE / Shoulder complex: cervical, scapular, scapulothoracic and UE control with self care, reaching, carrying, lifting, house/yardwork, driving, computer work.   [] (37000) Therapist is in constant attendance of 2 or more patients providing skilled therapy interventions, but not providing any significant amount of measurable one-on-one time to either patient, for improvements in  [] LE / Core stability: LE, hip, and core control in self care, mobility, lifting, ambulation and eccentric single leg control. [] UE / Shoulder complex: cervical, scapular, scapulothoracic and UE control with self care, reaching, carrying, lifting, house/yardwork, driving, computer work.      Home Exercise Program:    [] (04061) Reviewed/Progressed HEP activities related to strengthening, flexibility, endurance, ROM of   [] LE / Core stability: core, hip and LE for functional self-care, mobility, lifting and ambulation/stair navigation   [] UE / Shoulder complex: cervical, postural, scapular, scapulothoracic and UE control with self care, reaching, carrying, lifting, house/yardwork, driving, computer work  [] (60745)Reviewed/Progressed HEP activities related to improving balance, coordination, kinesthetic sense, posture, motor skill, proprioception of   [] LE: core, hip and LE for self care, mobility, lifting, and ambulation/stair navigation    [] UE / Shoulder complex: cervical, postural,  scapular, scapulothoracic and UE control with self care, reaching, carrying, lifting, house/yardwork, driving, computer work    Manual Treatments:  PROM / STM / Oscillations-Mobs:  G-I, II, III, IV (PA's, Inf., Post.)  [] (18327) Provided manual therapy to mobilize shoulder complex, hip, LE, and/or cervicothoracic/LS spine soft tissue/joints for the purpose of modulating pain, promoting relaxation,  increasing ROM, reducing/eliminating soft tissue swelling/inflammation/restriction, improving soft tissue extensibility and allowing for proper ROM for normal function with   [] LE / Core stability: self care, mobility, lifting and ambulation. [] UE / Shoulder complex: self care, reaching, carrying, lifting, house/yardwork, driving, computer work. Modalities:  [] (41597) Vasopneumatic compression: Utilized vasopneumatic compression to decrease edema / swelling for the purpose of improving mobility and quad tone / recruitment which will allow for increased overall function including but not limited to self-care, transfers, ambulation, and ascending / descending stairs. Charges:  Timed Code Treatment Minutes: 44   Total Treatment Minutes: 88   **CO-treat with OT    [] EVAL - LOW (81175)   [] EVAL - MOD (91545)  [] EVAL - HIGH (19956)  [] RE-EVAL (99025)  [] TE (85850) x       [] Ionto  [x] NMR (25283) x  1    [] Vaso  [] Manual (90496) x      [] Ultrasound  [x] TA x  2     [] Mech Traction (17323)  [] Gait Training x     [] ES (un) (76270):   [] Aquatic therapy x   [] Other:   [] Group:     GOALS:   Patient stated goal: improve ability to complete transfers   []? Progressing: []? Met: []? Not Met: []? Adjusted     Therapist goals for Patient:   Short Term Goals: To be achieved in: 2 weeks  1. Independent in HEP and progression per patient tolerance, in order to prevent re-injury. []? Progressing: [x]? Met: []? Not Met: []? Adjusted  2. Patient will have a decrease in pain to facilitate improvement in movement, function, and ADLs as indicated by Functional Deficits. []? Progressing: [x]? Met: []? Not Met: []? Adjusted     Long Term Goals: To be achieved in: 6 weeks  1. Patient will report increased standing tolerance to at least 30 minutes in standing frame. []? Progressing: [x]? Met: []? Not Met: []? Adjusted  2. Patient will demonstrate an increase in strength to good shoulder & hip complex, and core activation to allow for proper functional mobility as indicated by patients Functional Deficits. [x]? Progressing: []? Met: []? Not Met: []? Adjusted  3. Patient will return to functional activities including demo'ing safe transfers to/from w/c with mod I.    [x]? Progressing: []? Met: []? Not Met: []? Adjusted  4. Patient will increase STREAM score to 32 or above for increased UE/LE movement in sitting, supine, and standing. [x]? Progressing: []? Met: []? Not Met: []? Adjusted          Overall Progression Towards Functional goals/ Treatment Progress Update:  [] Patient is progressing as expected towards functional goals listed. [x] Progression is slowed due to complexities/Impairments listed. [] Progression has been slowed due to co-morbidities.   [] Plan just implemented, too soon to assess goals progression <30days   [] Goals require adjustment due to lack of progress  [] Patient is not progressing as expected and requires additional follow up with physician  [] Other    Persisting Functional Limitations/Impairments:  []Sleeping [x]Sitting               []Standing [x]Transfers        []Walking []Kneeling               []Stairs []Squatting / bending   [x]ADLs []Reaching  []Lifting  []Housework  []Driving []Job related tasks  []Sports/Recreation []Other:        ASSESSMENT: Treatment/Activity Tolerance:  [x] Patient able to complete tx  [] Patient limited by fatigue  [] Patient limited by pain  [] Patient limited by other medical complications  [] Other:     Prognosis: [x] Good [] Fair  [] Poor    Patient Requires Follow-up: [x] Yes  [] No    Plan for next treatment session: transfers, seated core strength, will plan to co-treat with OT when possible    PLAN: See eval. PT 2x / week for 6 weeks. [x] Continue per plan of care [] Alter current plan (see comments)  [] Plan of care initiated [] Hold pending MD visit [] Discharge    Electronically signed by: Darline Duarte PT DPT    Note: If patient does not return for scheduled/ recommended follow up visits, his note will serve as a discharge from care along with most recent update on progress.

## 2021-07-19 ENCOUNTER — HOSPITAL ENCOUNTER (OUTPATIENT)
Dept: OCCUPATIONAL THERAPY | Age: 21
Setting detail: THERAPIES SERIES
Discharge: HOME | End: 2021-07-19
Payer: MEDICARE

## 2021-07-19 ENCOUNTER — HOSPITAL ENCOUNTER (OUTPATIENT)
Dept: PHYSICAL THERAPY | Age: 21
Setting detail: THERAPIES SERIES
Discharge: HOME | End: 2021-07-19
Payer: MEDICARE

## 2021-07-19 PROCEDURE — 97112 NEUROMUSCULAR REEDUCATION: CPT

## 2021-07-19 PROCEDURE — 97110 THERAPEUTIC EXERCISES: CPT

## 2021-07-19 PROCEDURE — 97168 OT RE-EVAL EST PLAN CARE: CPT

## 2021-07-19 PROCEDURE — 97530 THERAPEUTIC ACTIVITIES: CPT

## 2021-07-19 NOTE — PLAN OF CARE
Occupational Therapy Re-Certification Plan of Care    Dear James Melendez  ,    We had the pleasure of treating the following patient for physical therapy services at [address] [place] [address]. A summary of our findings can be found in the updated assessment below. This includes our plan of care. If you have any questions or concerns regarding these findings, please do not hesitate to contact me at the office phone number checked above. Thank you for the referral.     Physician Signature:________________________________Date:__________________  By signing above (or electronic signature), therapists plan is approved by physician      Functional Outcome: stream score 27    Overall Response to Treatment:   [x]Patient is responding well to treatment and improvement is noted with regards  to goals   []Patient should continue to improve in reasonable time if they continue HEP   []Patient has plateaued and is no longer responding to skilled OT intervention    []Patient is getting worse and would benefit from return to referring MD   []Patient unable to adhere to initial POC   []Other:      Date range of Visits:     Total Visits:     Recommendation:    [x]Continue OT2x / wk for 6 weeks. []Hold OT, pending MD visit  [address] [facility]  Phone: [phone] -  Fax: [phone]    Occupational Therapy Daily Treatment Note  Date:  2021    Patient: Nikhil Hernandez   : 2000   MRN: [id number]  Referring Physician:  Jose Beckwith CNP       Medical Diagnosis Information: paraplegia, cerebral palsy status post covid has not been seen in clinic since                                        Insurance information: medicare/ medicaid     Date of Injury:   Date of Surgery: rhizotomy when he was about 10    Progress Report: []  Yes  [x]  No     Date Range for reporting period:  2021 to 2021.   Patient is status post covid recovery and missed treatments since 5/12/2021. Progress report due (10 Rx/or 30 days whichever is less): visit #20 or 1/14/0406    Recertification due (POC duration/ or 90 days whichever is less): visit #20 or 7/22/2021    Visit # Insurance Allowable Auth required? Date Range   12/12 + 10 /16  [place] []  Yes  [x]  No n/a       Latex Allergy:  []No      []Yes  Pacemaker:  [] No       [] Yes     Preferred Language for Healthcare:   [x]English       []other:    Pain level: 0     SUBJECTIVE:  Patient requesting to use toilet upon arrival. Pt reports using stander yesterday. Pt presents in different shoes, reporting they are more narrow, but more comfortable to use with his AFOs. Functional Disability Index:  STREAM: score 21 7/19/2021         OBJECTIVE: See eval    4/1/21: 3 minutes static stance at parallel bars with CGA x2 for trunk control and blocking knees      RESTRICTIONS/PRECAUTIONS: fall risk    Exercises/Interventions: Treatment focused on improving independence in LE dressing, trunk control, and standing balance and tolerance for functional mobility. Session initiated with patient transferring to mat. Patient then initiated treatment focusing on trunk stabilization reaching and hitting targets with trunk rotation times 10 reps each direction losing balance more to left and then completed sit to supine with min assist.  Patient then worked on trunk rotation each direction in supine to hit target completing intervals of 30 seconds hitting target 6 -8 reps each direction in 30 second intervals times 4. Patient needing cues at scapula to rotate to the upper trunk . Patient then completed bridge and scoots on mat with mod assist of one to two. Patient then worked on standing tolerance at walker with forearm support of arm troughs completed 3 reps standing up to 2 minutes with min assist of two  While playing connect 4.   Patient finished treatment seated at edge of mat playing bozena focusing on trunk stability and shuffling cards, bilateral coordination, intrinsic hand function. Therapeutic Exercises  Resistance / level Sets/sec Reps Notes                                                                                Neuromuscular Re-ed / Therapeutic Activities                                                 Manual Intervention                                                     Modalities:     Patient education:  Plan of care, importance of weight bearing in stander for overall health, home exercise program, goals. Home Exercise Program:   Patient encouraged to start using stander 3 sessions a day and while in stander completing over head reaches . Patient to try and reach 15 minute intervals in stander. Therapeutic Exercise and NMR:  [x] (67247) Provided verbal/tactile cueing for activities related to strengthening, flexibility, endurance, ROM  for improvements in scapular, scapulothoracic and UE control with self care, reaching, carrying, lifting, house/yardwork, driving/computer work. [x] (20366) Provided verbal/tactile cueing for activities related to improving balance, coordination, kinesthetic sense, posture, motor skill, proprioception  to assist with  scapular, scapulothoracic and UE control with self care, reaching, carrying, lifting, house/yardwork, driving/computer work.   [] Comments:    Therapeutic Activities:    [x] (48714 or 14059) Provided verbal/tactile cueing for activities related to improving balance, coordination, kinesthetic sense, posture, motor skill, proprioception and motor activation to allow for proper function of scapular, scapulothoracic and UE control with self care, carrying, lifting, driving/computer work  [] Comments:    Home Exercise Program:    [x] (60668) Reviewed/Progressed HEP activities related to strengthening, flexibility, endurance, ROM of scapular, scapulothoracic and UE control with self care, reaching, carrying, lifting, house/yardwork, driving/computer work  [] (49951) Reviewed/Progressed HEP activities related to improving balance, coordination, kinesthetic sense, posture, motor skill, proprioception of scapular, scapulothoracic and UE control with self care, reaching, carrying, lifting, house/yardwork, driving/computer work    [] Comments:    Manual Treatments:  PROM / STM / Oscillations-Mobs:  G-I, II, III, IV (PA's, Inf., Post.)  [] ([address] [facility]) Provided manual therapy to mobilize soft tissue/joints of cervical/CT, scapular GHJ and UE for the purpose of modulating pain, promoting relaxation,  increasing ROM, reducing/eliminating soft tissue swelling/inflammation/restriction, improving soft tissue extensibility and allowing for proper ROM for normal function with self care, reaching, carrying, lifting, house/yardwork, driving/computer work  [] Comments:    ADL Training:  [] (74475) Provided self-care/home management training related to activities of daily living and compensatory training, and/or use of adaptive equipment   [] Comments:     Splinting:  [] Fabrication of:   [] (04703) Orthotic/Prosthetic Management, subsequent encounter  [] (22627) Orthotic management and training (fitting and assessment)  [] Comments:      Charges: co treat with PT for safety and increased intensity of treatment  Timed Code Treatment Minutes: 44   Total Treatment Minutes: 88     [] EVAL (LOW) 57406   [x] OT Re-eval (07711)  [] EVAL (MOD) 65186   [] EVAL (HIGH) 89708       [] Ru (46223) x   1  [] DOCYP(73921)  [] NMR (71722) x    [] Estim (attended) (12262)   [] Manual ([address] [facility]) x      [] US (71901)  [x] TA (19642) x  1   [] Paraffin (55305)  [x] ADL  (96607) x  1   [] Splint/L code:    [] Estim (unattended) (69204)  [] Fluidotherapy (91851)  [] Other:    GOALS:    Patient stated goal: improved trunk control and standing tolerance to increase independence in self care. [x]? Progressing: []? Met: []? Not Met: []?  Adjusted     Therapist goals for Patient:   Short Term Goals: To be achieved in: 30 days  1. Pt will be independent with clothing management on toilet or in wheelchair to complete toileting with use of grab bar. Min assist  [x]? Progressing: []? Met: []? Not Met: []? Adjusted  2. Pt will be stand by assist completing scoot transfers on level surfaces  Contact guard  [x]? Progressing: []? Met: []? Not Met: []? Adjusted  3. Patient will be able to stand up to 30 seconds at grab bar for toileting and lower body dress with min assist.  Goal met progress to stand by assist   []? Progressing: []? Met: []? Not Met: []? Adjusted     Long Term Goals: To be achieved by discharge  1. Pt will demonstrate an improved stream score of 28. New goal is 32  []? Progressing: [x]? Met: []? Not Met: [x]? Adjusted    Progression Towards Functional goals:  [x] Patient is progressing as expected towards functional goals listed. [] Progression is slowed due to complexities listed. [] Progression has been slowed due to co-morbidities. [] Plan just implemented, too soon to assess goals progression  [] All goals are met  [] Other:     ASSESSMENT:   Patient has made progress in mobility, balance and executive function. Treatment/Activity Tolerance:  [x] Patient tolerated treatment well [] Patient limited by fatigue  [] Patient limited by pain  [] Patient limited by other medical complications  [] Other:     Prognosis: [x] Good [] Fair  [] Poor    Patient Requires Follow-up: [x] Yes  [] No    PLAN: See eval  [x] Continue per plan of care [] Alter current plan (see comments)  [] Plan of care initiated (MD cosigned) [] Hold pending MD visit [] Discharge    Electronically signed by:                        Note: If patient does not return for scheduled/ recommended follow up visits, this note will serve as a discharge from care along with most recent update on progress.

## 2021-07-19 NOTE — FLOWSHEET NOTE
[facility] - Outpatient Physical Therapy  Phone: [phone]   Fax: [phone]    Physical Therapy Daily Treatment Note  Date:  2021     Patient Name:  Carole Everett    :  2000  MRN: [id number]  Medical/Treatment Diagnosis Information:  Diagnosis: Cerebral palsy with spastic diplegia  Treatment Diagnosis: Decreased trunk control impacting ability to complete safe transfers, decreased standing tolerance, LE weakness  Insurance/Certification information:  PT Insurance Information: Medicare & Medicaid  Physician Information:  Referring Practitioner: JOHNNY Serrano  Plan of care signed (Y/N): [x]  Yes []  No     Date of Patient follow up with Physician:      Progress Report: []  Yes  [x]  No     Date Range for reporting period:  Beginning  21  Re-eval:   Ending    Progress report due (10 Rx/or 30 days whichever is less):  3/44    Recertification due (POC duration/ or 90 days whichever is less):      Visit # Insurance Allowable Auth required? Date Range    +  + 4/10 larry Medical necessity []  Yes  [x]  No n/a     Latex Allergy:  [x]NO      []YES  Preferred Language for Healthcare:   [x]English       []Other:      Functional Scale/Test Evaluation 3/1/2021 4/21/21  6/22/21 Discharge   STREAM   26                        Pain level:  0/10  Location:  none    SUBJECTIVE: Pt used restroom upon arrival. Reports his shoes that he is wearing today are more narrow but more comfortable when using his AFOs. OBJECTIVE:  : Session started 10 min after scheduled time, pt had to use the restroom. PT offered to help pt with standing, but pt declined, stating he would rather use the urinal to save some energy for the session.     :  Pt 15 mins late then needed to use restroom    Co-treat w/OT for safety and assistance    RESTRICTIONS/PRECAUTIONS: recent Bell's Palsy    Interventions/Exercises:   Therapeutic Exercises (04149) Resistance / level Sets/sec Reps Notes   W/c push ups in preparation for standing                            Neuromuscular Re-ed (49854) /  Gait Training (74595)       Upright posture seated in W/C   X 5 reps holding football, rest of reps completed with L UE bracing on L knee, PT hand assist on sternum and lumbar spine to facilitate                                Gait Training:  Amb in //bars    Bwd walking in //bars      Transfer w/B Lofstrand crutches   Static stand w/B Lofstrand crutches   8 ft    8 ft    Mod A of 2  Mod A of 2   -manual required to advance LEs, pt activation hip extensors to initiate swing phase, demo'd adequate weight-shifting. OT provided assist & w/c follow for safety      -difficulty with placement for adequate support                 Therapeutic Activities (06228) /  Functional Tasks       [facility] reaching across midline in seated   Pt seated in w/c using seat belt to help stay in w/c          [facility] reaching across midline and diagonally   Pt seated in w/c using seat belt to help stay in w/c               STS to std walker            Transfer to w/c from mat table            STS from w/c to mat table - with large bolster placed on table in front of patient to hold onto   Min A, w/c close to mat table to assist in blocking pt's knees. Manual Intervention ([id number])       Supine hamstring stretch 90/90    -completed by PT  -completed by OT                                        Sessions:     7/19: Treatment focused on improving independence in LE dressing, trunk control, and standing balance and tolerance for functional mobility. Session initiated with patient transferring to mat.   Patient then initiated treatment focusing on trunk stabilization reaching and hitting targets with trunk rotation times 10 reps each direction losing balance more to left and then completed sit to supine with min assist.  Patient then worked on trunk rotation each direction in supine to hit target completing intervals of 30 seconds hitting target 6 -8 reps each direction in 30 second intervals times 4. Patient needing cues at scapula to rotate to the upper trunk . Patient then completed bridge and scoots on mat with mod assist of one to two. Patient then worked on standing tolerance at walker with forearm support of arm troughs completed 3 reps standing up to 2 minutes with min assist of two  While playing connect 4. Patient finished treatment seated at edge of mat playing bozena focusing on trunk stability and shuffling cards, bilateral coordination, intrinsic hand function. 7/14:  Patient doffed L AFO and shoe, required significant time and frequent cues to complete. Had significant difficulty donning AFO and shoe despite several verbal and manual cues from OT. OT modified shoe to assist pt in keeping tongue open to allow foot better access into shoe. Pt continued to have difficulty donning shoe and eventually took AFO out of shoe to kendall it. Was only able to kendall shoe with assist from OT. Patient required Supervision about 90% of the time during task, SBA for remainder of time due to significant forward trunk flexion. Pt did utilize seat belt on w/c about half-way through task. At [facility]: side stepped length of bar x1 lap w/PT assisting advancement of LEs and OT providing support. Manual blocking of knees was required with increased step length to maintain safety. Sit to stands at [facility] x10 w/mod A of 2.    7/12: Session initiated with pt completing squat pivot transfer from w/c to sitting EOM with CGA. [facility], pt completed bilateral wb for partial STS x7 to lift glutes off mat to position over gerardo disc with min A x2 for positioning. From short sit, pt completed STS transfer x4 with min A x2 for proper positioning on dynadisc to challenge [place].  Pt sat on gerardo disc for trunk control exercise, with pt instructed in using BLE to provide support, with multiple instances of LOB into lateral lean. Pt completed UE reaching activity of playing Connect 4 with an emphasis on trunk lengthening, crossing midline, lateral pinch, and eccentric control to challenge trunk control, with pt requiring verbal cues to bend at waist rather than shift buttocks forward; verbal cues for problem solving. Patient then scooted out to edge of mat and completed STS to don harness to work on ambulation. Completed ~20 feet x2 in harness with mod x2 for LE advancement and cues for hand placement. Pt transitioned to sitting in w/c with assist of harness > sitting EOM. Pt completed sit>supine transfer with SBA. In supine, pt received manual stretch of hamstrings and adductors for decreased tightness. Pt completed supine>sit transfer with mod A + min A. Session concluded with pt completing squat pivot transfer from EOM to w/c with min A d/t fatigue and pt exiting clinic in w/c.      7/8:  Session focused on increasing trunk control for safe transfers, transitional movement patterns, ADL. Patient transferred w/c to mat with SBA/CGA. From short sit at edge of mat with feet on 4 inch step for increased weight bearing patient worked on pelvic stabilization, lateral weight shifts reaching in a variety of planes needing SBA/CGA to lengthen trunk and stabilize. Patient then assumed supine and worked on segmental rolling for facilitation of lower trunk and min assist of one. Patient then transitioned to prone and completed plank with mod assist of two. Patient then attempted transition to quadriped with mod assist of two due to fatigue, modified it with ball under abdomen. Patient completed shoulder abduction in prone for scapular stabilization and increasing thoracic extension needing mod of two. Patient then transitioned back to long sit and vertical roll placed along spine to increase thoracic extension and worked on shoulder abduction in long sit. .  Patient then scooted out to edge of mat and was placed in harness to work on ambulation and completed about 20 feet in harness with mod of one and stand by of one.      7/6: Session began by assisting pt in restroom again. Pt used grab bars around toilet to pull to standing with CGA and then leaned forward on wall while completing toileting. Pt sat down in w/c, then stood again to pull up pants. Session then moved to [facility] room. Pt transferred to mat table there with CGA. Pat sat EOB and was cued to lift chest for upright posture and worked on pushing sled away. Sled had two 25# plates loaded onto it. Pt also worked on initiating a stand while pushing into sled and patient had a few successful trials in which he was able to clear his buttocks from the mat. Pt worked on trunk control in which he was holding a 4# medicine ball and reaching overhead, out in front, and down to the floor over several trials with elbows extended, chest lifted, and hands spread wide on the ball. Pt also worked on trunk control and lengthening reaching across midline towards a target with stabilizing on table using his ipsilateral hand. PT/OT then placed pt into harness for use with the [employer] system. Once centered in system, pt stood and worked on stepping fwd and retro in place. Pt completed 2-3 trials of stepping with each LE and then took a seated rest break. Upon the second trial of standing, his weight was shifted too far anteriorly and pt was unable to advance either LE. Pt sat again and was too fatigued to continue so the session was ended. 6/28: Pt assisted by both PT and OT in restroom at beginning of session. Pt completed multiple STS from [facility] with use of grab bars in restroom and SBA/CGA. Pt braced legs on toilet and used 1 HR on bar to balance self while completing LE dressing with minimal assistance only to get shorts out from underneath pt's feet. Pt also able to complete bridge in w/c to pull pants down. Pt able to complete STS in // bars with CGA and then completed stepping to target x 5 B/LE.  Pt required less assistance to complete hip extension back to starting position. Mod to Max A to move leg fwd with other therapist blocking stance leg. At mat table, pt sat in w/c and leaned forward on forearms. Pt then pushed through forearms and used glutes and quads to lift bottom from chair with therapists assisting to block knees and lift buttocks if needed. Pt able to transition from forearms to open palms with elbows extended and then to standing x 2. Pt transferred to the mat table to progress from seated to sidelying and was prompted to lift legs 1 at a time as he rolled to sidelying then supine. Pt worked on rolling to his R side, and then propping up on R hand to reach for points on the wall and therapists hand with his left. Pt was cued to lift his chest and reach while depressing his R shoulder. Pt performed on both sides with multiple reps of reaching while propped in the position. In supine, pt kicked each LE separately into extension against resistance from therapist, beginning with full ROM and progressing to extension pulses. Pt ended session by transferring back to w/c with min A/CGA. 6/22: Stand pivot to mat table with min A. STREAM completed for reassessment - pt demo's increased score this date. Pt completed the following exercises seated EOM with 4\" step under feet and PVC pipe with orange TB and OT holding band: bicep curls x 10, mid rows x 10, trunk twists x 5 B; pt then completed PNF flexion with orange band x 1 B, and horiz abd with orange band x 10'. Pt completed LAQ - done bilaterally with upright posture x 5, then done B/L with arms extended behind pt and cues to lift chest and relax neck to avoid forward head.  Pt completed 1 static stand during the session at [facility] walker with mod A to block pt's knees while completing STREAM. Pt then completed squat pivot transfer back to w/c at end of session with min A.     5/12:  Squat pivot w/OT w/c to high mat table min/mod A for safety and to facilitate LE use. Sit to supine mod A. Harness for gait training system was donned while supine, then adjusted while long sitting and sitting EOM. Mod A of 2 supine to long sit, min A of 1 for balance long sit to sitting EOM. Pt setup inside NxStep partial weight-bearing gait training system for ambulation. Partial weight adjusted between 80 & 100 lbs depending on pt's ease of LE advancement. Amb 100' while PT & OT guided system for pt. Seated rest.  Amb [de-identified]' w/OT & student OT guiding NxStep system, PT assessing gait, provided mod A for trunk stability final 20 ft. Seated rest.  Amb 61' w/2 person guiding system & PT providing mod A for trunk stability -pt able to demo increased step length and weight shift when trunk was stabilized. Pt performed dips while in partial [address] system, 2x3, 1x5;  Pt performed bean bag toss with OT, working to facilitate lateral reach outside [place] w/opposit UE then toss into bucket. Pt passed gold med ball [place] to student PT and received to facilitate UE flex at end range and trunk ext while working on dynamic balance. Pt in good spirits after walking, excited to show family, and had increased fatigue. 5/10:    Squat pivot transfer w/OT, w/c to w/c height mat table w/min A and mod verbal cues for improved technique. Leaning onto RUE, hip/LE ext activation to prime mat table to floor transfer x6 w/manual cues for facilitation. Mat table to floor transfer mod A of 2. Tall kneeling at lowered mat table to work on weight shifting, hip ext, trunk ext, UE ex's for 50 mins. Floor to chair transfer, max A of 2. Sit to stand transfer to mat table w/o B AFO w/max A of 2, increased instability of B knees, prevented full stand. Donned B AFO, pt completed transfer mod A of 2 and able to stand for 5 mins to work on hip ext, trunk ext, and LE WB'ing.         5/7:   Upon arrival, pt requested to use restroom, stated he didn't want to attempt to urinate as he had too much in the pocket of his hoodie and would most likely make a mess. Stand pivot w/c to same height mat table w/CGA, improved foot placement throughout but need to better place them prior to scooting back on edge of mat. Blue bungee ab crunches x5 mins w/intermittent rest breaks & time for recover following LOB. Forward reach, outside [place] for gold med ball, [place] reverse pass to PT behind him, then reach around back at sacrum level to receive ball, forward reach outside [place] to return to OT x10 w/intermittent rest and to recover LOB. Seated knee ext & flex w/assit from gliders x10 B -min/mod A for hip flex to further reduce friction OT provided min/mod A for balance. Stand pivot mat table to w/c w/min A and reduced eccentric control. Sit to stand at ballet barre x2 min/mod A of 2. Lateral stepping length of bar each way, w/min/mod A for balance, mod A to stabilize stance knee & facilitate WB'ing, CGA to mod A to advance ipsilateral LE. Sit to stand w/ballet barre x5.    5/5: Discussed with pt appropriate amount of time to spend in stander at home, with education on importance of quality of participation rather than quantity/duration; pt verbalized understanding, but may require reinforcement. Pt completed sit pivot transfer from w/c to mat table with Min A x 2. Pt participated in crossing midline for open hand slap onto gerardo disc x10 each side with SBA-min A for trunk control during task. Pt with tendency to lose balance towards R side. Pt required min-mod A for partial stand to place gerardo disc under buttocks. Pt seated on gerardo disc ~10 min to increase trunk strength and stability, with bilateral BUE reaching/shoulder flexion with CGA-mod A for trunk stability and blocking knees to prevent sliding forward. Pt completed a partial stand-> pivot edge of mat to w/c min A x 2 with verbal cues for safety as pt did not notify therapists prior to initiation of transfer.  Pt seated at barre in w/c with 2 sit to stands with min A and use of barre and in stance ~2 min x 2 trials with min progressing to mod A with fatigue for trunk support by OT and PT blocking knees/facilitating LE extension; tactile/vcs for upright posture. During first trial pt with shoulder flexion of BUEs to slide up wall. Pt sat in w/c to complete bilateral LE advancement of w/c for strengthening requiring verbal cues and assist to put LEs in position (partial knee extension) with PT blocking feet for proper technique as pt initially used rocking of trunk to advance w/c. Pt completed this for ~10 ft with almost constant cueing to not propel w/c by rocking trunk. Pt grasping cylindrical handle of 30# bungee cord to maintain 90* elbow flexion for stability of BUEs, followed by mid rows for scapular retraction with light facilitation by PT x10. In // bars, pt completed STS with B UE assist on bars and CGA for trunk control. Pt completed forward stepping length of // bars ~6 ft with mod A for R LE advancement and max A for L LE advancement. PT blocked stance leg during advancement assist of opposite LE with OT assist for trunk control min-mod A and wheelchair follow. Improved initiation of RLE movement on this date noted, specifically in knee flexion/extension and hip flexion. Continued verbal cues for placement of hands in front of body to prepare for use of lofstrand crutches. Pt declined need to toilet at end of session before transport arrived. Pt ended session with cross midline \"high 5\" x6 with BUEs. 4/28:    Pt about 12 mins late then needed to use restroom, delaying start of therapy.  //bars: sit to stand transition with B UE assist and CGA for trunk control. Amb ~6 steps with mod A for R LE advancement and max A for L LE advancement. PT min blocked stance leg during contralateral swing phase with OT assist for trunk control min-mod A and wheelchair follow.  Completed x3 with retro stepping after 2nd attempt ~6 steps with pt relying on momentum of trunk rotation for hip extension when stepping back. Continued verbal cues for placement of hands in front of body to prepare for use of lofstrand crutches. Also discussed potential use of posterior walker as pt previously used when ambulatory and due to pt's preference for hands used as [place] behind trunk. In treatment room, pt doffed/donned R AFO seated in wheelchair using technique of abducting hip to bring to side of wheelchair to place foot into AFO and shoe, and then placing foot on foot rest to adhere straps. Decreased time required with this method with pt agreeing to attempt donning both AFOs at home. Pt transferred to sit edge of mat from wheelchair with min A x2 to block feet and for trunk control during scooting. Continued difficulty with trunk flexion during lowering to sit far back on surface pt is transferring to affecting safety. With PT holding ball in front of pt, pt completed cross and hook style punches across body to opposite side x10 B with emphasis on trunk control and proprioceptive input of ball to improve grading of force; decreased trunk control on L punches due to R trunk weakness with mod-min A for sitting balance compared to CGA-min A on L. Trunk ext w/BUE [place] to trunk flex & shoulder ext to hit ball forcefully x3 -difficulty achieving necessary trunk ext and focused on BUEs [place] instead. Session concluded with mat to wheelchair transfer min A x2 with wheelchair on pt's right.      4/26:  Began session with OT applying foam padding to patient's ankle for cushion so pt can wear AFOs comfortably. Pt transferred to sit edge of mat from wheelchair with min A x2 to block feet and for trunk control during scooting. Grasping 20# bungee cord, pt completed horizontal ab/adduction x10, followed by mid row x10 with emphasis on scapular retraction and trunk control.  For continued trunk control during dynamic balance, bungee cord placed around pt's trunk with pt completed hip and thoracic extension against resistance of bungee cord and then returning to upright starting position x8, followed by R and L lateral lean x5 each with min A required for balance. Pt completed trunk rotation to reach behind self to grab bean bag to improve dynamic balance required for pericare while toileting, followed by tossing to bucket, with min A for ~3 instances LOB. Pt instructed on placing flat hands on surface of mat to complete weight bearing during trunk rotation to improve balance. Bean bags were tossed back to patient x3 with instruction  to catch with open hands to reduce tone. Pt returned to wheelchair from edge of mat with min A for transfer. Pt transitioned to // bars to complete ambulation x4 steps with max A x2 for trunk control and advancing LEs prior to seated rest break due to increased fatigue with lack of AFO support. Pt re-attempted with 3 steps, followed by static standing x60 seconds with max A + mod A with cues to keep arms in front of him to prepare for use of lofstrand crutches and to facilitate trunk engagement and knee extension. Pt sat on gerardo disc placed in wheelchair for trunk control with min-mod A for balance to complete ball toss/catch task with multiple LOB; theraband loop placed around knees to prevent abduction and improve pelvic stability and base of support. UB dressing completed to doff/don sweatshirt with SBA and good trunk control noted during weight shifts. Focus of today's session was to improve dynamic sitting and standing balance.         Modalities:     Pt. Education:  -patient educated on diagnosis, prognosis and expectations for rehab  -all patient questions were answered    HEP instruction:      NMR and Therapeutic Activities:    [x] (03036 or 71369) Provided verbal/tactile cueing for activities related to improving balance, coordination, kinesthetic sense, posture, motor skill, proprioception and motor activation to allow for proper function of   [x] LE / Core, hip and LE with self care and ADLs  [x] UE Core stability: LE, hip, and core control in self care, mobility, lifting, ambulation and eccentric single leg control. [] UE / Shoulder complex: cervical, scapular, scapulothoracic and UE control with self care, reaching, carrying, lifting, house/yardwork, driving, computer work. Home Exercise Program:    [] (51032) Reviewed/Progressed HEP activities related to strengthening, flexibility, endurance, ROM of   [] LE / Core stability: core, hip and LE for functional self-care, mobility, lifting and ambulation/stair navigation   [] UE / Shoulder complex: cervical, postural, scapular, scapulothoracic and UE control with self care, reaching, carrying, lifting, house/yardwork, driving, computer work  [] (20367)Reviewed/Progressed HEP activities related to improving balance, coordination, kinesthetic sense, posture, motor skill, proprioception of   [] LE: core, hip and LE for self care, mobility, lifting, and ambulation/stair navigation    [] UE / Shoulder complex: cervical, postural,  scapular, scapulothoracic and UE control with self care, reaching, carrying, lifting, house/yardwork, driving, computer work    Manual Treatments:  PROM / STM / Oscillations-Mobs:  G-I, II, III, IV (PA's, Inf., Post.)  [] (46594) Provided manual therapy to mobilize shoulder complex, hip, LE, and/or cervicothoracic/LS spine soft tissue/joints for the purpose of modulating pain, promoting relaxation,  increasing ROM, reducing/eliminating soft tissue swelling/inflammation/restriction, improving soft tissue extensibility and allowing for proper ROM for normal function with   [] LE / Core stability: self care, mobility, lifting and ambulation. [] UE / Shoulder complex: self care, reaching, carrying, lifting, house/yardwork, driving, computer work.      Modalities:  [] (41656) Vasopneumatic compression: Utilized vasopneumatic compression to decrease edema / swelling for the purpose of improving mobility and quad tone / recruitment which will allow for increased overall function including but not limited to self-care, transfers, ambulation, and ascending / descending stairs. Charges:  Timed Code Treatment Minutes: 44   Total Treatment Minutes: 88   **CO-treat with OT    [] EVAL - LOW (98883)   [] EVAL - MOD (04069)  [] EVAL - HIGH (43025)  [] RE-EVAL (75393)  [x] TE (40843) x 1      [] Ionto  [x] NMR (80095) x  1    [] Vaso  [] Manual (14190) x      [] Ultrasound  [x] TA x  1     [] Mech Traction (04303)  [] Gait Training x     [] ES (un) ([id number]):   [] Aquatic therapy x   [] Other:   [] Group:     GOALS:   Patient stated goal: improve ability to complete transfers   []? Progressing: []? Met: []? Not Met: []? Adjusted     Therapist goals for Patient:   Short Term Goals: To be achieved in: 2 weeks  1. Independent in HEP and progression per patient tolerance, in order to prevent re-injury. []? Progressing: [x]? Met: []? Not Met: []? Adjusted  2. Patient will have a decrease in pain to facilitate improvement in movement, function, and ADLs as indicated by Functional Deficits. []? Progressing: [x]? Met: []? Not Met: []? Adjusted     Long Term Goals: To be achieved in: 6 weeks  1. Patient will report increased standing tolerance to at least 30 minutes in standing frame. []? Progressing: [x]? Met: []? Not Met: []? Adjusted  2. Patient will demonstrate an increase in strength to good shoulder & hip complex, and core activation to allow for proper functional mobility as indicated by patients Functional Deficits. [x]? Progressing: []? Met: []? Not Met: []? Adjusted  3. Patient will return to functional activities including demo'ing safe transfers to/from w/c with mod I.    [x]? Progressing: []? Met: []? Not Met: []? Adjusted  4. Patient will increase STREAM score to 32 or above for increased UE/LE movement in sitting, supine, and standing. [x]? Progressing: []? Met: []? Not Met: []?  Adjusted          Overall Progression Towards Functional goals/ Treatment Progress Update:  [] Patient is progressing as expected towards functional goals listed. [x] Progression is slowed due to complexities/Impairments listed. [] Progression has been slowed due to co-morbidities. [] Plan just implemented, too soon to assess goals progression <30days   [] Goals require adjustment due to lack of progress  [] Patient is not progressing as expected and requires additional follow up with physician  [] Other    Persisting Functional Limitations/Impairments:  []Sleeping [x]Sitting               []Standing [x]Transfers        []Walking []Kneeling               []Stairs []Squatting / bending   [x]ADLs []Reaching  []Lifting  []Housework  []Driving []Job related tasks  []Sports/Recreation []Other:        ASSESSMENT:         Treatment/Activity Tolerance:  [x] Patient able to complete tx  [] Patient limited by fatigue  [] Patient limited by pain  [] Patient limited by other medical complications  [] Other:     Prognosis: [x] Good [] Fair  [] Poor    Patient Requires Follow-up: [x] Yes  [] No    Plan for next treatment session: transfers, seated core strength, will plan to co-treat with OT when possible    PLAN: See [facility]. PT 2x / week for 6 weeks. [x] Continue per plan of care [] Alter current plan (see comments)  [] Plan of care initiated [] Hold pending MD visit [] Discharge    Electronically signed by: Bam Cazares PT DPT    Note: If patient does not return for scheduled/ recommended follow up visits, his note will serve as a discharge from care along with most recent update on progress.

## 2021-07-21 ENCOUNTER — HOSPITAL ENCOUNTER (OUTPATIENT)
Dept: PHYSICAL THERAPY | Age: 21
Setting detail: THERAPIES SERIES
Discharge: HOME | End: 2021-07-21
Payer: MEDICARE

## 2021-07-21 ENCOUNTER — HOSPITAL ENCOUNTER (OUTPATIENT)
Dept: OCCUPATIONAL THERAPY | Age: 21
Setting detail: THERAPIES SERIES
Discharge: HOME | End: 2021-07-21
Payer: MEDICARE

## 2021-07-21 NOTE — FLOWSHEET NOTE
[id number] [facility]    Physical Therapy  Cancellation/No-show Note  Patient Name:  Jeremy Jauregui  :  2000   Date:  2021    Cancelled visits to date: 8  No-shows to date: 0    For today's appointment patient:  [x]  Cancelled 3/10, 3/15, 3/29, , , , ,   []  Rescheduled appointment  []  No-show     Reason given by patient:  []  Patient ill  []  Conflicting appointment  []  No transportation    []  Conflict with work  []  No reason given  [x]  Other:  Has family in from out of town   Comments:        Phone call information:   []  Phone call made today to patient at _ time at number provided:      []  Patient answered, conversation as follows: see above   []  Patient did not answer, message left as follows:  []  Phone call not made today  [x]  Phone call not needed - pt contacted us to cancel and provided reason for cancellation.      Electronically signed by:  Rona Taylor, PT, DPT

## 2021-07-26 ENCOUNTER — HOSPITAL ENCOUNTER (OUTPATIENT)
Dept: PHYSICAL THERAPY | Age: 21
Setting detail: THERAPIES SERIES
Discharge: HOME | End: 2021-07-26
Payer: MEDICARE

## 2021-07-26 ENCOUNTER — HOSPITAL ENCOUNTER (OUTPATIENT)
Dept: OCCUPATIONAL THERAPY | Age: 21
Setting detail: THERAPIES SERIES
Discharge: HOME | End: 2021-07-26
Payer: MEDICARE

## 2021-07-26 PROCEDURE — 97110 THERAPEUTIC EXERCISES: CPT

## 2021-07-26 PROCEDURE — 97112 NEUROMUSCULAR REEDUCATION: CPT

## 2021-07-26 PROCEDURE — 97530 THERAPEUTIC ACTIVITIES: CPT

## 2021-07-26 PROCEDURE — 97535 SELF CARE MNGMENT TRAINING: CPT

## 2021-07-26 NOTE — FLOWSHEET NOTE
[facility] - Outpatient Physical Therapy  Phone: [phone]   Fax: [phone]    Physical Therapy Daily Treatment Note  Date:  2021     Patient Name:  Sasha Gerardo    :  2000  MRN: [id number]  Medical/Treatment Diagnosis Information:  Diagnosis: Cerebral palsy with spastic diplegia  Treatment Diagnosis: Decreased trunk control impacting ability to complete safe transfers, decreased standing tolerance, LE weakness  Insurance/Certification information:  PT Insurance Information: Medicare & Medicaid  Physician Information:  Referring Practitioner: JOHNNY Umana  Plan of care signed (Y/N): [x]  Yes []  No     Date of Patient follow up with Physician:      Progress Report: []  Yes  [x]  No     Date Range for reporting period:  Beginning  21  Re-eval:   Ending    Progress report due (10 Rx/or 30 days whichever is less):      Recertification due (POC duration/ or 90 days whichever is less):      Visit # Insurance Allowable Auth required? Date Range    +  + 5/10 larry Medical necessity []  Yes  [x]  No n/a     Latex Allergy:  [x]NO      []YES  Preferred Language for Healthcare:   [x]English       []Other:      Functional Scale/Test Evaluation 3/1/2021 4/21/21  6/22/21 Discharge   STREAM                         Pain level:  0/10  Location:  none    SUBJECTIVE: Pt used restroom upon arrival. Pt reports he is going to the [facility] clinic on . Pt states that he is hoping to get new braces that day. Pt is not wearing braces today - he states they are too painful. OBJECTIVE:  : Session started 10 min after scheduled time, pt had to use the restroom. PT offered to help pt with standing, but pt declined, stating he would rather use the urinal to save some energy for the session.     :  Pt 15 mins late then needed to use restroom    Co-treat w/OT for safety and assistance    RESTRICTIONS/PRECAUTIONS: recent Bell's Palsy    Interventions/Exercises:   Therapeutic Exercises (24515) Resistance / level Sets/sec Reps Notes   W/c push ups in preparation for standing                            Neuromuscular Re-ed (72068) /  Gait Training (62290)       Upright posture seated in W/C   X 5 reps holding football, rest of reps completed with L UE bracing on L knee, PT hand assist on sternum and lumbar spine to facilitate                                Gait Training:  Amb in //bars    Bwd walking in //bars      Transfer w/B Lofstrand crutches   Static stand w/B Lofstrand crutches   8 ft    8 ft    Mod A of 2  Mod A of 2   -manual required to advance LEs, pt activation hip extensors to initiate swing phase, demo'd adequate weight-shifting. OT provided assist & w/c follow for safety      -difficulty with placement for adequate support                 Therapeutic Activities (50532) /  Functional Tasks       [facility] reaching across midline in seated   Pt seated in w/c using seat belt to help stay in w/c          [facility] reaching across midline and diagonally   Pt seated in w/c using seat belt to help stay in w/c               STS to std walker            Transfer to w/c from mat table            STS from w/c to mat table - with large bolster placed on table in front of patient to hold onto   Min A, w/c close to mat table to assist in blocking pt's knees. Manual Intervention ([phone])       Supine hamstring stretch 90/90    -completed by PT  -completed by OT                                        Sessions:     7/26: Pt used restroom prior to session and was in a hurry and had some urine on his shirt. Pt doffed shirt and donned clean T shirt with stand by/set up. Pt transferred from w/c to mat table with mod A and transitioned sit to supine with mod A. While in supine, pt worked on upper trunk rotation reaching across body for targets 10x B UE. PT assisted with keeping LEs in hooklying position and rotation at hips.  Pt then completed 10 supine hip bridges. Pt then rolled to prone and worked on achieving modified plank on elbows with very min assist to maintain feet as base of support. He then transitioned with momentum and min A to Q-ped. He crawled up onto a swiss ball for support of trunk and worked on propping prone on elbows in modified tall kneeling. From this position pt played [address] [employer] writing letters on a dry erase board with mod - min x 1 to maintain midline. He then transitioned to short sit with max assist and completed one game of hangman in short sit at edge of mat. Pt needed min of one to maintain balance while writing on board and min assist for executive function. PT assisted with manual pec stretch with pt leaning back over swiss ball and applying overpressure in the posterior direction at the shoulders. Pt then worked on sit to stand times three at walker with forearm rests with mod assist of one for hip/knee control and cues to engage quads and glutes. Pt then transferred to wheelchair with contact guard. 7/19: Treatment focused on improving independence in LE dressing, trunk control, and standing balance and tolerance for functional mobility. Session initiated with patient transferring to mat. Patient then initiated treatment focusing on trunk stabilization reaching and hitting targets with trunk rotation times 10 reps each direction losing balance more to left and then completed sit to supine with min assist.  Patient then worked on trunk rotation each direction in supine to hit target completing intervals of 30 seconds hitting target 6 -8 reps each direction in 30 second intervals times 4. Patient needing cues at scapula to rotate to the upper trunk . Patient then completed bridge and scoots on mat with mod assist of one to two. Patient then worked on standing tolerance at walker with forearm support of arm troughs completed 3 reps standing up to 2 minutes with min assist of two  While playing connect 4.   Patient finished treatment seated at edge of mat playing bozena focusing on trunk stability and shuffling cards, bilateral coordination, intrinsic hand function. 7/14:  Patient doffed L AFO and shoe, required significant time and frequent cues to complete. Had significant difficulty donning AFO and shoe despite several verbal and manual cues from OT. OT modified shoe to assist pt in keeping tongue open to allow foot better access into shoe. Pt continued to have difficulty donning shoe and eventually took AFO out of shoe to kendall it. Was only able to kendall shoe with assist from OT. Patient required Supervision about 90% of the time during task, SBA for remainder of time due to significant forward trunk flexion. Pt did utilize seat belt on w/c about retirement through task. At [facility]: side stepped length of bar x1 lap w/PT assisting advancement of LEs and OT providing support. Manual blocking of knees was required with increased step length to maintain safety. Sit to stands at [facility] x10 w/mod A of 2.    7/12: Session initiated with pt completing squat pivot transfer from w/c to sitting EOM with CGA. [facility] pt completed bilateral wb for partial STS x7 to lift glutes off mat to position over gerardo disc with min A x2 for positioning. From short sit, pt completed STS transfer x4 with min A x2 for proper positioning on dynadisc to challenge [place]. Pt sat on gerardo disc for trunk control exercise, with pt instructed in using BLE to provide support, with multiple instances of LOB into lateral lean. Pt completed UE reaching activity of playing Connect 4 with an emphasis on trunk lengthening, crossing midline, lateral pinch, and eccentric control to challenge trunk control, with pt requiring verbal cues to bend at waist rather than shift buttocks forward; verbal cues for problem solving. Patient then scooted out to edge of mat and completed STS to don harness to work on ambulation.  Completed ~20 feet x2 in harness with mod x2 for LE advancement and cues for hand placement. Pt transitioned to sitting in w/c with assist of harness > sitting EOM. Pt completed sit>supine transfer with SBA. In supine, pt received manual stretch of hamstrings and adductors for decreased tightness. Pt completed supine>sit transfer with mod A + min A. Session concluded with pt completing squat pivot transfer from EOM to w/c with min A d/t fatigue and pt exiting clinic in w/c.      7/8:  Session focused on increasing trunk control for safe transfers, transitional movement patterns, ADL. Patient transferred w/c to mat with SBA/CGA. From short sit at edge of mat with feet on 4 inch step for increased weight bearing patient worked on pelvic stabilization, lateral weight shifts reaching in a variety of planes needing SBA/CGA to lengthen trunk and stabilize. Patient then assumed supine and worked on segmental rolling for facilitation of lower trunk and min assist of one. Patient then transitioned to prone and completed plank with mod assist of two. Patient then attempted transition to quadriped with mod assist of two due to fatigue, modified it with ball under abdomen. Patient completed shoulder abduction in prone for scapular stabilization and increasing thoracic extension needing mod of two. Patient then transitioned back to long sit and vertical roll placed along spine to increase thoracic extension and worked on shoulder abduction in long sit. .  Patient then scooted out to edge of mat and was placed in harness to work on ambulation and completed about 20 feet in harness with mod of one and stand by of one.      7/6: Session began by assisting pt in restroom again. Pt used grab bars around toilet to pull to standing with CGA and then leaned forward on wall while completing toileting. Pt sat down in w/c, then stood again to pull up pants. Session then moved to [facility] room. Pt transferred to mat table there with CGA.  Pat sat EOB and was cued to lift chest for upright posture and worked on pushing sled away. Sled had two 25# plates loaded onto it. Pt also worked on initiating a stand while pushing into sled and patient had a few successful trials in which he was able to clear his buttocks from the mat. Pt worked on trunk control in which he was holding a 4# medicine ball and reaching overhead, out in front, and down to the floor over several trials with elbows extended, chest lifted, and hands spread wide on the ball. Pt also worked on trunk control and lengthening reaching across midline towards a target with stabilizing on table using his ipsilateral hand. PT/OT then placed pt into harness for use with the [employer] system. Once centered in system, pt stood and worked on stepping fwd and retro in place. Pt completed 2-3 trials of stepping with each LE and then took a seated rest break. Upon the second trial of standing, his weight was shifted too far anteriorly and pt was unable to advance either LE. Pt sat again and was too fatigued to continue so the session was ended. 6/28: Pt assisted by both PT and OT in restroom at beginning of session. Pt completed multiple STS from [facility] with use of grab bars in restroom and SBA/CGA. Pt braced legs on toilet and used 1 HR on bar to balance self while completing LE dressing with minimal assistance only to get shorts out from underneath pt's feet. Pt also able to complete bridge in w/c to pull pants down. Pt able to complete STS in // bars with CGA and then completed stepping to target x 5 B/LE. Pt required less assistance to complete hip extension back to starting position. Mod to Max A to move leg fwd with other therapist blocking stance leg. At mat table, pt sat in w/c and leaned forward on forearms. Pt then pushed through forearms and used glutes and quads to lift bottom from chair with therapists assisting to block knees and lift buttocks if needed.  Pt able to transition from forearms to open palms with elbows extended and then to standing x 2. Pt transferred to the mat table to progress from seated to sidelying and was prompted to lift legs 1 at a time as he rolled to sidelying then supine. Pt worked on rolling to his R side, and then propping up on R hand to reach for points on the wall and therapists hand with his left. Pt was cued to lift his chest and reach while depressing his R shoulder. Pt performed on both sides with multiple reps of reaching while propped in the position. In supine, pt kicked each LE separately into extension against resistance from therapist, beginning with full ROM and progressing to extension pulses. Pt ended session by transferring back to w/c with min A/CGA. 6/22: Stand pivot to mat table with min A. STREAM completed for reassessment - pt rivera's increased score this date. Pt completed the following exercises seated EOM with 4\" step under feet and PVC pipe with orange TB and OT holding band: bicep curls x 10, mid rows x 10, trunk twists x 5 B; pt then completed PNF flexion with orange band x 1 B, and horiz abd with orange band x 10'. Pt completed LAQ - done bilaterally with upright posture x 5, then done B/L with arms extended behind pt and cues to lift chest and relax neck to avoid forward head. Pt completed 1 static stand during the session at [facility] walker with mod A to block pt's knees while completing STREAM. Pt then completed squat pivot transfer back to w/c at end of session with min A.     5/12:  Squat pivot w/OT w/c to high mat table min/mod A for safety and to facilitate LE use. Sit to supine mod A. Harness for gait training system was donned while supine, then adjusted while long sitting and sitting EOM. Mod A of 2 supine to long sit, min A of 1 for balance long sit to sitting EOM. Pt setup inside Step partial weight-bearing gait training system for ambulation. Partial weight adjusted between 80 & 100 lbs depending on pt's ease of LE advancement.   Amb 100' while PT & OT guided system for pt. Seated rest.  Amb [de-identified]' w/OT & student OT guiding NxStep system, PT assessing gait, provided mod A for trunk stability final 20 ft. Seated rest.  Amb 61' w/2 person guiding system & PT providing mod A for trunk stability -pt able to demo increased step length and weight shift when trunk was stabilized. Pt performed dips while in partial [address] system, 2x3, 1x5;  Pt performed bean bag toss with OT, working to facilitate lateral reach outside [place] w/opposit UE then toss into bucket. Pt passed gold med ball [place] to student PT and received to facilitate UE flex at end range and trunk ext while working on dynamic balance. Pt in good spirits after walking, excited to show family, and had increased fatigue. 5/10:    Squat pivot transfer w/OT, w/c to w/c height mat table w/min A and mod verbal cues for improved technique. Leaning onto RUE, hip/LE ext activation to prime mat table to floor transfer x6 w/manual cues for facilitation. Mat table to floor transfer mod A of 2. Tall kneeling at lowered mat table to work on weight shifting, hip ext, trunk ext, UE ex's for 50 mins. Floor to chair transfer, max A of 2. Sit to stand transfer to mat table w/o B AFO w/max A of 2, increased instability of B knees, prevented full stand. Donned B AFO, pt completed transfer mod A of 2 and able to stand for 5 mins to work on hip ext, trunk ext, and LE WB'ing. 5/7:   Upon arrival, pt requested to use restroom, stated he didn't want to attempt to urinate as he had too much in the pocket of his hoodie and would most likely make a mess. Stand pivot w/c to same height mat table w/CGA, improved foot placement throughout but need to better place them prior to scooting back on edge of mat. Blue bungee ab crunches x5 mins w/intermittent rest breaks & time for recover following LOB.   Forward reach, outside [place] for gold med ball, [place] reverse pass to PT behind him, then reach around back at sacrum level to receive ball, forward reach outside [place] to return to OT x10 w/intermittent rest and to recover LOB. Seated knee ext & flex w/assit from gliders x10 B -min/mod A for hip flex to further reduce friction OT provided min/mod A for balance. Stand pivot mat table to w/c w/min A and reduced eccentric control. Sit to stand at ballet barre x2 min/mod A of 2. Lateral stepping length of bar each way, w/min/mod A for balance, mod A to stabilize stance knee & facilitate WB'ing, CGA to mod A to advance ipsilateral LE. Sit to stand w/ballet barre x5.    5/5: Discussed with pt appropriate amount of time to spend in stander at home, with education on importance of quality of participation rather than quantity/duration; pt verbalized understanding, but may require reinforcement. Pt completed sit pivot transfer from w/c to mat table with Min A x 2. Pt participated in crossing midline for open hand slap onto gerardo disc x10 each side with SBA-min A for trunk control during task. Pt with tendency to lose balance towards R side. Pt required min-mod A for partial stand to place gerardo disc under buttocks. Pt seated on gerardo disc ~10 min to increase trunk strength and stability, with bilateral BUE reaching/shoulder flexion with CGA-mod A for trunk stability and blocking knees to prevent sliding forward. Pt completed a partial stand-> pivot edge of mat to w/c min A x 2 with verbal cues for safety as pt did not notify therapists prior to initiation of transfer. Pt seated at barre in w/c with 2 sit to stands with min A and use of barre and in stance ~2 min x 2 trials with min progressing to mod A with fatigue for trunk support by OT and PT blocking knees/facilitating LE extension; tactile/vcs for upright posture. During first trial pt with shoulder flexion of BUEs to slide up wall.  Pt sat in w/c to complete bilateral LE advancement of w/c for strengthening requiring verbal cues and assist to put LEs in position (partial knee extension) with PT blocking feet for proper technique as pt initially used rocking of trunk to advance w/c. Pt completed this for ~10 ft with almost constant cueing to not propel w/c by rocking trunk. Pt grasping cylindrical handle of 30# bungee cord to maintain 90* elbow flexion for stability of BUEs, followed by mid rows for scapular retraction with light facilitation by PT x10. In // bars, pt completed STS with B UE assist on bars and CGA for trunk control. Pt completed forward stepping length of // bars ~6 ft with mod A for R LE advancement and max A for L LE advancement. PT blocked stance leg during advancement assist of opposite LE with OT assist for trunk control min-mod A and wheelchair follow. Improved initiation of RLE movement on this date noted, specifically in knee flexion/extension and hip flexion. Continued verbal cues for placement of hands in front of body to prepare for use of lofstrand crutches. Pt declined need to toilet at end of session before transport arrived. Pt ended session with cross midline \"high 5\" x6 with BUEs. 4/28:    Pt about 12 mins late then needed to use restroom, delaying start of therapy.  //bars: sit to stand transition with B UE assist and CGA for trunk control. Amb ~6 steps with mod A for R LE advancement and max A for L LE advancement. PT min blocked stance leg during contralateral swing phase with OT assist for trunk control min-mod A and wheelchair follow. Completed x3 with retro stepping after 2nd attempt ~6 steps with pt relying on momentum of trunk rotation for hip extension when stepping back. Continued verbal cues for placement of hands in front of body to prepare for use of lofstrand crutches. Also discussed potential use of posterior walker as pt previously used when ambulatory and due to pt's preference for hands used as [place] behind trunk.  In treatment room, pt doffed/donned R AFO seated in wheelchair using technique of abducting hip to bring to side of wheelchair to place foot into AFO and shoe, and then placing foot on foot rest to adhere straps. Decreased time required with this method with pt agreeing to attempt donning both AFOs at home. Pt transferred to sit edge of mat from wheelchair with min A x2 to block feet and for trunk control during scooting. Continued difficulty with trunk flexion during lowering to sit far back on surface pt is transferring to affecting safety. With PT holding ball in front of pt, pt completed cross and hook style punches across body to opposite side x10 B with emphasis on trunk control and proprioceptive input of ball to improve grading of force; decreased trunk control on L punches due to R trunk weakness with mod-min A for sitting balance compared to CGA-min A on L. Trunk ext w/BUE [place] to trunk flex & shoulder ext to hit ball forcefully x3 -difficulty achieving necessary trunk ext and focused on BUEs [place] instead. Session concluded with mat to wheelchair transfer min A x2 with wheelchair on pt's right.      4/26:  Began session with OT applying foam padding to patient's ankle for cushion so pt can wear AFOs comfortably. Pt transferred to sit edge of mat from wheelchair with min A x2 to block feet and for trunk control during scooting. Grasping 20# bungee cord, pt completed horizontal ab/adduction x10, followed by mid row x10 with emphasis on scapular retraction and trunk control. For continued trunk control during dynamic balance, bungee cord placed around pt's trunk with pt completed hip and thoracic extension against resistance of bungee cord and then returning to upright starting position x8, followed by R and L lateral lean x5 each with min A required for balance. Pt completed trunk rotation to reach behind self to grab bean bag to improve dynamic balance required for pericare while toileting, followed by tossing to bucket, with min A for ~3 instances LOB.  Pt instructed on placing flat hands on surface of mat to complete weight bearing during trunk (43755) Provided self-care/home management training related to activities of daily living and compensatory training, and/or use of adaptive equipment for improvement with: ADLs and compensatory training, meal preparation, safety procedures and instruction in use of adaptive equipment, including bathing, grooming, dressing, personal hygiene, basic household cleaning and chores. Therapeutic Exercise and NMR EXR  [x] (36554) Provided verbal/tactile cueing for activities related to strengthening, flexibility, endurance, ROM for improvements in  [x] LE / Core stability: LE, hip, and core control with self care, mobility, lifting, ambulation. [x] UE / Shoulder complex: cervical, postural, scapular, scapulothoracic and UE control with self care, reaching, carrying, lifting, house/yardwork, driving, computer work. [x] (17991) Provided verbal/tactile cueing for activities related to improving balance, coordination, kinesthetic sense, posture, motor skill, proprioception to assist with   [x] LE / Core stability: LE, hip, and core control in self care, mobility, lifting, ambulation and eccentric single leg control. [x] UE / Shoulder complex: cervical, scapular, scapulothoracic and UE control with self care, reaching, carrying, lifting, house/yardwork, driving, computer work.   [] (34500) Therapist is in constant attendance of 2 or more patients providing skilled therapy interventions, but not providing any significant amount of measurable one-on-one time to either patient, for improvements in  [] LE / Core stability: LE, hip, and core control in self care, mobility, lifting, ambulation and eccentric single leg control. [] UE / Shoulder complex: cervical, scapular, scapulothoracic and UE control with self care, reaching, carrying, lifting, house/yardwork, driving, computer work.      Home Exercise Program:    [] (69688) Reviewed/Progressed HEP activities related to strengthening, flexibility, endurance, ROM of   [] LE / Core stability: core, hip and LE for functional self-care, mobility, lifting and ambulation/stair navigation   [] UE / Shoulder complex: cervical, postural, scapular, scapulothoracic and UE control with self care, reaching, carrying, lifting, house/yardwork, driving, computer work  [] (23786)Reviewed/Progressed HEP activities related to improving balance, coordination, kinesthetic sense, posture, motor skill, proprioception of   [] LE: core, hip and LE for self care, mobility, lifting, and ambulation/stair navigation    [] UE / Shoulder complex: cervical, postural,  scapular, scapulothoracic and UE control with self care, reaching, carrying, lifting, house/yardwork, driving, computer work    Manual Treatments:  PROM / STM / Oscillations-Mobs:  G-I, II, III, IV (PA's, Inf., Post.)  [] (03811) Provided manual therapy to mobilize shoulder complex, hip, LE, and/or cervicothoracic/LS spine soft tissue/joints for the purpose of modulating pain, promoting relaxation,  increasing ROM, reducing/eliminating soft tissue swelling/inflammation/restriction, improving soft tissue extensibility and allowing for proper ROM for normal function with   [] LE / Core stability: self care, mobility, lifting and ambulation. [] UE / Shoulder complex: self care, reaching, carrying, lifting, house/yardwork, driving, computer work. Modalities:  [] (19775) Vasopneumatic compression: Utilized vasopneumatic compression to decrease edema / swelling for the purpose of improving mobility and quad tone / recruitment which will allow for increased overall function including but not limited to self-care, transfers, ambulation, and ascending / descending stairs.          Charges:  Timed Code Treatment Minutes: 45   Total Treatment Minutes: 90   **CO-treat with OT    [] EVAL - LOW (39881)   [] EVAL - MOD (08197)  [] EVAL - HIGH (49952)  [] RE-EVAL (56368)  [x] TE (69266) x 1      [] Ionto  [x] NMR (64522) x  1    [] Vaso  [] Manual (02477) x [] Ultrasound  [x] TA x  1     [] [facility] Traction (63674)  [] Gait Training x     [] ES (un) (79628):   [] Aquatic therapy x   [] Other:   [] Group:     GOALS:   Patient stated goal: improve ability to complete transfers   []? Progressing: []? Met: []? Not Met: []? Adjusted     Therapist goals for Patient:   Short Term Goals: To be achieved in: 2 weeks  1. Independent in HEP and progression per patient tolerance, in order to prevent re-injury. []? Progressing: [x]? Met: []? Not Met: []? Adjusted  2. Patient will have a decrease in pain to facilitate improvement in movement, function, and ADLs as indicated by Functional Deficits. []? Progressing: [x]? Met: []? Not Met: []? Adjusted     Long Term Goals: To be achieved in: 6 weeks  1. Patient will report increased standing tolerance to at least 30 minutes in standing frame. []? Progressing: [x]? Met: []? Not Met: []? Adjusted  2. Patient will demonstrate an increase in strength to good shoulder & hip complex, and core activation to allow for proper functional mobility as indicated by patients Functional Deficits. [x]? Progressing: []? Met: []? Not Met: []? Adjusted  3. Patient will return to functional activities including demo'ing safe transfers to/from w/c with mod I.    [x]? Progressing: []? Met: []? Not Met: []? Adjusted  4. Patient will increase STREAM score to 32 or above for increased UE/LE movement in sitting, supine, and standing. [x]? Progressing: []? Met: []? Not Met: []? Adjusted          Overall Progression Towards Functional goals/ Treatment Progress Update:  [] Patient is progressing as expected towards functional goals listed. [x] Progression is slowed due to complexities/Impairments listed. [] Progression has been slowed due to co-morbidities.   [] Plan just implemented, too soon to assess goals progression <30days   [] Goals require adjustment due to lack of progress  [] Patient is not progressing as expected and requires additional follow

## 2021-07-26 NOTE — FLOWSHEET NOTE
Occupational Therapy Daily Treatment Note  Date:  2021    Patient: Sasha Gerardo   : 2000   MRN: [id number]  Referring Physician:  Adolfo Murray CNP       Medical Diagnosis Information: paraplegia, cerebral palsy status post covid has not been seen in clinic since                                        Insurance information: medicare/ medicaid     Date of Injury:   Date of Surgery: rhizotomy when he was about 10    Progress Report: []  Yes  [x]  No     Date Range for reporting period:  2021 to 2021. Patient is status post covid recovery and missed treatments since 2021. Progress report due (10 Rx/or 30 days whichever is less): visit #20 or     Recertification due (POC duration/ or 90 days whichever is less): visit #20 or 10/22/2021    Visit # Insurance Allowable Auth required? Date Range    +   [place] []  Yes  [x]  No n/a       Latex Allergy:  []No      []Yes  Pacemaker:  [] No       [] Yes     Preferred Language for Healthcare:   [x]English       []other:    Pain level: 0     SUBJECTIVE:  Patient requesting to use toilet upon arrival. Pt reports using stander yesterday. Pt presents in different shoes, reporting they are more narrow, but more comfortable to use with his AFOs. Functional Disability Index:  STREAM: score 21 2021         OBJECTIVE: See eval    21: 3 minutes static stance at parallel bars with CGA x2 for trunk control and blocking knees      RESTRICTIONS/PRECAUTIONS: fall risk    Exercises/Interventions: Treatment focused on improving trunk control with transitional movement patterns . Patient incontinent of urine trying to use urinal independently prior to treatment session. Patient doffed shirt and donned clean t shirt with stand by/ set up.   Patient then transferred to mat from wheelchair with mod assist.  Patient transferred sit to supine with mod assist.  In supine patient worked on upper trunk rotation reaching for balance, coordination, kinesthetic sense, posture, motor skill, proprioception  to assist with  scapular, scapulothoracic and UE control with self care, reaching, carrying, lifting, house/yardwork, driving/computer work.   [] Comments:    Therapeutic Activities:    [x] (97465 or 68631) Provided verbal/tactile cueing for activities related to improving balance, coordination, kinesthetic sense, posture, motor skill, proprioception and motor activation to allow for proper function of scapular, scapulothoracic and UE control with self care, carrying, lifting, driving/computer work  [] Comments:    Home Exercise Program:    [x] (05060) Reviewed/Progressed HEP activities related to strengthening, flexibility, endurance, ROM of scapular, scapulothoracic and UE control with self care, reaching, carrying, lifting, house/yardwork, driving/computer work  [] (90329) Reviewed/Progressed HEP activities related to improving balance, coordination, kinesthetic sense, posture, motor skill, proprioception of scapular, scapulothoracic and UE control with self care, reaching, carrying, lifting, house/yardwork, driving/computer work    [] Comments:    Manual Treatments:  PROM / STM / Oscillations-Mobs:  G-I, II, III, IV (PA's, Inf., Post.)  [] (39314) Provided manual therapy to mobilize soft tissue/joints of cervical/CT, scapular GHJ and UE for the purpose of modulating pain, promoting relaxation,  increasing ROM, reducing/eliminating soft tissue swelling/inflammation/restriction, improving soft tissue extensibility and allowing for proper ROM for normal function with self care, reaching, carrying, lifting, house/yardwork, driving/computer work  [] Comments:    ADL Training:  [] (32430) Provided self-care/home management training related to activities of daily living and compensatory training, and/or use of adaptive equipment   [] Comments:     Splinting:  [] Fabrication of:   [] (40235) Orthotic/Prosthetic Management, subsequent encounter  [] (53072) Orthotic management and training (fitting and assessment)  [] Comments:      Charges: co treat with PT for safety and increased intensity of treatment  Timed Code Treatment Minutes: 44   Total Treatment Minutes: 88     [] EVAL (LOW) 64144   [x] OT Re-eval (06452)  [] EVAL (MOD) 81634   [] EVAL (HIGH) 34061       [] Ru (66417) x     [] RFMXQ(45579)  [x] NMR (01213) x 1    [] Estim (attended) (58124)   [] Manual ([id number]) x      [] US (34551)  [x] TA (21549) x  1   [] Paraffin (15678)  [x] ADL  (88484) x  1   [] Splint/L code:    [] Estim (unattended) (61352)  [] Fluidotherapy (33588)  [] Other:    GOALS:    Patient stated goal: improved trunk control and standing tolerance to increase independence in self care. [x]? Progressing: []? Met: []? Not Met: []? Adjusted     Therapist goals for Patient:   Short Term Goals: To be achieved in: 30 days  1. Pt will be independent with clothing management on toilet or in wheelchair to complete toileting with use of grab bar. Min assist  [x]? Progressing: []? Met: []? Not Met: []? Adjusted  2. Pt will be stand by assist completing scoot transfers on level surfaces  Contact guard  [x]? Progressing: []? Met: []? Not Met: []? Adjusted  3. Patient will be able to stand up to 30 seconds at grab bar for toileting and lower body dress with min assist.  Goal met progress to stand by assist   [x]? Progressing: []? Met: []? Not Met: []? Adjusted     Long Term Goals: To be achieved by discharge  1. Pt will demonstrate an improved stream score of 28. New goal is 32  []? Progressing: [x]? Met: []? Not Met: [x]? Adjusted    Progression Towards Functional goals:  [x] Patient is progressing as expected towards functional goals listed. [] Progression is slowed due to complexities listed. [] Progression has been slowed due to co-morbidities.   [] Plan just implemented, too soon to assess goals progression  [] All goals are met  [] Other:     ASSESSMENT:   Patient has made progress in mobility, balance and executive function. Treatment/Activity Tolerance:  [x] Patient tolerated treatment well [] Patient limited by fatigue  [] Patient limited by pain  [] Patient limited by other medical complications  [] Other:     Prognosis: [x] Good [] Fair  [] Poor    Patient Requires Follow-up: [x] Yes  [] No    PLAN: See eval  [x] Continue per plan of care [] Alter current plan (see comments)  [] Plan of care initiated (MD cosigned) [] Hold pending MD visit [] Discharge    Electronically signed by:                        Note: If patient does not return for scheduled/ recommended follow up visits, this note will serve as a discharge from care along with most recent update on progress.

## 2021-07-28 ENCOUNTER — HOSPITAL ENCOUNTER (OUTPATIENT)
Dept: PHYSICAL THERAPY | Age: 21
Setting detail: THERAPIES SERIES
Discharge: HOME | End: 2021-07-28
Payer: MEDICARE

## 2021-07-28 ENCOUNTER — HOSPITAL ENCOUNTER (OUTPATIENT)
Dept: OCCUPATIONAL THERAPY | Age: 21
Setting detail: THERAPIES SERIES
Discharge: HOME | End: 2021-07-28
Payer: MEDICARE

## 2021-07-28 PROCEDURE — 97112 NEUROMUSCULAR REEDUCATION: CPT

## 2021-07-28 PROCEDURE — 97110 THERAPEUTIC EXERCISES: CPT

## 2021-07-28 PROCEDURE — 97530 THERAPEUTIC ACTIVITIES: CPT

## 2021-07-28 NOTE — FLOWSHEET NOTE
[facility] - Outpatient Physical Therapy  Phone: [phone]   Fax: [phone]    Physical Therapy Daily Treatment Note  Date:  2021     Patient Name:  Luis Norton    :  2000  MRN: [id number]  Medical/Treatment Diagnosis Information:  Diagnosis: Cerebral palsy with spastic diplegia  Treatment Diagnosis: Decreased trunk control impacting ability to complete safe transfers, decreased standing tolerance, LE weakness  Insurance/Certification information:  PT Insurance Information: Medicare & Medicaid  Physician Information:  Referring Practitioner: JOHNNY Joel  Plan of care signed (Y/N): [x]  Yes []  No     Date of Patient follow up with Physician:      Progress Report: []  Yes  [x]  No     Date Range for reporting period:  Beginning  21  Re-eval:   PN: 21  Ending    Progress report due (10 Rx/or 30 days whichever is less):      Recertification due (POC duration/ or 90 days whichever is less):      Visit # Insurance Allowable Auth required? Date Range    +  + 6/10 larry Medical necessity []  Yes  [x]  No n/a     Latex Allergy:  [x]NO      []YES  Preferred Language for Healthcare:   [x]English       []Other:      Functional Scale/Test Evaluation 3/1/2021 4/21/21  6/22/21 7/28 Discharge   STREAM 23  26 29                          Pain level:  0/10  Location:  none    SUBJECTIVE:     Not wearing AFOs today and didn't bring them, states L AFO bothers plantar surface of L heel. OT able to add foam for protection, pt plans to bring with next session to attempt since apt isn't until Aug 3. Only doing standing table 1 time weekly, about an hour at a time. OBJECTIVE:  : Session started 10 min after scheduled time, pt had to use the restroom. PT offered to help pt with standing, but pt declined, stating he would rather use the urinal to save some energy for the session.     :  Pt 15 mins late then needed to use restroom    Co-treat w/OT for safety and assistance    RESTRICTIONS/PRECAUTIONS: recent Bell's Palsy    Interventions/Exercises:   Therapeutic Exercises (55070) Resistance / level Sets/sec Reps Notes   W/c push ups in preparation for standing                            Neuromuscular Re-ed (28731) /  Gait Training (35389)       Upright posture seated in W/C   X 5 reps holding football, rest of reps completed with L UE bracing on L knee, PT hand assist on sternum and lumbar spine to facilitate                                Gait Training:  Amb in //bars    Bwd walking in //bars      Transfer w/B Lofstrand crutches   Static stand w/B Lofstrand crutches   8 ft    8 ft    Mod A of 2  Mod A of 2   -manual required to advance LEs, pt activation hip extensors to initiate swing phase, demo'd adequate weight-shifting. OT provided assist & w/c follow for safety      -difficulty with placement for adequate support                 Therapeutic Activities (32082) /  Functional Tasks       [facility] reaching across midline in seated   Pt seated in w/c using seat belt to help stay in w/c          [facility] reaching across midline and diagonally   Pt seated in w/c using seat belt to help stay in w/c               STS to std walker            Transfer to w/c from mat table            STS from w/c to mat table - with large bolster placed on table in front of patient to hold onto   Min A, w/c close to mat table to assist in blocking pt's knees. Manual Intervention ([phone])       Supine hamstring stretch 90/90    -completed by PT  -completed by OT                                        Sessions:     7/28:  Session initiated with squat pivot transfer w/c to mat with CGA x1; noted improved positioning of w/c and LEs prior to initiation of transfer. Assessed pt progress using functional STREAM assessment with results noted above.  In supine, pt completed hip bridges x3 with improved ability to maintain knees in midline following support of trunk and worked on propping prone on elbows in modified tall kneeling. From this position pt played [address] [facility] Man writing letters on a dry erase board with mod - min x 1 to maintain midline. He then transitioned to short sit with max assist and completed one game of hangman in short sit at edge of mat. Pt needed min of one to maintain balance while writing on board and min assist for executive function. PT assisted with manual pec stretch with pt leaning back over swiss ball and applying overpressure in the posterior direction at the shoulders. Pt then worked on sit to stand times three at walker with forearm rests with mod assist of one for hip/knee control and cues to engage quads and glutes. Pt then transferred to wheelchair with contact guard. 7/19: Treatment focused on improving independence in LE dressing, trunk control, and standing balance and tolerance for functional mobility. Session initiated with patient transferring to mat. Patient then initiated treatment focusing on trunk stabilization reaching and hitting targets with trunk rotation times 10 reps each direction losing balance more to left and then completed sit to supine with min assist.  Patient then worked on trunk rotation each direction in supine to hit target completing intervals of 30 seconds hitting target 6 -8 reps each direction in 30 second intervals times 4. Patient needing cues at scapula to rotate to the upper trunk . Patient then completed bridge and scoots on mat with mod assist of one to two. Patient then worked on standing tolerance at walker with forearm support of arm troughs completed 3 reps standing up to 2 minutes with min assist of two  While playing connect 4. Patient finished treatment seated at edge of mat playing bozena focusing on trunk stability and shuffling cards, bilateral coordination, intrinsic hand function.       7/14:  Patient doffed L AFO and shoe, required significant time and frequent cues to complete. Had significant difficulty donning AFO and shoe despite several verbal and manual cues from OT. OT modified shoe to assist pt in keeping tongue open to allow foot better access into shoe. Pt continued to have difficulty donning shoe and eventually took AFO out of shoe to kendall it. Was only able to kendall shoe with assist from OT. Patient required Supervision about 90% of the time during task, SBA for remainder of time due to significant forward trunk flexion. Pt did utilize seat belt on w/c about alf through task. At [facility]: side stepped length of bar x1 lap w/PT assisting advancement of LEs and OT providing support. Manual blocking of knees was required with increased step length to maintain safety. Sit to stands at [facility] x10 w/mod A of 2.    7/12: Session initiated with pt completing squat pivot transfer from w/c to sitting EOM with CGA. [facility] pt completed bilateral wb for partial STS x7 to lift glutes off mat to position over gerardo disc with min A x2 for positioning. From short sit, pt completed STS transfer x4 with min A x2 for proper positioning on dynadisc to challenge [place]. Pt sat on gerardo disc for trunk control exercise, with pt instructed in using BLE to provide support, with multiple instances of LOB into lateral lean. Pt completed UE reaching activity of playing Connect 4 with an emphasis on trunk lengthening, crossing midline, lateral pinch, and eccentric control to challenge trunk control, with pt requiring verbal cues to bend at waist rather than shift buttocks forward; verbal cues for problem solving. Patient then scooted out to edge of mat and completed STS to don harness to work on ambulation. Completed ~20 feet x2 in harness with mod x2 for LE advancement and cues for hand placement. Pt transitioned to sitting in w/c with assist of harness > sitting EOM. Pt completed sit>supine transfer with SBA.  In supine, pt received manual stretch of hamstrings and adductors for decreased tightness. Pt completed supine>sit transfer with mod A + min A. Session concluded with pt completing squat pivot transfer from EOM to w/c with min A d/t fatigue and pt exiting clinic in w/c.      7/8:  Session focused on increasing trunk control for safe transfers, transitional movement patterns, ADL. Patient transferred w/c to mat with SBA/CGA. From short sit at edge of mat with feet on 4 inch step for increased weight bearing patient worked on pelvic stabilization, lateral weight shifts reaching in a variety of planes needing SBA/CGA to lengthen trunk and stabilize. Patient then assumed supine and worked on segmental rolling for facilitation of lower trunk and min assist of one. Patient then transitioned to prone and completed plank with mod assist of two. Patient then attempted transition to quadriped with mod assist of two due to fatigue, modified it with ball under abdomen. Patient completed shoulder abduction in prone for scapular stabilization and increasing thoracic extension needing mod of two. Patient then transitioned back to long sit and vertical roll placed along spine to increase thoracic extension and worked on shoulder abduction in long sit. .  Patient then scooted out to edge of mat and was placed in harness to work on ambulation and completed about 20 feet in harness with mod of one and stand by of one.      7/6: Session began by assisting pt in restroom again. Pt used grab bars around toilet to pull to standing with CGA and then leaned forward on wall while completing toileting. Pt sat down in w/c, then stood again to pull up pants. Session then moved to [facility] room. Pt transferred to mat table there with CGA. Pat sat EOB and was cued to lift chest for upright posture and worked on pushing sled away. Sled had two 25# plates loaded onto it.  Pt also worked on initiating a stand while pushing into sled and patient had a few successful trials in which he was able to clear his buttocks from the mat. Pt worked on trunk control in which he was holding a 4# medicine ball and reaching overhead, out in front, and down to the floor over several trials with elbows extended, chest lifted, and hands spread wide on the ball. Pt also worked on trunk control and lengthening reaching across midline towards a target with stabilizing on table using his ipsilateral hand. PT/OT then placed pt into harness for use with the [employer]cn[employer] system. Once centered in system, pt stood and worked on stepping fwd and retro in place. Pt completed 2-3 trials of stepping with each LE and then took a seated rest break. Upon the second trial of standing, his weight was shifted too far anteriorly and pt was unable to advance either LE. Pt sat again and was too fatigued to continue so the session was ended. 6/28: Pt assisted by both PT and OT in restroom at beginning of session. Pt completed multiple STS from [facility] with use of grab bars in restroom and SBA/CGA. Pt braced legs on toilet and used 1 HR on bar to balance self while completing LE dressing with minimal assistance only to get shorts out from underneath pt's feet. Pt also able to complete bridge in w/c to pull pants down. Pt able to complete STS in // bars with CGA and then completed stepping to target x 5 B/LE. Pt required less assistance to complete hip extension back to starting position. Mod to Max A to move leg fwd with other therapist blocking stance leg. At mat table, pt sat in w/c and leaned forward on forearms. Pt then pushed through forearms and used glutes and quads to lift bottom from chair with therapists assisting to block knees and lift buttocks if needed. Pt able to transition from forearms to open palms with elbows extended and then to standing x 2. Pt transferred to the mat table to progress from seated to sidelying and was prompted to lift legs 1 at a time as he rolled to sidelying then supine.  Pt worked on rolling to his R side, and then propping up on R hand to reach for points on the wall and therapists hand with his left. Pt was cued to lift his chest and reach while depressing his R shoulder. Pt performed on both sides with multiple reps of reaching while propped in the position. In supine, pt kicked each LE separately into extension against resistance from therapist, beginning with full ROM and progressing to extension pulses. Pt ended session by transferring back to w/c with min A/CGA. 6/22: Stand pivot to mat table with min A. STREAM completed for reassessment - pt demo's increased score this date. Pt completed the following exercises seated EOM with 4\" step under feet and PVC pipe with orange TB and OT holding band: bicep curls x 10, mid rows x 10, trunk twists x 5 B; pt then completed PNF flexion with orange band x 1 B, and horiz abd with orange band x 10'. Pt completed LAQ - done bilaterally with upright posture x 5, then done B/L with arms extended behind pt and cues to lift chest and relax neck to avoid forward head. Pt completed 1 static stand during the session at [facility] walker with mod A to block pt's knees while completing STREAM. Pt then completed squat pivot transfer back to w/c at end of session with min A.     5/12:  Squat pivot w/OT w/c to high mat table min/mod A for safety and to facilitate LE use. Sit to supine mod A. Harness for gait training system was donned while supine, then adjusted while long sitting and sitting EOM. Mod A of 2 supine to long sit, min A of 1 for balance long sit to sitting EOM. Pt setup inside NxStep partial weight-bearing gait training system for ambulation. Partial weight adjusted between 80 & 100 lbs depending on pt's ease of LE advancement. Amb 100' while PT & OT guided system for pt. Seated rest.  Amb [de-identified]' w/OT & student OT guiding NxStep system, PT assessing gait, provided mod A for trunk stability final 20 ft.   Seated rest.  Amb 61' w/2 person guiding system & PT providing mod A for trunk stability -pt able to demo increased step length and weight shift when trunk was stabilized. Pt performed dips while in partial [address] system, 2x3, 1x5;  Pt performed bean bag toss with OT, working to facilitate lateral reach outside [place] w/opposit UE then toss into bucket. Pt passed gold med ball [place] to student PT and received to facilitate UE flex at end range and trunk ext while working on dynamic balance. Pt in good spirits after walking, excited to show family, and had increased fatigue. 5/10:    Squat pivot transfer w/OT, w/c to w/c height mat table w/min A and mod verbal cues for improved technique. Leaning onto RUE, hip/LE ext activation to prime mat table to floor transfer x6 w/manual cues for facilitation. Mat table to floor transfer mod A of 2. Tall kneeling at lowered mat table to work on weight shifting, hip ext, trunk ext, UE ex's for 50 mins. Floor to chair transfer, max A of 2. Sit to stand transfer to mat table w/o B AFO w/max A of 2, increased instability of B knees, prevented full stand. Donned B AFO, pt completed transfer mod A of 2 and able to stand for 5 mins to work on hip ext, trunk ext, and LE WB'ing. 5/7:   Upon arrival, pt requested to use restroom, stated he didn't want to attempt to urinate as he had too much in the pocket of his hoodie and would most likely make a mess. Stand pivot w/c to same height mat table w/CGA, improved foot placement throughout but need to better place them prior to scooting back on edge of mat. Blue bungee ab crunches x5 mins w/intermittent rest breaks & time for recover following LOB. Forward reach, outside [place] for gold med ball, [place] reverse pass to PT behind him, then reach around back at sacrum level to receive ball, forward reach outside [place] to return to OT x10 w/intermittent rest and to recover LOB. Seated knee ext & flex w/assit from gliders x10 B -min/mod A for hip flex to further reduce friction OT provided min/mod A for balance. Stand pivot mat table to w/c w/min A and reduced eccentric control. Sit to stand at ballet barre x2 min/mod A of 2. Lateral stepping length of bar each way, w/min/mod A for balance, mod A to stabilize stance knee & facilitate WB'ing, CGA to mod A to advance ipsilateral LE. Sit to stand w/ballet barre x5.    5/5: Discussed with pt appropriate amount of time to spend in stander at home, with education on importance of quality of participation rather than quantity/duration; pt verbalized understanding, but may require reinforcement. Pt completed sit pivot transfer from w/c to mat table with Min A x 2. Pt participated in crossing midline for open hand slap onto gerardo disc x10 each side with SBA-min A for trunk control during task. Pt with tendency to lose balance towards R side. Pt required min-mod A for partial stand to place gerardo disc under buttocks. Pt seated on gerardo disc ~10 min to increase trunk strength and stability, with bilateral BUE reaching/shoulder flexion with CGA-mod A for trunk stability and blocking knees to prevent sliding forward. Pt completed a partial stand-> pivot edge of mat to w/c min A x 2 with verbal cues for safety as pt did not notify therapists prior to initiation of transfer. Pt seated at barre in w/c with 2 sit to stands with min A and use of barre and in stance ~2 min x 2 trials with min progressing to mod A with fatigue for trunk support by OT and PT blocking knees/facilitating LE extension; tactile/vcs for upright posture. During first trial pt with shoulder flexion of BUEs to slide up wall. Pt sat in w/c to complete bilateral LE advancement of w/c for strengthening requiring verbal cues and assist to put LEs in position (partial knee extension) with PT blocking feet for proper technique as pt initially used rocking of trunk to advance w/c. Pt completed this for ~10 ft with almost constant cueing to not propel w/c by rocking trunk.  Pt grasping cylindrical handle of 30# bungee cord to maintain 90* elbow flexion for stability of BUEs, followed by mid rows for scapular retraction with light facilitation by PT x10. In // bars, pt completed STS with B UE assist on bars and CGA for trunk control. Pt completed forward stepping length of // bars ~6 ft with mod A for R LE advancement and max A for L LE advancement. PT blocked stance leg during advancement assist of opposite LE with OT assist for trunk control min-mod A and wheelchair follow. Improved initiation of RLE movement on this date noted, specifically in knee flexion/extension and hip flexion. Continued verbal cues for placement of hands in front of body to prepare for use of lofstrand crutches. Pt declined need to toilet at end of session before transport arrived. Pt ended session with cross midline \"high 5\" x6 with BUEs. 4/28:    Pt about 12 mins late then needed to use restroom, delaying start of therapy.  //bars: sit to stand transition with B UE assist and CGA for trunk control. Amb ~6 steps with mod A for R LE advancement and max A for L LE advancement. PT min blocked stance leg during contralateral swing phase with OT assist for trunk control min-mod A and wheelchair follow. Completed x3 with retro stepping after 2nd attempt ~6 steps with pt relying on momentum of trunk rotation for hip extension when stepping back. Continued verbal cues for placement of hands in front of body to prepare for use of lofstrand crutches. Also discussed potential use of posterior walker as pt previously used when ambulatory and due to pt's preference for hands used as [place] behind trunk. In treatment room, pt doffed/donned R AFO seated in wheelchair using technique of abducting hip to bring to side of wheelchair to place foot into AFO and shoe, and then placing foot on foot rest to adhere straps. Decreased time required with this method with pt agreeing to attempt donning both AFOs at home.  Pt transferred to sit edge of mat from wheelchair with min A x2 to block feet and for trunk control during scooting. Continued difficulty with trunk flexion during lowering to sit far back on surface pt is transferring to affecting safety. With PT holding ball in front of pt, pt completed cross and hook style punches across body to opposite side x10 B with emphasis on trunk control and proprioceptive input of ball to improve grading of force; decreased trunk control on L punches due to R trunk weakness with mod-min A for sitting balance compared to CGA-min A on L. Trunk ext w/BUE [place] to trunk flex & shoulder ext to hit ball forcefully x3 -difficulty achieving necessary trunk ext and focused on BUEs [place] instead. Session concluded with mat to wheelchair transfer min A x2 with wheelchair on pt's right.      4/26:  Began session with OT applying foam padding to patient's ankle for cushion so pt can wear AFOs comfortably. Pt transferred to sit edge of mat from wheelchair with min A x2 to block feet and for trunk control during scooting. Grasping 20# bungee cord, pt completed horizontal ab/adduction x10, followed by mid row x10 with emphasis on scapular retraction and trunk control. For continued trunk control during dynamic balance, bungee cord placed around pt's trunk with pt completed hip and thoracic extension against resistance of bungee cord and then returning to upright starting position x8, followed by R and L lateral lean x5 each with min A required for balance. Pt completed trunk rotation to reach behind self to grab bean bag to improve dynamic balance required for pericare while toileting, followed by tossing to bucket, with min A for ~3 instances LOB. Pt instructed on placing flat hands on surface of mat to complete weight bearing during trunk rotation to improve balance. Bean bags were tossed back to patient x3 with instruction  to catch with open hands to reduce tone. Pt returned to wheelchair from edge of mat with min A for transfer.  Pt transitioned to // bars to complete ambulation x4 steps with max A x2 for trunk control and advancing LEs prior to seated rest break due to increased fatigue with lack of AFO support. Pt re-attempted with 3 steps, followed by static standing x60 seconds with max A + mod A with cues to keep arms in front of him to prepare for use of lofstrand crutches and to facilitate trunk engagement and knee extension. Pt sat on gerardo disc placed in wheelchair for trunk control with min-mod A for balance to complete ball toss/catch task with multiple LOB; theraband loop placed around knees to prevent abduction and improve pelvic stability and base of support. UB dressing completed to doff/don sweatshirt with SBA and good trunk control noted during weight shifts. Focus of today's session was to improve dynamic sitting and standing balance.         Modalities:     Pt. Education:  -patient educated on diagnosis, prognosis and expectations for rehab  -all patient questions were answered    HEP instruction:      NMR and Therapeutic Activities:    [x] (57937 or 28344) Provided verbal/tactile cueing for activities related to improving balance, coordination, kinesthetic sense, posture, motor skill, proprioception and motor activation to allow for proper function of   [x] LE / Core, hip and LE with self care and ADLs  [x] UE / Shoulder complex: cervical, postural, scapular, scapulothoracic and UE control with self care, carrying, lifting, driving, computer work.   [] (98313) Gait Re-education- Provided training and instruction to the patient for proper LE, core and hip recruitment, positioning, and eccentric body weight control with ambulation re-education, including ascending & descending stairs     Home Management Training / Self Care:  [] (13735) Provided self-care/home management training related to activities of daily living and compensatory training, and/or use of adaptive equipment for improvement with: ADLs and compensatory training, meal preparation, safety procedures and instruction in use of adaptive equipment, including bathing, grooming, dressing, personal hygiene, basic household cleaning and chores. Therapeutic Exercise and NMR EXR  [x] (68369) Provided verbal/tactile cueing for activities related to strengthening, flexibility, endurance, ROM for improvements in  [x] LE / Core stability: LE, hip, and core control with self care, mobility, lifting, ambulation. [x] UE / Shoulder complex: cervical, postural, scapular, scapulothoracic and UE control with self care, reaching, carrying, lifting, house/yardwork, driving, computer work. [x] (30521) Provided verbal/tactile cueing for activities related to improving balance, coordination, kinesthetic sense, posture, motor skill, proprioception to assist with   [x] LE / Core stability: LE, hip, and core control in self care, mobility, lifting, ambulation and eccentric single leg control. [x] UE / Shoulder complex: cervical, scapular, scapulothoracic and UE control with self care, reaching, carrying, lifting, house/yardwork, driving, computer work.   [] (73508) Therapist is in constant attendance of 2 or more patients providing skilled therapy interventions, but not providing any significant amount of measurable one-on-one time to either patient, for improvements in  [] LE / Core stability: LE, hip, and core control in self care, mobility, lifting, ambulation and eccentric single leg control. [] UE / Shoulder complex: cervical, scapular, scapulothoracic and UE control with self care, reaching, carrying, lifting, house/yardwork, driving, computer work.      Home Exercise Program:    [] (26373) Reviewed/Progressed HEP activities related to strengthening, flexibility, endurance, ROM of   [] LE / Core stability: core, hip and LE for functional self-care, mobility, lifting and ambulation/stair navigation   [] UE / Shoulder complex: cervical, postural, scapular, scapulothoracic and UE control with self care, reaching, carrying, lifting, house/yardwork, driving, computer work  [] (23379)Reviewed/Progressed HEP activities related to improving balance, coordination, kinesthetic sense, posture, motor skill, proprioception of   [] LE: core, hip and LE for self care, mobility, lifting, and ambulation/stair navigation    [] UE / Shoulder complex: cervical, postural,  scapular, scapulothoracic and UE control with self care, reaching, carrying, lifting, house/yardwork, driving, computer work    Manual Treatments:  PROM / STM / Oscillations-Mobs:  G-I, II, III, IV (PA's, Inf., Post.)  [] (04168) Provided manual therapy to mobilize shoulder complex, hip, LE, and/or cervicothoracic/LS spine soft tissue/joints for the purpose of modulating pain, promoting relaxation,  increasing ROM, reducing/eliminating soft tissue swelling/inflammation/restriction, improving soft tissue extensibility and allowing for proper ROM for normal function with   [] LE / Core stability: self care, mobility, lifting and ambulation. [] UE / Shoulder complex: self care, reaching, carrying, lifting, house/yardwork, driving, computer work. Modalities:  [] (94414) Vasopneumatic compression: Utilized vasopneumatic compression to decrease edema / swelling for the purpose of improving mobility and quad tone / recruitment which will allow for increased overall function including but not limited to self-care, transfers, ambulation, and ascending / descending stairs. Charges:  Timed Code Treatment Minutes: 45   Total Treatment Minutes: 90   **CO-treat with OT    [] EVAL - LOW (38177)   [] EVAL - MOD (55724)  [] EVAL - HIGH (94929)  [] RE-EVAL (84770)  [] TE (79689) x       [] Ionto  [x] NMR (32005) x  2    [] Vaso  [] Manual (96848) x      [] Ultrasound  [x] TA x  1     [] Mech Traction (75932)  [] Gait Training x     [] ES (un) (96665):   [] Aquatic therapy x   [] Other:   [] Group:     GOALS:   Patient stated goal: improve ability to complete transfers   []?  Progressing: []? Met: []? Not Met: []? Adjusted     Therapist goals for Patient:   Short Term Goals: To be achieved in: 2 weeks  1. Independent in HEP and progression per patient tolerance, in order to prevent re-injury. []? Progressing: [x]? Met: []? Not Met: []? Adjusted  2. Patient will have a decrease in pain to facilitate improvement in movement, function, and ADLs as indicated by Functional Deficits. []? Progressing: [x]? Met: []? Not Met: []? Adjusted     Long Term Goals: To be achieved in: 6 weeks  1. Patient will report increased standing tolerance to at least 30 minutes in standing frame. []? Progressing: [x]? Met: []? Not Met: []? Adjusted  2. Patient will demonstrate an increase in strength to good shoulder & hip complex, and core activation to allow for proper functional mobility as indicated by patients Functional Deficits. [x]? Progressing: []? Met: []? Not Met: []? Adjusted  3. Patient will return to functional activities including demo'ing safe transfers to/from w/c with mod I.    [x]? Progressing: []? Met: []? Not Met: []? Adjusted  4. Patient will increase STREAM score to 32 or above for increased UE/LE movement in sitting, supine, and standing. [x]? Progressing: []? Met: []? Not Met: []? Adjusted          Overall Progression Towards Functional goals/ Treatment Progress Update:  [] Patient is progressing as expected towards functional goals listed. [x] Progression is slowed due to complexities/Impairments listed. [] Progression has been slowed due to co-morbidities.   [] Plan just implemented, too soon to assess goals progression <30days   [] Goals require adjustment due to lack of progress  [] Patient is not progressing as expected and requires additional follow up with physician  [] Other    Persisting Functional Limitations/Impairments:  []Sleeping [x]Sitting               []Standing [x]Transfers        []Walking []Kneeling               []Stairs []Squatting / bending   [x]ADLs []Reaching  []Lifting  []Housework  []Driving []Job related tasks  []Sports/Recreation []Other:        ASSESSMENT:     Patient's STREAM score is improving along with static standing tolerance. Pt did not wear B AFO due to pain allowing for increased anterior translation of B tibia while standing, increasing assist at times. Pt able to shift hips to L, onto LLE for improved stance at ballet barre, however had difficulty maintaining when any dynamic activity occurred. Pt tolerated tall kneeling and modified quadruped well w/good hip activation during dynamic tasks, however does still require assist for safety during bed mobility and transfers. Pt can continue to benefit from PT to further improve hip stability to progress functional mobility. Treatment/Activity Tolerance:  [x] Patient able to complete tx  [] Patient limited by fatigue  [] Patient limited by pain  [] Patient limited by other medical complications  [] Other:     Prognosis: [x] Good [] Fair  [] Poor    Patient Requires Follow-up: [x] Yes  [] No    Plan for next treatment session: transfers, seated core strength, will plan to co-treat with OT when possible    PLAN: See eval. PT 2x / week for 6 weeks. [x] Continue per plan of care [] Alter current plan (see comments)  [] Plan of care initiated [] Hold pending MD visit [] Discharge    Electronically signed by: Roxana Martinez PT DPT    Note: If patient does not return for scheduled/ recommended follow up visits, his note will serve as a discharge from care along with most recent update on progress.

## 2021-07-28 NOTE — PROGRESS NOTES
[address], [address] [place] [address] [place], [address]  Phone: [phone]   Fax: [phone]    Occupational Therapy Daily Treatment Note  Date:  2021    Patient: Jeremy Jauregui   : 2000   MRN: [id number]  Referring Physician:  Quirino Bills CNP       Medical Diagnosis Information: paraplegia, cerebral palsy status post covid has not been seen in clinic since                                        Insurance information: medicare/ medicaid     Date of Injury:   Date of Surgery: rhizotomy when he was about 10    Progress Report: []  Yes  [x]  No     Date Range for reporting period:  2021 to 2021. Patient is status post covid recovery and missed treatments since 2021. Progress report due (10 Rx/or 30 days whichever is less): visit #28 or     Recertification due (POC duration/ or 90 days whichever is less): visit #28 or 10/22/2021    Visit # Insurance Allowable Auth required? Date Range    +   [place] []  Yes  [x]  No n/a       Latex Allergy:  []No      []Yes  Pacemaker:  [] No       [] Yes     Preferred Language for Healthcare:   [x]English       []other:    Pain level: 0/10    SUBJECTIVE:  Pt presents without AFOs, reporting continued discomfort on L heel. Examined both feet with round callus present on plantar surface of L heel. Requested pt bring AFOs to next visit to apply foam padding for pressure redistribution as pt does not have appt at  center for new AFOs and w/c until 8/3/21. Pt reports using stander only 1x/week at this time.      Functional Disability Index:      Stroke Rehabilitation Assessment of Movement (STREAM) 3/1/21 4/21/21 6/22/21 7/28/21    23 24  (supine- 9/15, sitting- 15/31, standing- ) 26 29  (supine- 9/15, sitting- , standing- 3/24       OBJECTIVE:     21: 3 minutes static stance at parallel bars with CGA x2 for trunk control and blocking knees      RESTRICTIONS/PRECAUTIONS: fall risk    Exercises/Interventions: Treatment focused on improving trunk control with transitional movement patterns to increase safety and independence. Session initiated with squat pivot transfer w/c to mat with CGA x1; noted improved positioning of w/c and LEs prior to initiation of transfer. Assessed pt progress using functional STREAM assessment with results noted above. In supine, pt completed hip bridges x3 with improved ability to maintain knees in midline following tactile and verbal cues. Pt transitioned to prone with supervision and quadruped with CGA; poor eccentric control noted with pt using momentum of rocking motion and UE weight bearing to assume quadruped. In tall kneel, pt hit playground ball with each hand x6 with focus on trunk rotation and UE control. Wedge placed in front of pt to use as stabilizing surface to maintain dynamic balance. Pt assumed quadruped with support of wedge at trunk for rest break, followed by forearm weight bearing on wedge to complete alternating LE extension/return to quadruped x12 each side with tactile and verbal cues for glute contraction during extension and weight shift to opposite side for flexion. Pt transitioned to sit edge of mat with mod A x2. Squat pivot mat to w/c min A. At ballet barre, pt completed STS with one hand pressing into surface pt seated on and one hand pulling on barre to stand with max A x2 to block knees from buckling and facilitate quads and glutes for LE extension; repeated STS and static stance x3 for 30-90 seconds with decreased time to fatigue due to lack of support from AFOs. Session concluded with pt grasping ballet barre seated in w/c to push self away from barre for UE and trunk lengthening stretch and pulling self to barre with same speed each UE for strengthening and control.        Therapeutic Exercises  Resistance / level Sets/sec Reps Notes Neuromuscular Re-ed / Therapeutic Activities                                                 Manual Intervention                                                     Modalities:     Patient education:  Plan of care, importance of weight bearing in stander for overall health, home exercise program, goals. Home Exercise Program:   Patient encouraged to start using stander 3 sessions a day and while in stander completing over head reaches . Patient to try and reach 15 minute intervals in stander. Therapeutic Exercise and NMR:  [x] (14598) Provided verbal/tactile cueing for activities related to strengthening, flexibility, endurance, ROM  for improvements in scapular, scapulothoracic and UE control with self care, reaching, carrying, lifting, house/yardwork, driving/computer work. [x] (53453) Provided verbal/tactile cueing for activities related to improving balance, coordination, kinesthetic sense, posture, motor skill, proprioception  to assist with  scapular, scapulothoracic and UE control with self care, reaching, carrying, lifting, house/yardwork, driving/computer work.   [] Comments:    Therapeutic Activities:    [x] (52128 or 98566) Provided verbal/tactile cueing for activities related to improving balance, coordination, kinesthetic sense, posture, motor skill, proprioception and motor activation to allow for proper function of scapular, scapulothoracic and UE control with self care, carrying, lifting, driving/computer work  [] Comments:    Home Exercise Program:    [x] (39057) Reviewed/Progressed HEP activities related to strengthening, flexibility, endurance, ROM of scapular, scapulothoracic and UE control with self care, reaching, carrying, lifting, house/yardwork, driving/computer work  [] (57333) Reviewed/Progressed HEP activities related to improving balance, coordination, kinesthetic sense, posture, motor skill, proprioception of scapular, scapulothoracic and UE control with self care, reaching, carrying, lifting, house/yardwork, driving/computer work    [] Comments:    Manual Treatments:  PROM / STM / Oscillations-Mobs:  G-I, II, III, IV (PA's, Inf., Post.)  [] (10889) Provided manual therapy to mobilize soft tissue/joints of cervical/CT, scapular GHJ and UE for the purpose of modulating pain, promoting relaxation,  increasing ROM, reducing/eliminating soft tissue swelling/inflammation/restriction, improving soft tissue extensibility and allowing for proper ROM for normal function with self care, reaching, carrying, lifting, house/yardwork, driving/computer work  [] Comments:    ADL Training:  [] (07752) Provided self-care/home management training related to activities of daily living and compensatory training, and/or use of adaptive equipment   [] Comments:     Splinting:  [] Fabrication of:   [] (06022) Orthotic/Prosthetic Management, subsequent encounter  [] (20259) Orthotic management and training (fitting and assessment)  [] Comments:      Charges: co treat with PT for safety and increased intensity of treatment  Timed Code Treatment Minutes: 45   Total Treatment Minutes: 90     [] EVAL (LOW) 37549   [] OT Re-eval (46993)  [] EVAL (MOD) 84118   [] EVAL (HIGH) 40251       [x] Ru (92129) x   1  [] DYITC(95814)  [x] NMR (16440) x 1    [] Estim (attended) (62666)   [] Manual ([id number]) x      [] US (19883)  [x] TA (46930) x  1   [] Paraffin (61790)  [] ADL  (30 [phone] [id number]) x     [] Splint/L code:    [] Estim (unattended) ([id number])  [] Fluidotherapy (30409)  [] Other:    GOALS:    Patient stated goal: improved trunk control and standing tolerance to increase independence in self care. [x]? Progressing: []? Met: []? Not Met: []? Adjusted     Therapist goals for Patient:   Short Term Goals: To be achieved in: 30 days  1. Pt will be independent with clothing management on toilet or in wheelchair to complete toileting with use of grab bar. Min assist  [x]? Progressing: []?  Met: []? Not Met: []? Adjusted  2. Pt will be stand by assist completing scoot transfers on level surfaces  Contact guard  [x]? Progressing: []? Met: []? Not Met: []? Adjusted  3. Patient will be able to stand up to 30 seconds at grab bar for toileting and lower body dress with min assist.  Goal met progress to stand by assist   [x]? Progressing: []? Met: []? Not Met: []? Adjusted     Long Term Goals: To be achieved by discharge  1. Pt will demonstrate an improved stream score of 28. New goal is 32  []? Progressing: [x]? Met: []? Not Met: [x]? Adjusted    Progression Towards Functional goals:  [x] Patient is progressing as expected towards functional goals listed. [] Progression is slowed due to complexities listed. [] Progression has been slowed due to co-morbidities. [] Plan just implemented, too soon to assess goals progression  [] All goals are met  [] Other:     ASSESSMENT:   Patient has made progress in mobility, balance and executive function. Treatment/Activity Tolerance:  [x] Patient tolerated treatment well [] Patient limited by fatigue  [] Patient limited by pain  [] Patient limited by other medical complications  [] Other:     Prognosis: [x] Good [] Fair  [] Poor    Patient Requires Follow-up: [x] Yes  [] No    PLAN: See eval  [x] Continue per plan of care [] Alter current plan (see comments)  [] Plan of care initiated (MD cosigned) [] Hold pending MD visit [] Discharge    Electronically signed by:    Joe Antoine OTR/VINH [id number]                  Note: If patient does not return for scheduled/ recommended follow up visits, this note will serve as a discharge from care along with most recent update on progress.

## 2021-08-06 LAB — VITAMIN D 25-HYDROXY: 28 NG/ML (ref 20–60)

## 2021-09-08 ENCOUNTER — HOSPITAL ENCOUNTER (OUTPATIENT)
Dept: OCCUPATIONAL THERAPY | Age: 21
Setting detail: THERAPIES SERIES
Discharge: HOME | End: 2021-09-08
Payer: MEDICARE

## 2021-09-08 ENCOUNTER — HOSPITAL ENCOUNTER (OUTPATIENT)
Dept: PHYSICAL THERAPY | Age: 21
Setting detail: THERAPIES SERIES
Discharge: HOME | End: 2021-09-08
Payer: MEDICARE

## 2021-09-08 PROCEDURE — 97112 NEUROMUSCULAR REEDUCATION: CPT

## 2021-09-08 PROCEDURE — 97110 THERAPEUTIC EXERCISES: CPT

## 2021-09-08 PROCEDURE — 97530 THERAPEUTIC ACTIVITIES: CPT

## 2021-09-08 PROCEDURE — 97750 PHYSICAL PERFORMANCE TEST: CPT

## 2021-09-08 NOTE — PLAN OF CARE
[address] [facility]  Phone: [phone]   Fax: [phone]    Physical Therapy Re-Certification Plan of Care    Dear JOHNNY Moncada,    We had the pleasure of treating the following patient for physical therapy services at [facility]. A summary of our findings can be found in the updated assessment below. This includes our plan of care. If you have any questions or concerns regarding these findings, please do not hesitate to contact me at the office phone number checked above. Thank you for the referral.     Physician Signature:________________________________Date:__________________  By signing above (or electronic signature), therapist's plan is approved by physician      Functional Outcome:  STREAM score: 29     Transfers:  Sit to/from stand: in //bars: min of 2; w/UE support at mat table: Mod of 2  Stand pivot: Mod A of 2    Static stand at elevated mat table: min of 2 w/constant manual cues to facilitate glute max & quad activation  Dynamic standing in //bars: Mod of 1, min of 1      Overall Response to Treatment:   []Patient is responding well to treatment and improvement is noted with regards  to goals   []Patient should continue to improve in reasonable time if they continue HEP   []Patient has plateaued and is no longer responding to skilled PT intervention    []Patient is getting worse and would benefit from return to referring MD   []Patient unable to adhere to initial POC   [x]Other: Pt has had a long layoff between sessions due to scheduling conflicts on both patient and clinic sides and in part due to transportation issues, however pt was still able to demonstrate improved core stability with lateral trunk leans, but continues to have difficulty when UE's are overhead.  Pt has difficulty activating B glutes when standing, requiring assistance to stand and remain standing despite significant UE support. Pt was able to improve hip extension activation performed during the STREAM, however had difficulty rolling in bed which kept score the same as at last assessment. Pt will continue to benefit from PT services to improve functional LE strength and core stability to improve indep; however in home habits may limit his progression towards standing & amb tolerance. Date range of Visits: 3/01 - 21  Total Visits: 27    Recommendation:    [x]Request additional visits PT at 1x / wk for 5 weeks. []Hold PT, pending MD visit          Physical Therapy Daily Treatment Note  Date:  2021     Patient Name:  Cody Salmeron    :  2000  MRN: [id number]  Medical/Treatment Diagnosis Information:  Diagnosis: Cerebral palsy with spastic diplegia  Treatment Diagnosis: Decreased trunk control impacting ability to complete safe transfers, decreased standing tolerance, LE weakness  Insurance/Certification information:  PT Insurance Information: Medicare & Medicaid  Physician Information:  Referring Practitioner: JOHNNY Vee  Plan of care signed (Y/N): [x]  Yes []  No     Date of Patient follow up with Physician:      Progress Report: [x]  Yes  []  No     Date Range for reporting period:  Beginning  21  Re-eval:   PN: 21  PN: 21  Ending    Progress report due (10 Rx/or 30 days whichever is less):  3/12    Recertification due (POC duration/ or 90 days whichever is less):      Visit # Insurance Allowable Auth required? Date Range    +  + 7/10 larry Medical necessity []  Yes  [x]  No n/a     Latex Allergy:  [x]NO      []YES  Preferred Language for Healthcare:   [x]English       []Other:      Functional Scale/Test Evaluation 3/1/2021 4/21/21  6/22/21 7/28 9/8 Discharge   STREAM 23  26 29 29                            Pain level:  0/10  Location:  none    SUBJECTIVE:    Pt reports this is the first time he has worn his AFOs in a month.  Pt reports using the stander for roughly an hour multiple times a week. He has noticed that when he comes out of the stander his knees are red. OBJECTIVE:  7/8: Session started 10 min after scheduled time, pt had to use the restroom. PT offered to help pt with standing, but pt declined, stating he would rather use the urinal to save some energy for the session. 4/28:  Pt 15 mins late then needed to use restroom    Co-treat w/OT for safety and assistance    RESTRICTIONS/PRECAUTIONS: recent Bell's Palsy    Interventions/Exercises:   Therapeutic Exercises (18767) Resistance / level Sets/sec Reps Notes   W/c push ups in preparation for standing                            Neuromuscular Re-ed (76106) /  Gait Training (02311)       Upright posture seated in W/C   X 5 reps holding football, rest of reps completed with L UE bracing on L knee, PT hand assist on sternum and lumbar spine to facilitate                                Gait Training:  Amb in //bars    Bwd walking in //bars      Transfer w/B Lofstrand crutches   Static stand w/B Lofstrand crutches   8 ft    8 ft    Mod A of 2  Mod A of 2   -manual required to advance LEs, pt activation hip extensors to initiate swing phase, demo'd adequate weight-shifting. OT provided assist & w/c follow for safety      -difficulty with placement for adequate support                 Therapeutic Activities (22407) /  Functional Tasks       [facility] reaching across midline in seated   Pt seated in w/c using seat belt to help stay in w/c          [facility] reaching across midline and diagonally   Pt seated in w/c using seat belt to help stay in w/c               STS to std walker            Transfer to w/c from mat table            STS from w/c to mat table - with large bolster placed on table in front of patient to hold onto   Min A, w/c close to mat table to assist in blocking pt's knees.                   Manual Intervention (11764)       Supine hamstring stretch 90/90    -completed by PT  -completed by OT                                        Sessions:     9/8: In preparation for standing activities the pt performed B UE walk outs in seated; pt required manual cues to facilitate WB through B hands. Pt performed sit to stand with mod Ax2 verbal cues to facilitate glute contraction when standing; tactile cues at B knee to facilitate knee extension once standing. Pt performed static standing w/ WB through B UE's for roughly 3 minutes. Following exercise pt performed stand to sit with poor eccentric control. While sitting the pt performed rapid lateral trunk shifts to premote core stability. Pt repeated static standing with B UE WB; continuing to require cueing at B knees, but fewer at the UEs. Pt demonstrated more control with stand to sit. Pt performed overhead ball throws in a seated position with no back rest; pt had loss of balance to the R multiple times requiring physical assist from OT. After resting pt performed another sit to stand and attempted to through the ball overhead, performed x5; pt had difficulty maintaining balance and required mod Ax2 with manual cues at B knees to facilitate knee extension. Performed the STREAM assessment, standing potion in //bars. On the mat table pt performed long sitting w/ physical assist of OT.     7/28:  Session initiated with squat pivot transfer w/c to mat with CGA x1; noted improved positioning of w/c and LEs prior to initiation of transfer. Assessed pt progress using functional STREAM assessment with results noted above. In supine, pt completed hip bridges x3 with improved ability to maintain knees in midline following tactile and verbal cues. Pt transitioned to prone with supervision and quadruped with CGA; poor eccentric control noted with pt using momentum of rocking motion and UE weight bearing to assume quadruped. In tall kneel, pt hit playground ball with each hand x6 with focus on trunk rotation and UE control.  Large gray bolster placed in front of pt to use as stabilizing surface to assist with dynamic balance. Pt assumed quadruped with support of bolster at trunk for rest break, followed by forearm weight bearing on bolster to complete alternating LE extension/return to quadruped x12 each side with tactile and verbal cues for glute contraction during extension and weight shift to opposite side for flexion. Pt transitioned to sit edge of mat with mod A x2. Squat pivot mat to w/c min A. At ballet barre, pt completed STS with one hand pressing into surface pt seated on and one hand pulling on barre to stand with max A x2 to block knees from buckling and facilitate quads and glutes for LE extension; repeated STS and static stance x3 for 30-90 seconds. Session concluded with pt grasping ballet barre seated in w/c to push self away from barre for UE and trunk lengthening stretch and pulling self to barre with same speed each UE for strengthening and control. 7/26: Pt used restroom prior to session and was in a hurry and had some urine on his shirt. Pt doffed shirt and donned clean T shirt with stand by/set up. Pt transferred from w/c to mat table with mod A and transitioned sit to supine with mod A. While in supine, pt worked on upper trunk rotation reaching across body for targets 10x B UE. PT assisted with keeping LEs in hooklying position and rotation at hips. Pt then completed 10 supine hip bridges. Pt then rolled to prone and worked on achieving modified plank on elbows with very min assist to maintain feet as base of support. He then transitioned with momentum and min A to Q-ped. He crawled up onto a swiss ball for support of trunk and worked on propping prone on elbows in modified tall kneeling. From this position pt played [address] [employer] writing letters on a dry erase board with mod - min x 1 to maintain midline. He then transitioned to short sit with max assist and completed one game of hangman in short sit at edge of mat.  Pt needed min of one to maintain balance while writing on board and min assist for executive function. PT assisted with manual pec stretch with pt leaning back over swiss ball and applying overpressure in the posterior direction at the shoulders. Pt then worked on sit to stand times three at walker with forearm rests with mod assist of one for hip/knee control and cues to engage quads and glutes. Pt then transferred to wheelchair with contact guard. 7/19: Treatment focused on improving independence in LE dressing, trunk control, and standing balance and tolerance for functional mobility. Session initiated with patient transferring to mat. Patient then initiated treatment focusing on trunk stabilization reaching and hitting targets with trunk rotation times 10 reps each direction losing balance more to left and then completed sit to supine with min assist.  Patient then worked on trunk rotation each direction in supine to hit target completing intervals of 30 seconds hitting target 6 -8 reps each direction in 30 second intervals times 4. Patient needing cues at scapula to rotate to the upper trunk . Patient then completed bridge and scoots on mat with mod assist of one to two. Patient then worked on standing tolerance at walker with forearm support of arm troughs completed 3 reps standing up to 2 minutes with min assist of two  While playing connect 4. Patient finished treatment seated at edge of mat playing bozena focusing on trunk stability and shuffling cards, bilateral coordination, intrinsic hand function. 7/14:  Patient doffed L AFO and shoe, required significant time and frequent cues to complete. Had significant difficulty donning AFO and shoe despite several verbal and manual cues from OT. OT modified shoe to assist pt in keeping tongue open to allow foot better access into shoe. Pt continued to have difficulty donning shoe and eventually took AFO out of shoe to kendall it.   Was only able to kendall shoe with assist from OT.  Patient required Supervision about 90% of the time during task, SBA for remainder of time due to significant forward trunk flexion. Pt did utilize seat belt on w/c about senior care through task. At [facility]: side stepped length of bar x1 lap w/PT assisting advancement of LEs and OT providing support. Manual blocking of knees was required with increased step length to maintain safety. Sit to stands at [facility] x10 w/mod A of 2.    7/12: Session initiated with pt completing squat pivot transfer from w/c to sitting EOM with CGA. [facility] pt completed bilateral wb for partial STS x7 to lift glutes off mat to position over gerardo disc with min A x2 for positioning. From short sit, pt completed STS transfer x4 with min A x2 for proper positioning on dynadisc to challenge [place]. Pt sat on gerardo disc for trunk control exercise, with pt instructed in using BLE to provide support, with multiple instances of LOB into lateral lean. Pt completed UE reaching activity of playing Connect 4 with an emphasis on trunk lengthening, crossing midline, lateral pinch, and eccentric control to challenge trunk control, with pt requiring verbal cues to bend at waist rather than shift buttocks forward; verbal cues for problem solving. Patient then scooted out to edge of mat and completed STS to don harness to work on ambulation. Completed ~20 feet x2 in harness with mod x2 for LE advancement and cues for hand placement. Pt transitioned to sitting in w/c with assist of harness > sitting EOM. Pt completed sit>supine transfer with SBA. In supine, pt received manual stretch of hamstrings and adductors for decreased tightness. Pt completed supine>sit transfer with mod A + min A. Session concluded with pt completing squat pivot transfer from EOM to w/c with min A d/t fatigue and pt exiting clinic in w/c.      7/8:  Session focused on increasing trunk control for safe transfers, transitional movement patterns, ADL.   Patient transferred w/c to mat with SBA/CGA. From short sit at edge of mat with feet on 4 inch step for increased weight bearing patient worked on pelvic stabilization, lateral weight shifts reaching in a variety of planes needing SBA/CGA to lengthen trunk and stabilize. Patient then assumed supine and worked on segmental rolling for facilitation of lower trunk and min assist of one. Patient then transitioned to prone and completed plank with mod assist of two. Patient then attempted transition to quadriped with mod assist of two due to fatigue, modified it with ball under abdomen. Patient completed shoulder abduction in prone for scapular stabilization and increasing thoracic extension needing mod of two. Patient then transitioned back to long sit and vertical roll placed along spine to increase thoracic extension and worked on shoulder abduction in long sit. .  Patient then scooted out to edge of mat and was placed in harness to work on ambulation and completed about 20 feet in harness with mod of one and stand by of one.      7/6: Session began by assisting pt in restroom again. Pt used grab bars around toilet to pull to standing with CGA and then leaned forward on wall while completing toileting. Pt sat down in w/c, then stood again to pull up pants. Session then moved to [facility] room. Pt transferred to mat table there with CGA. Pat sat EOB and was cued to lift chest for upright posture and worked on pushing sled away. Sled had two 25# plates loaded onto it. Pt also worked on initiating a stand while pushing into sled and patient had a few successful trials in which he was able to clear his buttocks from the mat. Pt worked on trunk control in which he was holding a 4# medicine ball and reaching overhead, out in front, and down to the floor over several trials with elbows extended, chest lifted, and hands spread wide on the ball.  Pt also worked on trunk control and lengthening reaching across midline towards a target with stabilizing on table using his ipsilateral hand. PT/OT then placed pt into harness for use with the NxStep system. Once centered in system, pt stood and worked on stepping fwd and retro in place. Pt completed 2-3 trials of stepping with each LE and then took a seated rest break. Upon the second trial of standing, his weight was shifted too far anteriorly and pt was unable to advance either LE. Pt sat again and was too fatigued to continue so the session was ended. 6/28: Pt assisted by both PT and OT in restroom at beginning of session. Pt completed multiple STS from [facility] with use of grab bars in restroom and SBA/CGA. Pt braced legs on toilet and used 1 HR on bar to balance self while completing LE dressing with minimal assistance only to get shorts out from underneath pt's feet. Pt also able to complete bridge in w/c to pull pants down. Pt able to complete STS in // bars with CGA and then completed stepping to target x 5 B/LE. Pt required less assistance to complete hip extension back to starting position. Mod to Max A to move leg fwd with other therapist blocking stance leg. At mat table, pt sat in w/c and leaned forward on forearms. Pt then pushed through forearms and used glutes and quads to lift bottom from chair with therapists assisting to block knees and lift buttocks if needed. Pt able to transition from forearms to open palms with elbows extended and then to standing x 2. Pt transferred to the mat table to progress from seated to sidelying and was prompted to lift legs 1 at a time as he rolled to sidelying then supine. Pt worked on rolling to his R side, and then propping up on R hand to reach for points on the wall and therapists hand with his left. Pt was cued to lift his chest and reach while depressing his R shoulder. Pt performed on both sides with multiple reps of reaching while propped in the position.  In supine, pt kicked each LE separately into extension against resistance from therapist, beginning with full ROM and progressing to extension pulses. Pt ended session by transferring back to w/c with min A/CGA. 6/22: Stand pivot to mat table with min A. STREAM completed for reassessment - pt demo's increased score this date. Pt completed the following exercises seated EOM with 4\" step under feet and PVC pipe with orange TB and OT holding band: bicep curls x 10, mid rows x 10, trunk twists x 5 B; pt then completed PNF flexion with orange band x 1 B, and horiz abd with orange band x 10'. Pt completed LAQ - done bilaterally with upright posture x 5, then done B/L with arms extended behind pt and cues to lift chest and relax neck to avoid forward head. Pt completed 1 static stand during the session at [facility] walker with mod A to block pt's knees while completing STREAM. Pt then completed squat pivot transfer back to w/c at end of session with min A.     5/12:  Squat pivot w/OT w/c to high mat table min/mod A for safety and to facilitate LE use. Sit to supine mod A. Harness for gait training system was donned while supine, then adjusted while long sitting and sitting EOM. Mod A of 2 supine to long sit, min A of 1 for balance long sit to sitting EOM. Pt setup inside NxStep partial weight-bearing gait training system for ambulation. Partial weight adjusted between 80 & 100 lbs depending on pt's ease of LE advancement. Amb 100' while PT & OT guided system for pt. Seated rest.  Amb [de-identified]' w/OT & student OT guiding NxStep system, PT assessing gait, provided mod A for trunk stability final 20 ft. Seated rest.  Amb 61' w/2 person guiding system & PT providing mod A for trunk stability -pt able to demo increased step length and weight shift when trunk was stabilized. Pt performed dips while in partial [address] system, 2x3, 1x5;  Pt performed bean bag toss with OT, working to facilitate lateral reach outside [place] w/opposit UE then toss into bucket.   Pt passed gold med ball [place] to student PT and received to facilitate UE flex at end range and trunk ext while working on dynamic balance. Pt in good spirits after walking, excited to show family, and had increased fatigue. 5/10:    Squat pivot transfer w/OT, w/c to w/c height mat table w/min A and mod verbal cues for improved technique. Leaning onto RUE, hip/LE ext activation to prime mat table to floor transfer x6 w/manual cues for facilitation. Mat table to floor transfer mod A of 2. Tall kneeling at lowered mat table to work on weight shifting, hip ext, trunk ext, UE ex's for 50 mins. Floor to chair transfer, max A of 2. Sit to stand transfer to mat table w/o B AFO w/max A of 2, increased instability of B knees, prevented full stand. Donned B AFO, pt completed transfer mod A of 2 and able to stand for 5 mins to work on hip ext, trunk ext, and LE WB'ing. 5/7:   Upon arrival, pt requested to use restroom, stated he didn't want to attempt to urinate as he had too much in the pocket of his hoodie and would most likely make a mess. Stand pivot w/c to same height mat table w/CGA, improved foot placement throughout but need to better place them prior to scooting back on edge of mat. Blue bungee ab crunches x5 mins w/intermittent rest breaks & time for recover following LOB. Forward reach, outside [place] for gold med ball, [place] reverse pass to PT behind him, then reach around back at sacrum level to receive ball, forward reach outside [place] to return to OT x10 w/intermittent rest and to recover LOB. Seated knee ext & flex w/assit from gliders x10 B -min/mod A for hip flex to further reduce friction OT provided min/mod A for balance. Stand pivot mat table to w/c w/min A and reduced eccentric control. Sit to stand at ballet barre x2 min/mod A of 2. Lateral stepping length of bar each way, w/min/mod A for balance, mod A to stabilize stance knee & facilitate WB'ing, CGA to mod A to advance ipsilateral LE.   Sit to stand w/ballet barre x5.    5/5: Discussed with pt appropriate amount of time to spend in stander at home, with education on importance of quality of participation rather than quantity/duration; pt verbalized understanding, but may require reinforcement. Pt completed sit pivot transfer from w/c to mat table with Min A x 2. Pt participated in crossing midline for open hand slap onto gerardo disc x10 each side with SBA-min A for trunk control during task. Pt with tendency to lose balance towards R side. Pt required min-mod A for partial stand to place gerardo disc under buttocks. Pt seated on gerardo disc ~10 min to increase trunk strength and stability, with bilateral BUE reaching/shoulder flexion with CGA-mod A for trunk stability and blocking knees to prevent sliding forward. Pt completed a partial stand-> pivot edge of mat to w/c min A x 2 with verbal cues for safety as pt did not notify therapists prior to initiation of transfer. Pt seated at barre in w/c with 2 sit to stands with min A and use of barre and in stance ~2 min x 2 trials with min progressing to mod A with fatigue for trunk support by OT and PT blocking knees/facilitating LE extension; tactile/vcs for upright posture. During first trial pt with shoulder flexion of BUEs to slide up wall. Pt sat in w/c to complete bilateral LE advancement of w/c for strengthening requiring verbal cues and assist to put LEs in position (partial knee extension) with PT blocking feet for proper technique as pt initially used rocking of trunk to advance w/c. Pt completed this for ~10 ft with almost constant cueing to not propel w/c by rocking trunk. Pt grasping cylindrical handle of 30# bungee cord to maintain 90* elbow flexion for stability of BUEs, followed by mid rows for scapular retraction with light facilitation by PT x10. In // bars, pt completed STS with B UE assist on bars and CGA for trunk control. Pt completed forward stepping length of // bars ~6 ft with mod A for R LE advancement and max A for L LE advancement.  PT blocked stance leg during advancement assist of opposite LE with OT assist for trunk control min-mod A and wheelchair follow. Improved initiation of RLE movement on this date noted, specifically in knee flexion/extension and hip flexion. Continued verbal cues for placement of hands in front of body to prepare for use of lofstrand crutches. Pt declined need to toilet at end of session before transport arrived. Pt ended session with cross midline \"high 5\" x6 with BUEs. 4/28:    Pt about 12 mins late then needed to use restroom, delaying start of therapy.  //bars: sit to stand transition with B UE assist and CGA for trunk control. Amb ~6 steps with mod A for R LE advancement and max A for L LE advancement. PT min blocked stance leg during contralateral swing phase with OT assist for trunk control min-mod A and wheelchair follow. Completed x3 with retro stepping after 2nd attempt ~6 steps with pt relying on momentum of trunk rotation for hip extension when stepping back. Continued verbal cues for placement of hands in front of body to prepare for use of lofstrand crutches. Also discussed potential use of posterior walker as pt previously used when ambulatory and due to pt's preference for hands used as [place] behind trunk. In treatment room, pt doffed/donned R AFO seated in wheelchair using technique of abducting hip to bring to side of wheelchair to place foot into AFO and shoe, and then placing foot on foot rest to adhere straps. Decreased time required with this method with pt agreeing to attempt donning both AFOs at home. Pt transferred to sit edge of mat from wheelchair with min A x2 to block feet and for trunk control during scooting. Continued difficulty with trunk flexion during lowering to sit far back on surface pt is transferring to affecting safety.  With PT holding ball in front of pt, pt completed cross and hook style punches across body to opposite side x10 B with emphasis on trunk control and proprioceptive input of ball to improve grading of force; decreased trunk control on L punches due to R trunk weakness with mod-min A for sitting balance compared to CGA-min A on L. Trunk ext w/BUE OH to trunk flex & shoulder ext to hit ball forcefully x3 -difficulty achieving necessary trunk ext and focused on BUEs OH instead. Session concluded with mat to wheelchair transfer min A x2 with wheelchair on pt's right.      4/26:  Began session with OT applying foam padding to patient's ankle for cushion so pt can wear AFOs comfortably. Pt transferred to sit edge of mat from wheelchair with min A x2 to block feet and for trunk control during scooting. Grasping 20# bungee cord, pt completed horizontal ab/adduction x10, followed by mid row x10 with emphasis on scapular retraction and trunk control. For continued trunk control during dynamic balance, bungee cord placed around pt's trunk with pt completed hip and thoracic extension against resistance of bungee cord and then returning to upright starting position x8, followed by R and L lateral lean x5 each with min A required for balance. Pt completed trunk rotation to reach behind self to grab bean bag to improve dynamic balance required for pericare while toileting, followed by tossing to bucket, with min A for ~3 instances LOB. Pt instructed on placing flat hands on surface of mat to complete weight bearing during trunk rotation to improve balance. Bean bags were tossed back to patient x3 with instruction  to catch with open hands to reduce tone. Pt returned to wheelchair from edge of mat with min A for transfer. Pt transitioned to // bars to complete ambulation x4 steps with max A x2 for trunk control and advancing LEs prior to seated rest break due to increased fatigue with lack of AFO support.  Pt re-attempted with 3 steps, followed by static standing x60 seconds with max A + mod A with cues to keep arms in front of him to prepare for use of lofstrand crutches and to facilitate trunk engagement and knee extension. Pt sat on gerardo disc placed in wheelchair for trunk control with min-mod A for balance to complete ball toss/catch task with multiple LOB; theraband loop placed around knees to prevent abduction and improve pelvic stability and base of support. UB dressing completed to doff/don sweatshirt with SBA and good trunk control noted during weight shifts. Focus of today's session was to improve dynamic sitting and standing balance. Modalities:     Pt. Education:  -patient educated on diagnosis, prognosis and expectations for rehab  -all patient questions were answered    HEP instruction:      NMR and Therapeutic Activities:    [x] (03563 or 28932) Provided verbal/tactile cueing for activities related to improving balance, coordination, kinesthetic sense, posture, motor skill, proprioception and motor activation to allow for proper function of   [x] LE / Core, hip and LE with self care and ADLs  [x] UE / Shoulder complex: cervical, postural, scapular, scapulothoracic and UE control with self care, carrying, lifting, driving, computer work.   [] (61122) Gait Re-education- Provided training and instruction to the patient for proper LE, core and hip recruitment, positioning, and eccentric body weight control with ambulation re-education, including ascending & descending stairs     Home Management Training / Self Care:  [] (11502) Provided self-care/home management training related to activities of daily living and compensatory training, and/or use of adaptive equipment for improvement with: ADLs and compensatory training, meal preparation, safety procedures and instruction in use of adaptive equipment, including bathing, grooming, dressing, personal hygiene, basic household cleaning and chores.      Therapeutic Exercise and NMR EXR  [x] (24750) Provided verbal/tactile cueing for activities related to strengthening, flexibility, endurance, ROM for improvements in  [x] LE / Core stability: LE, hip, and core control with self care, mobility, lifting, ambulation. [x] UE / Shoulder complex: cervical, postural, scapular, scapulothoracic and UE control with self care, reaching, carrying, lifting, house/yardwork, driving, computer work. [x] (59083) Provided verbal/tactile cueing for activities related to improving balance, coordination, kinesthetic sense, posture, motor skill, proprioception to assist with   [x] LE / Core stability: LE, hip, and core control in self care, mobility, lifting, ambulation and eccentric single leg control. [x] UE / Shoulder complex: cervical, scapular, scapulothoracic and UE control with self care, reaching, carrying, lifting, house/yardwork, driving, computer work.   [] (75114) Therapist is in constant attendance of 2 or more patients providing skilled therapy interventions, but not providing any significant amount of measurable one-on-one time to either patient, for improvements in  [] LE / Core stability: LE, hip, and core control in self care, mobility, lifting, ambulation and eccentric single leg control. [] UE / Shoulder complex: cervical, scapular, scapulothoracic and UE control with self care, reaching, carrying, lifting, house/yardwork, driving, computer work.      Home Exercise Program:    [] (48331) Reviewed/Progressed HEP activities related to strengthening, flexibility, endurance, ROM of   [] LE / Core stability: core, hip and LE for functional self-care, mobility, lifting and ambulation/stair navigation   [] UE / Shoulder complex: cervical, postural, scapular, scapulothoracic and UE control with self care, reaching, carrying, lifting, house/yardwork, driving, computer work  [] (48247)Reviewed/Progressed HEP activities related to improving balance, coordination, kinesthetic sense, posture, motor skill, proprioception of   [] LE: core, hip and LE for self care, mobility, lifting, and ambulation/stair navigation    [] UE / Shoulder complex: cervical, postural,  scapular, scapulothoracic and UE control with self care, reaching, carrying, lifting, house/yardwork, driving, computer work    Manual Treatments:  PROM / STM / Oscillations-Mobs:  G-I, II, III, IV (Rajiv, Inf., Post.)  [] (99591) Provided manual therapy to mobilize shoulder complex, hip, LE, and/or cervicothoracic/LS spine soft tissue/joints for the purpose of modulating pain, promoting relaxation,  increasing ROM, reducing/eliminating soft tissue swelling/inflammation/restriction, improving soft tissue extensibility and allowing for proper ROM for normal function with   [] LE / Core stability: self care, mobility, lifting and ambulation. [] UE / Shoulder complex: self care, reaching, carrying, lifting, house/yardwork, driving, computer work. Modalities:  [] (09613) Vasopneumatic compression: Utilized vasopneumatic compression to decrease edema / swelling for the purpose of improving mobility and quad tone / recruitment which will allow for increased overall function including but not limited to self-care, transfers, ambulation, and ascending / descending stairs. Charges:  Timed Code Treatment Minutes: 45   Total Treatment Minutes: 90   **CO-treat with OT    [] EVAL - LOW (57158)   [] EVAL - MOD (53413)  [] EVAL - HIGH (94876)  [] RE-EVAL (86671)  [] TE (89182) x       [] Ionto  [x] NMR (95607) x  2    [] Vaso  [] Manual (04915) x      [] Ultrasound  [] TA x       [] Mech Traction (24484)  [] Gait Training x     [] ES (un) (77886):   [] Aquatic therapy x   [x] Other: Performance test w/report (90559)  x1  [] Group:     GOALS:   Patient stated goal: improve ability to complete transfers   []? Progressing: []? Met: []? Not Met: []? Adjusted     Therapist goals for Patient:   Short Term Goals: To be achieved in: 2 weeks  1. Independent in HEP and progression per patient tolerance, in order to prevent re-injury. []? Progressing: [x]? Met: []? Not Met: []? Adjusted  2.  Patient will have a decrease in pain to facilitate improvement in movement, function, and ADLs as indicated by Functional Deficits. []? Progressing: [x]? Met: []? Not Met: []? Adjusted     Long Term Goals: To be achieved in: 6 weeks  1. Patient will report increased standing tolerance to at least 30 minutes in standing frame. []? Progressing: [x]? Met: []? Not Met: []? Adjusted  2. Patient will demonstrate an increase in strength to good shoulder & hip complex, and core activation to allow for proper functional mobility as indicated by patients Functional Deficits. [x]? Progressing: []? Met: []? Not Met: []? Adjusted  3. Patient will return to functional activities including demo'ing safe transfers to/from w/c with mod I.    [x]? Progressing: []? Met: []? Not Met: []? Adjusted  4. Patient will increase STREAM score to 32 or above for increased UE/LE movement in sitting, supine, and standing. [x]? Progressing: []? Met: []? Not Met: []? Adjusted          Overall Progression Towards Functional goals/ Treatment Progress Update:  [] Patient is progressing as expected towards functional goals listed. [x] Progression is slowed due to complexities/Impairments listed. [] Progression has been slowed due to co-morbidities. [] Plan just implemented, too soon to assess goals progression <30days   [] Goals require adjustment due to lack of progress  [] Patient is not progressing as expected and requires additional follow up with physician  [] Other    Persisting Functional Limitations/Impairments:  []Sleeping [x]Sitting               []Standing [x]Transfers        []Walking []Kneeling               []Stairs []Squatting / bending   [x]ADLs []Reaching  []Lifting  []Housework  []Driving []Job related tasks  []Sports/Recreation []Other:        ASSESSMENT:   Pt demonstrated improved core stability with lateral trunk leans, but continues to have difficulty when UE's are overhead.  Pt has difficulty activating B glutes when standing,

## 2021-09-08 NOTE — PLAN OF CARE
[address], [address] [facility], [address]  Phone: [phone]   Fax: [phone]     Occupational Therapy Re-Certification Plan of Care    Dear JOHNNY Butt,    We had the pleasure of treating the following patient for occupational therapy services at [facility]. A summary of our findings can be found in the updated assessment below. This includes our plan of care. If you have any questions or concerns regarding these findings, please do not hesitate to contact me at the office phone number checked above. Thank you for the referral.     Physician Signature:________________________________Date:__________________  By signing above (or electronic signature), therapists plan is approved by physician      Functional Outcome: STREAM- 3/1/21 (23), 9/8/21 (29)    Overall Response to Treatment:   []Patient is responding well to treatment and improvement is noted with regards  to goals   []Patient should continue to improve in reasonable time if they continue HEP   []Patient has plateaued and is no longer responding to skilled OT intervention    []Patient is getting worse and would benefit from return to referring MD   []Patient unable to adhere to initial POC   [x]Other: Pt has had a long layoff between sessions due to scheduling conflicts on both patient and clinic sides and in part due to transportation issues, however pt was still able to demonstrate improved core stability with lateral trunk leans, but continues to have difficulty when UEs are overhead. Pt has difficulty activating B glutes when standing, requiring assistance to stand and remain standing despite significant UE support. Pt was able to improve hip extension activation performed during the STREAM, however had difficulty rolling in bed which kept score the same as at last assessment.  Pt will continue to benefit from OT services to improve functional LE strength and core stability to improve indep; however in home habits may limit his progression towards standing & amb tolerance. Date range of Visits: 3/1/21-21  Total Visits: 27    Recommendation:    [x]Continue OT 1x / wk for 5 weeks. []Hold OT, pending MD visit    Occupational Therapy Daily Treatment Note  Date:  2021    Patient: Bg Salinas   : 2000   MRN: [id number]  Referring Physician:  Howard Cedeno CNP       Medical Diagnosis Information: paraplegia, cerebral palsy status post covid has not been seen in clinic since                                        Insurance information: medicare/ medicaid     Date of Injury:   Date of Surgery: rhizotomy when he was about 10    Progress Report: []  Yes  [x]  No     Date Range for reporting period:  2021 to 2021. Patient is status post covid recovery and missed treatments since 2021. Progress report due (10 Rx/or 30 days whichever is less): visit #28 or     Recertification due (POC duration/ or 90 days whichever is less): visit #28 or 10/22/2021    Visit # Insurance Allowable Auth required? Date Range    +   [place] []  Yes  [x]  No n/a       Latex Allergy:  []No      []Yes  Pacemaker:  [] No       [] Yes     Preferred Language for Healthcare:   [x]English       []other:    Pain level: 0/10    SUBJECTIVE: Pt presents with AFO's. Pt reports using stander only 1/week. Pt went to CP clinic and AFOs and w/c have been ordered.      Functional Disability Index:      Stroke Rehabilitation Assessment of Movement (STREAM) 3/1/21 4/21/21 6/22/21 7/28/21 2021    23 24  (supine- 9/15, sitting- 15, standing- 0) 26 29  (supine- 9/15, sitting- 17, standing- 3/24 29  (supine- 8/15, sitting- 17, standing-        OBJECTIVE:     21: 3 minutes static stance at parallel bars with CGA x2 for trunk control and blocking knees      RESTRICTIONS/PRECAUTIONS: fall risk    Exercises/Interventions: Treatment focused on improving trunk control with transitional movement patterns to increase safety and independence. Patient began session by performing weightbearing and stretching of BUEs, walking hands forward on mat and sliding backwards x 6 in preparation for standing. Patient also performed bilateral exercises to engage obliques and abdominal muscles for increased trunk strength. Patient performed sit to stands in front of mat table with mod A x 2 requiring moderate verbal cues to keep head upright and proprioceptive input applied to BUE's for increased weight bearing and blocking at knees for correct body mechanics/placement with verbal cues for glute activation. While holding yellow ball in seated, patient performed continued trunk strengthening while weight shifting side to side. From seated, patient performed ball toss overhead with shoulders in abduction and external rotation, emphasis on increasing abdominal/trunk strength. Patient noted with LOB x 6 during activity following forward momentum after throw with Mod A to self-correct. Pt then tolerated 1 sit to stand with Mod A x 2 incorporating ball toss with verbal cues and tactile cues to weight shift. Patient educated thoroughly on importance of performing HEP while at home including abdominal strengthening and standing with focus on weightbearing throughout BUE's and buttocks squeezes. Pt performed stand pivot transfer <> mat table with Mod A x 2 and sit to supine bed mobility with moderate verbal cues and Min a X 2. Patient verbalized understanding. Assessed pt progress using functional STREAM assessment at mat and // bars with results noted above.       Therapeutic Exercises  Resistance / level Sets/sec Reps Notes                                                                                Neuromuscular Re-ed / Therapeutic Activities                                                 Manual Intervention Modalities:     Patient education:  Plan of care, importance of weight bearing in stander for overall health, home exercise program, goals. Home Exercise Program:   Patient encouraged to start using stander 3 sessions a day and while in stander completing over head reaches . Patient to try and reach 15 minute intervals in stander. Therapeutic Exercise and NMR:  [x] (85273) Provided verbal/tactile cueing for activities related to strengthening, flexibility, endurance, ROM  for improvements in scapular, scapulothoracic and UE control with self care, reaching, carrying, lifting, house/yardwork, driving/computer work. [x] (05651) Provided verbal/tactile cueing for activities related to improving balance, coordination, kinesthetic sense, posture, motor skill, proprioception  to assist with  scapular, scapulothoracic and UE control with self care, reaching, carrying, lifting, house/yardwork, driving/computer work.   [] Comments:    Therapeutic Activities:    [x] (20334 or 31444) Provided verbal/tactile cueing for activities related to improving balance, coordination, kinesthetic sense, posture, motor skill, proprioception and motor activation to allow for proper function of scapular, scapulothoracic and UE control with self care, carrying, lifting, driving/computer work  [] Comments:    Home Exercise Program:    [x] (46102) Reviewed/Progressed HEP activities related to strengthening, flexibility, endurance, ROM of scapular, scapulothoracic and UE control with self care, reaching, carrying, lifting, house/yardwork, driving/computer work  [] (21175) Reviewed/Progressed HEP activities related to improving balance, coordination, kinesthetic sense, posture, motor skill, proprioception of scapular, scapulothoracic and UE control with self care, reaching, carrying, lifting, house/yardwork, driving/computer work    [] Comments:    Manual Treatments:  PROM / STM / Oscillations-Mobs:  G-I, II, III, IV (PA's, Inf., Post.)  [] (40733) Provided manual therapy to mobilize soft tissue/joints of cervical/CT, scapular GHJ and UE for the purpose of modulating pain, promoting relaxation,  increasing ROM, reducing/eliminating soft tissue swelling/inflammation/restriction, improving soft tissue extensibility and allowing for proper ROM for normal function with self care, reaching, carrying, lifting, house/yardwork, driving/computer work  [] Comments:    ADL Training:  [] (85360) Provided self-care/home management training related to activities of daily living and compensatory training, and/or use of adaptive equipment   [] Comments:     Splinting:  [] Fabrication of:   [] (38566) Orthotic/Prosthetic Management, subsequent encounter  [] (63493) Orthotic management and training (fitting and assessment)  [] Comments:      Charges: co treat with PT for safety and increased intensity of treatment  Timed Code Treatment Minutes: 45   Total Treatment Minutes: 90     [] EVAL (LOW) 98638   [] OT Re-eval (98437)  [] EVAL (MOD) 92787   [] EVAL (HIGH) 40865       [x] Ru (06095) x   1  [] NKOFH(13959)  [x] NMR (77340) x 1    [] Estim (attended) (60238)   [] Manual ([id number]) x      [] US (16752)  [x] TA (15758) x  1   [] Paraffin (18635)  [] ADL  (53862) x     [] Splint/L code:    [] Estim (unattended) ([id number])  [] Fluidotherapy (72050)  [] Other:    GOALS:    Patient stated goal: improved trunk control and standing tolerance to increase independence in self care. [x]? Progressing: []? Met: []? Not Met: []? Adjusted     Therapist goals for Patient:   Short Term Goals: To be achieved in: 30 days  1. Pt will be independent with clothing management on toilet or in wheelchair to complete toileting with use of grab bar. Min assist  [x]? Progressing: []? Met: []? Not Met: []? Adjusted  2. Pt will be stand by assist completing scoot transfers on level surfaces  Contact guard  [x]? Progressing: []?  Met: []? Not Met: []? Adjusted  3. Patient will be able to stand up to 30 seconds at grab bar for toileting and lower body dress with min assist.  Goal met progress to stand by assist   [x]? Progressing: []? Met: []? Not Met: []? Adjusted     Long Term Goals: To be achieved by discharge  1. Pt will demonstrate an improved stream score of 28. New goal is 32  []? Progressing: [x]? Met: []? Not Met: [x]? Adjusted    Progression Towards Functional goals:  [x] Patient is progressing as expected towards functional goals listed. [] Progression is slowed due to complexities listed. [] Progression has been slowed due to co-morbidities. [] Plan just implemented, too soon to assess goals progression  [] All goals are met  [] Other:     ASSESSMENT:   Patient has made progress in mobility, balance and executive function. Treatment/Activity Tolerance:  [x] Patient tolerated treatment well [] Patient limited by fatigue  [] Patient limited by pain  [] Patient limited by other medical complications  [] Other:     Prognosis: [x] Good [] Fair  [] Poor    Patient Requires Follow-up: [x] Yes  [] No    PLAN: See eval  [x] Continue per plan of care [] Alter current plan (see comments)  [] Plan of care initiated (MD cosigned) [] Hold pending MD visit [] Discharge    Electronically signed by:    SHUKRI Tamayo/VINH [id number]                  Note: If patient does not return for scheduled/ recommended follow up visits, this note will serve as a discharge from care along with most recent update on progress.

## 2021-09-15 ENCOUNTER — HOSPITAL ENCOUNTER (OUTPATIENT)
Dept: PHYSICAL THERAPY | Age: 21
Setting detail: THERAPIES SERIES
Discharge: HOME | End: 2021-09-15
Payer: MEDICARE

## 2021-09-15 ENCOUNTER — HOSPITAL ENCOUNTER (OUTPATIENT)
Dept: OCCUPATIONAL THERAPY | Age: 21
Setting detail: THERAPIES SERIES
Discharge: HOME | End: 2021-09-15
Payer: MEDICARE

## 2021-09-15 PROCEDURE — 97112 NEUROMUSCULAR REEDUCATION: CPT

## 2021-09-15 PROCEDURE — 97110 THERAPEUTIC EXERCISES: CPT

## 2021-09-15 NOTE — FLOWSHEET NOTE
[address] [facility] Physical Therapy  Phone: [phone]   Fax: [phone]      Physical Therapy Daily Treatment Note  Date:  9/15/2021     Patient Name:  Valerie Hernandez    :  2000  MRN: [id number]  Medical/Treatment Diagnosis Information:  Diagnosis: Cerebral palsy with spastic diplegia  Treatment Diagnosis: Decreased trunk control impacting ability to complete safe transfers, decreased standing tolerance, LE weakness  Insurance/Certification information:  PT Insurance Information: Medicare & Medicaid  Physician Information:  Referring Practitioner: JOHNNY Ratliff  Plan of care signed (Y/N): [x]  Yes []  No     Date of Patient follow up with Physician:      Progress Report: []  Yes  [x]  No     Date Range for reporting period:  Beginning  21  Re-eval:   PN: 21  PN: 21  Ending    Progress report due (10 Rx/or 30 days whichever is less):      Recertification due (POC duration/ or 90 days whichever is less):      Visit # Insurance Allowable Auth required? Date Range    +  + 8/10 larry Medical necessity []  Yes  [x]  No n/a     Latex Allergy:  [x]NO      []YES  Preferred Language for Healthcare:   [x]English       []Other:      Functional Scale/Test Evaluation 3/1/2021 4/21/21  6/22/21 7/28 9/8 Discharge   STREAM 23  26 29 29                            Pain level:  0/10  Location:  none    SUBJECTIVE:    Has been able to get into the stander pretty regularly for an hour to 1.5 hours each time. Feels that his LEs are moving more while he's in bed, jerking and tremors, some long lasting spasms as well. OBJECTIVE:  : Session started 10 min after scheduled time, pt had to use the restroom. PT offered to help pt with standing, but pt declined, stating he would rather use the urinal to save some energy for the session.     :  Pt 15 mins late then needed to use restroom    Co-treat w/OT for safety and assistance    RESTRICTIONS/PRECAUTIONS: recent Bell's Palsy    Interventions/Exercises:   Therapeutic Exercises (46403) Resistance / level Sets/sec Reps Notes   W/c push ups in preparation for standing                            Neuromuscular Re-ed (27749) /  Gait Training (49706)       Upright posture seated in W/C   X 5 reps holding football, rest of reps completed with L UE bracing on L knee, PT hand assist on sternum and lumbar spine to facilitate                                Gait Training:  Amb in //bars    Bwd walking in //bars      Transfer w/B Lofstrand crutches   Static stand w/B Lofstrand crutches   8 ft    8 ft    Mod A of 2  Mod A of 2   -manual required to advance LEs, pt activation hip extensors to initiate swing phase, demo'd adequate weight-shifting. OT provided assist & w/c follow for safety      -difficulty with placement for adequate support                 Therapeutic Activities (08762) /  Functional Tasks       [facility] reaching across midline in seated   Pt seated in w/c using seat belt to help stay in w/c          [facility] reaching across midline and diagonally   Pt seated in w/c using seat belt to help stay in w/c               STS to std walker            Transfer to w/c from mat table            STS from w/c to mat table - with large bolster placed on table in front of patient to hold onto   Min A, w/c close to mat table to assist in blocking pt's knees. Manual Intervention ([phone])       Supine hamstring stretch 90/90    -completed by PT  -completed by OT                                        Sessions:     9/15:  Sit to stand from wheelchair mod A of 2 to large mat table w/large gray bolster for UE support. Transitioned to quadruped w/UEs on bolster w/manual facilitation of mod A of 1 for each LE onto mat table. Transitioned to tall knee w/man cues to pelvis and trunk to facilitate good trunk ext and balance pelvis w/even WB'ing through BLEs x2 attempts.   Pt demo'd good glute max contraction but had difficulty reducing dependency on UEs so red Belgian ball was used instead of bolster to destabilize UE support. Pt able to improve tall kneeling posture x3 sets /continued manual facilitation to pelvis for stability and equal WB'ing and improved trunk posture. Patient able to remove his UEs from supportive device for short periods of time while maintaining good posture. During that time, patient able to utilize core musculature but quickly fatigued and switched focus to lumbar musculature. Transitioned from quadruped on swiss ball to L sidelying, to quadruped, to R sideyling w/manual facilitation of 1. In R sidelying, R lateral trunk flex x5 w/manual cues. L knee to L elbow in R sidelying for oblique contraction x3 w/man facilitation to L pelvis and scapula and min assist of 1 for LLE.  R S/L to long sitting, mod A of 2. Positioned UEs behind pt for support with pt able to complete slight internal rotation of hips to midline in this position; min-mod facilitation of triceps for elbow extension as pt fatigued. Rest break leaning forward grasping hands behind thighs. With min-mod A and reduced friction, pt slid LEs over side of mat for short sit. Completed posterior-anterior lean in this position with focus on abdominal engagement and oblique control to maintain midline x12 with 4 instances min A to prevent lateral LOB. STS with mod A x2 with pt maintaining stand ~90 seconds and completing stand step to w/c on R side with max A for LE advancement. 9/8: In preparation for standing activities the pt performed B UE walk outs in seated; pt required manual cues to facilitate WB through B hands. Pt performed sit to stand with mod Ax2 verbal cues to facilitate glute contraction when standing; tactile cues at B knee to facilitate knee extension once standing. Pt performed static standing w/ WB through B UE's for roughly 3 minutes.  Following exercise pt performed stand to sit with poor eccentric control. While sitting the pt performed rapid lateral trunk shifts to premote core stability. Pt repeated static standing with B UE WB; continuing to require cueing at B knees, but fewer at the UEs. Pt demonstrated more control with stand to sit. Pt performed overhead ball throws in a seated position with no back rest; pt had loss of balance to the R multiple times requiring physical assist from OT. After resting pt performed another sit to stand and attempted to through the ball overhead, performed x5; pt had difficulty maintaining balance and required mod Ax2 with manual cues at B knees to facilitate knee extension. Performed the STREAM assessment, standing potion in //bars. On the mat table pt performed long sitting w/ physical assist of OT.     7/28:  Session initiated with squat pivot transfer w/c to mat with CGA x1; noted improved positioning of w/c and LEs prior to initiation of transfer. Assessed pt progress using functional STREAM assessment with results noted above. In supine, pt completed hip bridges x3 with improved ability to maintain knees in midline following tactile and verbal cues. Pt transitioned to prone with supervision and quadruped with CGA; poor eccentric control noted with pt using momentum of rocking motion and UE weight bearing to assume quadruped. In tall kneel, pt hit playground ball with each hand x6 with focus on trunk rotation and UE control. Large gray bolster placed in front of pt to use as stabilizing surface to assist with dynamic balance. Pt assumed quadruped with support of bolster at trunk for rest break, followed by forearm weight bearing on bolster to complete alternating LE extension/return to quadruped x12 each side with tactile and verbal cues for glute contraction during extension and weight shift to opposite side for flexion. Pt transitioned to sit edge of mat with mod A x2. Squat pivot mat to w/c min A.  At [facility], pt completed STS with one hand pressing into surface pt seated on and one hand pulling on barre to stand with max A x2 to block knees from buckling and facilitate quads and glutes for LE extension; repeated STS and static stance x3 for 30-90 seconds. Session concluded with pt grasping ballet barre seated in w/c to push self away from barre for UE and trunk lengthening stretch and pulling self to barre with same speed each UE for strengthening and control. 7/26: Pt used restroom prior to session and was in a hurry and had some urine on his shirt. Pt doffed shirt and donned clean T shirt with stand by/set up. Pt transferred from w/c to mat table with mod A and transitioned sit to supine with mod A. While in supine, pt worked on upper trunk rotation reaching across body for targets 10x B UE. PT assisted with keeping LEs in hooklying position and rotation at hips. Pt then completed 10 supine hip bridges. Pt then rolled to prone and worked on achieving modified plank on elbows with very min assist to maintain feet as base of support. He then transitioned with momentum and min A to Q-ped. He crawled up onto a swiss ball for support of trunk and worked on propping prone on elbows in modified tall kneeling. From this position pt played [address] [employer] writing letters on a dry erase board with mod - min x 1 to maintain midline. He then transitioned to short sit with max assist and completed one game of hangman in short sit at edge of mat. Pt needed min of one to maintain balance while writing on board and min assist for executive function. PT assisted with manual pec stretch with pt leaning back over swiss ball and applying overpressure in the posterior direction at the shoulders. Pt then worked on sit to stand times three at walker with forearm rests with mod assist of one for hip/knee control and cues to engage quads and glutes. Pt then transferred to wheelchair with contact guard.       7/19: Treatment focused on improving independence in LE dressing, trunk control, and standing balance and tolerance for functional mobility. Session initiated with patient transferring to mat. Patient then initiated treatment focusing on trunk stabilization reaching and hitting targets with trunk rotation times 10 reps each direction losing balance more to left and then completed sit to supine with min assist.  Patient then worked on trunk rotation each direction in supine to hit target completing intervals of 30 seconds hitting target 6 -8 reps each direction in 30 second intervals times 4. Patient needing cues at scapula to rotate to the upper trunk . Patient then completed bridge and scoots on mat with mod assist of one to two. Patient then worked on standing tolerance at walker with forearm support of arm troughs completed 3 reps standing up to 2 minutes with min assist of two  While playing connect 4. Patient finished treatment seated at edge of mat playing bozena focusing on trunk stability and shuffling cards, bilateral coordination, intrinsic hand function.            Modalities:     Pt. Education:  -patient educated on diagnosis, prognosis and expectations for rehab  -all patient questions were answered    HEP instruction:      NMR and Therapeutic Activities:    [x] (74537 or 11990) Provided verbal/tactile cueing for activities related to improving balance, coordination, kinesthetic sense, posture, motor skill, proprioception and motor activation to allow for proper function of   [x] LE / Core, hip and LE with self care and ADLs  [x] UE / Shoulder complex: cervical, postural, scapular, scapulothoracic and UE control with self care, carrying, lifting, driving, computer work.   [] (19760) Gait Re-education- Provided training and instruction to the patient for proper LE, core and hip recruitment, positioning, and eccentric body weight control with ambulation re-education, including ascending & descending stairs     Home Management Training / Self Care:  [] (44895) Provided self-care/home management training related to activities of daily living and compensatory training, and/or use of adaptive equipment for improvement with: ADLs and compensatory training, meal preparation, safety procedures and instruction in use of adaptive equipment, including bathing, grooming, dressing, personal hygiene, basic household cleaning and chores. Therapeutic Exercise and NMR EXR  [x] (41544) Provided verbal/tactile cueing for activities related to strengthening, flexibility, endurance, ROM for improvements in  [x] LE / Core stability: LE, hip, and core control with self care, mobility, lifting, ambulation. [x] UE / Shoulder complex: cervical, postural, scapular, scapulothoracic and UE control with self care, reaching, carrying, lifting, house/yardwork, driving, computer work. [x] (89343) Provided verbal/tactile cueing for activities related to improving balance, coordination, kinesthetic sense, posture, motor skill, proprioception to assist with   [x] LE / Core stability: LE, hip, and core control in self care, mobility, lifting, ambulation and eccentric single leg control. [x] UE / Shoulder complex: cervical, scapular, scapulothoracic and UE control with self care, reaching, carrying, lifting, house/yardwork, driving, computer work.   [] (04822) Therapist is in constant attendance of 2 or more patients providing skilled therapy interventions, but not providing any significant amount of measurable one-on-one time to either patient, for improvements in  [] LE / Core stability: LE, hip, and core control in self care, mobility, lifting, ambulation and eccentric single leg control. [] UE / Shoulder complex: cervical, scapular, scapulothoracic and UE control with self care, reaching, carrying, lifting, house/yardwork, driving, computer work.      Home Exercise Program:    [] (93836) Reviewed/Progressed HEP activities related to strengthening, flexibility, endurance, ROM of   [] LE / Core stability: core, hip and LE for functional self-care, mobility, lifting and ambulation/stair navigation   [] UE / Shoulder complex: cervical, postural, scapular, scapulothoracic and UE control with self care, reaching, carrying, lifting, house/yardwork, driving, computer work  [] (65997)Reviewed/Progressed HEP activities related to improving balance, coordination, kinesthetic sense, posture, motor skill, proprioception of   [] LE: core, hip and LE for self care, mobility, lifting, and ambulation/stair navigation    [] UE / Shoulder complex: cervical, postural,  scapular, scapulothoracic and UE control with self care, reaching, carrying, lifting, house/yardwork, driving, computer work    Manual Treatments:  PROM / STM / Oscillations-Mobs:  G-I, II, III, IV (PA's, Inf., Post.)  [] (61311) Provided manual therapy to mobilize shoulder complex, hip, LE, and/or cervicothoracic/LS spine soft tissue/joints for the purpose of modulating pain, promoting relaxation,  increasing ROM, reducing/eliminating soft tissue swelling/inflammation/restriction, improving soft tissue extensibility and allowing for proper ROM for normal function with   [] LE / Core stability: self care, mobility, lifting and ambulation. [] UE / Shoulder complex: self care, reaching, carrying, lifting, house/yardwork, driving, computer work. Modalities:  [] (42426) Vasopneumatic compression: Utilized vasopneumatic compression to decrease edema / swelling for the purpose of improving mobility and quad tone / recruitment which will allow for increased overall function including but not limited to self-care, transfers, ambulation, and ascending / descending stairs.          Charges:  Timed Code Treatment Minutes: 45   Total Treatment Minutes: 90   **CO-treat with OT    [] EVAL - LOW (93349)   [] EVAL - MOD (46674)  [] EVAL - HIGH (01211)  [] RE-EVAL (39317)  [] TE (53347) x       [] Ionto  [x] NMR (57924) x  3    [] Vaso  [] Manual (31909) x      [] Ultrasound  [] additional follow up with physician  [] Other    Persisting Functional Limitations/Impairments:  []Sleeping [x]Sitting               []Standing [x]Transfers        []Walking []Kneeling               []Stairs []Squatting / bending   [x]ADLs []Reaching  []Lifting  []Housework  []Driving []Job related tasks  []Sports/Recreation []Other:        ASSESSMENT:    Patient demo'd good core activation to stabilize pelvis and spine, however as fatigue levels increased, pt quickly became dependent on lower back musculature and required rest breaks before he could activate core muscles sufficiently. Pt was able to activate obliques for minimal AROM in sidelying. Continue to progress core and hip stability to improve functionality at home. Treatment/Activity Tolerance:  [x] Patient able to complete tx  [] Patient limited by fatigue  [] Patient limited by pain  [] Patient limited by other medical complications  [] Other:     Prognosis: [x] Good [] Fair  [] Poor    Patient Requires Follow-up: [x] Yes  [] No    Plan for next treatment session: transfers, seated core strength, co-treat with OT as able    PLAN: See andres. PT 1x / week for 6 weeks. [x] Continue per plan of care [] Alter current plan (see comments)  [] Plan of care initiated [] Hold pending MD visit [] Discharge    Electronically signed by: ALEJANDRA Canas  Therapist was present, directed the patient's care, made skilled judgement, and was responsible for assessment and treatment of the patient. Darline Duarte, PT DPT    Note: If patient does not return for scheduled/ recommended follow up visits, his note will serve as a discharge from care along with most recent update on progress.

## 2021-09-15 NOTE — FLOWSHEET NOTE
[address], [address] [place] [address], [address]  Phone: [phone]   Fax: [phone]    Occupational Therapy Daily Treatment Note  Date:  9/15/2021    Patient: Selvin Loo   : 2000   MRN: [id number]  Referring Physician:  Hellen Villavicencio CNP       Medical Diagnosis Information: paraplegia, cerebral palsy status post covid has not been seen in clinic since                                        Insurance information: medicare/ medicaid     Date of Injury:   Date of Surgery: rhizotomy when he was about 10    Progress Report: []  Yes  [x]  No     Date Range for reporting period:  2021 to 2021. Patient is status post covid recovery and missed treatments since 2021. Progress report due (10 Rx/or 30 days whichever is less): visit #33 or 3/22/6522    Recertification due (POC duration/ or 90 days whichever is less): visit #33 or 2021    Visit # Insurance Allowable Auth required? Date Range    +  + 0/5 [place] []  Yes  [x]  No n/a       Latex Allergy:  []No      []Yes  Pacemaker:  [] No       [] Yes     Preferred Language for Healthcare:   [x]English       []other:    Pain level: 0/10    SUBJECTIVE: Has been able to get into the stander pretty regularly for an hour to 1.5 hours each time. Feels that his LEs are moving more while he's in bed, jerking and tremors, some long lasting spasms as well.       Functional Disability Index:      Stroke Rehabilitation Assessment of Movement (STREAM) 3/1/21 4/21/21 6/22/21 7/28/21 2021    23 24  (supine- 9/15, sitting- 15/31, standing- 0) 26 29  (supine- 9/15, sitting- , standing- 3/24 29  (supine- 8/15, sitting- , standing-        OBJECTIVE:     21: 3 minutes static stance at parallel bars with CGA x2 for trunk control and blocking knees      RESTRICTIONS/PRECAUTIONS: fall risk    Exercises/Interventions: Treatment focused on improving trunk control with transitional movement patterns to increase safety and independence. Sit to stand from wheelchair mod A of 2 to large mat table w/large gray bolster for UE support. Transitioned to quadruped w/UEs on bolster w/manual facilitation of mod A of 1 for each LE onto mat table. Transitioned to tall knee w/man cues to pelvis and trunk to facilitate good trunk ext and balance pelvis w/even WB'ing through BLEs x2 attempts. Pt demo'd good glute max contraction but had difficulty reducing dependency on UEs so red Latvian ball was used instead of bolster to destabilize UE support. Pt able to improve tall kneeling posture x3 sets /continued manual facilitation to pelvis for stability and equal WB'ing and improved trunk posture. Patient able to remove his UEs from supportive device for short periods of time while maintaining good posture. Patient able to utilize core musculature but quickly fatigued and switched focus to lumbar musculature. Transitioned from quadruped on swiss ball to L sidelying, to quadruped, to R sideyling w/manual facilitation of 1. In R sidelying, R lateral trunk flex x5 w/manual cues. L knee to L elbow in R sidelying for oblique contraction x3 w/man facilitation to L pelvis and scapula. Pt assumed supine due to fatigue with rest break. R oblique contraction with therapist reducing LE friction against mat with min-mod A to slide LEs to side, followed by WBing into R elbow and L hand with mod A at trunk to assume long sit. Positioned UEs behind pt for support with pt able to complete slight internal rotation of hips to midline in this position; min-mod facilitation of triceps for elbow extension as pt fatigued. Rest break leaning forward grasping hands behind thighs. With min-mod A and reduced friction, pt slid LEs over side of mat for short sit.  Completed posterior-anterior lean in this position with focus on abdominal engagement and oblique control to maintain midline x12 with 4 instances min A to prevent lateral LOB. STS with mod A x2 with pt maintaining stand ~90 seconds and completing stand step to w/c on R side with max A for LE advancement. Therapeutic Exercises  Resistance / level Sets/sec Reps Notes                                                                                Neuromuscular Re-ed / Therapeutic Activities                                                 Manual Intervention                                                     Modalities:     Patient education:  Plan of care, importance of weight bearing in stander for overall health, home exercise program, goals. Home Exercise Program:   Patient encouraged to start using stander 3 sessions a day and while in stander completing over head reaches . Patient to try and reach 15 minute intervals in stander. Therapeutic Exercise and NMR:  [x] (78289) Provided verbal/tactile cueing for activities related to strengthening, flexibility, endurance, ROM  for improvements in scapular, scapulothoracic and UE control with self care, reaching, carrying, lifting, house/yardwork, driving/computer work. [x] (78864) Provided verbal/tactile cueing for activities related to improving balance, coordination, kinesthetic sense, posture, motor skill, proprioception  to assist with  scapular, scapulothoracic and UE control with self care, reaching, carrying, lifting, house/yardwork, driving/computer work.   [] Comments:    Therapeutic Activities:    [x] (41154 or 81471) Provided verbal/tactile cueing for activities related to improving balance, coordination, kinesthetic sense, posture, motor skill, proprioception and motor activation to allow for proper function of scapular, scapulothoracic and UE control with self care, carrying, lifting, driving/computer work  [] Comments:    Home Exercise Program:    [x] (28836) Reviewed/Progressed HEP activities related to strengthening, flexibility, endurance, ROM of scapular, scapulothoracic and UE control with self care, reaching, carrying, lifting, house/yardwork, driving/computer work  [] (14744) Reviewed/Progressed HEP activities related to improving balance, coordination, kinesthetic sense, posture, motor skill, proprioception of scapular, scapulothoracic and UE control with self care, reaching, carrying, lifting, house/yardwork, driving/computer work    [] Comments:    Manual Treatments:  PROM / STM / Oscillations-Mobs:  G-I, II, III, IV (PA's, Inf., Post.)  [] ([id number]) Provided manual therapy to mobilize soft tissue/joints of cervical/CT, scapular GHJ and UE for the purpose of modulating pain, promoting relaxation,  increasing ROM, reducing/eliminating soft tissue swelling/inflammation/restriction, improving soft tissue extensibility and allowing for proper ROM for normal function with self care, reaching, carrying, lifting, house/yardwork, driving/computer work  [] Comments:    ADL Training:  [] (69820) Provided self-care/home management training related to activities of daily living and compensatory training, and/or use of adaptive equipment   [] Comments:     Splinting:  [] Fabrication of:   [] (67625) Orthotic/Prosthetic Management, subsequent encounter  [] (68046) Orthotic management and training (fitting and assessment)  [] Comments:      Charges: co treat with PT for safety and increased intensity of treatment  Timed Code Treatment Minutes: 45   Total Treatment Minutes: 90     [] EVAL (LOW) 46701   [] OT Re-eval (53325)  [] EVAL (MOD) 88156   [] EVAL (HIGH) 41410       [x] Ru (85124) x   1  [] TQZNZ(29113)  [x] NMR (15538) x 2  [] Estim (attended) (12139)   [] Manual ([id number]) x      [] US (46654)  [] TA (31369) x    [] Paraffin (78081)  [] ADL  ([id number] [phone] [id number]) x     [] Splint/L code:    [] Estim (unattended) (26726)  [] Fluidotherapy (99842)  [] Other:    GOALS:    Patient stated goal: improved trunk control and standing tolerance to increase independence in self care. [x]? Progressing: []? Met: []?  Not Met: []? Adjusted     Therapist goals for Patient:   Short Term Goals: To be achieved in: 30 days  1. Pt will be independent with clothing management on toilet or in wheelchair to complete toileting with use of grab bar. Min assist  [x]? Progressing: []? Met: []? Not Met: []? Adjusted  2. Pt will be stand by assist completing scoot transfers on level surfaces  Contact guard  [x]? Progressing: []? Met: []? Not Met: []? Adjusted  3. Patient will be able to stand up to 30 seconds at grab bar for toileting and lower body dress with min assist.  Goal met progress to stand by assist   [x]? Progressing: []? Met: []? Not Met: []? Adjusted     Long Term Goals: To be achieved by discharge  1. Pt will demonstrate an improved stream score of 28. New goal is 32  []? Progressing: [x]? Met: []? Not Met: [x]? Adjusted    Progression Towards Functional goals:  [x] Patient is progressing as expected towards functional goals listed. [] Progression is slowed due to complexities listed. [] Progression has been slowed due to co-morbidities. [] Plan just implemented, too soon to assess goals progression  [] All goals are met  [] Other:     ASSESSMENT:   Patient has made progress in mobility, balance and executive function. Treatment/Activity Tolerance:  [x] Patient tolerated treatment well [] Patient limited by fatigue  [] Patient limited by pain  [] Patient limited by other medical complications  [] Other:     Prognosis: [x] Good [] Fair  [] Poor    Patient Requires Follow-up: [x] Yes  [] No    PLAN: See eval  [x] Continue per plan of care [] Alter current plan (see comments)  [] Plan of care initiated (MD cosigned) [] Hold pending MD visit [] Discharge    Electronically signed by:    Elisa Curiel, ORLANDOR/VINH [id number]                  Note: If patient does not return for scheduled/ recommended follow up visits, this note will serve as a discharge from care along with most recent update on progress.

## 2021-09-21 ENCOUNTER — HOSPITAL ENCOUNTER (OUTPATIENT)
Dept: OCCUPATIONAL THERAPY | Age: 21
Setting detail: THERAPIES SERIES
Discharge: HOME | End: 2021-09-21
Payer: MEDICARE

## 2021-09-21 ENCOUNTER — HOSPITAL ENCOUNTER (OUTPATIENT)
Dept: PHYSICAL THERAPY | Age: 21
Setting detail: THERAPIES SERIES
Discharge: HOME | End: 2021-09-21
Payer: MEDICARE

## 2021-09-21 PROCEDURE — 97110 THERAPEUTIC EXERCISES: CPT

## 2021-09-21 PROCEDURE — 97530 THERAPEUTIC ACTIVITIES: CPT

## 2021-09-21 PROCEDURE — 97112 NEUROMUSCULAR REEDUCATION: CPT

## 2021-09-21 NOTE — FLOWSHEET NOTE
transitional movement patterns to increase safety and independence. Sit to stand from wheelchair mod A of 1 and then mod of two for picot  to large mat table     Patient sat at edge of mat and worked on dynamic sitting balance while first hitting targets on easel with use of squigs tmies 12 reps followed by playing two games of hang man focusing on midline orientation and writing legibility on vertical plane needing stand by assist.  He then complained of back pain after 25 minutes of game. Patient then transitioned to supine with mod assist of one, completed hip bridges times 5 with mod assist of one followed by hitting targets across midline  Punching  red resistance band across midline times 5 reps each direction to facilitate abdominals and serratus strength. Patient then transitioned to prone with mod assist of two and worked on transitions to quadriped times 5 with 5 push ups completed at each transition. Patient then transitioned to short sit at edge of mat with mod assist of one, completed sit to stand times 2 at edge of mat with mod assist of two and then transferred to wheelchair with mod assist of two. Therapeutic Exercises  Resistance / level Sets/sec Reps Notes                                                                                Neuromuscular Re-ed / Therapeutic Activities                                                 Manual Intervention                                                     Modalities:     Patient education:  Plan of care, importance of weight bearing in stander for overall health, home exercise program, goals. Home Exercise Program:   Patient encouraged to start using stander 3 sessions a day and while in stander completing over head reaches . Patient to try and reach 15 minute intervals in stander.       Therapeutic Exercise and NMR:  [x] (57528) Provided verbal/tactile cueing for activities related to strengthening, flexibility, endurance, ROM  for improvements in scapular, scapulothoracic and UE control with self care, reaching, carrying, lifting, house/yardwork, driving/computer work. [x] (93482) Provided verbal/tactile cueing for activities related to improving balance, coordination, kinesthetic sense, posture, motor skill, proprioception  to assist with  scapular, scapulothoracic and UE control with self care, reaching, carrying, lifting, house/yardwork, driving/computer work.   [] Comments:    Therapeutic Activities:    [x] (98413 or 65339) Provided verbal/tactile cueing for activities related to improving balance, coordination, kinesthetic sense, posture, motor skill, proprioception and motor activation to allow for proper function of scapular, scapulothoracic and UE control with self care, carrying, lifting, driving/computer work  [] Comments:    Home Exercise Program:    [x] (85656) Reviewed/Progressed HEP activities related to strengthening, flexibility, endurance, ROM of scapular, scapulothoracic and UE control with self care, reaching, carrying, lifting, house/yardwork, driving/computer work  [] (12563) Reviewed/Progressed HEP activities related to improving balance, coordination, kinesthetic sense, posture, motor skill, proprioception of scapular, scapulothoracic and UE control with self care, reaching, carrying, lifting, house/yardwork, driving/computer work    [] Comments:    Manual Treatments:  PROM / STM / Oscillations-Mobs:  G-I, II, III, IV (PA's, Inf., Post.)  [] (65547) Provided manual therapy to mobilize soft tissue/joints of cervical/CT, scapular GHJ and UE for the purpose of modulating pain, promoting relaxation,  increasing ROM, reducing/eliminating soft tissue swelling/inflammation/restriction, improving soft tissue extensibility and allowing for proper ROM for normal function with self care, reaching, carrying, lifting, house/yardwork, driving/computer work  [] Comments:    ADL Training:  [] (91549) Provided self-care/home

## 2021-09-21 NOTE — FLOWSHEET NOTE
[address] [facility] Physical Therapy  Phone: [phone]   Fax: [phone]      Physical Therapy Daily Treatment Note  Date:  2021     Patient Name:  Shaka Juarez    :  2000  MRN: [id number]  Medical/Treatment Diagnosis Information:  Diagnosis: Cerebral palsy with spastic diplegia  Treatment Diagnosis: Decreased trunk control impacting ability to complete safe transfers, decreased standing tolerance, LE weakness  Insurance/Certification information:  PT Insurance Information: Medicare & Medicaid  Physician Information:  Referring Practitioner: JOHNNY Burgos  Plan of care signed (Y/N): [x]  Yes []  No     Date of Patient follow up with Physician:      Progress Report: []  Yes  [x]  No     Date Range for reporting period:  Beginning  21  Re-eval:   PN: 21  PN: 21  Ending    Progress report due (10 Rx/or 30 days whichever is less):      Recertification due (POC duration/ or 90 days whichever is less):      Visit # Insurance Allowable Auth required? Date Range    +  + 9/10 + 0/5 Medical necessity []  Yes  [x]  No n/a     Latex Allergy:  [x]NO      []YES  Preferred Language for Healthcare:   [x]English       []Other:      Functional Scale/Test Evaluation 3/1/2021 4/21/21  6/22/21 7/28 9/8 Discharge   STREAM 23  26 29 29                            Pain level:  0/10  Location:  none    SUBJECTIVE:  Pt reports that he has still been working on regularly getting into the [address] [place] [address]. He sometimes gets jerking movements in the LEs when at home. OBJECTIVE:  : Session started 10 min after scheduled time, pt had to use the restroom. PT offered to help pt with standing, but pt declined, stating he would rather use the urinal to save some energy for the session.     :  Pt 15 mins late then needed to use restroom    Co-treat w/OT for safety and assistance    RESTRICTIONS/PRECAUTIONS: recent Bell's Palsy    Interventions/Exercises:   Therapeutic Exercises (28914) Resistance / level Sets/sec Reps Notes   W/c push ups in preparation for standing                            Neuromuscular Re-ed (05423) /  Gait Training (89534)       Upright posture seated in W/C   X 5 reps holding football, rest of reps completed with L UE bracing on L knee, PT hand assist on sternum and lumbar spine to facilitate                                Gait Training:  Amb in //bars    Bwd walking in //bars      Transfer w/B Lofstrand crutches   Static stand w/B Lofstrand crutches   8 ft    8 ft    Mod A of 2  Mod A of 2   -manual required to advance LEs, pt activation hip extensors to initiate swing phase, demo'd adequate weight-shifting. OT provided assist & w/c follow for safety      -difficulty with placement for adequate support                 Therapeutic Activities (51884) /  Functional Tasks       [facility] reaching across midline in seated   Pt seated in w/c using seat belt to help stay in w/c          [facility] reaching across midline and diagonally   Pt seated in w/c using seat belt to help stay in w/c               STS to std walker            Transfer to w/c from mat table            STS from w/c to mat table - with large bolster placed on table in front of patient to hold onto   Min A, w/c close to mat table to assist in blocking pt's knees. Manual Intervention ([phone])       Supine hamstring stretch 90/90    -completed by PT  -completed by OT                                        Sessions:     9/21: Treatment focused on improving trunk control with transitional movement patterns to increase safety and independence. Sit to stand from wheelchair mod A of 1 and then mod of two for pivot to large mat table.  Patient sat at edge of mat and worked on dynamic sitting balance while first hitting targets on easel with use of squigs x 12 reps followed by playing two games of [employer] man focusing on midline orientation and writing legibily on vertical plane needing stand by assist for sitting balance. He then complained of back pain due to fatigue after 25 minutes of game. Patient then transitioned to supine with mod assist of one, completed hip bridges times 5 with mod assist of one followed by hitting targets across midline  Punching red 20# resistance band across midline times 5 reps each direction to facilitate abdominals and serratus strength. Patient then transitioned to prone with mod assist of two and worked on transitions to quadriped times 5 with 5 push ups completed at each transition. Patient then transitioned to short sit at edge of mat with mod assist of one, completed sit to stand times 2 at edge of mat with mod assist of two and then transferred to wheelchair with mod assist of two.      9/15:  Sit to stand from wheelchair mod A of 2 to large mat table w/large gray bolster for UE support. Transitioned to quadruped w/UEs on bolster w/manual facilitation of mod A of 1 for each LE onto mat table. Transitioned to tall knee w/man cues to pelvis and trunk to facilitate good trunk ext and balance pelvis w/even WB'ing through BLEs x2 attempts. Pt demo'd good glute max contraction but had difficulty reducing dependency on UEs so red Belgian ball was used instead of bolster to destabilize UE support. Pt able to improve tall kneeling posture x3 sets /continued manual facilitation to pelvis for stability and equal WB'ing and improved trunk posture. Patient able to remove his UEs from supportive device for short periods of time while maintaining good posture. During that time, patient able to utilize core musculature but quickly fatigued and switched focus to lumbar musculature. Transitioned from quadruped on swiss ball to L sidelying, to quadruped, to R sideyling w/manual facilitation of 1. In R sidelying, R lateral trunk flex x5 w/manual cues.   L knee to L elbow in R sidelying for oblique contraction x3 w/man facilitation to L pelvis and scapula and min assist of 1 for LLE.  R S/L to long sitting, mod A of 2. Positioned UEs behind pt for support with pt able to complete slight internal rotation of hips to midline in this position; min-mod facilitation of triceps for elbow extension as pt fatigued. Rest break leaning forward grasping hands behind thighs. With min-mod A and reduced friction, pt slid LEs over side of mat for short sit. Completed posterior-anterior lean in this position with focus on abdominal engagement and oblique control to maintain midline x12 with 4 instances min A to prevent lateral LOB. STS with mod A x2 with pt maintaining stand ~90 seconds and completing stand step to w/c on R side with max A for LE advancement. 9/8: In preparation for standing activities the pt performed B UE walk outs in seated; pt required manual cues to facilitate WB through B hands. Pt performed sit to stand with mod Ax2 verbal cues to facilitate glute contraction when standing; tactile cues at B knee to facilitate knee extension once standing. Pt performed static standing w/ WB through B UE's for roughly 3 minutes. Following exercise pt performed stand to sit with poor eccentric control. While sitting the pt performed rapid lateral trunk shifts to premote core stability. Pt repeated static standing with B UE WB; continuing to require cueing at B knees, but fewer at the UEs. Pt demonstrated more control with stand to sit. Pt performed overhead ball throws in a seated position with no back rest; pt had loss of balance to the R multiple times requiring physical assist from OT. After resting pt performed another sit to stand and attempted to through the ball overhead, performed x5; pt had difficulty maintaining balance and required mod Ax2 with manual cues at B knees to facilitate knee extension. Performed the STREAM assessment, standing potion in //bars.  On the mat table pt performed long sitting w/ physical assist of OT.     7/28:  Session initiated with squat pivot transfer w/c to mat with CGA x1; noted improved positioning of w/c and LEs prior to initiation of transfer. Assessed pt progress using functional STREAM assessment with results noted above. In supine, pt completed hip bridges x3 with improved ability to maintain knees in midline following tactile and verbal cues. Pt transitioned to prone with supervision and quadruped with CGA; poor eccentric control noted with pt using momentum of rocking motion and UE weight bearing to assume quadruped. In tall kneel, pt hit playground ball with each hand x6 with focus on trunk rotation and UE control. Large gray bolster placed in front of pt to use as stabilizing surface to assist with dynamic balance. Pt assumed quadruped with support of bolster at trunk for rest break, followed by forearm weight bearing on bolster to complete alternating LE extension/return to quadruped x12 each side with tactile and verbal cues for glute contraction during extension and weight shift to opposite side for flexion. Pt transitioned to sit edge of mat with mod A x2. Squat pivot mat to w/c min A. At ballet barre, pt completed STS with one hand pressing into surface pt seated on and one hand pulling on barre to stand with max A x2 to block knees from buckling and facilitate quads and glutes for LE extension; repeated STS and static stance x3 for 30-90 seconds. Session concluded with pt grasping ballet barre seated in w/c to push self away from barre for UE and trunk lengthening stretch and pulling self to barre with same speed each UE for strengthening and control. 7/26: Pt used restroom prior to session and was in a hurry and had some urine on his shirt. Pt doffed shirt and donned clean T shirt with stand by/set up. Pt transferred from w/c to mat table with mod A and transitioned sit to supine with mod A.  While in supine, pt worked on upper trunk rotation reaching across body for targets 10x B UE. PT assisted with keeping LEs in hooklying position and rotation at hips. Pt then completed 10 supine hip bridges. Pt then rolled to prone and worked on achieving modified plank on elbows with very min assist to maintain feet as base of support. He then transitioned with momentum and min A to Q-ped. He crawled up onto a swiss ball for support of trunk and worked on propping prone on elbows in modified tall kneeling. From this position pt played [address] [address] [employer] Man writing letters on a dry erase board with mod - min x 1 to maintain midline. He then transitioned to short sit with max assist and completed one game of hangman in short sit at edge of mat. Pt needed min of one to maintain balance while writing on board and min assist for executive function. PT assisted with manual pec stretch with pt leaning back over swiss ball and applying overpressure in the posterior direction at the shoulders. Pt then worked on sit to stand times three at walker with forearm rests with mod assist of one for hip/knee control and cues to engage quads and glutes. Pt then transferred to wheelchair with contact guard. 7/19: Treatment focused on improving independence in LE dressing, trunk control, and standing balance and tolerance for functional mobility. Session initiated with patient transferring to mat. Patient then initiated treatment focusing on trunk stabilization reaching and hitting targets with trunk rotation times 10 reps each direction losing balance more to left and then completed sit to supine with min assist.  Patient then worked on trunk rotation each direction in supine to hit target completing intervals of 30 seconds hitting target 6 -8 reps each direction in 30 second intervals times 4. Patient needing cues at scapula to rotate to the upper trunk . Patient then completed bridge and scoots on mat with mod assist of one to two.   Patient then worked on standing tolerance at walker with forearm support of arm troughs completed 3 reps standing up to 2 minutes with min assist of two  While playing connect 4. Patient finished treatment seated at edge of mat playing bozena focusing on trunk stability and shuffling cards, bilateral coordination, intrinsic hand function. Modalities:     Pt. Education:  -patient educated on diagnosis, prognosis and expectations for rehab  -all patient questions were answered    HEP instruction:      NMR and Therapeutic Activities:    [x] (68460 or 67712) Provided verbal/tactile cueing for activities related to improving balance, coordination, kinesthetic sense, posture, motor skill, proprioception and motor activation to allow for proper function of   [x] LE / Core, hip and LE with self care and ADLs  [x] UE / Shoulder complex: cervical, postural, scapular, scapulothoracic and UE control with self care, carrying, lifting, driving, computer work.   [] (47995) Gait Re-education- Provided training and instruction to the patient for proper LE, core and hip recruitment, positioning, and eccentric body weight control with ambulation re-education, including ascending & descending stairs     Home Management Training / Self Care:  [] (48575) Provided self-care/home management training related to activities of daily living and compensatory training, and/or use of adaptive equipment for improvement with: ADLs and compensatory training, meal preparation, safety procedures and instruction in use of adaptive equipment, including bathing, grooming, dressing, personal hygiene, basic household cleaning and chores. Therapeutic Exercise and NMR EXR  [x] (58532) Provided verbal/tactile cueing for activities related to strengthening, flexibility, endurance, ROM for improvements in  [x] LE / Core stability: LE, hip, and core control with self care, mobility, lifting, ambulation.   [x] UE / Shoulder complex: cervical, postural, scapular, scapulothoracic and UE control with self care, reaching, carrying, lifting, house/yardwork, driving, computer work. [x] (07075) Provided verbal/tactile cueing for activities related to improving balance, coordination, kinesthetic sense, posture, motor skill, proprioception to assist with   [x] LE / Core stability: LE, hip, and core control in self care, mobility, lifting, ambulation and eccentric single leg control. [x] UE / Shoulder complex: cervical, scapular, scapulothoracic and UE control with self care, reaching, carrying, lifting, house/yardwork, driving, computer work.   [] (17918) Therapist is in constant attendance of 2 or more patients providing skilled therapy interventions, but not providing any significant amount of measurable one-on-one time to either patient, for improvements in  [] LE / Core stability: LE, hip, and core control in self care, mobility, lifting, ambulation and eccentric single leg control. [] UE / Shoulder complex: cervical, scapular, scapulothoracic and UE control with self care, reaching, carrying, lifting, house/yardwork, driving, computer work.      Home Exercise Program:    [] (37334) Reviewed/Progressed HEP activities related to strengthening, flexibility, endurance, ROM of   [] LE / Core stability: core, hip and LE for functional self-care, mobility, lifting and ambulation/stair navigation   [] UE / Shoulder complex: cervical, postural, scapular, scapulothoracic and UE control with self care, reaching, carrying, lifting, house/yardwork, driving, computer work  [] (73409)Reviewed/Progressed HEP activities related to improving balance, coordination, kinesthetic sense, posture, motor skill, proprioception of   [] LE: core, hip and LE for self care, mobility, lifting, and ambulation/stair navigation    [] UE / Shoulder complex: cervical, postural,  scapular, scapulothoracic and UE control with self care, reaching, carrying, lifting, house/yardwork, driving, computer work    Manual Treatments:  PROM / STM / Oscillations-Mobs:  G-I, II, III, IV (PA's, Inf., Post.)  [] (11108) Provided manual therapy to mobilize shoulder complex, hip, LE, and/or cervicothoracic/LS spine soft tissue/joints for the purpose of modulating pain, promoting relaxation,  increasing ROM, reducing/eliminating soft tissue swelling/inflammation/restriction, improving soft tissue extensibility and allowing for proper ROM for normal function with   [] LE / Core stability: self care, mobility, lifting and ambulation. [] UE / Shoulder complex: self care, reaching, carrying, lifting, house/yardwork, driving, computer work. Modalities:  [] (82638) Vasopneumatic compression: Utilized vasopneumatic compression to decrease edema / swelling for the purpose of improving mobility and quad tone / recruitment which will allow for increased overall function including but not limited to self-care, transfers, ambulation, and ascending / descending stairs. Charges:  Timed Code Treatment Minutes: 40   Total Treatment Minutes: 85   **CO-treat with OT    [] EVAL - LOW (21000)   [] EVAL - MOD (66114)  [] EVAL - HIGH (89777)  [] RE-EVAL (93561)  [x] TE (72174) x  1     [] Ionto  [x] NMR (00886) x  2    [] Vaso  [] Manual (88305) x      [] Ultrasound  [] TA x       [] Mech Traction (93943)  [] Gait Training x     [] ES (un) (00449):   [] Aquatic therapy x   [] Other: Performance test w/report (48969)  x1  [] Group:     GOALS:   Patient stated goal: improve ability to complete transfers   []? Progressing: []? Met: []? Not Met: []? Adjusted     Therapist goals for Patient:   Short Term Goals: To be achieved in: 2 weeks  1. Independent in HEP and progression per patient tolerance, in order to prevent re-injury. []? Progressing: [x]? Met: []? Not Met: []? Adjusted  2. Patient will have a decrease in pain to facilitate improvement in movement, function, and ADLs as indicated by Functional Deficits. []? Progressing: [x]? Met: []? Not Met: []? Adjusted     Long Term Goals: To be achieved in: 6 weeks  1.  Patient will report increased standing tolerance to at least 30 minutes in standing frame. []? Progressing: [x]? Met: []? Not Met: []? Adjusted  2. Patient will demonstrate an increase in strength to good shoulder & hip complex, and core activation to allow for proper functional mobility as indicated by patients Functional Deficits. [x]? Progressing: []? Met: []? Not Met: []? Adjusted  3. Patient will return to functional activities including demo'ing safe transfers to/from w/c with mod I.    [x]? Progressing: []? Met: []? Not Met: []? Adjusted  4. Patient will increase STREAM score to 32 or above for increased UE/LE movement in sitting, supine, and standing. [x]? Progressing: []? Met: []? Not Met: []? Adjusted          Overall Progression Towards Functional goals/ Treatment Progress Update:  [] Patient is progressing as expected towards functional goals listed. [x] Progression is slowed due to complexities/Impairments listed. [] Progression has been slowed due to co-morbidities. [] Plan just implemented, too soon to assess goals progression <30days   [] Goals require adjustment due to lack of progress  [] Patient is not progressing as expected and requires additional follow up with physician  [] Other    Persisting Functional Limitations/Impairments:  []Sleeping [x]Sitting               []Standing [x]Transfers        []Walking []Kneeling               []Stairs []Squatting / bending   [x]ADLs []Reaching  []Lifting  []Housework  []Driving []Job related tasks  []Sports/Recreation []Other:        ASSESSMENT:   Pt has made considerable progress with trunk control, especially when sitting edge of mat. Pt was unable to perform any prolonged static standing this date due to fatigue from mat exercises. Had difficulty activating obliques and serratus when in sidelying and punching across midline. Will continue with trunk control and functional mobility.    Treatment/Activity Tolerance:  [x] Patient able to complete tx  [] Patient limited by fatigue  [] Patient limited by pain  [] Patient limited by other medical complications  [] Other:     Prognosis: [x] Good [] Fair  [] Poor    Patient Requires Follow-up: [x] Yes  [] No    Plan for next treatment session: transfers, seated core strength, co-treat with OT as able    PLAN: See andres. PT 1x / week for 6 weeks. [x] Continue per plan of care [] Alter current plan (see comments)  [] Plan of care initiated [] Hold pending MD visit [] Discharge    Electronically signed by:  Jonah Milner, PT DPT    Note: If patient does not return for scheduled/ recommended follow up visits, his note will serve as a discharge from care along with most recent update on progress.

## 2021-09-28 ENCOUNTER — HOSPITAL ENCOUNTER (OUTPATIENT)
Dept: OCCUPATIONAL THERAPY | Age: 21
Setting detail: THERAPIES SERIES
Discharge: HOME | End: 2021-09-28
Payer: MEDICARE

## 2021-09-28 ENCOUNTER — HOSPITAL ENCOUNTER (OUTPATIENT)
Dept: PHYSICAL THERAPY | Age: 21
Setting detail: THERAPIES SERIES
Discharge: HOME | End: 2021-09-28
Payer: MEDICARE

## 2021-09-28 PROCEDURE — 97112 NEUROMUSCULAR REEDUCATION: CPT

## 2021-09-28 PROCEDURE — 97110 THERAPEUTIC EXERCISES: CPT

## 2021-09-28 PROCEDURE — 97530 THERAPEUTIC ACTIVITIES: CPT

## 2021-09-28 NOTE — FLOWSHEET NOTE
[address], [address] [place] [address] [place], [address]  Phone: [phone]   Fax: [phone]    Occupational Therapy Daily Treatment Note  Date:  2021    Patient: Layne Renee   : 2000   MRN: [id number]  Referring Physician:  Bridget Garrett CNP       Medical Diagnosis Information: paraplegia, cerebral palsy status post covid has not been seen in clinic since                                        Insurance information: medicare/ medicaid     Date of Injury:   Date of Surgery: rhizotomy when he was about 10    Progress Report: []  Yes  [x]  No     Date Range for reporting period:  2021 to 2021. Patient is status post covid recovery and missed treatments since 2021. Progress report due (10 Rx/or 30 days whichever is less): visit #33 or     Recertification due (POC duration/ or 90 days whichever is less): visit #33 or 2021    Visit # Insurance Allowable Auth required? Date Range    + /16 + 0/5 [place] []  Yes  [x]  No n/a       Latex Allergy:  []No      []Yes  Pacemaker:  [] No       [] Yes     Preferred Language for Healthcare:   [x]English       []other:    Pain level: 0/10    SUBJECTIVE: Pt reporting pain/tenderness in LLE when wearing AFO, callous noted on bottom of heel. Patient often repeating he is fatigued on this date. Pt reports he has used the stander while at home but non complaint with daily use.      Functional Disability Index:      Stroke Rehabilitation Assessment of Movement (STREAM) 3/1/21 4/21/21 6/22/21 7/28/21 2021    23 24  (supine- 9/15, sitting- 15/31, standing- 0) 26 29  (supine- 9/15, sitting- , standing- 3/24 29  (supine- 8/15, sitting- , standing-        OBJECTIVE:     21: 3 minutes static stance at parallel bars with CGA x2 for trunk control and blocking knees      RESTRICTIONS/PRECAUTIONS: fall risk    Exercises/Interventions: Treatment focused on improving trunk control with transitional movement patterns to increase safety and independence. At beginning of session, foam padding added to heel of L AFO to increase comfort and decrease s/s of skin breakdown. Patient performed stretching of BLEs while seated in w/c for improved knee extension with emphasis on reducing external rotation. Isometric strengthening with BLEs in seated, with proprioceptive input at knees and ankles to facilitate knee extension. Patient then performed donning AFOs with verbal cues provided on safety to wear seatbelt to reduce risk of falls when bending forward; patient verbalized understanding and able to secure safety belt. Patient trialed donning AFO with hips in abduction with verbal cueing required 75% of time, resting trunk on mat table for stability, and extra time to complete task due to fatigue for LLE. Patient trialed donning AFO on RLE in normal sitting position following encouragement from therapist to attempt without need for trunk support, 50% verbal cues, and improved timing. Stand pivot transfer w/c to mat with min A x1 + mod A x 1. While seated EOM, patient performed dynamic balance, coordination, and abdominal strengthening while reaching at various heights with BUE's. Transitioning from seated to supine, patient required min A x 2 and cues for proper hand placement to increase independence. Supine to quadruped mod A x 2. In quadruped position, patient working on lengthening spine, stabilizing hips, trunk control, and UE strengthening; patient performed modified push ups with UEs on wedge cushion with proprioceptive input applied to lateral aspect of hips to increase stabilization.  Pt then working on tall kneel with proprioceptive input applied to hips by PT and AAROM/proprioceptive input provided by OT to reach overhead with shoulders in flexion and returning to neutral while maintaining trunk control, patient with verbal cues to engage abdominal muscles and pelvic stability. When transitioning from quadruped to tall kneel, patient required Min-mod A x 2, with cueing to engage abdominals. Patient required frequent rest breaks on this date due to fatigue throughout treatment. Patient then working on STS with UE support through RW with blocking provided at knees and Max A x 2 tolerating ~15 seconds. Education provided to patient re: importance of using stander daily and performing modified scoot transfers when home, patient verbalizes understanding. Therapeutic Exercises  Resistance / level Sets/sec Reps Notes                                                                                Neuromuscular Re-ed / Therapeutic Activities                                                 Manual Intervention                                                     Modalities:     Patient education:  Plan of care, importance of weight bearing in stander for overall health, home exercise program, goals. Home Exercise Program:   Patient encouraged to start using stander 3 sessions a day and while in stander completing over head reaches . Patient to try and reach 15 minute intervals in stander. Therapeutic Exercise and NMR:  [x] (37535) Provided verbal/tactile cueing for activities related to strengthening, flexibility, endurance, ROM  for improvements in scapular, scapulothoracic and UE control with self care, reaching, carrying, lifting, house/yardwork, driving/computer work. [x] (27779) Provided verbal/tactile cueing for activities related to improving balance, coordination, kinesthetic sense, posture, motor skill, proprioception  to assist with  scapular, scapulothoracic and UE control with self care, reaching, carrying, lifting, house/yardwork, driving/computer work.   [] Comments:    Therapeutic Activities:    [x] (60444 or 03284) Provided verbal/tactile cueing for activities related to improving balance, coordination, kinesthetic sense, posture, motor skill, proprioception and motor activation to allow for proper function of scapular, scapulothoracic and UE control with self care, carrying, lifting, driving/computer work  [] Comments:    Home Exercise Program:    [x] (63954) Reviewed/Progressed HEP activities related to strengthening, flexibility, endurance, ROM of scapular, scapulothoracic and UE control with self care, reaching, carrying, lifting, house/yardwork, driving/computer work  [] (88703) Reviewed/Progressed HEP activities related to improving balance, coordination, kinesthetic sense, posture, motor skill, proprioception of scapular, scapulothoracic and UE control with self care, reaching, carrying, lifting, house/yardwork, driving/computer work    [] Comments:    Manual Treatments:  PROM / STM / Oscillations-Mobs:  G-I, II, III, IV (PA's, Inf., Post.)  [] ([address] [facility]) Provided manual therapy to mobilize soft tissue/joints of cervical/CT, scapular GHJ and UE for the purpose of modulating pain, promoting relaxation,  increasing ROM, reducing/eliminating soft tissue swelling/inflammation/restriction, improving soft tissue extensibility and allowing for proper ROM for normal function with self care, reaching, carrying, lifting, house/yardwork, driving/computer work  [] Comments:    ADL Training:  [] (52225) Provided self-care/home management training related to activities of daily living and compensatory training, and/or use of adaptive equipment   [] Comments:     Splinting:  [] Fabrication of:   [] (63640) Orthotic/Prosthetic Management, subsequent encounter  [] (60673) Orthotic management and training (fitting and assessment)  [] Comments:      Charges: co treat with PT for safety and increased intensity of treatment  Timed Code Treatment Minutes: 37   Total Treatment Minutes: 75     [] EVAL (LOW) 92722   [] OT Re-eval (75442)  [] EVAL (MOD) 21529   [] EVAL (HIGH) 19712       [x] Ru (93020) x   1  [] GKZTO(60572)  [] NMR (17464) x   [] Estim (attended) (82631)   [] Manual ([id number]) x      [] US (86112)  [x] TA (25636) x  1  [] Paraffin (36211)  [] ADL  (49389) x     [] Splint/L code:    [] Estim (unattended) (53932)  [] Fluidotherapy (17625)  [] Other:    GOALS:    Patient stated goal: improved trunk control and standing tolerance to increase independence in self care. [x]? Progressing: []? Met: []? Not Met: []? Adjusted     Therapist goals for Patient:   Short Term Goals: To be achieved in: 30 days  1. Pt will be independent with clothing management on toilet or in wheelchair to complete toileting with use of grab bar. Min assist  [x]? Progressing: []? Met: []? Not Met: []? Adjusted  2. Pt will be stand by assist completing scoot transfers on level surfaces  Contact guard  [x]? Progressing: []? Met: []? Not Met: []? Adjusted  3. Patient will be able to stand up to 30 seconds at grab bar for toileting and lower body dress with min assist.  Goal met progress to stand by assist   [x]? Progressing: []? Met: []? Not Met: []? Adjusted     Long Term Goals: To be achieved by discharge  1. Pt will demonstrate an improved stream score of 28. New goal is 32  []? Progressing: [x]? Met: []? Not Met: [x]? Adjusted    Progression Towards Functional goals:  [x] Patient is progressing as expected towards functional goals listed. [] Progression is slowed due to complexities listed. [] Progression has been slowed due to co-morbidities. [] Plan just implemented, too soon to assess goals progression  [] All goals are met  [] Other:     ASSESSMENT:   Patient has made progress in mobility, balance and executive function.     Treatment/Activity Tolerance:  [x] Patient tolerated treatment well [] Patient limited by fatigue  [] Patient limited by pain  [] Patient limited by other medical complications  [] Other:     Prognosis: [x] Good [] Fair  [] Poor    Patient Requires Follow-up: [x] Yes  [] No    PLAN: See eval  [x] Continue per plan of care [] Alter current plan (see comments)  [] Plan of care initiated (MD cosigned) [] Hold pending MD visit [] Discharge    Electronically signed by:        Andrea Baum OTR/VINH [id number]                  Note: If patient does not return for scheduled/ recommended follow up visits, this note will serve as a discharge from care along with most recent update on progress.

## 2021-09-28 NOTE — FLOWSHEET NOTE
[address] [facility] Therapy  Phone: [phone]   Fax: [phone]      Physical Therapy Daily Treatment Note  Date:  2021     Patient Name:  Ryan Luciano    :  2000  MRN: [id number]  Medical/Treatment Diagnosis Information:  Diagnosis: Cerebral palsy with spastic diplegia  Treatment Diagnosis: Decreased trunk control impacting ability to complete safe transfers, decreased standing tolerance, LE weakness  Insurance/Certification information:  PT Insurance Information: Medicare & Medicaid  Physician Information:  Referring Practitioner: JOHNNY Hobbs  Plan of care signed (Y/N): [x]  Yes []  No     Date of Patient follow up with Physician:      Progress Report: []  Yes  [x]  No     Date Range for reporting period:  Beginning  21  Re-eval:   PN: 21  PN: 21  Ending    Progress report due (10 Rx/or 30 days whichever is less):      Recertification due (POC duration/ or 90 days whichever is less):      Visit # Insurance Allowable Auth required? Date Range    +  + 10/10 + 0/5 Medical necessity []  Yes  [x]  No n/a     Latex Allergy:  [x]NO      []YES  Preferred Language for Healthcare:   [x]English       []Other:      Functional Scale/Test Evaluation 3/1/2021 4/21/21  6/22/21 7/28 9/8 Discharge   STREAM 23  26 29 29                            Pain level:  0/10  Location:  none    SUBJECTIVE:  Pt reports he has not used the stander much since the last session. He also does not wear his AFOs much at home. OBJECTIVE:  : Session started 10 min after scheduled time, pt had to use the restroom. PT offered to help pt with standing, but pt declined, stating he would rather use the urinal to save some energy for the session.     :  Pt 15 mins late then needed to use restroom    Co-treat w/OT for safety and assistance    RESTRICTIONS/PRECAUTIONS: recent Bell's Palsy    Interventions/Exercises:   Therapeutic Exercises (94506) Resistance / level Sets/sec Reps Notes   W/c push ups in preparation for standing                            Neuromuscular Re-ed (98997) /  Gait Training (38893)       Upright posture seated in W/C   X 5 reps holding football, rest of reps completed with L UE bracing on L knee, PT hand assist on sternum and lumbar spine to facilitate                                Gait Training:  Amb in //bars    Bwd walking in //bars      Transfer w/B Lofstrand crutches   Static stand w/B Lofstrand crutches   8 ft    8 ft    Mod A of 2  Mod A of 2   -manual required to advance LEs, pt activation hip extensors to initiate swing phase, demo'd adequate weight-shifting. OT provided assist & w/c follow for safety      -difficulty with placement for adequate support                 Therapeutic Activities (06006) /  Functional Tasks       [facility] reaching across midline in seated   Pt seated in w/c using seat belt to help stay in w/c          [facility] reaching across midline and diagonally   Pt seated in w/c using seat belt to help stay in w/c               STS to std walker            Transfer to w/c from mat table            STS from w/c to mat table - with large bolster placed on table in front of patient to hold onto   Min A, w/c close to mat table to assist in blocking pt's knees. Manual Intervention ([phone])       Supine hamstring stretch 90/90    -completed by PT  -completed by OT                                        Sessions:     9/28: Treatment focused on improving trunk control with transitional movement patterns to increase safety and independence. At beginning of session, foam padding added to heel of L AFO to increase comfort and decrease s/s of skin breakdown. Patient performed stretching of BLEs while seated in w/c for improved knee extension with emphasis on reducing external rotation.  Isometric strengthening with BLEs in seated, with proprioceptive input at knees and ankles to facilitate knee extension. Patient then performed donning AFOs with verbal cues provided on safety to wear seatbelt to reduce risk of falls when bending forward; patient verbalized understanding and able to secure safety belt. Patient trialed donning AFO with hips in abduction with verbal cueing required 75% of time, resting trunk on mat table for stability, and extra time to complete task due to fatigue for LLE. Patient trialed donning AFO on RLE in normal sitting position following encouragement from therapist to attempt without need for trunk support, 50% verbal cues, and improved timing. Stand pivot transfer w/c to mat with min A x1 + mod A x 1. While seated EOM, patient performed dynamic balance, coordination, and abdominal strengthening while reaching at various heights with BUE's. Transitioning from seated to supine, patient required min A x 2 and cues for proper hand placement to increase independence. Supine to quadruped mod A x 2. In quadruped position, patient working on lengthening spine, stabilizing hips, trunk control, and UE strengthening; patient performed modified push ups with UEs on wedge cushion with proprioceptive input applied to lateral aspect of hips to increase stabilization. Pt then working on tall kneel with proprioceptive input applied to hips by PT and AAROM/proprioceptive input provided by OT to reach overhead with shoulders in flexion and returning to neutral while maintaining trunk control, patient with verbal cues to engage abdominal muscles and pelvic stability. When transitioning from quadruped to tall kneel, patient required Min-mod A x 2, with cueing to engage abdominals. Patient required frequent rest breaks on this date due to fatigue throughout treatment. Patient then working on STS with UE support through RW with blocking provided at knees and Max A x 2 tolerating ~15 seconds.  Education provided to patient re: importance of using stander daily and performing modified scoot transfers when home, patient verbalizes understanding.     9/21: Treatment focused on improving trunk control with transitional movement patterns to increase safety and independence. Sit to stand from wheelchair mod A of 1 and then mod of two for pivot to large mat table. Patient sat at edge of mat and worked on dynamic sitting balance while first hitting targets on easel with use of squigs x 12 reps followed by playing two games of [employer] man focusing on midline orientation and writing legibily on vertical plane needing stand by assist for sitting balance. He then complained of back pain due to fatigue after 25 minutes of game. Patient then transitioned to supine with mod assist of one, completed hip bridges times 5 with mod assist of one followed by hitting targets across midline  Punching red 20# resistance band across midline times 5 reps each direction to facilitate abdominals and serratus strength. Patient then transitioned to prone with mod assist of two and worked on transitions to quadriped times 5 with 5 push ups completed at each transition. Patient then transitioned to short sit at edge of mat with mod assist of one, completed sit to stand times 2 at edge of mat with mod assist of two and then transferred to wheelchair with mod assist of two.      9/15:  Sit to stand from wheelchair mod A of 2 to large mat table w/large gray bolster for UE support. Transitioned to quadruped w/UEs on bolster w/manual facilitation of mod A of 1 for each LE onto mat table. Transitioned to tall knee w/man cues to pelvis and trunk to facilitate good trunk ext and balance pelvis w/even WB'ing through BLEs x2 attempts. Pt demo'd good glute max contraction but had difficulty reducing dependency on UEs so red [place] ball was used instead of bolster to destabilize UE support. Pt able to improve tall kneeling posture x3 sets /continued manual facilitation to pelvis for stability and equal WB'ing and improved trunk posture.   Patient able to remove his UEs from supportive device for short periods of time while maintaining good posture. During that time, patient able to utilize core musculature but quickly fatigued and switched focus to lumbar musculature. Transitioned from quadruped on swiss ball to L sidelying, to quadruped, to R sideyling w/manual facilitation of 1. In R sidelying, R lateral trunk flex x5 w/manual cues. L knee to L elbow in R sidelying for oblique contraction x3 w/man facilitation to L pelvis and scapula and min assist of 1 for LLE.  R S/L to long sitting, mod A of 2. Positioned UEs behind pt for support with pt able to complete slight internal rotation of hips to midline in this position; min-mod facilitation of triceps for elbow extension as pt fatigued. Rest break leaning forward grasping hands behind thighs. With min-mod A and reduced friction, pt slid LEs over side of mat for short sit. Completed posterior-anterior lean in this position with focus on abdominal engagement and oblique control to maintain midline x12 with 4 instances min A to prevent lateral LOB. STS with mod A x2 with pt maintaining stand ~90 seconds and completing stand step to w/c on R side with max A for LE advancement. 9/8: In preparation for standing activities the pt performed B UE walk outs in seated; pt required manual cues to facilitate WB through B hands. Pt performed sit to stand with mod Ax2 verbal cues to facilitate glute contraction when standing; tactile cues at B knee to facilitate knee extension once standing. Pt performed static standing w/ WB through B UE's for roughly 3 minutes. Following exercise pt performed stand to sit with poor eccentric control. While sitting the pt performed rapid lateral trunk shifts to premote core stability. Pt repeated static standing with B UE WB; continuing to require cueing at B knees, but fewer at the UEs. Pt demonstrated more control with stand to sit.  Pt performed overhead ball throws in a seated for UE and trunk lengthening stretch and pulling self to barre with same speed each UE for strengthening and control. 7/26: Pt used restroom prior to session and was in a hurry and had some urine on his shirt. Pt doffed shirt and donned clean T shirt with stand by/set up. Pt transferred from w/c to mat table with mod A and transitioned sit to supine with mod A. While in supine, pt worked on upper trunk rotation reaching across body for targets 10x B UE. PT assisted with keeping LEs in hooklying position and rotation at hips. Pt then completed 10 supine hip bridges. Pt then rolled to prone and worked on achieving modified plank on elbows with very min assist to maintain feet as base of support. He then transitioned with momentum and min A to Q-ped. He crawled up onto a swiss ball for support of trunk and worked on propping prone on elbows in modified tall kneeling. From this position pt played [address] [employer] writing letters on a dry erase board with mod - min x 1 to maintain midline. He then transitioned to short sit with max assist and completed one game of [place] in short sit at edge of mat. Pt needed min of one to maintain balance while writing on board and min assist for executive function. PT assisted with manual pec stretch with pt leaning back over swiss ball and applying overpressure in the posterior direction at the shoulders. Pt then worked on sit to stand times three at walker with forearm rests with mod assist of one for hip/knee control and cues to engage quads and glutes. Pt then transferred to wheelchair with contact guard. 7/19: Treatment focused on improving independence in LE dressing, trunk control, and standing balance and tolerance for functional mobility. Session initiated with patient transferring to mat.   Patient then initiated treatment focusing on trunk stabilization reaching and hitting targets with trunk rotation times 10 reps each direction losing balance more to left and then completed sit to supine with min assist.  Patient then worked on trunk rotation each direction in supine to hit target completing intervals of 30 seconds hitting target 6 -8 reps each direction in 30 second intervals times 4. Patient needing cues at scapula to rotate to the upper trunk . Patient then completed bridge and scoots on mat with mod assist of one to two. Patient then worked on standing tolerance at walker with forearm support of arm troughs completed 3 reps standing up to 2 minutes with min assist of two  While playing connect 4. Patient finished treatment seated at edge of mat playing bozena focusing on trunk stability and shuffling cards, bilateral coordination, intrinsic hand function.         Modalities:     Pt. Education:  -patient educated on diagnosis, prognosis and expectations for rehab  -all patient questions were answered    HEP instruction:      NMR and Therapeutic Activities:    [x] (75407 or 00218) Provided verbal/tactile cueing for activities related to improving balance, coordination, kinesthetic sense, posture, motor skill, proprioception and motor activation to allow for proper function of   [x] LE / Core, hip and LE with self care and ADLs  [x] UE / Shoulder complex: cervical, postural, scapular, scapulothoracic and UE control with self care, carrying, lifting, driving, computer work.   [] (25361) Gait Re-education- Provided training and instruction to the patient for proper LE, core and hip recruitment, positioning, and eccentric body weight control with ambulation re-education, including ascending & descending stairs     Home Management Training / Self Care:  [] (22905) Provided self-care/home management training related to activities of daily living and compensatory training, and/or use of adaptive equipment for improvement with: ADLs and compensatory training, meal preparation, safety procedures and instruction in use of adaptive equipment, including bathing, grooming, dressing, personal hygiene, basic household cleaning and chores. Therapeutic Exercise and NMR EXR  [x] (69463) Provided verbal/tactile cueing for activities related to strengthening, flexibility, endurance, ROM for improvements in  [x] LE / Core stability: LE, hip, and core control with self care, mobility, lifting, ambulation. [x] UE / Shoulder complex: cervical, postural, scapular, scapulothoracic and UE control with self care, reaching, carrying, lifting, house/yardwork, driving, computer work. [x] (24388) Provided verbal/tactile cueing for activities related to improving balance, coordination, kinesthetic sense, posture, motor skill, proprioception to assist with   [x] LE / Core stability: LE, hip, and core control in self care, mobility, lifting, ambulation and eccentric single leg control. [x] UE / Shoulder complex: cervical, scapular, scapulothoracic and UE control with self care, reaching, carrying, lifting, house/yardwork, driving, computer work.   [] (91315) Therapist is in constant attendance of 2 or more patients providing skilled therapy interventions, but not providing any significant amount of measurable one-on-one time to either patient, for improvements in  [] LE / Core stability: LE, hip, and core control in self care, mobility, lifting, ambulation and eccentric single leg control. [] UE / Shoulder complex: cervical, scapular, scapulothoracic and UE control with self care, reaching, carrying, lifting, house/yardwork, driving, computer work.      Home Exercise Program:    [] (90135) Reviewed/Progressed HEP activities related to strengthening, flexibility, endurance, ROM of   [] LE / Core stability: core, hip and LE for functional self-care, mobility, lifting and ambulation/stair navigation   [] UE / Shoulder complex: cervical, postural, scapular, scapulothoracic and UE control with self care, reaching, carrying, lifting, house/yardwork, driving, computer work  [] (95106)Reviewed/Progressed HEP activities related to improving balance, coordination, kinesthetic sense, posture, motor skill, proprioception of   [] LE: core, hip and LE for self care, mobility, lifting, and ambulation/stair navigation    [] UE / Shoulder complex: cervical, postural,  scapular, scapulothoracic and UE control with self care, reaching, carrying, lifting, house/yardwork, driving, computer work    Manual Treatments:  PROM / STM / Oscillations-Mobs:  G-I, II, III, IV (PA's, Inf., Post.)  [] (88254) Provided manual therapy to mobilize shoulder complex, hip, LE, and/or cervicothoracic/LS spine soft tissue/joints for the purpose of modulating pain, promoting relaxation,  increasing ROM, reducing/eliminating soft tissue swelling/inflammation/restriction, improving soft tissue extensibility and allowing for proper ROM for normal function with   [] LE / Core stability: self care, mobility, lifting and ambulation. [] UE / Shoulder complex: self care, reaching, carrying, lifting, house/yardwork, driving, computer work. Modalities:  [] (38310) Vasopneumatic compression: Utilized vasopneumatic compression to decrease edema / swelling for the purpose of improving mobility and quad tone / recruitment which will allow for increased overall function including but not limited to self-care, transfers, ambulation, and ascending / descending stairs. Charges:  Timed Code Treatment Minutes: 40   Total Treatment Minutes: 75   **CO-treat with OT    [] EVAL - LOW (38939)   [] EVAL - MOD (26586)  [] EVAL - HIGH (30400)  [] RE-EVAL (20925)  [x] TE (52879) x  1     [] Ionto  [x] NMR (73946) x  2    [] Vaso  [] Manual (00228) x      [] Ultrasound  [] TA x       [] Mech Traction (09774)  [] Gait Training x     [] ES (un) (84494):   [] Aquatic therapy x   [] Other: Performance test w/report (77787)  x1  [] Group:     GOALS:   Patient stated goal: improve ability to complete transfers   []? Progressing: []? Met: []?  Not Met: []? Adjusted     Therapist goals for Patient:   Short Term Goals: To be achieved in: 2 weeks  1. Independent in HEP and progression per patient tolerance, in order to prevent re-injury. []? Progressing: [x]? Met: []? Not Met: []? Adjusted  2. Patient will have a decrease in pain to facilitate improvement in movement, function, and ADLs as indicated by Functional Deficits. []? Progressing: [x]? Met: []? Not Met: []? Adjusted     Long Term Goals: To be achieved in: 6 weeks  1. Patient will report increased standing tolerance to at least 30 minutes in standing frame. []? Progressing: [x]? Met: []? Not Met: []? Adjusted  2. Patient will demonstrate an increase in strength to good shoulder & hip complex, and core activation to allow for proper functional mobility as indicated by patients Functional Deficits. [x]? Progressing: []? Met: []? Not Met: []? Adjusted  3. Patient will return to functional activities including demo'ing safe transfers to/from w/c with mod I.    [x]? Progressing: []? Met: []? Not Met: []? Adjusted  4. Patient will increase STREAM score to 32 or above for increased UE/LE movement in sitting, supine, and standing. [x]? Progressing: []? Met: []? Not Met: []? Adjusted          Overall Progression Towards Functional goals/ Treatment Progress Update:  [] Patient is progressing as expected towards functional goals listed. [x] Progression is slowed due to complexities/Impairments listed. [] Progression has been slowed due to co-morbidities.   [] Plan just implemented, too soon to assess goals progression <30days   [] Goals require adjustment due to lack of progress  [] Patient is not progressing as expected and requires additional follow up with physician  [] Other    Persisting Functional Limitations/Impairments:  []Sleeping [x]Sitting               []Standing [x]Transfers        []Walking []Kneeling               []Stairs []Squatting / bending   [x]ADLs []Reaching  []Lifting []Housework  []Driving []Job related tasks  []Sports/Recreation []Other:        ASSESSMENT:   Session limited in time due to patient's fatigue levels. Pt had more difficulty stabilizaing in Q-ped with UEs on bolster and required increased cueing and facilitation at hips to maintain neutral trunk. Expressed importance of using stander at home and transferring to bed from w/c the same way that has been practiced in therapy. Will continue to focus on trunk control, transfers, and bed mobility to improve independence at home. Treatment/Activity Tolerance:  [x] Patient able to complete tx  [] Patient limited by fatigue  [] Patient limited by pain  [] Patient limited by other medical complications  [] Other:     Prognosis: [x] Good [] Fair  [] Poor    Patient Requires Follow-up: [x] Yes  [] No    Plan for next treatment session: transfers, seated core strength, co-treat with OT as able    PLAN: See cinthia PT 1x / week for 6 weeks. [x] Continue per plan of care [] Alter current plan (see comments)  [] Plan of care initiated [] Hold pending MD visit [] Discharge    Electronically signed by:  Marlee Benson, PT DPT    Note: If patient does not return for scheduled/ recommended follow up visits, his note will serve as a discharge from care along with most recent update on progress.

## 2021-10-05 ENCOUNTER — HOSPITAL ENCOUNTER (OUTPATIENT)
Dept: PHYSICAL THERAPY | Age: 21
Setting detail: THERAPIES SERIES
Discharge: HOME | End: 2021-10-05
Payer: MEDICARE

## 2021-10-05 ENCOUNTER — HOSPITAL ENCOUNTER (OUTPATIENT)
Dept: OCCUPATIONAL THERAPY | Age: 21
Setting detail: THERAPIES SERIES
Discharge: HOME | End: 2021-10-05
Payer: MEDICARE

## 2021-10-05 PROCEDURE — 97535 SELF CARE MNGMENT TRAINING: CPT

## 2021-10-05 PROCEDURE — 97112 NEUROMUSCULAR REEDUCATION: CPT

## 2021-10-05 PROCEDURE — 97110 THERAPEUTIC EXERCISES: CPT

## 2021-10-05 NOTE — FLOWSHEET NOTE
[address], [address] [place] [address] [place], [address]  Phone: [phone]   Fax: [phone]    Occupational Therapy Daily Treatment Note  Date:  10/5/2021    Patient: Lizbeth Talavera   : 2000   MRN: [id number]  Referring Physician:  Nava Rodas CNP       Medical Diagnosis Information: paraplegia, cerebral palsy status post covid has not been seen in clinic since                                        Insurance information: medicare/ medicaid     Date of Injury:   Date of Surgery: rhizotomy when he was about 10    Progress Report: []  Yes  [x]  No     Date Range for reporting period:  2021 to 2021. Patient is status post covid recovery and missed treatments since 2021. Progress report due (10 Rx/or 30 days whichever is less): visit #33 or      Recertification due (POC duration/ or 90 days whichever is less): visit #33 or 2021    Visit # Insurance Allowable Auth required? Date Range    +  +  [place] []  Yes  [x]  No n/a       Latex Allergy:  []No      []Yes  Pacemaker:  [] No       [] Yes     Preferred Language for Healthcare:   [x]English       []other:    Pain level: 0/10    SUBJECTIVE: Pt reporting pain/tenderness in LLE when wearing AFO, callous noted on bottom of heel. Patient often repeating he is fatigued on this date. Pt reports he has used the stander while at home but non complaint with daily use.      Functional Disability Index:      Stroke Rehabilitation Assessment of Movement (STREAM) 3/1/21 4/21/21 6/22/21 7/28/21 2021    23 24  (supine- 9/15, sitting- 15/31, standing- 0) 26 29  (supine- 9/15, sitting- , standing- 3/24 29  (supine- 8/15, sitting- , standing-        OBJECTIVE:     21: 3 minutes static stance at parallel bars with CGA x2 for trunk control and blocking knees      RESTRICTIONS/PRECAUTIONS: fall risk    Exercises/Interventions: Treatment focused on improving trunk control with transitional movement patterns to increase safety and independence. Patient transferred to mat with min assist.  Patient then worked on sit to 1/2 stand with min assist of two . Patient then worked on 2 pound reach diagonals times 10 reps each ue . Patient then layed down with legs over the edge of the mat. Patient held dowel aretha with blue theraband tied to it and worked on moving into shoulder flexion and knee extension times 5 reps. Patient then worked on serratus punches with trunk rotation to invitation rolling with hip and knee flexion times 5 reps each direction. Patient then sat at edge of mat and completed simulated dressing activity with use of rings reaching to feet and placing rings over feet times 2 each side with min assist.  Patient then worked on balance with t ball and bat swing hitting ball and then completing side scoots to go from \" base to Base\". Min assist to maintain base of support during scoots with improved motor planning. Patient then completed ring toss times 10 ending treatment with side scoots times 5 to wheelchair with contact guard and excellent lift off. Therapeutic Exercises  Resistance / level Sets/sec Reps Notes                                                                                Neuromuscular Re-ed / Therapeutic Activities                                                 Manual Intervention                                                     Modalities:     Patient education:  Plan of care, importance of weight bearing in stander for overall health, home exercise program, goals. Home Exercise Program:   Patient encouraged to start using stander 3 sessions a day and while in stander completing over head reaches . Patient to try and reach 15 minute intervals in stander.       Therapeutic Exercise and NMR:  [x] (22454) Provided verbal/tactile cueing for activities related to strengthening, flexibility, endurance, ROM  for improvements in scapular, scapulothoracic and UE control with self care, reaching, carrying, lifting, house/yardwork, driving/computer work. [x] (03407) Provided verbal/tactile cueing for activities related to improving balance, coordination, kinesthetic sense, posture, motor skill, proprioception  to assist with  scapular, scapulothoracic and UE control with self care, reaching, carrying, lifting, house/yardwork, driving/computer work.   [] Comments:    Therapeutic Activities:    [x] (15638 or 19854) Provided verbal/tactile cueing for activities related to improving balance, coordination, kinesthetic sense, posture, motor skill, proprioception and motor activation to allow for proper function of scapular, scapulothoracic and UE control with self care, carrying, lifting, driving/computer work  [] Comments:    Home Exercise Program:    [x] (79107) Reviewed/Progressed HEP activities related to strengthening, flexibility, endurance, ROM of scapular, scapulothoracic and UE control with self care, reaching, carrying, lifting, house/yardwork, driving/computer work  [] (31336) Reviewed/Progressed HEP activities related to improving balance, coordination, kinesthetic sense, posture, motor skill, proprioception of scapular, scapulothoracic and UE control with self care, reaching, carrying, lifting, house/yardwork, driving/computer work    [] Comments:    Manual Treatments:  PROM / STM / Oscillations-Mobs:  G-I, II, III, IV (PA's, Inf., Post.)  [] (89516) Provided manual therapy to mobilize soft tissue/joints of cervical/CT, scapular GHJ and UE for the purpose of modulating pain, promoting relaxation,  increasing ROM, reducing/eliminating soft tissue swelling/inflammation/restriction, improving soft tissue extensibility and allowing for proper ROM for normal function with self care, reaching, carrying, lifting, house/yardwork, driving/computer work  [] Comments:    ADL Training:  [] (66541) Provided self-care/home management training related to activities of daily living and compensatory training, and/or use of adaptive equipment   [] Comments:     Splinting:  [] Fabrication of:   [] (68809) Orthotic/Prosthetic Management, subsequent encounter  [] (51803) Orthotic management and training (fitting and assessment)  [] Comments:      Charges: co treat with PT for safety and increased intensity of treatment  Timed Code Treatment Minutes: 45   Total Treatment Minutes: 90     [] EVAL (LOW) 62765   [] OT Re-eval (90744)  [] EVAL (MOD) 22620   [] EVAL (HIGH) 74676       [x] Ru (83960) x   1  [] RMYAX(34287)  [x] NMR (52674) x  1 [] Estim (attended) (55278)   [] Manual ([id number]) x      [] US (45559)  [x] TA (03713) x  1  [] Paraffin (29650)  [] ADL  ([phone]) x     [] Splint/L code:    [] Estim (unattended) ([id number])  [] Fluidotherapy (74483)  [] Other:    GOALS:    Patient stated goal: improved trunk control and standing tolerance to increase independence in self care. [x]? Progressing: []? Met: []? Not Met: []? Adjusted     Therapist goals for Patient:   Short Term Goals: To be achieved in: 30 days  1. Pt will be independent with clothing management on toilet or in wheelchair to complete toileting with use of grab bar. Min assist  [x]? Progressing: []? Met: []? Not Met: []? Adjusted  2. Pt will be stand by assist completing scoot transfers on level surfaces  Contact guard  [x]? Progressing: []? Met: []? Not Met: []? Adjusted  3. Patient will be able to stand up to 30 seconds at grab bar for toileting and lower body dress with min assist.  Goal met progress to stand by assist   [x]? Progressing: []? Met: []? Not Met: []? Adjusted     Long Term Goals: To be achieved by discharge  1. Pt will demonstrate an improved stream score of 28. New goal is 32  []? Progressing: [x]? Met: []? Not Met: [x]? Adjusted    Progression Towards Functional goals:  [x] Patient is progressing as expected towards functional goals listed.     [] Progression is slowed due to complexities listed. [] Progression has been slowed due to co-morbidities. [] Plan just implemented, too soon to assess goals progression  [] All goals are met  [] Other:     ASSESSMENT:   Patient has made progress in mobility, balance and executive function. Treatment/Activity Tolerance:  [x] Patient tolerated treatment well [] Patient limited by fatigue  [] Patient limited by pain  [] Patient limited by other medical complications  [] Other:     Prognosis: [x] Good [] Fair  [] Poor    Patient Requires Follow-up: [x] Yes  [] No    PLAN: See eval  [x] Continue per plan of care [] Alter current plan (see comments)  [] Plan of care initiated (MD cosigned) [] Hold pending MD visit [] Discharge    Electronically signed by:                             Note: If patient does not return for scheduled/ recommended follow up visits, this note will serve as a discharge from care along with most recent update on progress.

## 2021-10-05 NOTE — FLOWSHEET NOTE
[address] [facility] Physical Therapy  Phone: [phone]   Fax: [phone]      Physical Therapy Daily Treatment Note  Date:  10/5/2021     Patient Name:  Valarie Ivy    :  2000  MRN: [id number]  Medical/Treatment Diagnosis Information:  Diagnosis: Cerebral palsy with spastic diplegia  Treatment Diagnosis: Decreased trunk control impacting ability to complete safe transfers, decreased standing tolerance, LE weakness  Insurance/Certification information:  PT Insurance Information: Medicare & Medicaid  Physician Information:  Referring Practitioner: JOHNNY Martinez  Plan of care signed (Y/N): [x]  Yes []  No     Date of Patient follow up with Physician:      Progress Report: []  Yes  [x]  No     Date Range for reporting period:  Beginning  21  Re-eval:   PN: 21  PN: 21  Ending    Progress report due (10 Rx/or 30 days whichever is less):      Recertification due (POC duration/ or 90 days whichever is less):      Visit # Insurance Allowable Auth required? Date Range    +  + 10/10 +  Medical necessity []  Yes  [x]  No n/a     Latex Allergy:  [x]NO      []YES  Preferred Language for Healthcare:   [x]English       []Other:      Functional Scale/Test Evaluation 3/1/2021 4/21/21  6/22/21 7/28 9/8 Discharge   STREAM 23  26 29 29                            Pain level:  0/10  Location:  none    SUBJECTIVE:  Pt without complaint starting the session. Did not use the stander over the last few days. OBJECTIVE:  : Session started 10 min after scheduled time, pt had to use the restroom. PT offered to help pt with standing, but pt declined, stating he would rather use the urinal to save some energy for the session.     :  Pt 15 mins late then needed to use restroom    Co-treat w/OT for safety and assistance    RESTRICTIONS/PRECAUTIONS: recent Bell's Palsy    Interventions/Exercises:   Therapeutic Exercises (94255) Resistance / level Sets/sec Reps Notes   W/c push ups in preparation for standing                            Neuromuscular Re-ed (84987) /  Gait Training (22622)       Upright posture seated in W/C   X 5 reps holding football, rest of reps completed with L UE bracing on L knee, PT hand assist on sternum and lumbar spine to facilitate                                Gait Training:  Amb in //bars    Bwd walking in //bars      Transfer w/B Lofstrand crutches   Static stand w/B Lofstrand crutches   8 ft    8 ft    Mod A of 2  Mod A of 2   -manual required to advance LEs, pt activation hip extensors to initiate swing phase, demo'd adequate weight-shifting. OT provided assist & w/c follow for safety      -difficulty with placement for adequate support                 Therapeutic Activities (06158) /  Functional Tasks       [facility] reaching across midline in seated   Pt seated in w/c using seat belt to help stay in w/c          [facility] reaching across midline and diagonally   Pt seated in w/c using seat belt to help stay in w/c               STS to std walker            Transfer to w/c from mat table            STS from w/c to mat table - with large bolster placed on table in front of patient to hold onto   Min A, w/c close to mat table to assist in blocking pt's knees. Manual Intervention ([phone])       Supine hamstring stretch 90/90    -completed by PT  -completed by OT                                        Sessions:     10/5:  Treatment focused on improving trunk control with transitional movement patterns to increase safety and independence. Patient transferred to mat with min assist.  Patient then worked on sit to 1/2 stand with min assist of two. Patient then worked on diagonal reaching with 2# weights x 10 reps each UE. Patient then layed supine with legs over the edge of the mat. Patient held dowel aretha with blue theraband tied to it and worked on moving into shoulder flexion and knee extension times 5 reps. Patient then worked on serratus punches with trunk rotation and initiated rolling with hip and knee flexion times 5 reps each direction. Pt pushed into PT or OTs hands with each LE during the rolling as mat was too high for pt's feet to be on the ground. Patient then sat at edge of mat and completed simulated dressing activity with use of rings reaching to feet and placing rings over feet times 2 each side with min assist.  Patient then worked on balance with t ball and bat swing hitting ball and then completing side scoots to go from \" base to base\". Min assist to maintain base of support during scoots with improved motor planning. Patient then completed ring toss times 10 ending treatment with side scoots times 5 to wheelchair with contact guard and excellent lift off.      9/28: Treatment focused on improving trunk control with transitional movement patterns to increase safety and independence. At beginning of session, foam padding added to heel of L AFO to increase comfort and decrease s/s of skin breakdown. Patient performed stretching of BLEs while seated in w/c for improved knee extension with emphasis on reducing external rotation. Isometric strengthening with BLEs in seated, with proprioceptive input at knees and ankles to facilitate knee extension. Patient then performed donning AFOs with verbal cues provided on safety to wear seatbelt to reduce risk of falls when bending forward; patient verbalized understanding and able to secure safety belt. Patient trialed donning AFO with hips in abduction with verbal cueing required 75% of time, resting trunk on mat table for stability, and extra time to complete task due to fatigue for LLE. Patient trialed donning AFO on RLE in normal sitting position following encouragement from therapist to attempt without need for trunk support, 50% verbal cues, and improved timing. Stand pivot transfer w/c to mat with min A x1 + mod A x 1.  While seated EOM, patient performed dynamic balance, coordination, and abdominal strengthening while reaching at various heights with BUE's. Transitioning from seated to supine, patient required min A x 2 and cues for proper hand placement to increase independence. Supine to quadruped mod A x 2. In quadruped position, patient working on lengthening spine, stabilizing hips, trunk control, and UE strengthening; patient performed modified push ups with UEs on wedge cushion with proprioceptive input applied to lateral aspect of hips to increase stabilization. Pt then working on tall kneel with proprioceptive input applied to hips by PT and AAROM/proprioceptive input provided by OT to reach overhead with shoulders in flexion and returning to neutral while maintaining trunk control, patient with verbal cues to engage abdominal muscles and pelvic stability. When transitioning from quadruped to tall kneel, patient required Min-mod A x 2, with cueing to engage abdominals. Patient required frequent rest breaks on this date due to fatigue throughout treatment. Patient then working on STS with UE support through RW with blocking provided at knees and Max A x 2 tolerating ~15 seconds. Education provided to patient re: importance of using stander daily and performing modified scoot transfers when home, patient verbalizes understanding.     9/21: Treatment focused on improving trunk control with transitional movement patterns to increase safety and independence. Sit to stand from wheelchair mod A of 1 and then mod of two for pivot to large mat table. Patient sat at edge of mat and worked on dynamic sitting balance while first hitting targets on easel with use of squigs x 12 reps followed by playing two games of hang man focusing on midline orientation and writing legibily on vertical plane needing stand by assist for sitting balance. He then complained of back pain due to fatigue after 25 minutes of game.  Patient then transitioned to supine with mod assist of one, completed hip bridges times 5 with mod assist of one followed by hitting targets across midline  Punching red 20# resistance band across midline times 5 reps each direction to facilitate abdominals and serratus strength. Patient then transitioned to prone with mod assist of two and worked on transitions to quadriped times 5 with 5 push ups completed at each transition. Patient then transitioned to short sit at edge of mat with mod assist of one, completed sit to stand times 2 at edge of mat with mod assist of two and then transferred to wheelchair with mod assist of two.      9/15:  Sit to stand from wheelchair mod A of 2 to large mat table w/large gray bolster for UE support. Transitioned to quadruped w/UEs on bolster w/manual facilitation of mod A of 1 for each LE onto mat table. Transitioned to tall knee w/man cues to pelvis and trunk to facilitate good trunk ext and balance pelvis w/even WB'ing through BLEs x2 attempts. Pt demo'd good glute max contraction but had difficulty reducing dependency on UEs so red [place] ball was used instead of bolster to destabilize UE support. Pt able to improve tall kneeling posture x3 sets /continued manual facilitation to pelvis for stability and equal WB'ing and improved trunk posture. Patient able to remove his UEs from supportive device for short periods of time while maintaining good posture. During that time, patient able to utilize core musculature but quickly fatigued and switched focus to lumbar musculature. Transitioned from quadruped on swiss ball to L sidelying, to quadruped, to R sideyling w/manual facilitation of 1. In R sidelying, R lateral trunk flex x5 w/manual cues. L knee to L elbow in R sidelying for oblique contraction x3 w/man facilitation to L pelvis and scapula and min assist of 1 for LLE.  R S/L to long sitting, mod A of 2.   Positioned UEs behind pt for support with pt able to complete slight internal rotation of hips to midline in this position; min-mod facilitation of triceps for elbow extension as pt fatigued. Rest break leaning forward grasping hands behind thighs. With min-mod A and reduced friction, pt slid LEs over side of mat for short sit. Completed posterior-anterior lean in this position with focus on abdominal engagement and oblique control to maintain midline x12 with 4 instances min A to prevent lateral LOB. STS with mod A x2 with pt maintaining stand ~90 seconds and completing stand step to w/c on R side with max A for LE advancement. 9/8: In preparation for standing activities the pt performed B UE walk outs in seated; pt required manual cues to facilitate WB through B hands. Pt performed sit to stand with mod Ax2 verbal cues to facilitate glute contraction when standing; tactile cues at B knee to facilitate knee extension once standing. Pt performed static standing w/ WB through B UE's for roughly 3 minutes. Following exercise pt performed stand to sit with poor eccentric control. While sitting the pt performed rapid lateral trunk shifts to premote core stability. Pt repeated static standing with B UE WB; continuing to require cueing at B knees, but fewer at the UEs. Pt demonstrated more control with stand to sit. Pt performed overhead ball throws in a seated position with no back rest; pt had loss of balance to the R multiple times requiring physical assist from OT. After resting pt performed another sit to stand and attempted to through the ball overhead, performed x5; pt had difficulty maintaining balance and required mod Ax2 with manual cues at B knees to facilitate knee extension. Performed the STREAM assessment, standing potion in //bars. On the mat table pt performed long sitting w/ physical assist of OT.     7/28:  Session initiated with squat pivot transfer w/c to mat with CGA x1; noted improved positioning of w/c and LEs prior to initiation of transfer.  Assessed pt progress using functional STREAM assessment with results noted above. In supine, pt completed hip bridges x3 with improved ability to maintain knees in midline following tactile and verbal cues. Pt transitioned to prone with supervision and quadruped with CGA; poor eccentric control noted with pt using momentum of rocking motion and UE weight bearing to assume quadruped. In tall kneel, pt hit playground ball with each hand x6 with focus on trunk rotation and UE control. Large gray bolster placed in front of pt to use as stabilizing surface to assist with dynamic balance. Pt assumed quadruped with support of bolster at trunk for rest break, followed by forearm weight bearing on bolster to complete alternating LE extension/return to quadruped x12 each side with tactile and verbal cues for glute contraction during extension and weight shift to opposite side for flexion. Pt transitioned to sit edge of mat with mod A x2. Squat pivot mat to w/c min A. At ballet barre, pt completed STS with one hand pressing into surface pt seated on and one hand pulling on barre to stand with max A x2 to block knees from buckling and facilitate quads and glutes for LE extension; repeated STS and static stance x3 for 30-90 seconds. Session concluded with pt grasping ballet barre seated in w/c to push self away from barre for UE and trunk lengthening stretch and pulling self to barre with same speed each UE for strengthening and control. 7/26: Pt used restroom prior to session and was in a hurry and had some urine on his shirt. Pt doffed shirt and donned clean T shirt with stand by/set up. Pt transferred from w/c to mat table with mod A and transitioned sit to supine with mod A. While in supine, pt worked on upper trunk rotation reaching across body for targets 10x B UE. PT assisted with keeping LEs in hooklying position and rotation at hips. Pt then completed 10 supine hip bridges.  Pt then rolled to prone and worked on achieving modified plank on elbows with very min assist to maintain feet as base of support. He then transitioned with momentum and min A to Q-ped. He crawled up onto a swiss ball for support of trunk and worked on propping prone on elbows in modified tall kneeling. From this position pt played [address] [facility] Man writing letters on a dry erase board with mod - min x 1 to maintain midline. He then transitioned to short sit with max assist and completed one game of hangman in short sit at edge of mat. Pt needed min of one to maintain balance while writing on board and min assist for executive function. PT assisted with manual pec stretch with pt leaning back over swiss ball and applying overpressure in the posterior direction at the shoulders. Pt then worked on sit to stand times three at walker with forearm rests with mod assist of one for hip/knee control and cues to engage quads and glutes. Pt then transferred to wheelchair with contact guard. 7/19: Treatment focused on improving independence in LE dressing, trunk control, and standing balance and tolerance for functional mobility. Session initiated with patient transferring to mat. Patient then initiated treatment focusing on trunk stabilization reaching and hitting targets with trunk rotation times 10 reps each direction losing balance more to left and then completed sit to supine with min assist.  Patient then worked on trunk rotation each direction in supine to hit target completing intervals of 30 seconds hitting target 6 -8 reps each direction in 30 second intervals times 4. Patient needing cues at scapula to rotate to the upper trunk . Patient then completed bridge and scoots on mat with mod assist of one to two. Patient then worked on standing tolerance at walker with forearm support of arm troughs completed 3 reps standing up to 2 minutes with min assist of two  While playing connect 4.   Patient finished treatment seated at edge of mat playing bozena focusing on trunk stability and shuffling cards, bilateral coordination, intrinsic hand function. Modalities:     Pt. Education:  -patient educated on diagnosis, prognosis and expectations for rehab  -all patient questions were answered    HEP instruction:      NMR and Therapeutic Activities:    [x] (89375 or 25756) Provided verbal/tactile cueing for activities related to improving balance, coordination, kinesthetic sense, posture, motor skill, proprioception and motor activation to allow for proper function of   [x] LE / Core, hip and LE with self care and ADLs  [x] UE / Shoulder complex: cervical, postural, scapular, scapulothoracic and UE control with self care, carrying, lifting, driving, computer work.   [] (81683) Gait Re-education- Provided training and instruction to the patient for proper LE, core and hip recruitment, positioning, and eccentric body weight control with ambulation re-education, including ascending & descending stairs     Home Management Training / Self Care:  [] (92518) Provided self-care/home management training related to activities of daily living and compensatory training, and/or use of adaptive equipment for improvement with: ADLs and compensatory training, meal preparation, safety procedures and instruction in use of adaptive equipment, including bathing, grooming, dressing, personal hygiene, basic household cleaning and chores. Therapeutic Exercise and NMR EXR  [x] (86546) Provided verbal/tactile cueing for activities related to strengthening, flexibility, endurance, ROM for improvements in  [x] LE / Core stability: LE, hip, and core control with self care, mobility, lifting, ambulation. [x] UE / Shoulder complex: cervical, postural, scapular, scapulothoracic and UE control with self care, reaching, carrying, lifting, house/yardwork, driving, computer work.   [x] (68799) Provided verbal/tactile cueing for activities related to improving balance, coordination, kinesthetic sense, posture, motor skill, proprioception to assist with [x] LE / Core stability: LE, hip, and core control in self care, mobility, lifting, ambulation and eccentric single leg control. [x] UE / Shoulder complex: cervical, scapular, scapulothoracic and UE control with self care, reaching, carrying, lifting, house/yardwork, driving, computer work.   [] (56311) Therapist is in constant attendance of 2 or more patients providing skilled therapy interventions, but not providing any significant amount of measurable one-on-one time to either patient, for improvements in  [] LE / Core stability: LE, hip, and core control in self care, mobility, lifting, ambulation and eccentric single leg control. [] UE / Shoulder complex: cervical, scapular, scapulothoracic and UE control with self care, reaching, carrying, lifting, house/yardwork, driving, computer work.      Home Exercise Program:    [] (88106) Reviewed/Progressed HEP activities related to strengthening, flexibility, endurance, ROM of   [] LE / Core stability: core, hip and LE for functional self-care, mobility, lifting and ambulation/stair navigation   [] UE / Shoulder complex: cervical, postural, scapular, scapulothoracic and UE control with self care, reaching, carrying, lifting, house/yardwork, driving, computer work  [] (52990)Reviewed/Progressed HEP activities related to improving balance, coordination, kinesthetic sense, posture, motor skill, proprioception of   [] LE: core, hip and LE for self care, mobility, lifting, and ambulation/stair navigation    [] UE / Shoulder complex: cervical, postural,  scapular, scapulothoracic and UE control with self care, reaching, carrying, lifting, house/yardwork, driving, computer work    Manual Treatments:  PROM / STM / Oscillations-Mobs:  G-I, II, III, IV (PA's, Inf., Post.)  [] (27269) Provided manual therapy to mobilize shoulder complex, hip, LE, and/or cervicothoracic/LS spine soft tissue/joints for the purpose of modulating pain, promoting relaxation,  increasing ROM, hip complex, and core activation to allow for proper functional mobility as indicated by patients Functional Deficits. [x]? Progressing: []? Met: []? Not Met: []? Adjusted  3. Patient will return to functional activities including demo'ing safe transfers to/from w/c with mod I.    [x]? Progressing: []? Met: []? Not Met: []? Adjusted  4. Patient will increase STREAM score to 32 or above for increased UE/LE movement in sitting, supine, and standing. [x]? Progressing: []? Met: []? Not Met: []? Adjusted          Overall Progression Towards Functional goals/ Treatment Progress Update:  [] Patient is progressing as expected towards functional goals listed. [x] Progression is slowed due to complexities/Impairments listed. [] Progression has been slowed due to co-morbidities. [] Plan just implemented, too soon to assess goals progression <30days   [] Goals require adjustment due to lack of progress  [] Patient is not progressing as expected and requires additional follow up with physician  [] Other    Persisting Functional Limitations/Impairments:  []Sleeping [x]Sitting               []Standing [x]Transfers        []Walking []Kneeling               []Stairs []Squatting / bending   [x]ADLs []Reaching  []Lifting  []Housework  []Driving []Job related tasks  []Sports/Recreation []Other:        ASSESSMENT:   Pt performed one of his best sessions to date today. He was able to complete a stand pivot transfer from w/c to mat table at the beginning of the session with only min A. He demo'd improved weight shifting throughout the session when scooting on the mat with very minimal cueing and min A. He had improved endurance and did not rest as often as he typically does. Will continue with remaining visits and then plan to DC.     Treatment/Activity Tolerance:  [x] Patient able to complete tx  [] Patient limited by fatigue  [] Patient limited by pain  [] Patient limited by other medical complications  [] Other:

## 2021-10-11 ENCOUNTER — HOSPITAL ENCOUNTER (OUTPATIENT)
Dept: OCCUPATIONAL THERAPY | Age: 21
Setting detail: THERAPIES SERIES
Discharge: HOME | End: 2021-10-11
Payer: MEDICARE

## 2021-10-11 ENCOUNTER — HOSPITAL ENCOUNTER (OUTPATIENT)
Dept: PHYSICAL THERAPY | Age: 21
Setting detail: THERAPIES SERIES
Discharge: HOME | End: 2021-10-11
Payer: MEDICARE

## 2021-10-11 PROCEDURE — 97112 NEUROMUSCULAR REEDUCATION: CPT

## 2021-10-11 PROCEDURE — 97530 THERAPEUTIC ACTIVITIES: CPT

## 2021-10-11 PROCEDURE — 97110 THERAPEUTIC EXERCISES: CPT

## 2021-10-11 PROCEDURE — 97535 SELF CARE MNGMENT TRAINING: CPT

## 2021-10-11 NOTE — FLOWSHEET NOTE
improving trunk control with transitional movement patterns to increase safety and independence. Session initiated by patient preparing for transfers with the ability to remove B leg rests of wheelchair with improved trunk stability, no LOB noted when removing leg rests. Patient then performed modified scoot transfers with use of simulated bed rail with Min A x 2. Patient then worked on sit to stands with use of blanche-walker with emphasis on weight shifting/hip hiking when standing to promote side step moving laterally on mat table, patient with occasional LOB noted requiring Mod A to self correct. Patient provided with thorough education re: abdominal control/strengthening and placement of BLE's during scooting. Patient transferred back into w/ with use of blanche-walker with CGA, showing improvement from beginning of session. Patient then transferred onto the total gym, performing ~15 quadricep presses with emphasis on eccentric control, PT and OT providing stabilization to BLE's on platform and verbal cues/proprioceptive input provided at distal thigh to promote full extension of knee-patient tolerated well. Patient then working on gross and fine motor coordination/strethening of BUE's, bimanual coordination, motor planning, and trunk control while seated in w/ during dynamic seated task of pumpkin designing/carving/scooping. OT providing hand over hand guidance during carving to increase safety and provide just right challenge. Patient trialed standing when scooping pumpkin seeds, able to stand for ~30-45 seconds before needing to sit due to complexity of task and returned to seated for remainder of task. Patient with improved motor planning, bimanual coordination, and gross/fine motor coordination with pumpkin carving as session progressed as evidenced by no longer requiring hand over hand guidance during carving.        Therapeutic Exercises  Resistance / level Sets/sec Reps Notes scapulothoracic and UE control with self care, reaching, carrying, lifting, house/yardwork, driving/computer work    [] Comments:    Manual Treatments:  PROM / STM / Oscillations-Mobs:  G-I, II, III, IV (PA's, Inf., Post.)  [] (63928) Provided manual therapy to mobilize soft tissue/joints of cervical/CT, scapular GHJ and UE for the purpose of modulating pain, promoting relaxation,  increasing ROM, reducing/eliminating soft tissue swelling/inflammation/restriction, improving soft tissue extensibility and allowing for proper ROM for normal function with self care, reaching, carrying, lifting, house/yardwork, driving/computer work  [] Comments:    ADL Training:  [] (21319) Provided self-care/home management training related to activities of daily living and compensatory training, and/or use of adaptive equipment   [] Comments:     Splinting:  [] Fabrication of:   [] (28927) Orthotic/Prosthetic Management, subsequent encounter  [] (16423) Orthotic management and training (fitting and assessment)  [] Comments:      Charges: co treat with PT for safety and increased intensity of treatment  Timed Code Treatment Minutes: 45   Total Treatment Minutes: 90     [] EVAL (LOW) 54629   [] OT Re-eval (32465)  [] EVAL (MOD) 43957   [] EVAL (HIGH) 40246       [x] Ru (90102) x   1  [] VQAYZ(56599)  [x] NMR (94076) x  1 [] Estim (attended) (74867)   [] Manual ([id number]) x      [] US (88824)  [x] TA (92429) x  1  [] Paraffin (12924)  [] ADL  (94 649 24 60) x     [] Splint/L code:    [] Estim (unattended) (22 582343)  [] Fluidotherapy (33012)  [] Other:    GOALS:    Patient stated goal: improved trunk control and standing tolerance to increase independence in self care. [x]? Progressing: []? Met: []? Not Met: []? Adjusted     Therapist goals for Patient:   Short Term Goals: To be achieved in: 30 days  1. Pt will be independent with clothing management on toilet or in wheelchair to complete toileting with use of grab bar. Min assist  [x]? Progressing: []? Met: []? Not Met: []? Adjusted  2. Pt will be stand by assist completing scoot transfers on level surfaces  Contact guard  [x]? Progressing: []? Met: []? Not Met: []? Adjusted  3. Patient will be able to stand up to 30 seconds at grab bar for toileting and lower body dress with min assist.  Goal met progress to stand by assist   [x]? Progressing: []? Met: []? Not Met: []? Adjusted     Long Term Goals: To be achieved by discharge  1. Pt will demonstrate an improved stream score of 28. [place] [address] [place]  []? Progressing: [x]? Met: []? Not Met: [x]? Adjusted    Progression Towards Functional goals:  [x] Patient is progressing as expected towards functional goals listed. [] Progression is slowed due to complexities listed. [] Progression has been slowed due to co-morbidities. [] Plan just implemented, too soon to assess goals progression  [] All goals are met  [] Other:     ASSESSMENT:   Patient has made progress in mobility, balance and executive function. Treatment/Activity Tolerance:  [x] Patient tolerated treatment well [] Patient limited by fatigue  [] Patient limited by pain  [] Patient limited by other medical complications  [] Other:     Prognosis: [x] Good [] Fair  [] Poor    Patient Requires Follow-up: [x] Yes  [] No    PLAN: See eval  [x] Continue per plan of care [] Alter current plan (see comments)  [] Plan of care initiated (MD cosigned) [] Hold pending MD visit [] Discharge    Electronically signed by:                             Note: If patient does not return for scheduled/ recommended follow up visits, this note will serve as a discharge from care along with most recent update on progress.

## 2021-10-11 NOTE — FLOWSHEET NOTE
[address], [address] [place] [address] [place], [address]  Phone: [phone]   Fax: [phone]    Occupational Therapy Daily Treatment Note  Date:  10/11/2021    Patient: Felicitas Wilcox   : 2000   MRN: [id number]  Referring Physician:  Abhijit Obregon CNP       Medical Diagnosis Information: paraplegia, cerebral palsy status post covid has not been seen in clinic since                                        Insurance information: medicare/ medicaid     Date of Injury:   Date of Surgery: rhizotomy when he was about 10    Progress Report: []  Yes  [x]  No     Date Range for reporting period:  2021 to 2021. Patient is status post covid recovery and missed treatments since 2021. Progress report due (10 Rx/or 30 days whichever is less): visit #33 or  10/8/5180    Recertification due (POC duration/ or 90 days whichever is less): visit #33 or 2021    Visit # Insurance Allowable Auth required? Date Range    +  + 2/5 [place] []  Yes  [x]  No n/a       Latex Allergy:  []No      []Yes  Pacemaker:  [] No       [] Yes     Preferred Language for Healthcare:   [x]English       []other:    Pain level: 0/10    SUBJECTIVE: Pt reporting pain/tenderness in LLE when wearing AFO, callous noted on bottom of heel. Patient often repeating he is fatigued on this date. Pt reports he has used the stander while at home but non complaint with daily use.      Functional Disability Index:      Stroke Rehabilitation Assessment of Movement (STREAM) 3/1/21 4/21/21 6/22/21 7/28/21 2021    23 24  (supine- 9/15, sitting- 15/31, standing- 0) 26 29  (supine- 9/15, sitting- , standing- 3/24 29  (supine- 8/15, sitting- , standing-        OBJECTIVE:     21: 3 minutes static stance at parallel bars with CGA x2 for trunk control and blocking knees      RESTRICTIONS/PRECAUTIONS: fall risk    Exercises/Interventions:   Treatment focused on improving trunk control with transitional movement patterns to increase safety and independence. Session initiated by patient preparing for transfers with the ability to remove B leg rests of wheelchair with improved trunk stability, no LOB noted when removing leg rests. Patient then performed modified scoot transfers with use of simulated bed rail with Min A x 2. Patient then worked on sit to stands with use of blanche-walker with emphasis on weight shifting/hip hiking when standing to promote side step moving laterally on mat table, patient with occasional LOB noted requiring Mod A to self correct. Patient provided with thorough education re: abdominal control/strengthening and placement of BLE's during scooting. Patient transferred back into w/ with use of blanche-walker with CGA, showing improvement from beginning of session. Patient then transferred onto the total gym, performing ~15 quadricep presses with emphasis on eccentric control, PT and OT providing stabilization to BLE's on platform and verbal cues/proprioceptive input provided at distal thigh to promote full extension of knee-patient tolerated well. Patient then working on gross and fine motor coordination/strethening of BUE's, bimanual coordination, motor planning, and trunk control while seated in w/ during dynamic seated task of pumpkin designing/carving/scooping. OT providing hand over hand guidance during carving to increase safety and provide just right challenge. Patient trialed standing when scooping pumpkin seeds, able to stand for ~30-45 seconds before needing to sit due to complexity of task and returned to seated for remainder of task. Patient with improved motor planning, bimanual coordination, and gross/fine motor coordination with pumpkin carving as session progressed as evidenced by no longer requiring hand over hand guidance during carving.        Therapeutic Exercises  Resistance / level Sets/sec Reps Notes Neuromuscular Re-ed / Therapeutic Activities                                                 Manual Intervention                                                     Modalities:     Patient education:  Plan of care, importance of weight bearing in stander for overall health, home exercise program, goals. Home Exercise Program:   Patient encouraged to start using stander 3 sessions a day and while in stander completing over head reaches . Patient to try and reach 15 minute intervals in stander. Therapeutic Exercise and NMR:  [x] (07651) Provided verbal/tactile cueing for activities related to strengthening, flexibility, endurance, ROM  for improvements in scapular, scapulothoracic and UE control with self care, reaching, carrying, lifting, house/yardwork, driving/computer work. [x] (06713) Provided verbal/tactile cueing for activities related to improving balance, coordination, kinesthetic sense, posture, motor skill, proprioception  to assist with  scapular, scapulothoracic and UE control with self care, reaching, carrying, lifting, house/yardwork, driving/computer work.   [] Comments:    Therapeutic Activities:    [x] (71307 or 52102) Provided verbal/tactile cueing for activities related to improving balance, coordination, kinesthetic sense, posture, motor skill, proprioception and motor activation to allow for proper function of scapular, scapulothoracic and UE control with self care, carrying, lifting, driving/computer work  [] Comments:    Home Exercise Program:    [x] (88418) Reviewed/Progressed HEP activities related to strengthening, flexibility, endurance, ROM of scapular, scapulothoracic and UE control with self care, reaching, carrying, lifting, house/yardwork, driving/computer work  [] (71050) Reviewed/Progressed HEP activities related to improving balance, coordination, kinesthetic sense, posture, motor skill, proprioception of scapular, scapulothoracic and UE control with self care, reaching, carrying, lifting, house/yardwork, driving/computer work    [] Comments:    Manual Treatments:  PROM / STM / Oscillations-Mobs:  G-I, II, III, IV (PA's, Inf., Post.)  [] (59555) Provided manual therapy to mobilize soft tissue/joints of cervical/CT, scapular GHJ and UE for the purpose of modulating pain, promoting relaxation,  increasing ROM, reducing/eliminating soft tissue swelling/inflammation/restriction, improving soft tissue extensibility and allowing for proper ROM for normal function with self care, reaching, carrying, lifting, house/yardwork, driving/computer work  [] Comments:    ADL Training:  [] (14691) Provided self-care/home management training related to activities of daily living and compensatory training, and/or use of adaptive equipment   [] Comments:     Splinting:  [] Fabrication of:   [] (26842) Orthotic/Prosthetic Management, subsequent encounter  [] (38454) Orthotic management and training (fitting and assessment)  [] Comments:      Charges: co treat with PT for safety and increased intensity of treatment  Timed Code Treatment Minutes: 45   Total Treatment Minutes: 90     [] EVAL (LOW) 41981   [] OT Re-eval (18220)  [] EVAL (MOD) 89347   [] EVAL (HIGH) 63012       [x] Ru (80004) x   1  [] IHVHF(69173)  [x] NMR (54138) x  1 [] Estim (attended) (99334)   [] Manual ([id number]) x      [] US (00100)  [x] TA (11978) x  1  [] Paraffin (45422)  [] ADL  (10 649 24 60) x     [] Splint/L code:    [] Estim (unattended) (22 766077)  [] Fluidotherapy (58117)  [] Other:    GOALS:    Patient stated goal: improved trunk control and standing tolerance to increase independence in self care. [x]? Progressing: []? Met: []? Not Met: []? Adjusted     Therapist goals for Patient:   Short Term Goals: To be achieved in: 30 days  1. Pt will be independent with clothing management on toilet or in wheelchair to complete toileting with use of grab bar. Min assist  [x]? Progressing: []? Met: []? Not Met: []? Adjusted  2. Pt will be stand by assist completing scoot transfers on level surfaces  Contact guard  [x]? Progressing: []? Met: []? Not Met: []? Adjusted  3. Patient will be able to stand up to 30 seconds at grab bar for toileting and lower body dress with min assist.  Goal met progress to stand by assist   [x]? Progressing: []? Met: []? Not Met: []? Adjusted     Long Term Goals: To be achieved by discharge  1. Pt will demonstrate an improved stream score of 28. New goal is 32  []? Progressing: [x]? Met: []? Not Met: [x]? Adjusted    Progression Towards Functional goals:  [x] Patient is progressing as expected towards functional goals listed. [] Progression is slowed due to complexities listed. [] Progression has been slowed due to co-morbidities. [] Plan just implemented, too soon to assess goals progression  [] All goals are met  [] Other:     ASSESSMENT:   Patient has made progress in mobility, balance and executive function. Treatment/Activity Tolerance:  [x] Patient tolerated treatment well [] Patient limited by fatigue  [] Patient limited by pain  [] Patient limited by other medical complications  [] Other:     Prognosis: [x] Good [] Fair  [] Poor    Patient Requires Follow-up: [x] Yes  [] No    PLAN: See eval  [x] Continue per plan of care [] Alter current plan (see comments)  [] Plan of care initiated (MD cosigned) [] Hold pending MD visit [] Discharge    Electronically signed by:                             Note: If patient does not return for scheduled/ recommended follow up visits, this note will serve as a discharge from care along with most recent update on progress.

## 2021-10-11 NOTE — FLOWSHEET NOTE
[address] [facility] Physical Therapy  Phone: [phone]   Fax: [phone]      Physical Therapy Daily Treatment Note  Date:  10/11/2021     Patient Name:  Paola Lindsey    :  2000  MRN: [id number]  Medical/Treatment Diagnosis Information:  Diagnosis: Cerebral palsy with spastic diplegia  Treatment Diagnosis: Decreased trunk control impacting ability to complete safe transfers, decreased standing tolerance, LE weakness  Insurance/Certification information:  PT Insurance Information: Medicare & Medicaid  Physician Information:  Referring Practitioner: JOHNNY Lopez  Plan of care signed (Y/N): [x]  Yes []  No     Date of Patient follow up with Physician:      Progress Report: []  Yes  [x]  No     Date Range for reporting period:  Beginning  21  Re-eval:   PN: 21  PN: 21  Ending    Progress report due (10 Rx/or 30 days whichever is less):      Recertification due (POC duration/ or 90 days whichever is less):      Visit # Insurance Allowable Auth required? Date Range    +  + 10/10 + / Medical necessity []  Yes  [x]  No n/a     Latex Allergy:  [x]NO      []YES  Preferred Language for Healthcare:   [x]English       []Other:      Functional Scale/Test Evaluation 3/1/2021 4/21/21  6/22/21 7/28 9/8 Discharge   STREAM 23  26 29 29                            Pain level:  0/10  Location:  none    SUBJECTIVE:  Pt brought his phone today, he would like to take a video of how he transfers in therapy to show his mom. OBJECTIVE:  : Session started 10 min after scheduled time, pt had to use the restroom. PT offered to help pt with standing, but pt declined, stating he would rather use the urinal to save some energy for the session.     :  Pt 15 mins late then needed to use restroom    Co-treat w/OT for safety and assistance    RESTRICTIONS/PRECAUTIONS: recent Bell's Palsy    Interventions/Exercises:   Therapeutic Exercises (20824) Resistance / level Sets/sec Reps Notes   W/c push ups in preparation for standing                            Neuromuscular Re-ed (15220) /  Gait Training (98510)       Upright posture seated in W/C   X 5 reps holding football, rest of reps completed with L UE bracing on L knee, PT hand assist on sternum and lumbar spine to facilitate                                Gait Training:  Amb in //bars    Bwd walking in //bars      Transfer w/B Lofstrand crutches   Static stand w/B Lofstrand crutches   8 ft    8 ft    Mod A of 2  Mod A of 2   -manual required to advance LEs, pt activation hip extensors to initiate swing phase, demo'd adequate weight-shifting. OT provided assist & w/c follow for safety      -difficulty with placement for adequate support                 Therapeutic Activities (92388) /  Functional Tasks       [facility] reaching across midline in seated   Pt seated in w/c using seat belt to help stay in w/c          [facility] reaching across midline and diagonally   Pt seated in w/c using seat belt to help stay in w/c               STS to std walker            Transfer to w/c from mat table            STS from w/c to mat table - with large bolster placed on table in front of patient to hold onto   Min A, w/c close to mat table to assist in blocking pt's knees. Manual Intervention ([phone])       Supine hamstring stretch 90/90    -completed by PT  -completed by OT                                        Sessions:     10/11: Treatment focused on trunk control to improve safety and independence at home, with focus on transfers. Began session with pt transferring to mat table. Before transfer, he was able to maintain his balance while leaning forward to remove the leg rests from his wheelchair. Pt transferred to EOB with min A of 2, with 1 hand on W/C and the other hand placement on a modified bed rail (using a long-handled step stool).  Pt performed modified scoot transfers along the length of the mat, again with 1 hand placed on arm of w/c and the other hand on a modified bed rail, but part way through the modified rail was switched out with a blanche-walker. Pt then worked on STS with use of blanche-walker with emphasis on weight shifting/hip hiking when standing to promote side step moving laterally on mat table, patient with occasional LOB noted requiring Mod A to self correct. Patient provided with thorough education re: abdominal control/strengthening and placement of BLE's during scooting. Pt transferred back into /c with use of blanche-walker and CGA/min A. Pt then transferred onto [employer] with mod A x 2, with additional assistance needed for LE placement on foot plate. Pt completed ~15 squats with tactile cues provided for full knee extension and verbal cues for eccentric control. Pt tolerated this activity well and was fatigued afterwards. Pt then worked on fine motor skills and UE strengthening, as well as motor planning while seated in w/ and drawing on then carving a pumpkin. Initially pt tried to stand while scooping out seeds, and was able to stand for 30-45 seconds but was leaning heavily on the table and was unable to scoop while standing. Pt then completed the task while seated. Initially OT provided hand over hand guidance during carving then pt finished the carving mod I with cueing. 10/5:  Treatment focused on improving trunk control with transitional movement patterns to increase safety and independence. Patient transferred to mat with min assist.  Patient then worked on sit to 1/2 stand with min assist of two. Patient then worked on diagonal reaching with 2# weights x 10 reps each UE. Patient then layed supine with legs over the edge of the mat. Patient held dowel aretha with blue theraband tied to it and worked on moving into shoulder flexion and knee extension times 5 reps.   Patient then worked on serratus punches with trunk rotation and initiated rolling with hip and knee flexion times 5 reps each direction. Pt pushed into PT or OTs hands with each LE during the rolling as mat was too high for pt's feet to be on the ground. Patient then sat at edge of mat and completed simulated dressing activity with use of rings reaching to feet and placing rings over feet times 2 each side with min assist.  Patient then worked on balance with t ball and bat swing hitting ball and then completing side scoots to go from \" base to base\". Min assist to maintain base of support during scoots with improved motor planning. Patient then completed ring toss times 10 ending treatment with side scoots times 5 to wheelchair with contact guard and excellent lift off.      9/28: Treatment focused on improving trunk control with transitional movement patterns to increase safety and independence. At beginning of session, foam padding added to heel of L AFO to increase comfort and decrease s/s of skin breakdown. Patient performed stretching of BLEs while seated in w/c for improved knee extension with emphasis on reducing external rotation. Isometric strengthening with BLEs in seated, with proprioceptive input at knees and ankles to facilitate knee extension. Patient then performed donning AFOs with verbal cues provided on safety to wear seatbelt to reduce risk of falls when bending forward; patient verbalized understanding and able to secure safety belt. Patient trialed donning AFO with hips in abduction with verbal cueing required 75% of time, resting trunk on mat table for stability, and extra time to complete task due to fatigue for LLE. Patient trialed donning AFO on RLE in normal sitting position following encouragement from therapist to attempt without need for trunk support, 50% verbal cues, and improved timing. Stand pivot transfer w/c to mat with min A x1 + mod A x 1. While seated EOM, patient performed dynamic balance, coordination, and abdominal strengthening while reaching at various heights with BUE's.  Transitioning from seated to supine, patient required min A x 2 and cues for proper hand placement to increase independence. Supine to quadruped mod A x 2. In quadruped position, patient working on lengthening spine, stabilizing hips, trunk control, and UE strengthening; patient performed modified push ups with UEs on wedge cushion with proprioceptive input applied to lateral aspect of hips to increase stabilization. Pt then working on tall kneel with proprioceptive input applied to hips by PT and AAROM/proprioceptive input provided by OT to reach overhead with shoulders in flexion and returning to neutral while maintaining trunk control, patient with verbal cues to engage abdominal muscles and pelvic stability. When transitioning from quadruped to tall kneel, patient required Min-mod A x 2, with cueing to engage abdominals. Patient required frequent rest breaks on this date due to fatigue throughout treatment. Patient then working on STS with UE support through RW with blocking provided at knees and Max A x 2 tolerating ~15 seconds. Education provided to patient re: importance of using stander daily and performing modified scoot transfers when home, patient verbalizes understanding.     9/21: Treatment focused on improving trunk control with transitional movement patterns to increase safety and independence. Sit to stand from wheelchair mod A of 1 and then mod of two for pivot to large mat table. Patient sat at edge of mat and worked on dynamic sitting balance while first hitting targets on easel with use of squigs x 12 reps followed by playing two games of [employer] man focusing on midline orientation and writing legibily on vertical plane needing stand by assist for sitting balance. He then complained of back pain due to fatigue after 25 minutes of game.  Patient then transitioned to supine with mod assist of one, completed hip bridges times 5 with mod assist of one followed by hitting targets across midline  Punching red 20# resistance band across midline times 5 reps each direction to facilitate abdominals and serratus strength. Patient then transitioned to prone with mod assist of two and worked on transitions to quadriped times 5 with 5 push ups completed at each transition. Patient then transitioned to short sit at edge of mat with mod assist of one, completed sit to stand times 2 at edge of mat with mod assist of two and then transferred to wheelchair with mod assist of two.      9/15:  Sit to stand from wheelchair mod A of 2 to large mat table w/large gray bolster for UE support. Transitioned to quadruped w/UEs on bolster w/manual facilitation of mod A of 1 for each LE onto mat table. Transitioned to tall knee w/man cues to pelvis and trunk to facilitate good trunk ext and balance pelvis w/even WB'ing through BLEs x2 attempts. Pt demo'd good glute max contraction but had difficulty reducing dependency on UEs so red [place] ball was used instead of bolster to destabilize UE support. Pt able to improve tall kneeling posture x3 sets /continued manual facilitation to pelvis for stability and equal WB'ing and improved trunk posture. Patient able to remove his UEs from supportive device for short periods of time while maintaining good posture. During that time, patient able to utilize core musculature but quickly fatigued and switched focus to lumbar musculature. Transitioned from quadruped on swiss ball to L sidelying, to quadruped, to R sideyling w/manual facilitation of 1. In R sidelying, R lateral trunk flex x5 w/manual cues. L knee to L elbow in R sidelying for oblique contraction x3 w/man facilitation to L pelvis and scapula and min assist of 1 for LLE.  R S/L to long sitting, mod A of 2. Positioned UEs behind pt for support with pt able to complete slight internal rotation of hips to midline in this position; min-mod facilitation of triceps for elbow extension as pt fatigued.  Rest break leaning forward grasping hands behind verbal cues. Pt transitioned to prone with supervision and quadruped with CGA; poor eccentric control noted with pt using momentum of rocking motion and UE weight bearing to assume quadruped. In tall kneel, pt hit playground ball with each hand x6 with focus on trunk rotation and UE control. Large gray bolster placed in front of pt to use as stabilizing surface to assist with dynamic balance. Pt assumed quadruped with support of bolster at trunk for rest break, followed by forearm weight bearing on bolster to complete alternating LE extension/return to quadruped x12 each side with tactile and verbal cues for glute contraction during extension and weight shift to opposite side for flexion. Pt transitioned to sit edge of mat with mod A x2. Squat pivot mat to w/c min A. At ballet barre, pt completed STS with one hand pressing into surface pt seated on and one hand pulling on barre to stand with max A x2 to block knees from buckling and facilitate quads and glutes for LE extension; repeated STS and static stance x3 for 30-90 seconds. Session concluded with pt grasping ballet barre seated in w/c to push self away from barre for UE and trunk lengthening stretch and pulling self to barre with same speed each UE for strengthening and control. 7/26: Pt used restroom prior to session and was in a hurry and had some urine on his shirt. Pt doffed shirt and donned clean T shirt with stand by/set up. Pt transferred from w/c to mat table with mod A and transitioned sit to supine with mod A. While in supine, pt worked on upper trunk rotation reaching across body for targets 10x B UE. PT assisted with keeping LEs in hooklying position and rotation at hips. Pt then completed 10 supine hip bridges. Pt then rolled to prone and worked on achieving modified plank on elbows with very min assist to maintain feet as base of support. He then transitioned with momentum and min A to Q-ped.  He crawled up onto a swiss ball for support of trunk and worked on propping prone on elbows in modified tall kneeling. From this position pt played [address] [facility] Man writing letters on a dry erase board with mod - min x 1 to maintain midline. He then transitioned to short sit with max assist and completed one game of hangman in short sit at edge of mat. Pt needed min of one to maintain balance while writing on board and min assist for executive function. PT assisted with manual pec stretch with pt leaning back over swiss ball and applying overpressure in the posterior direction at the shoulders. Pt then worked on sit to stand times three at walker with forearm rests with mod assist of one for hip/knee control and cues to engage quads and glutes. Pt then transferred to wheelchair with contact guard. 7/19: Treatment focused on improving independence in LE dressing, trunk control, and standing balance and tolerance for functional mobility. Session initiated with patient transferring to mat. Patient then initiated treatment focusing on trunk stabilization reaching and hitting targets with trunk rotation times 10 reps each direction losing balance more to left and then completed sit to supine with min assist.  Patient then worked on trunk rotation each direction in supine to hit target completing intervals of 30 seconds hitting target 6 -8 reps each direction in 30 second intervals times 4. Patient needing cues at scapula to rotate to the upper trunk . Patient then completed bridge and scoots on mat with mod assist of one to two. Patient then worked on standing tolerance at walker with forearm support of arm troughs completed 3 reps standing up to 2 minutes with min assist of two  While playing connect 4. Patient finished treatment seated at edge of mat playing bozena focusing on trunk stability and shuffling cards, bilateral coordination, intrinsic hand function.         Modalities:     Pt. Education:  -patient educated on diagnosis, prognosis and expectations for rehab  -all patient questions were answered    HEP instruction:      NMR and Therapeutic Activities:    [x] (65166 or 36300) Provided verbal/tactile cueing for activities related to improving balance, coordination, kinesthetic sense, posture, motor skill, proprioception and motor activation to allow for proper function of   [x] LE / Core, hip and LE with self care and ADLs  [x] UE / Shoulder complex: cervical, postural, scapular, scapulothoracic and UE control with self care, carrying, lifting, driving, computer work.   [] (10771) Gait Re-education- Provided training and instruction to the patient for proper LE, core and hip recruitment, positioning, and eccentric body weight control with ambulation re-education, including ascending & descending stairs     Home Management Training / Self Care:  [] (00949) Provided self-care/home management training related to activities of daily living and compensatory training, and/or use of adaptive equipment for improvement with: ADLs and compensatory training, meal preparation, safety procedures and instruction in use of adaptive equipment, including bathing, grooming, dressing, personal hygiene, basic household cleaning and chores. Therapeutic Exercise and NMR EXR  [x] (54342) Provided verbal/tactile cueing for activities related to strengthening, flexibility, endurance, ROM for improvements in  [x] LE / Core stability: LE, hip, and core control with self care, mobility, lifting, ambulation. [x] UE / Shoulder complex: cervical, postural, scapular, scapulothoracic and UE control with self care, reaching, carrying, lifting, house/yardwork, driving, computer work. [x] (81702) Provided verbal/tactile cueing for activities related to improving balance, coordination, kinesthetic sense, posture, motor skill, proprioception to assist with   [x] LE / Core stability: LE, hip, and core control in self care, mobility, lifting, ambulation and eccentric single leg control.    [x] UE / Shoulder complex: cervical, scapular, scapulothoracic and UE control with self care, reaching, carrying, lifting, house/yardwork, driving, computer work.   [] (79591) Therapist is in constant attendance of 2 or more patients providing skilled therapy interventions, but not providing any significant amount of measurable one-on-one time to either patient, for improvements in  [] LE / Core stability: LE, hip, and core control in self care, mobility, lifting, ambulation and eccentric single leg control. [] UE / Shoulder complex: cervical, scapular, scapulothoracic and UE control with self care, reaching, carrying, lifting, house/yardwork, driving, computer work.      Home Exercise Program:    [] (41621) Reviewed/Progressed HEP activities related to strengthening, flexibility, endurance, ROM of   [] LE / Core stability: core, hip and LE for functional self-care, mobility, lifting and ambulation/stair navigation   [] UE / Shoulder complex: cervical, postural, scapular, scapulothoracic and UE control with self care, reaching, carrying, lifting, house/yardwork, driving, computer work  [] (04442)Reviewed/Progressed HEP activities related to improving balance, coordination, kinesthetic sense, posture, motor skill, proprioception of   [] LE: core, hip and LE for self care, mobility, lifting, and ambulation/stair navigation    [] UE / Shoulder complex: cervical, postural,  scapular, scapulothoracic and UE control with self care, reaching, carrying, lifting, house/yardwork, driving, computer work    Manual Treatments:  PROM / STM / Oscillations-Mobs:  G-I, II, III, IV (PA's, Inf., Post.)  [] (09891) Provided manual therapy to mobilize shoulder complex, hip, LE, and/or cervicothoracic/LS spine soft tissue/joints for the purpose of modulating pain, promoting relaxation,  increasing ROM, reducing/eliminating soft tissue swelling/inflammation/restriction, improving soft tissue extensibility and allowing for proper ROM for normal function with   [] LE / Core stability: self care, mobility, lifting and ambulation. [] UE / Shoulder complex: self care, reaching, carrying, lifting, house/yardwork, driving, computer work. Modalities:  [] (28679) Vasopneumatic compression: Utilized vasopneumatic compression to decrease edema / swelling for the purpose of improving mobility and quad tone / recruitment which will allow for increased overall function including but not limited to self-care, transfers, ambulation, and ascending / descending stairs. Charges:  Timed Code Treatment Minutes: 45   Total Treatment Minutes: 95   **CO-treat with OT    [] EVAL - LOW (82804)   [] EVAL - MOD (80363)  [] EVAL - HIGH (14622)  [] RE-EVAL (62628)  [x] TE (67203) x  2     [] Ionto  [] NMR (36066) x      [] Vaso  [] Manual (41266) x      [] Ultrasound  [x] TA x 1       [] Mech Traction (80283)  [] Gait Training x     [] ES (un) (07661):   [] Aquatic therapy x   [] Other: Performance test w/report (68232)  x1  [] Group:     GOALS:   Patient stated goal: improve ability to complete transfers   []? Progressing: []? Met: []? Not Met: []? Adjusted     Therapist goals for Patient:   Short Term Goals: To be achieved in: 2 weeks  1. Independent in HEP and progression per patient tolerance, in order to prevent re-injury. []? Progressing: [x]? Met: []? Not Met: []? Adjusted  2. Patient will have a decrease in pain to facilitate improvement in movement, function, and ADLs as indicated by Functional Deficits. []? Progressing: [x]? Met: []? Not Met: []? Adjusted     Long Term Goals: To be achieved in: 6 weeks  1. Patient will report increased standing tolerance to at least 30 minutes in standing frame. []? Progressing: [x]? Met: []? Not Met: []? Adjusted  2. Patient will demonstrate an increase in strength to good shoulder & hip complex, and core activation to allow for proper functional mobility as indicated by patients Functional Deficits. [x]?  Progressing: []? Met: []? Not Met: []? Adjusted  3. Patient will return to functional activities including demo'ing safe transfers to/from w/c with mod I.    [x]? Progressing: []? Met: []? Not Met: []? Adjusted  4. Patient will increase STREAM score to 32 or above for increased UE/LE movement in sitting, supine, and standing. [x]? Progressing: []? Met: []? Not Met: []? Adjusted          Overall Progression Towards Functional goals/ Treatment Progress Update:  [] Patient is progressing as expected towards functional goals listed. [x] Progression is slowed due to complexities/Impairments listed. [] Progression has been slowed due to co-morbidities. [] Plan just implemented, too soon to assess goals progression <30days   [] Goals require adjustment due to lack of progress  [] Patient is not progressing as expected and requires additional follow up with physician  [] Other    Persisting Functional Limitations/Impairments:  []Sleeping [x]Sitting               []Standing [x]Transfers        []Walking []Kneeling               []Stairs []Squatting / bending   [x]ADLs []Reaching  []Lifting  []Housework  []Driving []Job related tasks  []Sports/Recreation []Other:        ASSESSMENT:  Pt very engaged in today's session with the pumpkin carving. He demonstrated improved strength and coordination of UEs while scooping (min cueing) as well as improved LE strength while on the total gym. Will continue with remaining visits then plan to DC. Treatment/Activity Tolerance:  [x] Patient able to complete tx  [] Patient limited by fatigue  [] Patient limited by pain  [] Patient limited by other medical complications  [] Other:     Prognosis: [x] Good [] Fair  [] Poor    Patient Requires Follow-up: [x] Yes  [] No    Plan for next treatment session: transfers, seated core strength, co-treat with OT as able    PLAN: See eval. PT 1x / week for 6 weeks.    [x] Continue per plan of care [] Alter current plan (see comments)  [] Plan of care initiated [] Hold pending MD visit [] Discharge    Electronically signed by:  Katherin Rubio PT DPT    Note: If patient does not return for scheduled/ recommended follow up visits, his note will serve as a discharge from care along with most recent update on progress.

## 2021-10-18 ENCOUNTER — HOSPITAL ENCOUNTER (OUTPATIENT)
Dept: OCCUPATIONAL THERAPY | Age: 21
Setting detail: THERAPIES SERIES
Discharge: HOME | End: 2021-10-18
Payer: MEDICARE

## 2021-10-18 ENCOUNTER — HOSPITAL ENCOUNTER (OUTPATIENT)
Dept: PHYSICAL THERAPY | Age: 21
Setting detail: THERAPIES SERIES
Discharge: HOME | End: 2021-10-18
Payer: MEDICARE

## 2021-10-18 PROCEDURE — 97112 NEUROMUSCULAR REEDUCATION: CPT

## 2021-10-18 PROCEDURE — 97110 THERAPEUTIC EXERCISES: CPT

## 2021-10-18 PROCEDURE — 97530 THERAPEUTIC ACTIVITIES: CPT

## 2021-10-18 NOTE — FLOWSHEET NOTE
[address], [address] [place] [address] [place], [address]  Phone: [phone]   Fax: [phone]    Occupational Therapy Daily Treatment Note  Date:  10/18/2021    Patient: Mi Hull   : 2000   MRN: [id number]  Referring Physician:  Kaleigh Preston CNP       Medical Diagnosis Information: paraplegia, cerebral palsy status post covid has not been seen in clinic since                                        Insurance information: medicare/ medicaid     Date of Injury:   Date of Surgery: rhizotomy when he was about 10    Progress Report: []  Yes  [x]  No     Date Range for reporting period:  2021 to 2021. Patient is status post covid recovery and missed treatments since 2021. Progress report due (10 Rx/or 30 days whichever is less): visit #33 or      Recertification due (POC duration/ or 90 days whichever is less): visit #33 or 2021    Visit # Insurance Allowable Auth required? Date Range    + / + 3/5 [place] []  Yes  [x]  No n/a       Latex Allergy:  []No      []Yes  Pacemaker:  [] No       [] Yes     Preferred Language for Healthcare:   [x]English       []other:    Pain level: 0/10    SUBJECTIVE: Pt reporting pain/tenderness in LLE when wearing AFO, callous noted on bottom of heel. Patient often repeating he is fatigued on this date. Pt reports he has used the stander while at home but non complaint with daily use.      Functional Disability Index:      Stroke Rehabilitation Assessment of Movement (STREAM) 3/1/21 4/21/21 6/22/21 7/28/21 2021    23 24  (supine- 9/15, sitting- 15/31, standing- 0) 26 29  (supine- 9/15, sitting- , standing- 3/24 29  (supine- 8/15, sitting- , standing-        OBJECTIVE:     21: 3 minutes static stance at parallel bars with CGA x2 for trunk control and blocking knees      RESTRICTIONS/PRECAUTIONS: fall risk    Exercises/Interventions:   Treatment focused on improving trunk control with transitional movement patterns to increase safety and independence. Session initiated with patient transferring wheelchair to mat . Patient then completed side scoots with use of blanche walker and worked on changing lower extremity base of support and transferred wheelchair to mat followed by transition from mat to floor. Patient then assumed supine with focus on eccentric control of trunk rolling onto back from there completed bridges times 10 to facilitate hip extension. Patient then worked on segmental rolling to sidelying times 5 each direction. Therapist provided min assist for eccentric hip and trunk control. In sidelying patient worked on modified side planks times 3 each direction. Patient then completed combat crawl with mod assist of therapist for reciprocal hip flexion and cues for big reaches through ues. Patient completed times 12 feet. He then assumed tall kneeling with contact guard and climbed up onto about 10 inch block. From block patient played connect four focusing on visual attention and bilateral coordination with good trunk control and fair visual attention. Patient then completed sit to stand times 1 from low surface with use of weighted sled for ue support and mod assist of two to transition with hip and knee and trunk extension. Unable to maintain stand due to fatigue. Patient then assumed sit on the floor and transitioned to sit on 12 inch block. With mod assist.  For trunk and changing lower extremity base. Patient then transferred to wheelchair. From wheelchair finished with reciprocal elbow flexion/ extension times 10 with large ropes to increase demands of trunk for balance in wheelchair. Finished with use of red theraband to complete low rows times 10 reps . Patent reported feeling very fatigued after treatment.         Therapeutic Exercises  Resistance / level Sets/sec Reps Notes Neuromuscular Re-ed / Therapeutic Activities                                                 Manual Intervention                                                     Modalities:     Patient education:  Plan of care, importance of weight bearing in stander for overall health, home exercise program, goals. Home Exercise Program:   Patient encouraged to start using stander 3 sessions a day and while in stander completing over head reaches . Patient to try and reach 15 minute intervals in stander. Therapeutic Exercise and NMR:  [x] (58328) Provided verbal/tactile cueing for activities related to strengthening, flexibility, endurance, ROM  for improvements in scapular, scapulothoracic and UE control with self care, reaching, carrying, lifting, house/yardwork, driving/computer work. [x] (58750) Provided verbal/tactile cueing for activities related to improving balance, coordination, kinesthetic sense, posture, motor skill, proprioception  to assist with  scapular, scapulothoracic and UE control with self care, reaching, carrying, lifting, house/yardwork, driving/computer work.   [] Comments:    Therapeutic Activities:    [x] (43046 or 51276) Provided verbal/tactile cueing for activities related to improving balance, coordination, kinesthetic sense, posture, motor skill, proprioception and motor activation to allow for proper function of scapular, scapulothoracic and UE control with self care, carrying, lifting, driving/computer work  [] Comments:    Home Exercise Program:    [x] (60202) Reviewed/Progressed HEP activities related to strengthening, flexibility, endurance, ROM of scapular, scapulothoracic and UE control with self care, reaching, carrying, lifting, house/yardwork, driving/computer work  [] (24710) Reviewed/Progressed HEP activities related to improving balance, coordination, kinesthetic sense, posture, motor skill, proprioception of scapular, scapulothoracic and UE control with self care, reaching, carrying, lifting, house/yardwork, driving/computer work    [] Comments:    Manual Treatments:  PROM / STM / Oscillations-Mobs:  G-I, II, III, IV (PA's, Inf., Post.)  [] (48129) Provided manual therapy to mobilize soft tissue/joints of cervical/CT, scapular GHJ and UE for the purpose of modulating pain, promoting relaxation,  increasing ROM, reducing/eliminating soft tissue swelling/inflammation/restriction, improving soft tissue extensibility and allowing for proper ROM for normal function with self care, reaching, carrying, lifting, house/yardwork, driving/computer work  [] Comments:    ADL Training:  [] (57147) Provided self-care/home management training related to activities of daily living and compensatory training, and/or use of adaptive equipment   [] Comments:     Splinting:  [] Fabrication of:   [] (17484) Orthotic/Prosthetic Management, subsequent encounter  [] (85818) Orthotic management and training (fitting and assessment)  [] Comments:      Charges: co treat with PT for safety and increased intensity of treatment  Timed Code Treatment Minutes: 45   Total Treatment Minutes: 90     [] EVAL (LOW) 40418   [] OT Re-eval (33247)  [] EVAL (MOD) 71161   [] EVAL (HIGH) 25710       [x] Ru (90141) x   1  [] ZZCSJ(15071)  [x] NMR (11192) x  1 [] Estim (attended) (33978)   [] Manual ([id number]) x      [] US (07728)  [x] TA (99776) x  1  [] Paraffin (18620)  [] ADL  ([facility]) x     [] Splint/L code:    [] Estim (unattended) ([id number])  [] Fluidotherapy (19795)  [] Other:    GOALS:    Patient stated goal: improved trunk control and standing tolerance to increase independence in self care. [x]? Progressing: []? Met: []? Not Met: []? Adjusted     Therapist goals for Patient:   Short Term Goals: To be achieved in: 30 days  1. Pt will be independent with clothing management on toilet or in wheelchair to complete toileting with use of grab bar. Min assist  [x]? Progressing: []? Met: []?  Not Met: []? Adjusted  2. Pt will be stand by assist completing scoot transfers on level surfaces  Contact guard  [x]? Progressing: []? Met: []? Not Met: []? Adjusted  3. Patient will be able to stand up to 30 seconds at grab bar for toileting and lower body dress with min assist.  Goal met progress to stand by assist   [x]? Progressing: []? Met: []? Not Met: []? Adjusted     Long Term Goals: To be achieved by discharge  1. Pt will demonstrate an improved stream score of 28. New goal is 32  []? Progressing: [x]? Met: []? Not Met: [x]? Adjusted    Progression Towards Functional goals:  [x] Patient is progressing as expected towards functional goals listed. [] Progression is slowed due to complexities listed. [] Progression has been slowed due to co-morbidities. [] Plan just implemented, too soon to assess goals progression  [] All goals are met  [] Other:     ASSESSMENT:   Patient has made progress in mobility, balance and executive function. Treatment/Activity Tolerance:  [x] Patient tolerated treatment well [] Patient limited by fatigue  [] Patient limited by pain  [] Patient limited by other medical complications  [] Other:     Prognosis: [x] Good [] Fair  [] Poor    Patient Requires Follow-up: [x] Yes  [] No    PLAN: See eval  [x] Continue per plan of care [] Alter current plan (see comments)  [] Plan of care initiated (MD cosigned) [] Hold pending MD visit [] Discharge    Electronically signed by:                             Note: If patient does not return for scheduled/ recommended follow up visits, this note will serve as a discharge from care along with most recent update on progress.

## 2021-10-18 NOTE — FLOWSHEET NOTE
[address] [facility] Physical Therapy  Phone: [phone]   Fax: [phone]      Physical Therapy Daily Treatment Note  Date:  10/18/2021     Patient Name:  Paola Lindsey    :  2000  MRN: [id number]  Medical/Treatment Diagnosis Information:  Diagnosis: Cerebral palsy with spastic diplegia  Treatment Diagnosis: Decreased trunk control impacting ability to complete safe transfers, decreased standing tolerance, LE weakness  Insurance/Certification information:  PT Insurance Information: Medicare & Medicaid  Physician Information:  Referring Practitioner: JOHNNY Lopez  Plan of care signed (Y/N): [x]  Yes []  No     Date of Patient follow up with Physician:      Progress Report: []  Yes  [x]  No     Date Range for reporting period:  Beginning  21  Re-eval:   PN: 21  PN: 21  Ending    Progress report due (10 Rx/or 30 days whichever is less):      Recertification due (POC duration/ or 90 days whichever is less):      Visit # Insurance Allowable Auth required? Date Range    +  + 10/10 + 3/5 Medical necessity []  Yes  [x]  No n/a     Latex Allergy:  [x]NO      []YES  Preferred Language for Healthcare:   [x]English       []Other:      Functional Scale/Test Evaluation 3/1/2021 4/21/21  6/22/21 7/28 9/8 Discharge   STREAM 23  26 29 29                            Pain level:  0/10  Location:  none    SUBJECTIVE:  Pt reports he did not use the stander because he was very busy this weekend. OBJECTIVE:  : Session started 10 min after scheduled time, pt had to use the restroom. PT offered to help pt with standing, but pt declined, stating he would rather use the urinal to save some energy for the session.     :  Pt 15 mins late then needed to use restroom    Co-treat w/OT for safety and assistance    RESTRICTIONS/PRECAUTIONS: recent Bell's Palsy    Interventions/Exercises:   Therapeutic Exercises (29886) Resistance / level Sets/sec Reps Notes   W/c push ups in preparation for standing                            Neuromuscular Re-ed (40991) /  Gait Training (51499)       Upright posture seated in W/C   X 5 reps holding football, rest of reps completed with L UE bracing on L knee, PT hand assist on sternum and lumbar spine to facilitate                                Gait Training:  Amb in //bars    Bwd walking in //bars      Transfer w/B Lofstrand crutches   Static stand w/B Lofstrand crutches   8 ft    8 ft    Mod A of 2  Mod A of 2   -manual required to advance LEs, pt activation hip extensors to initiate swing phase, demo'd adequate weight-shifting. OT provided assist & w/c follow for safety      -difficulty with placement for adequate support                 Therapeutic Activities (91822) /  Functional Tasks       [facility] reaching across midline in seated   Pt seated in w/c using seat belt to help stay in w/c          [facility] reaching across midline and diagonally   Pt seated in w/c using seat belt to help stay in w/c               STS to std walker            Transfer to w/c from mat table            STS from w/c to mat table - with large bolster placed on table in front of patient to hold onto   Min A, w/c close to mat table to assist in blocking pt's knees. Manual Intervention ([phone])       Supine hamstring stretch 90/90    -completed by PT  -completed by OT                                        Sessions:     10/18: Treatment focused on improving trunk control with transitional movement patterns to increase safety and independence. Session initiated with patient transferring wheelchair to low mat in turf room. Patient then completed side scoots with use of blanche walker and worked on changing lower extremity base of support and transferred wheelchair to mat followed by transition from mat to floor.   Patient then assumed supine with focus on eccentric control of trunk rolling onto back; from there completed bridges times 10 to facilitate hip extension. Patient then worked on segmental rolling to sidelying times 5 each direction. Therapist provided min assist for eccentric hip and trunk control. In sidelying patient worked on modified side planks times 3 each direction. Patient then completed combat crawl with mod assist of therapist for reciprocal hip flexion and cues for big reaches through UEs. Patient completed for ~12 feet. He then assumed tall kneeling with contact guard and climbed up onto about 10 inch block. From block patient played connect four focusing on visual attention and bilateral coordination with good trunk control and fair visual attention. Patient then completed sit to stand times 1 from low surface with use of weighted sled for UE support and mod assist of two to transition with hip and knee and trunk extension. Unable to maintain stand due to fatigue. Patient then assumed sit on the floor and transitioned to sit on 12 inch block with mod assist.  For trunk and changing lower extremity base. Patient then transferred to wheelchair. From wheelchair finished with reciprocal elbow flexion/ extension x10 with large ropes to increase demands of trunk for balance in wheelchair. Finished with use of red theraband to complete low rows times 10 reps. Patent reported feeling very fatigued after treatment. 10/11: Treatment focused on trunk control to improve safety and independence at home, with focus on transfers. Began session with pt transferring to mat table. Before transfer, he was able to maintain his balance while leaning forward to remove the leg rests from his wheelchair. Pt transferred to EOB with min A of 2, with 1 hand on W/C and the other hand placement on a modified bed rail (using a long-handled step stool).  Pt performed modified scoot transfers along the length of the mat, again with 1 hand placed on arm of w/c and the other hand on a modified bed rail, but part way through the modified rail was switched out with a blanche-walker. Pt then worked on STS with use of blanche-walker with emphasis on weight shifting/hip hiking when standing to promote side step moving laterally on mat table, patient with occasional LOB noted requiring Mod A to self correct. Patient provided with thorough education re: abdominal control/strengthening and placement of BLE's during scooting. Pt transferred back into w/c with use of blanche-walker and CGA/min A. Pt then transferred onto [employer] with mod A x 2, with additional assistance needed for LE placement on foot plate. Pt completed ~15 squats with tactile cues provided for full knee extension and verbal cues for eccentric control. Pt tolerated this activity well and was fatigued afterwards. Pt then worked on fine motor skills and UE strengthening, as well as motor planning while seated in w/c and drawing on then carving a pumpkin. Initially pt tried to stand while scooping out seeds, and was able to stand for 30-45 seconds but was leaning heavily on the table and was unable to scoop while standing. Pt then completed the task while seated. Initially OT provided hand over hand guidance during carving then pt finished the carving mod I with cueing. 10/5:  Treatment focused on improving trunk control with transitional movement patterns to increase safety and independence. Patient transferred to mat with min assist.  Patient then worked on sit to 1/2 stand with min assist of two. Patient then worked on diagonal reaching with 2# weights x 10 reps each UE. Patient then layed supine with legs over the edge of the mat. Patient held dowel aretha with blue theraband tied to it and worked on moving into shoulder flexion and knee extension times 5 reps. Patient then worked on serratus punches with trunk rotation and initiated rolling with hip and knee flexion times 5 reps each direction.  Pt pushed into PT or OTs hands with each LE during the rolling as mat was too high for pt's feet to be on the ground. Patient then sat at edge of mat and completed simulated dressing activity with use of rings reaching to feet and placing rings over feet times 2 each side with min assist.  Patient then worked on balance with t ball and bat swing hitting ball and then completing side scoots to go from \" base to base\". Min assist to maintain base of support during scoots with improved motor planning. Patient then completed ring toss times 10 ending treatment with side scoots times 5 to wheelchair with contact guard and excellent lift off.      9/28: Treatment focused on improving trunk control with transitional movement patterns to increase safety and independence. At beginning of session, foam padding added to heel of L AFO to increase comfort and decrease s/s of skin breakdown. Patient performed stretching of BLEs while seated in w/c for improved knee extension with emphasis on reducing external rotation. Isometric strengthening with BLEs in seated, with proprioceptive input at knees and ankles to facilitate knee extension. Patient then performed donning AFOs with verbal cues provided on safety to wear seatbelt to reduce risk of falls when bending forward; patient verbalized understanding and able to secure safety belt. Patient trialed donning AFO with hips in abduction with verbal cueing required 75% of time, resting trunk on mat table for stability, and extra time to complete task due to fatigue for LLE. Patient trialed donning AFO on RLE in normal sitting position following encouragement from therapist to attempt without need for trunk support, 50% verbal cues, and improved timing. Stand pivot transfer w/c to mat with min A x1 + mod A x 1. While seated EOM, patient performed dynamic balance, coordination, and abdominal strengthening while reaching at various heights with BUE's. Transitioning from seated to supine, patient required min A x 2 and cues for proper hand placement to increase independence.  Supine to quadruped mod A x 2. In quadruped position, patient working on lengthening spine, stabilizing hips, trunk control, and UE strengthening; patient performed modified push ups with UEs on wedge cushion with proprioceptive input applied to lateral aspect of hips to increase stabilization. Pt then working on tall kneel with proprioceptive input applied to hips by PT and AAROM/proprioceptive input provided by OT to reach overhead with shoulders in flexion and returning to neutral while maintaining trunk control, patient with verbal cues to engage abdominal muscles and pelvic stability. When transitioning from quadruped to tall kneel, patient required Min-mod A x 2, with cueing to engage abdominals. Patient required frequent rest breaks on this date due to fatigue throughout treatment. Patient then working on STS with UE support through RW with blocking provided at knees and Max A x 2 tolerating ~15 seconds. Education provided to patient re: importance of using stander daily and performing modified scoot transfers when home, patient verbalizes understanding.      Modalities:     Pt. Education:  -patient educated on diagnosis, prognosis and expectations for rehab  -all patient questions were answered    HEP instruction:      NMR and Therapeutic Activities:    [x] (92181 or 58486) Provided verbal/tactile cueing for activities related to improving balance, coordination, kinesthetic sense, posture, motor skill, proprioception and motor activation to allow for proper function of   [x] LE / Core, hip and LE with self care and ADLs  [x] UE / Shoulder complex: cervical, postural, scapular, scapulothoracic and UE control with self care, carrying, lifting, driving, computer work.   [] (04784) Gait Re-education- Provided training and instruction to the patient for proper LE, core and hip recruitment, positioning, and eccentric body weight control with ambulation re-education, including ascending & descending stairs     Home Management Training / Self Care:  [] (01832) Provided self-care/home management training related to activities of daily living and compensatory training, and/or use of adaptive equipment for improvement with: ADLs and compensatory training, meal preparation, safety procedures and instruction in use of adaptive equipment, including bathing, grooming, dressing, personal hygiene, basic household cleaning and chores. Therapeutic Exercise and NMR EXR  [x] (96282) Provided verbal/tactile cueing for activities related to strengthening, flexibility, endurance, ROM for improvements in  [x] LE / Core stability: LE, hip, and core control with self care, mobility, lifting, ambulation. [x] UE / Shoulder complex: cervical, postural, scapular, scapulothoracic and UE control with self care, reaching, carrying, lifting, house/yardwork, driving, computer work. [x] (86091) Provided verbal/tactile cueing for activities related to improving balance, coordination, kinesthetic sense, posture, motor skill, proprioception to assist with   [x] LE / Core stability: LE, hip, and core control in self care, mobility, lifting, ambulation and eccentric single leg control. [x] UE / Shoulder complex: cervical, scapular, scapulothoracic and UE control with self care, reaching, carrying, lifting, house/yardwork, driving, computer work.   [] (68866) Therapist is in constant attendance of 2 or more patients providing skilled therapy interventions, but not providing any significant amount of measurable one-on-one time to either patient, for improvements in  [] LE / Core stability: LE, hip, and core control in self care, mobility, lifting, ambulation and eccentric single leg control. [] UE / Shoulder complex: cervical, scapular, scapulothoracic and UE control with self care, reaching, carrying, lifting, house/yardwork, driving, computer work.      Home Exercise Program:    [] (60518) Reviewed/Progressed HEP activities related to strengthening, flexibility, endurance, ROM of   [] LE / Core stability: core, hip and LE for functional self-care, mobility, lifting and ambulation/stair navigation   [] UE / Shoulder complex: cervical, postural, scapular, scapulothoracic and UE control with self care, reaching, carrying, lifting, house/yardwork, driving, computer work  [] (91494)Reviewed/Progressed HEP activities related to improving balance, coordination, kinesthetic sense, posture, motor skill, proprioception of   [] LE: core, hip and LE for self care, mobility, lifting, and ambulation/stair navigation    [] UE / Shoulder complex: cervical, postural,  scapular, scapulothoracic and UE control with self care, reaching, carrying, lifting, house/yardwork, driving, computer work    Manual Treatments:  PROM / STM / Oscillations-Mobs:  G-I, II, III, IV (PA's, Inf., Post.)  [] (60002) Provided manual therapy to mobilize shoulder complex, hip, LE, and/or cervicothoracic/LS spine soft tissue/joints for the purpose of modulating pain, promoting relaxation,  increasing ROM, reducing/eliminating soft tissue swelling/inflammation/restriction, improving soft tissue extensibility and allowing for proper ROM for normal function with   [] LE / Core stability: self care, mobility, lifting and ambulation. [] UE / Shoulder complex: self care, reaching, carrying, lifting, house/yardwork, driving, computer work. Modalities:  [] (01637) Vasopneumatic compression: Utilized vasopneumatic compression to decrease edema / swelling for the purpose of improving mobility and quad tone / recruitment which will allow for increased overall function including but not limited to self-care, transfers, ambulation, and ascending / descending stairs.        Charges:  Timed Code Treatment Minutes: 45   Total Treatment Minutes: 90   **CO-treat with OT    [] EVAL - LOW (58629)   [] EVAL - MOD (54166)  [] EVAL - HIGH (82657)  [] RE-EVAL (77097)  [x] TE (62827) x  1    [] Ionto  [] NMR (99970) x      [] Vaso  [] Manual (97919) x      [] Ultrasound  [x] TA x 2       [] Mech Traction (06632)  [] Gait Training x     [] ES (un) (06699):   [] Aquatic therapy x   [] Other: Performance test w/report (98937)  x1  [] Group:     GOALS:   Patient stated goal: improve ability to complete transfers   []? Progressing: []? Met: []? Not Met: []? Adjusted     Therapist goals for Patient:   Short Term Goals: To be achieved in: 2 weeks  1. Independent in HEP and progression per patient tolerance, in order to prevent re-injury. []? Progressing: [x]? Met: []? Not Met: []? Adjusted  2. Patient will have a decrease in pain to facilitate improvement in movement, function, and ADLs as indicated by Functional Deficits. []? Progressing: [x]? Met: []? Not Met: []? Adjusted     Long Term Goals: To be achieved in: 6 weeks  1. Patient will report increased standing tolerance to at least 30 minutes in standing frame. []? Progressing: [x]? Met: []? Not Met: []? Adjusted  2. Patient will demonstrate an increase in strength to good shoulder & hip complex, and core activation to allow for proper functional mobility as indicated by patients Functional Deficits. [x]? Progressing: []? Met: []? Not Met: []? Adjusted  3. Patient will return to functional activities including demo'ing safe transfers to/from w/c with mod I.    [x]? Progressing: []? Met: []? Not Met: []? Adjusted  4. Patient will increase STREAM score to 32 or above for increased UE/LE movement in sitting, supine, and standing. [x]? Progressing: []? Met: []? Not Met: []? Adjusted          Overall Progression Towards Functional goals/ Treatment Progress Update:  [] Patient is progressing as expected towards functional goals listed. [x] Progression is slowed due to complexities/Impairments listed. [] Progression has been slowed due to co-morbidities.   [] Plan just implemented, too soon to assess goals progression <30days   [] Goals require adjustment due to lack of progress  [] Patient is not progressing as expected and requires additional follow up with physician  [] Other    Persisting Functional Limitations/Impairments:  []Sleeping [x]Sitting               []Standing [x]Transfers        []Walking []Kneeling               []Stairs []Squatting / bending   [x]ADLs []Reaching  []Lifting  []Housework  []Driving []Job related tasks  []Sports/Recreation []Other:        ASSESSMENT:  Pt with a highly productive session this date and was very fatigued afterwards. Worked on STS from various surfaces and patient had the most difficulty with a lower surface, roughly a ten inch block. Will continue with remaining visits focusing on trunk control for functional movement. Treatment/Activity Tolerance:  [x] Patient able to complete tx  [] Patient limited by fatigue  [] Patient limited by pain  [] Patient limited by other medical complications  [] Other:     Prognosis: [x] Good [] Fair  [] Poor    Patient Requires Follow-up: [x] Yes  [] No    Plan for next treatment session: transfers, seated core strength, co-treat with OT as able    PLAN: See eval. PT 1x / week for 6 weeks. [x] Continue per plan of care [] Alter current plan (see comments)  [] Plan of care initiated [] Hold pending MD visit [] Discharge    Electronically signed by:  Iraida Batista PT DPT    Note: If patient does not return for scheduled/ recommended follow up visits, his note will serve as a discharge from care along with most recent update on progress.

## 2021-10-25 ENCOUNTER — HOSPITAL ENCOUNTER (OUTPATIENT)
Dept: PHYSICAL THERAPY | Age: 21
Setting detail: THERAPIES SERIES
Discharge: HOME | End: 2021-10-25
Payer: MEDICARE

## 2021-10-25 ENCOUNTER — HOSPITAL ENCOUNTER (OUTPATIENT)
Dept: OCCUPATIONAL THERAPY | Age: 21
Setting detail: THERAPIES SERIES
Discharge: HOME | End: 2021-10-25
Payer: MEDICARE

## 2021-10-25 PROCEDURE — 97112 NEUROMUSCULAR REEDUCATION: CPT

## 2021-10-25 PROCEDURE — 97110 THERAPEUTIC EXERCISES: CPT

## 2021-10-25 PROCEDURE — 97530 THERAPEUTIC ACTIVITIES: CPT

## 2021-10-25 NOTE — FLOWSHEET NOTE
[address] [facility] Physical Therapy  Phone: [phone]   Fax: [phone]      Physical Therapy Daily Treatment Note  Date:  10/25/2021     Patient Name:  Teresa Davis    :  2000  MRN: [id number]  Medical/Treatment Diagnosis Information:  Diagnosis: Cerebral palsy with spastic diplegia  Treatment Diagnosis: Decreased trunk control impacting ability to complete safe transfers, decreased standing tolerance, LE weakness  Insurance/Certification information:  PT Insurance Information: Medicare & Medicaid  Physician Information:  Referring Practitioner: JOHNNY Brown  Plan of care signed (Y/N): [x]  Yes []  No     Date of Patient follow up with Physician:      Progress Report: []  Yes  [x]  No     Date Range for reporting period:  Beginning  21  Re-eval:   PN: 21  PN: 21  Ending    Progress report due (10 Rx/or 30 days whichever is less):      Recertification due (POC duration/ or 90 days whichever is less):      Visit # Insurance Allowable Auth required? Date Range    +  + 10/10 +  Medical necessity []  Yes  [x]  No n/a     Latex Allergy:  [x]NO      []YES  Preferred Language for Healthcare:   [x]English       []Other:      Functional Scale/Test Evaluation 3/1/2021 4/21/21  6/22/21 7/28 9/8 Discharge   STREAM 23  26 29 29                            Pain level:  0/10  Location:  none    SUBJECTIVE:  Pt with no complaints today. OBJECTIVE:  : Session started 10 min after scheduled time, pt had to use the restroom. PT offered to help pt with standing, but pt declined, stating he would rather use the urinal to save some energy for the session.     :  Pt 15 mins late then needed to use restroom    Co-treat w/OT for safety and assistance    RESTRICTIONS/PRECAUTIONS: recent Bell's Palsy    Interventions/Exercises:   Therapeutic Exercises (09772) Resistance / level Sets/sec Reps Notes   W/c push ups in preparation for standing                            Neuromuscular Re-ed (92777) /  Gait Training (89545)       Upright posture seated in W/C   X 5 reps holding football, rest of reps completed with L UE bracing on L knee, PT hand assist on sternum and lumbar spine to facilitate                                Gait Training:  Amb in //bars    Bwd walking in //bars      Transfer w/B Lofstrand crutches   Static stand w/B Lofstrand crutches   8 ft    8 ft    Mod A of 2  Mod A of 2   -manual required to advance LEs, pt activation hip extensors to initiate swing phase, demo'd adequate weight-shifting. OT provided assist & w/c follow for safety      -difficulty with placement for adequate support                 Therapeutic Activities (89112) /  Functional Tasks       [facility] reaching across midline in seated   Pt seated in w/c using seat belt to help stay in w/c          [facility] reaching across midline and diagonally   Pt seated in w/c using seat belt to help stay in w/c               STS to std walker            Transfer to w/c from mat table            STS from w/c to mat table - with large bolster placed on table in front of patient to hold onto   Min A, w/c close to mat table to assist in blocking pt's knees. Manual Intervention ([id number])       Supine hamstring stretch 90/90    -completed by PT  -completed by OT                                        Sessions:     10/25: Today's session focused on trunk control with transitional movements for increased independence and safety. Pt began by completing a stand pivot transfer from his wheelchair to an 18\" plyobox with CGA. Pt then completed a STS transfer and took 2 steps fwd with mod A to stand and max to move LEs fwd so the box could be moved closer to equipment. While sitting on the box, the pt used stretch cords to perform high rows x 10 reps. He then leaned fwd and while pushing into the cords he completed 1 partial STS (butt lift) from the box).  Pt then transitioned to the floor and combat crawled ~15 ft with cueing on digging in with his elbows while PT assisted putting contralateral leg into flexion and blocking at the heel so pt could also push through the leg. During the crawl, pt climbed up and over 2 smaller bolsters. Prior to climbing over, pt would come up on extended arms and with assist move into quadruped, the pull himself up and over the bolsters. When pt reached end of combat crawling, he pulled up into kneeling at the 18\" plyobox again while OT assisted at trunk to initially maintain balance and PT assisted with keeping LEs in neutral position. Pt played large Mik cards while working on kneeling balance. Patient took a rest break in which he lied supine and stretch UEs overhead. Pt then sat in long sitting while playing \"Spoons\" and OT sat next to pt to help maintain balance. For second round of \"Spoons\" pt sat in rayshawn cross position and in this position was able to both maintain balance and reach further with UEs compared to long sitting. Pt was also able to incorporate trunk rotation when reaching side to side for cards. At end of session, pt crawled up onto bolster and rolled over into sitting, then completed a stand pivot transfer back into the wheelchair. 10/18: Treatment focused on improving trunk control with transitional movement patterns to increase safety and independence. Session initiated with patient transferring wheelchair to low mat in turf room. Patient then completed side scoots with use of blanche walker and worked on changing lower extremity base of support and transferred wheelchair to mat followed by transition from mat to floor. Patient then assumed supine with focus on eccentric control of trunk rolling onto back; from there completed bridges times 10 to facilitate hip extension. Patient then worked on segmental rolling to sidelying times 5 each direction. Therapist provided min assist for eccentric hip and trunk control.   In sidelying patient patient with occasional LOB noted requiring Mod A to self correct. Patient provided with thorough education re: abdominal control/strengthening and placement of BLE's during scooting. Pt transferred back into w/c with use of blanche-walker and CGA/min A. Pt then transferred onto [employer] with mod A x 2, with additional assistance needed for LE placement on foot plate. Pt completed ~15 squats with tactile cues provided for full knee extension and verbal cues for eccentric control. Pt tolerated this activity well and was fatigued afterwards. Pt then worked on fine motor skills and UE strengthening, as well as motor planning while seated in w/c and drawing on then carving a pumpkin. Initially pt tried to stand while scooping out seeds, and was able to stand for 30-45 seconds but was leaning heavily on the table and was unable to scoop while standing. Pt then completed the task while seated. Initially OT provided hand over hand guidance during carving then pt finished the carving mod I with cueing. 10/5:  Treatment focused on improving trunk control with transitional movement patterns to increase safety and independence. Patient transferred to mat with min assist.  Patient then worked on sit to 1/2 stand with min assist of two. Patient then worked on diagonal reaching with 2# weights x 10 reps each UE. Patient then layed supine with legs over the edge of the mat. Patient held dowel aretha with blue theraband tied to it and worked on moving into shoulder flexion and knee extension times 5 reps. Patient then worked on serratus punches with trunk rotation and initiated rolling with hip and knee flexion times 5 reps each direction. Pt pushed into PT or OTs hands with each LE during the rolling as mat was too high for pt's feet to be on the ground.  Patient then sat at edge of mat and completed simulated dressing activity with use of rings reaching to feet and placing rings over feet times 2 each side with min assist. Patient then worked on balance with t ball and bat swing hitting ball and then completing side scoots to go from \" base to base\". Min assist to maintain base of support during scoots with improved motor planning. Patient then completed ring toss times 10 ending treatment with side scoots times 5 to wheelchair with contact guard and excellent lift off.      9/28: Treatment focused on improving trunk control with transitional movement patterns to increase safety and independence. At beginning of session, foam padding added to heel of L AFO to increase comfort and decrease s/s of skin breakdown. Patient performed stretching of BLEs while seated in w/c for improved knee extension with emphasis on reducing external rotation. Isometric strengthening with BLEs in seated, with proprioceptive input at knees and ankles to facilitate knee extension. Patient then performed donning AFOs with verbal cues provided on safety to wear seatbelt to reduce risk of falls when bending forward; patient verbalized understanding and able to secure safety belt. Patient trialed donning AFO with hips in abduction with verbal cueing required 75% of time, resting trunk on mat table for stability, and extra time to complete task due to fatigue for LLE. Patient trialed donning AFO on RLE in normal sitting position following encouragement from therapist to attempt without need for trunk support, 50% verbal cues, and improved timing. Stand pivot transfer w/c to mat with min A x1 + mod A x 1. While seated EOM, patient performed dynamic balance, coordination, and abdominal strengthening while reaching at various heights with BUE's. Transitioning from seated to supine, patient required min A x 2 and cues for proper hand placement to increase independence. Supine to quadruped mod A x 2.  In quadruped position, patient working on lengthening spine, stabilizing hips, trunk control, and UE strengthening; patient performed modified push ups with UEs on wedge cushion with proprioceptive input applied to lateral aspect of hips to increase stabilization. Pt then working on tall kneel with proprioceptive input applied to hips by PT and AAROM/proprioceptive input provided by OT to reach overhead with shoulders in flexion and returning to neutral while maintaining trunk control, patient with verbal cues to engage abdominal muscles and pelvic stability. When transitioning from quadruped to tall kneel, patient required Min-mod A x 2, with cueing to engage abdominals. Patient required frequent rest breaks on this date due to fatigue throughout treatment. Patient then working on STS with UE support through RW with blocking provided at knees and Max A x 2 tolerating ~15 seconds. Education provided to patient re: importance of using stander daily and performing modified scoot transfers when home, patient verbalizes understanding.      Modalities:     Pt. Education:  -patient educated on diagnosis, prognosis and expectations for rehab  -all patient questions were answered    HEP instruction:      NMR and Therapeutic Activities:    [x] (32384 or 06170) Provided verbal/tactile cueing for activities related to improving balance, coordination, kinesthetic sense, posture, motor skill, proprioception and motor activation to allow for proper function of   [x] LE / Core, hip and LE with self care and ADLs  [x] UE / Shoulder complex: cervical, postural, scapular, scapulothoracic and UE control with self care, carrying, lifting, driving, computer work.   [] (41610) Gait Re-education- Provided training and instruction to the patient for proper LE, core and hip recruitment, positioning, and eccentric body weight control with ambulation re-education, including ascending & descending stairs     Home Management Training / Self Care:  [] (61496) Provided self-care/home management training related to activities of daily living and compensatory training, and/or use of adaptive equipment for improvement with: ADLs and compensatory training, meal preparation, safety procedures and instruction in use of adaptive equipment, including bathing, grooming, dressing, personal hygiene, basic household cleaning and chores. Therapeutic Exercise and NMR EXR  [x] (39665) Provided verbal/tactile cueing for activities related to strengthening, flexibility, endurance, ROM for improvements in  [x] LE / Core stability: LE, hip, and core control with self care, mobility, lifting, ambulation. [x] UE / Shoulder complex: cervical, postural, scapular, scapulothoracic and UE control with self care, reaching, carrying, lifting, house/yardwork, driving, computer work. [x] (18558) Provided verbal/tactile cueing for activities related to improving balance, coordination, kinesthetic sense, posture, motor skill, proprioception to assist with   [x] LE / Core stability: LE, hip, and core control in self care, mobility, lifting, ambulation and eccentric single leg control. [x] UE / Shoulder complex: cervical, scapular, scapulothoracic and UE control with self care, reaching, carrying, lifting, house/yardwork, driving, computer work.   [] (45118) Therapist is in constant attendance of 2 or more patients providing skilled therapy interventions, but not providing any significant amount of measurable one-on-one time to either patient, for improvements in  [] LE / Core stability: LE, hip, and core control in self care, mobility, lifting, ambulation and eccentric single leg control. [] UE / Shoulder complex: cervical, scapular, scapulothoracic and UE control with self care, reaching, carrying, lifting, house/yardwork, driving, computer work.      Home Exercise Program:    [] (83274) Reviewed/Progressed HEP activities related to strengthening, flexibility, endurance, ROM of   [] LE / Core stability: core, hip and LE for functional self-care, mobility, lifting and ambulation/stair navigation   [] UE / Shoulder complex: cervical, postural, scapular, scapulothoracic and UE control with self care, reaching, carrying, lifting, house/yardwork, driving, computer work  [] (94105)Reviewed/Progressed HEP activities related to improving balance, coordination, kinesthetic sense, posture, motor skill, proprioception of   [] LE: core, hip and LE for self care, mobility, lifting, and ambulation/stair navigation    [] UE / Shoulder complex: cervical, postural,  scapular, scapulothoracic and UE control with self care, reaching, carrying, lifting, house/yardwork, driving, computer work    Manual Treatments:  PROM / STM / Oscillations-Mobs:  G-I, II, III, IV (PA's, Inf., Post.)  [] (68398) Provided manual therapy to mobilize shoulder complex, hip, LE, and/or cervicothoracic/LS spine soft tissue/joints for the purpose of modulating pain, promoting relaxation,  increasing ROM, reducing/eliminating soft tissue swelling/inflammation/restriction, improving soft tissue extensibility and allowing for proper ROM for normal function with   [] LE / Core stability: self care, mobility, lifting and ambulation. [] UE / Shoulder complex: self care, reaching, carrying, lifting, house/yardwork, driving, computer work. Modalities:  [] (81140) Vasopneumatic compression: Utilized vasopneumatic compression to decrease edema / swelling for the purpose of improving mobility and quad tone / recruitment which will allow for increased overall function including but not limited to self-care, transfers, ambulation, and ascending / descending stairs.        Charges:  Timed Code Treatment Minutes: 45   Total Treatment Minutes: 95   **CO-treat with OT    [] EVAL - LOW (57576)   [] EVAL - MOD (74229)  [] EVAL - HIGH (24959)  [] RE-EVAL (40837)  [x] TE (90524) x  1    [] Ionto  [x] NMR (83070) x  1    [] Vaso  [] Manual (02251) x      [] Ultrasound  [x] TA x 1       [] Mech Traction (72819)  [] Gait Training x     [] ES (un) (99330):   [] Aquatic therapy x   [] Other: Performance Limitations/Impairments:  []Sleeping [x]Sitting               []Standing [x]Transfers        []Walking []Kneeling               []Stairs []Squatting / bending   [x]ADLs []Reaching  []Lifting  []Housework  []Driving []Job related tasks  []Sports/Recreation []Other:        ASSESSMENT:  Pt with a highly productive session this date. His transitional movements and crawling were improved compared to last session. He was able to complete a stand pivot transfer at the beginning of the session with only CGA. Pt has one more session and then will plan to take a break from PT to continue working on these gains at home. Treatment/Activity Tolerance:  [x] Patient able to complete tx  [] Patient limited by fatigue  [] Patient limited by pain  [] Patient limited by other medical complications  [] Other:     Prognosis: [x] Good [] Fair  [] Poor    Patient Requires Follow-up: [x] Yes  [] No    Plan for next treatment session: transfers, seated core strength, co-treat with OT as able    PLAN: See eval. PT 1x / week for 6 weeks. [x] Continue per plan of care [] Alter current plan (see comments)  [] Plan of care initiated [] Hold pending MD visit [] Discharge    Electronically signed by:  Lenny Rodney PT DPT    Note: If patient does not return for scheduled/ recommended follow up visits, his note will serve as a discharge from care along with most recent update on progress.

## 2021-10-25 NOTE — FLOWSHEET NOTE
[address], [address] [place] [address] [place], [address]  Phone: [phone]   Fax: [phone]    Occupational Therapy Daily Treatment Note  Date:  10/25/2021    Patient: Eryn Higgins   : 2000   MRN: [id number]  Referring Physician:  Lizett Rand CNP       Medical Diagnosis Information: paraplegia, cerebral palsy status post covid has not been seen in clinic since                                        Insurance information: medicare/ medicaid     Date of Injury:   Date of Surgery: rhizotomy when he was about 10    Progress Report: []  Yes  [x]  No     Date Range for reporting period:  2021 to 2021. Patient is status post covid recovery and missed treatments since 2021. Progress report due (10 Rx/or 30 days whichever is less): visit #33 or  06/3/3608    Recertification due (POC duration/ or 90 days whichever is less): visit #33 or 2021    Visit # Insurance Allowable Auth required? Date Range    +  + /5 [place] []  Yes  [x]  No n/a       Latex Allergy:  []No      []Yes  Pacemaker:  [] No       [] Yes     Preferred Language for Healthcare:   [x]English       []other:    Pain level: 0/10    SUBJECTIVE: Pt reported he was able to transfer out of shower with increased safety and independence this past week. Functional Disability Index:      Stroke Rehabilitation Assessment of Movement (STREAM) 3/1/21 4/21/21 6/22/21 7/28/21 2021    23 24  (supine- 9/15, sitting- 15, standing- 0) 26 29  (supine- 9/15, sitting- , standing- 3/24 29  (supine- 8/15, sitting- 17, standing-        OBJECTIVE:     21: 3 minutes static stance at parallel bars with CGA x2 for trunk control and blocking knees      RESTRICTIONS/PRECAUTIONS: fall risk    Exercises/Interventions:   Treatment focused on improving trunk control with transitional movement patterns to increase safety and independence.  Session initiated with patient transferring onto blue plyobox from w/c during stand pivot with CGA x 2, improved standing tolerance on this date. Patient then completed TRX activity working on UE strengthening and focus provided for shoulder retraction during chest press with tactile input at medial scapula. Patient then worked on pushing down on rope with use of TRX with emphasis on tricep strengthening while lifting buttocks off of blue mat for LE strengthening/proprioceptive input, patient required mod-max A to lift buttocks throughout. Patient then transferred onto [facility]" and worked on combat crawl with mod A from therapist for reciprocal hip flexion and cues for big reaching throughout [address]. Added challenge during combat crawling, with patient completing obstacle course of climbing overtop red and yellow plyobox with continued Mod A required at hips for stability and flexion, tolerated ~15 feet combat crawling. Restbreaks given as needed throughout task. Patient then worked on trunk stability while in tall kneeling and LE strengthening/hip strengthening while playing game of YANELI. Due to fatigue throughout task, compensation provided as needed such as resting on elbows and short kneel. Patient then transferred to long sitting with back support provided while playing game of spoons; activity selected for hamstring stretch, trunk stability/strengthening/rotation, divided attention, visual attention, and bilateral coordination. At end of session, patient returned to w/c with modified squat transfer with Min A x 2 and education to reach for arm rest of w/c in future for increase independence, patient verbalized understanding.       Therapeutic Exercises  Resistance / level Sets/sec Reps Notes                                                                                Neuromuscular Re-ed / Therapeutic Activities                                                 Manual Intervention Modalities:     Patient education:  Plan of care, importance of weight bearing in stander for overall health, home exercise program, goals. Home Exercise Program:   Patient encouraged to start using stander 3 sessions a day and while in stander completing over head reaches . Patient to try and reach 15 minute intervals in stander. Therapeutic Exercise and NMR:  [x] (92999) Provided verbal/tactile cueing for activities related to strengthening, flexibility, endurance, ROM  for improvements in scapular, scapulothoracic and UE control with self care, reaching, carrying, lifting, house/yardwork, driving/computer work. [x] (56276) Provided verbal/tactile cueing for activities related to improving balance, coordination, kinesthetic sense, posture, motor skill, proprioception  to assist with  scapular, scapulothoracic and UE control with self care, reaching, carrying, lifting, house/yardwork, driving/computer work.   [] Comments:    Therapeutic Activities:    [x] (28090 or 91552) Provided verbal/tactile cueing for activities related to improving balance, coordination, kinesthetic sense, posture, motor skill, proprioception and motor activation to allow for proper function of scapular, scapulothoracic and UE control with self care, carrying, lifting, driving/computer work  [] Comments:    Home Exercise Program:    [x] (79750) Reviewed/Progressed HEP activities related to strengthening, flexibility, endurance, ROM of scapular, scapulothoracic and UE control with self care, reaching, carrying, lifting, house/yardwork, driving/computer work  [] (95877) Reviewed/Progressed HEP activities related to improving balance, coordination, kinesthetic sense, posture, motor skill, proprioception of scapular, scapulothoracic and UE control with self care, reaching, carrying, lifting, house/yardwork, driving/computer work    [] Comments:    Manual Treatments:  PROM / STM / Oscillations-Mobs:  G-I, II, III, IV (PA's, to stand up to 30 seconds at grab bar for toileting and lower body dress with min assist.  Goal met progress to stand by assist   [x]? Progressing: []? Met: []? Not Met: []? Adjusted     Long Term Goals: To be achieved by discharge  1. Pt will demonstrate an improved stream score of 28. New goal is 32  []? Progressing: [x]? Met: []? Not Met: [x]? Adjusted    Progression Towards Functional goals:  [x] Patient is progressing as expected towards functional goals listed. [] Progression is slowed due to complexities listed. [] Progression has been slowed due to co-morbidities. [] Plan just implemented, too soon to assess goals progression  [] All goals are met  [] Other:     ASSESSMENT:   Patient has made progress in mobility, balance and executive function. Treatment/Activity Tolerance:  [x] Patient tolerated treatment well [] Patient limited by fatigue  [] Patient limited by pain  [] Patient limited by other medical complications  [] Other:     Prognosis: [x] Good [] Fair  [] Poor    Patient Requires Follow-up: [x] Yes  [] No    PLAN: See eval  [x] Continue per plan of care [] Alter current plan (see comments)  [] Plan of care initiated (MD cosigned) [] Hold pending MD visit [] Discharge    Electronically signed by:             Bryson Torres, OT/S     Occupational Therapist was present, directed the patient's care, made skilled judgement, and was responsible for assessment and treatment of the patient. Note: If patient does not return for scheduled/ recommended follow up visits, this note will serve as a discharge from care along with most recent update on progress.

## 2021-11-01 ENCOUNTER — HOSPITAL ENCOUNTER (OUTPATIENT)
Dept: PHYSICAL THERAPY | Age: 21
Setting detail: THERAPIES SERIES
Discharge: HOME | End: 2021-11-01
Payer: MEDICARE

## 2021-11-01 ENCOUNTER — HOSPITAL ENCOUNTER (OUTPATIENT)
Dept: OCCUPATIONAL THERAPY | Age: 21
Setting detail: THERAPIES SERIES
Discharge: HOME | End: 2021-11-01
Payer: MEDICARE

## 2021-11-01 PROCEDURE — 97112 NEUROMUSCULAR REEDUCATION: CPT

## 2021-11-01 PROCEDURE — 97110 THERAPEUTIC EXERCISES: CPT

## 2021-11-01 PROCEDURE — 97530 THERAPEUTIC ACTIVITIES: CPT

## 2021-11-01 NOTE — DISCHARGE SUMMARY
Occupational Therapy Discharge Summary    Dear Oralia Posadas CNP    ,    We had the pleasure of treating the following patient for physical therapy services at [address] [place] [address]. A summary of our findings can be found in the discharge summary below. If you have any questions or concerns regarding these findings, please do not hesitate to contact me at the office phone number checked above. Thank you for the referral.     Physician Signature:________________________________Date:__________________  By signing above (or electronic signature), therapists plan is approved by physician      Functional Outcome:  [id number]    Overall Response to Treatment:   []Patient is responding well to treatment and improvement is noted with regards  to goals   []Patient should continue to improve in reasonable time if they continue HEP   []Patient has plateaued and is no longer responding to skilled OT intervention    []Patient is getting worse and would benefit from return to referring MD   []Patient unable to adhere to initial POC   []Other:     Date range of Visits: 3/1/2021  Total Visits:33    Recommendation:    [x] Discharge to HEP. Follow up with OT PRN. Name: Lorne Ibrahim  : 2000    The pt was evaluated by OT on 3/1/2021 and seen for 33 treatment sessions prior to DC to HEP on 2021 per MD order. The pt's acute therapy goals were:    1. Pt will be independent with clothing management on toilet or in wheelchair to complete toileting with use of grab bar.    []? Progressing: []? Met: [x]? Not Met: []? Adjusted  2. Pt will be stand by assist completing scoot transfers on level surfaces   []? Progressing: [x]? Met: []? Not Met: []? Adjusted  3. Patient will be able to stand up to 30 seconds at grab bar for toileting and lower body dress with min assist.  Goal met progress to stand by assist   []? Progressing: []? Met: [x]? Not Met: []?  Adjusted     Long Term Goals: To be achieved by discharge  1. Pt will demonstrate an improved stream score of 28. New goal is 32  []? Progressing: [x]? Met: []? Not Met: [x]? Adjusted    Patient met 1/3 goals during stay. Number of Refusals:0  Number of Holds: 0  During outpatient services, the patient was educated on: BUE exercises, using stander at least 3x per week, transfer training/lateral scooting. DC pt from OT caseload at this time. Thank you! [address], [address] [facility], [address]  Phone: [phone]   Fax: [phone]    Occupational Therapy Daily Treatment Note   Date:  2021    Patient: Madison Bright   : 2000   MRN: [id number]  Referring Physician:  Leslie Wray CNP       Medical Diagnosis Information: paraplegia, cerebral palsy status post covid has not been seen in clinic since                                        Insurance information: medicare/ medicaid     Date of Injury:   Date of Surgery: rhizotomy when he was about 10    Progress Report: []  Yes  [x]  No     Date Range for reporting period:  2021 to 2021. Patient is status post covid recovery and missed treatments since 2021. Progress report due (10 Rx/or 30 days whichever is less): visit #33 or  7327    Recertification due (POC duration/ or 90 days whichever is less): visit #33 or 2021    Visit # Insurance Allowable Auth required? Date Range    +  +  [place] []  Yes  [x]  No n/a       Latex Allergy:  []No      []Yes  Pacemaker:  [] No       [] Yes     Preferred Language for Healthcare:   [x]English       []other:    Pain level: 0/10    SUBJECTIVE: Pt reported he was able to transfer out of shower with increased safety and independence this past week.      Functional Disability Index:      Stroke Rehabilitation Assessment of Movement (STREAM) 3/1/21 4/21/21 6/22/21 7/28/21 2021    23 24  (supine- 9/15, sitting- 15/31, standing- 0/24) 26 29  (supine- 9/15, sitting- 17/31, standing- 3/24 29  (supine- 8/15, sitting- 17/31, standing- 4/24       OBJECTIVE:     4/1/21: 3 minutes static stance at parallel bars with CGA x2 for trunk control and blocking knees      RESTRICTIONS/PRECAUTIONS: fall risk    Exercises/Interventions:   Treatment focused on improving trunk control with transitional movement patterns to increase safety and independence. Session initiated with patient transferring from w/c to turf with SBA and extra time to complete task. Patient then worked on trunk stability, hamstring stretch, trunk stability/strengthening/rotation, divided attention, visual attention, and bilateral coordination while playing game of [employer], patient also provided with custom made card argueta to increase ability to hold cards. Patient then performed  At end of session, patient returned to w/c with modified squat transfer with Min A x 2 and education to reach for arm rest of w/c in future for increase independence, patient verbalized understanding. Patient then performed combat crawling ~25 ft with cueing on digging in with his elbows while OT assisted putting contralateral leg into flexion and blocking at the heel so pt could also push through the leg. Pt also climbed up and over 2 smaller bolsters with emphasis on pulling self overtop bolster and pulling through with BUE's. Patient required moderate/maximal verbal cues to keep digits extended. Patient then performed BUE exercises with use of green bunge rope during mid-row and high-rows ~10 each, with Min A/CGA to stabilize trunk. At end of session, patient performed lateral scoot into w/c from even surface with CGA/SBA. Patient educated on discharging from OT services on this date, pt with good understanding.        Therapeutic Exercises  Resistance / level Sets/sec Reps Notes                                                                                Neuromuscular Re-ed / Therapeutic Activities                                                 Manual Intervention                                                     Modalities:     Patient education:  Plan of care, importance of weight bearing in stander for overall health, home exercise program, goals. Home Exercise Program:   Patient encouraged to start using stander 3 sessions a day and while in stander completing over head reaches . Patient to try and reach 15 minute intervals in stander. Therapeutic Exercise and NMR:  [x] (85406) Provided verbal/tactile cueing for activities related to strengthening, flexibility, endurance, ROM  for improvements in scapular, scapulothoracic and UE control with self care, reaching, carrying, lifting, house/yardwork, driving/computer work. [x] (10868) Provided verbal/tactile cueing for activities related to improving balance, coordination, kinesthetic sense, posture, motor skill, proprioception  to assist with  scapular, scapulothoracic and UE control with self care, reaching, carrying, lifting, house/yardwork, driving/computer work.   [] Comments:    Therapeutic Activities:    [x] (10702 or 33544) Provided verbal/tactile cueing for activities related to improving balance, coordination, kinesthetic sense, posture, motor skill, proprioception and motor activation to allow for proper function of scapular, scapulothoracic and UE control with self care, carrying, lifting, driving/computer work  [] Comments:    Home Exercise Program:    [x] (77506) Reviewed/Progressed HEP activities related to strengthening, flexibility, endurance, ROM of scapular, scapulothoracic and UE control with self care, reaching, carrying, lifting, house/yardwork, driving/computer work  [] (77351) Reviewed/Progressed HEP activities related to improving balance, coordination, kinesthetic sense, posture, motor skill, proprioception of scapular, scapulothoracic and UE control with self care, reaching, carrying, lifting, house/yardwork, driving/computer work    [] Comments:    Manual Treatments:  PROM / STM / Oscillations-Mobs:  G-I, II, III, IV (PA's, Inf., Post.)  [] (82188) Provided manual therapy to mobilize soft tissue/joints of cervical/CT, scapular GHJ and UE for the purpose of modulating pain, promoting relaxation,  increasing ROM, reducing/eliminating soft tissue swelling/inflammation/restriction, improving soft tissue extensibility and allowing for proper ROM for normal function with self care, reaching, carrying, lifting, house/yardwork, driving/computer work  [] Comments:    ADL Training:  [] (52860) Provided self-care/home management training related to activities of daily living and compensatory training, and/or use of adaptive equipment   [] Comments:     Splinting:  [] Fabrication of:   [] (33217) Orthotic/Prosthetic Management, subsequent encounter  [] (90645) Orthotic management and training (fitting and assessment)  [] Comments:      Charges: co treat with PT for safety and increased intensity of treatment  Timed Code Treatment Minutes: 45   Total Treatment Minutes: 90     [] EVAL (LOW) 81227   [] OT Re-eval (10208)  [] EVAL (MOD) 32866   [] EVAL (HIGH) 09009       [x] Ru (70840) x   1  [] JWESW(46981)  [x] NMR (11931) x  1 [] Estim (attended) (85603)   [] Manual ([id number]) x      [] US (31359)  [x] TA (86434) x  1  [] Paraffin (73834)  [] ADL  ([phone]) x     [] Splint/L code:    [] Estim (unattended) (22 113563)  [] Fluidotherapy (66772)  [] Other:    GOALS:    Patient stated goal: improved trunk control and standing tolerance to increase independence in self care. []? Progressing: [x]? Met: []? Not Met: []? Adjusted     Therapist goals for Patient:   Short Term Goals: To be achieved in: 30 days  1. Pt will be independent with clothing management on toilet or in wheelchair to complete toileting with use of grab bar. Min assist  []? Progressing: []? Met: [x]? Not Met: []? Adjusted  2.  Pt will be stand by assist

## 2021-11-01 NOTE — DISCHARGE SUMMARY
[facility]  Phone: [phone]   Fax: [phone]   Physical Therapy Discharge Summary    Dear   JOHNNY Bernal,     We had the pleasure of treating the following patient for physical therapy services at [facility]. A summary of our findings can be found in the discharge summary below. If you have any questions or concerns regarding these findings, please do not hesitate to contact me at the office phone number checked above. Thank you for the referral.     Physician Signature:________________________________Date:__________________  By signing above (or electronic signature), therapists plan is approved by physician    Overall Response to Treatment:   []Patient is responding well to treatment and improvement is noted with regards  to goals   []Patient should continue to improve in reasonable time if they continue HEP   []Patient has plateaued and is no longer responding to skilled PT intervention    []Patient is getting worse and would benefit from return to referring MD   []Patient unable to adhere to initial POC   [x]Other: Pt made considerable progress over course of PT with trunk strength during transfers and functional movements. Pt able to complete stand pivot transfers from R Roxanne Brian 23 to mat table with SBA typically. Provided with strengthening exercises for home with theraband and how to complete transfers safely. Pt now discharged from PT services. Date range of Visits: 21 - 21  Total Visits: 35    Recommendation:    [x] Discharge to HEP. Follow up with PT or MD PRN.                Physical Therapy Daily Treatment Note  Date:  2021     Patient Name:  Lenin Burciaga    :  2000  MRN: [id number]  Medical/Treatment Diagnosis Information:  Diagnosis: Cerebral palsy with spastic diplegia  Treatment Diagnosis: Decreased trunk control impacting ability to complete safe transfers, decreased standing tolerance, LE weakness  Insurance/Certification information:  PT Insurance Information: Medicare & Medicaid  Physician Information:  Referring Practitioner: JOHNNY Ge  Plan of care signed (Y/N): [x]  Yes []  No     Date of Patient follow up with Physician:      Progress Report: [x]  Yes  []  No     Date Range for reporting period:  Beginning  4/1/21  Re-eval: 6/22  PN: 7/28/21  PN: 9/8/21  Ending 11/1/21    Progress report due (10 Rx/or 30 days whichever is less):  2/08    Recertification due (POC duration/ or 90 days whichever is less): 9/22     Visit # Insurance Allowable Auth required? Date Range   12/12 + 8/8 + 10/10 + 5/5 Medical necessity []  Yes  [x]  No n/a     Latex Allergy:  [x]NO      []YES  Preferred Language for Healthcare:   [x]English       []Other:      Functional Scale/Test Evaluation 3/1/2021 4/21/21  6/22/21 7/28 9/8 Discharge   STREAM 23  26 29 29                          Pain level:  0/10  Location:  none    SUBJECTIVE:  Pt reports he went trick or treating yesterday and propelled himself in the wheelchair for a majority of the time so he is very fatigued this date. OBJECTIVE:  7/8: Session started 10 min after scheduled time, pt had to use the restroom. PT offered to help pt with standing, but pt declined, stating he would rather use the urinal to save some energy for the session.     4/28:  Pt 15 mins late then needed to use restroom    Co-treat w/OT for safety and assistance    RESTRICTIONS/PRECAUTIONS: recent Bell's Palsy    Interventions/Exercises:   Therapeutic Exercises (50445) Resistance / level Sets/sec Reps Notes   W/c push ups in preparation for standing                            Neuromuscular Re-ed (88241) /  Gait Training (76343)       Upright posture seated in W/C   X 5 reps holding football, rest of reps completed with L UE bracing on L knee, PT hand assist on sternum and lumbar spine to facilitate Gait Training:  Amb in //bars    Bwd walking in //bars      Transfer w/B Lofstrand crutches   Static stand w/B Lofstrand crutches   8 ft    8 ft    Mod A of 2  Mod A of 2   -manual required to advance LEs, pt activation hip extensors to initiate swing phase, demo'd adequate weight-shifting. OT provided assist & w/c follow for safety      -difficulty with placement for adequate support                 Therapeutic Activities (72928) /  Functional Tasks       [facility] reaching across midline in seated   Pt seated in w/c using seat belt to help stay in w/c          [facility] reaching across midline and diagonally   Pt seated in w/c using seat belt to help stay in w/c               STS to std walker            Transfer to w/c from mat table            STS from w/c to mat table - with large bolster placed on table in front of patient to hold onto   Min A, w/c close to mat table to assist in blocking pt's knees. Manual Intervention ([phone])       Supine hamstring stretch 90/90    -completed by PT  -completed by OT                                        Sessions:     11/1: Pt arrived to therapy in w/c and was pushed out to turf field. Session began with pt transferring out of chair with CGA/SBA to large blue 18\" plyobox and then down to the turf so he was kneeling on the box. Worked on trunk control in tall kneeling while pt talked with PT and OT. Min A needed to position lower legs. After tall kneeling, pt transitioned to cross-legged leaning against wall for support while playing 2 games of card game \"spoons\". After card game, pt transitioned to prone and began combat crawling. Pt combat crawled ~25 ft with cueing on digging in with his elbows while PT assisted putting contralateral leg into flexion and blocking at the heel so pt could also push through the leg  and pulled himself over 2 bolsters.  Prior to climbing over, pt would come up on extended arms and with assist move into quadruped, then pull himself up and over the bolsters. At end of combat crawl, pt transitioned into cross-legged again and then used green TB tube to complete LPD x 10 and mid rows x 10. Pt then attempted pushing a bolster along the turf while kneeling. Only able to complete ~2 ft before reporting that he was too tired. Pt finished session by crawling up onto bolster and rolling over into sitting, then completing a stand pivot transfer back into the wheelchair. 10/25: Today's session focused on trunk control with transitional movements for increased independence and safety. Pt began by completing a stand pivot transfer from his wheelchair to an 18\" plyobox with CGA. Pt then completed a STS transfer and took 2 steps fwd with mod A to stand and max to move LEs fwd so the box could be moved closer to equipment. While sitting on the box, the pt used stretch cords to perform high rows x 10 reps. He then leaned fwd and while pushing into the cords he completed 1 partial STS (butt lift) from the box). Pt then transitioned to the floor and combat crawled ~15 ft with cueing on digging in with his elbows while PT assisted putting contralateral leg into flexion and blocking at the heel so pt could also push through the leg. During the crawl, pt climbed up and over 2 smaller bolsters. Prior to climbing over, pt would come up on extended arms and with assist move into quadruped, the pull himself up and over the bolsters. When pt reached end of combat crawling, he pulled up into kneeling at the 18\" plyobox again while OT assisted at trunk to initially maintain balance and PT assisted with keeping LEs in neutral position. Pt played large Mik cards while working on kneeling balance. Patient took a rest break in which he lied supine and stretch UEs overhead. Pt then sat in long sitting while playing \"Spoons\" and OT sat next to pt to help maintain balance.  For second round of \"Spoons\" pt sat in rayshawn cross position and in this position was able to both maintain large ropes to increase demands of trunk for balance in wheelchair. Finished with use of red theraband to complete low rows times 10 reps. Patent reported feeling very fatigued after treatment. 10/11: Treatment focused on trunk control to improve safety and independence at home, with focus on transfers. Began session with pt transferring to mat table. Before transfer, he was able to maintain his balance while leaning forward to remove the leg rests from his wheelchair. Pt transferred to EOB with min A of 2, with 1 hand on W/C and the other hand placement on a modified bed rail (using a long-handled step stool). Pt performed modified scoot transfers along the length of the mat, again with 1 hand placed on arm of w/c and the other hand on a modified bed rail, but part way through the modified rail was switched out with a blanche-walker. Pt then worked on STS with use of blanche-walker with emphasis on weight shifting/hip hiking when standing to promote side step moving laterally on mat table, patient with occasional LOB noted requiring Mod A to self correct. Patient provided with thorough education re: abdominal control/strengthening and placement of BLE's during scooting. Pt transferred back into w/c with use of blanche-walker and CGA/min A. Pt then transferred onto [employer] with mod A x 2, with additional assistance needed for LE placement on foot plate. Pt completed ~15 squats with tactile cues provided for full knee extension and verbal cues for eccentric control. Pt tolerated this activity well and was fatigued afterwards. Pt then worked on fine motor skills and UE strengthening, as well as motor planning while seated in w/c and drawing on then carving a pumpkin. Initially pt tried to stand while scooping out seeds, and was able to stand for 30-45 seconds but was leaning heavily on the table and was unable to scoop while standing. Pt then completed the task while seated.  Initially OT provided hand over hand guidance during carving then pt finished the carving mod I with cueing. 10/5:  Treatment focused on improving trunk control with transitional movement patterns to increase safety and independence. Patient transferred to mat with min assist.  Patient then worked on sit to 1/2 stand with min assist of two. Patient then worked on diagonal reaching with 2# weights x 10 reps each UE. Patient then layed supine with legs over the edge of the mat. Patient held dowel aretha with blue theraband tied to it and worked on moving into shoulder flexion and knee extension times 5 reps. Patient then worked on serratus punches with trunk rotation and initiated rolling with hip and knee flexion times 5 reps each direction. Pt pushed into PT or OTs hands with each LE during the rolling as mat was too high for pt's feet to be on the ground. Patient then sat at edge of mat and completed simulated dressing activity with use of rings reaching to feet and placing rings over feet times 2 each side with min assist.  Patient then worked on balance with t ball and bat swing hitting ball and then completing side scoots to go from \" base to base\". Min assist to maintain base of support during scoots with improved motor planning. Patient then completed ring toss times 10 ending treatment with side scoots times 5 to wheelchair with contact guard and excellent lift off.      9/28: Treatment focused on improving trunk control with transitional movement patterns to increase safety and independence. At beginning of session, foam padding added to heel of L AFO to increase comfort and decrease s/s of skin breakdown. Patient performed stretching of BLEs while seated in w/c for improved knee extension with emphasis on reducing external rotation. Isometric strengthening with BLEs in seated, with proprioceptive input at knees and ankles to facilitate knee extension.  Patient then performed donning AFOs with verbal cues provided on safety to wear seatbelt to reduce risk of falls when bending forward; patient verbalized understanding and able to secure safety belt. Patient trialed donning AFO with hips in abduction with verbal cueing required 75% of time, resting trunk on mat table for stability, and extra time to complete task due to fatigue for LLE. Patient trialed donning AFO on RLE in normal sitting position following encouragement from therapist to attempt without need for trunk support, 50% verbal cues, and improved timing. Stand pivot transfer w/c to mat with min A x1 + mod A x 1. While seated EOM, patient performed dynamic balance, coordination, and abdominal strengthening while reaching at various heights with BUE's. Transitioning from seated to supine, patient required min A x 2 and cues for proper hand placement to increase independence. Supine to quadruped mod A x 2. In quadruped position, patient working on lengthening spine, stabilizing hips, trunk control, and UE strengthening; patient performed modified push ups with UEs on wedge cushion with proprioceptive input applied to lateral aspect of hips to increase stabilization. Pt then working on tall kneel with proprioceptive input applied to hips by PT and AAROM/proprioceptive input provided by OT to reach overhead with shoulders in flexion and returning to neutral while maintaining trunk control, patient with verbal cues to engage abdominal muscles and pelvic stability. When transitioning from quadruped to tall kneel, patient required Min-mod A x 2, with cueing to engage abdominals. Patient required frequent rest breaks on this date due to fatigue throughout treatment. Patient then working on STS with UE support through RW with blocking provided at knees and Max A x 2 tolerating ~15 seconds. Education provided to patient re: importance of using stander daily and performing modified scoot transfers when home, patient verbalizes understanding.      Modalities:     Pt. Education:  -patient educated on diagnosis, prognosis and expectations for rehab  -all patient questions were answered    HEP instruction:      NMR and Therapeutic Activities:    [x] (23543 or 75535) Provided verbal/tactile cueing for activities related to improving balance, coordination, kinesthetic sense, posture, motor skill, proprioception and motor activation to allow for proper function of   [x] LE / Core, hip and LE with self care and ADLs  [x] UE / Shoulder complex: cervical, postural, scapular, scapulothoracic and UE control with self care, carrying, lifting, driving, computer work.   [] (82762) Gait Re-education- Provided training and instruction to the patient for proper LE, core and hip recruitment, positioning, and eccentric body weight control with ambulation re-education, including ascending & descending stairs     Home Management Training / Self Care:  [] (36672) Provided self-care/home management training related to activities of daily living and compensatory training, and/or use of adaptive equipment for improvement with: ADLs and compensatory training, meal preparation, safety procedures and instruction in use of adaptive equipment, including bathing, grooming, dressing, personal hygiene, basic household cleaning and chores. Therapeutic Exercise and NMR EXR  [x] (53917) Provided verbal/tactile cueing for activities related to strengthening, flexibility, endurance, ROM for improvements in  [x] LE / Core stability: LE, hip, and core control with self care, mobility, lifting, ambulation. [x] UE / Shoulder complex: cervical, postural, scapular, scapulothoracic and UE control with self care, reaching, carrying, lifting, house/yardwork, driving, computer work.   [x] (10868) Provided verbal/tactile cueing for activities related to improving balance, coordination, kinesthetic sense, posture, motor skill, proprioception to assist with   [x] LE / Core stability: LE, hip, and core control in self care, mobility, lifting, ambulation and eccentric single leg control. [x] UE / Shoulder complex: cervical, scapular, scapulothoracic and UE control with self care, reaching, carrying, lifting, house/yardwork, driving, computer work.   [] (10073) Therapist is in constant attendance of 2 or more patients providing skilled therapy interventions, but not providing any significant amount of measurable one-on-one time to either patient, for improvements in  [] LE / Core stability: LE, hip, and core control in self care, mobility, lifting, ambulation and eccentric single leg control. [] UE / Shoulder complex: cervical, scapular, scapulothoracic and UE control with self care, reaching, carrying, lifting, house/yardwork, driving, computer work.      Home Exercise Program:    [] (37937) Reviewed/Progressed HEP activities related to strengthening, flexibility, endurance, ROM of   [] LE / Core stability: core, hip and LE for functional self-care, mobility, lifting and ambulation/stair navigation   [] UE / Shoulder complex: cervical, postural, scapular, scapulothoracic and UE control with self care, reaching, carrying, lifting, house/yardwork, driving, computer work  [] (53870)Reviewed/Progressed HEP activities related to improving balance, coordination, kinesthetic sense, posture, motor skill, proprioception of   [] LE: core, hip and LE for self care, mobility, lifting, and ambulation/stair navigation    [] UE / Shoulder complex: cervical, postural,  scapular, scapulothoracic and UE control with self care, reaching, carrying, lifting, house/yardwork, driving, computer work    Manual Treatments:  PROM / STM / Oscillations-Mobs:  G-I, II, III, IV (PA's, Inf., Post.)  [] (95601) Provided manual therapy to mobilize shoulder complex, hip, LE, and/or cervicothoracic/LS spine soft tissue/joints for the purpose of modulating pain, promoting relaxation,  increasing ROM, reducing/eliminating soft tissue swelling/inflammation/restriction, improving soft tissue extensibility and allowing for proper ROM for normal function with   [] LE / Core stability: self care, mobility, lifting and ambulation. [] UE / Shoulder complex: self care, reaching, carrying, lifting, house/yardwork, driving, computer work. Modalities:  [] (22917) Vasopneumatic compression: Utilized vasopneumatic compression to decrease edema / swelling for the purpose of improving mobility and quad tone / recruitment which will allow for increased overall function including but not limited to self-care, transfers, ambulation, and ascending / descending stairs. Charges:  Timed Code Treatment Minutes: 40   Total Treatment Minutes: 85   **CO-treat with OT    [] EVAL - LOW (08050)   [] EVAL - MOD (04309)  [] EVAL - HIGH (80689)  [] RE-EVAL (43815)  [x] TE (93029) x  1    [] Ionto  [x] NMR (25736) x  1    [] Vaso  [] Manual (43303) x      [] Ultrasound  [x] TA x 1       [] Mech Traction (63165)  [] Gait Training x     [] ES (un) (31422):   [] Aquatic therapy x   [] Other: Performance test w/report (46052)  x1  [] Group:     GOALS:   Patient stated goal: improve ability to complete transfers   []? Progressing: []? Met: []? Not Met: []? Adjusted     Therapist goals for Patient:   Short Term Goals: To be achieved in: 2 weeks  1. Independent in HEP and progression per patient tolerance, in order to prevent re-injury. []? Progressing: [x]? Met: []? Not Met: []? Adjusted  2. Patient will have a decrease in pain to facilitate improvement in movement, function, and ADLs as indicated by Functional Deficits. []? Progressing: [x]? Met: []? Not Met: []? Adjusted     Long Term Goals: To be achieved in: 6 weeks  1. Patient will report increased standing tolerance to at least 30 minutes in standing frame. []? Progressing: [x]? Met: []? Not Met: []? Adjusted  2.  Patient will demonstrate an increase in strength to good shoulder & hip complex, and core activation to allow for proper functional mobility as indicated by patients Functional Deficits. []? Progressing: [x]? Met: []? Not Met: []? Adjusted  3. Patient will return to functional activities including demo'ing safe transfers to/from w/c with mod I.    []? Progressing: [x]? Met: []? Not Met: []? Adjusted  4. Patient will increase STREAM score to 32 or above for increased UE/LE movement in sitting, supine, and standing. []? Progressing: []? Met: [x]? Not Met: []? Adjusted          Overall Progression Towards Functional goals/ Treatment Progress Update:  [] Patient is progressing as expected towards functional goals listed. [x] Progression is slowed due to complexities/Impairments listed. [] Progression has been slowed due to co-morbidities. [] Plan just implemented, too soon to assess goals progression <30days   [] Goals require adjustment due to lack of progress  [] Patient is not progressing as expected and requires additional follow up with physician  [] Other    Persisting Functional Limitations/Impairments:  []Sleeping [x]Sitting               []Standing [x]Transfers        []Walking []Kneeling               []Stairs []Squatting / bending   [x]ADLs []Reaching  []Lifting  []Housework  []Driving []Job related tasks  []Sports/Recreation []Other:        ASSESSMENT:  See above  Treatment/Activity Tolerance:  [x] Patient able to complete tx  [] Patient limited by fatigue  [] Patient limited by pain  [] Patient limited by other medical complications  [] Other:     Prognosis: [x] Good [] Fair  [] Poor    Patient Requires Follow-up: [] Yes  [x] No    Plan for next treatment session: transfers, seated core strength, co-treat with OT as able    PLAN: See andres. PT 1x / week for 6 weeks.    [] Continue per plan of care [] Alter current plan (see comments)  [] Plan of care initiated [] Hold pending MD visit [x] Discharge    Electronically signed by:  Seth Fletcher, PT DPT    Note: If patient does not return for scheduled/ recommended follow up visits, his note will serve as a discharge from care along with most recent update on progress.

## 2022-02-24 DIAGNOSIS — I10 ESSENTIAL HYPERTENSION: ICD-10-CM

## 2022-02-25 RX ORDER — LISINOPRIL 10 MG/1
10 TABLET ORAL DAILY
Qty: 90 TABLET | Refills: 0 | Status: SHIPPED | OUTPATIENT
Start: 2022-02-25 | End: 2022-06-08

## 2022-04-12 DIAGNOSIS — I10 ESSENTIAL HYPERTENSION: ICD-10-CM

## 2022-04-14 RX ORDER — HYDROCHLOROTHIAZIDE 25 MG/1
25 TABLET ORAL DAILY
Qty: 30 TABLET | Refills: 0 | Status: SHIPPED | OUTPATIENT
Start: 2022-04-14 | End: 2022-06-13

## 2022-06-08 DIAGNOSIS — I10 ESSENTIAL HYPERTENSION: ICD-10-CM

## 2022-06-08 RX ORDER — LISINOPRIL 10 MG/1
10 TABLET ORAL DAILY
Qty: 90 TABLET | Refills: 0 | Status: SHIPPED | OUTPATIENT
Start: 2022-06-08 | End: 2022-08-31 | Stop reason: SDUPTHER

## 2022-06-11 DIAGNOSIS — I10 ESSENTIAL HYPERTENSION: ICD-10-CM

## 2022-06-13 RX ORDER — HYDROCHLOROTHIAZIDE 25 MG/1
25 TABLET ORAL DAILY
Qty: 30 TABLET | Refills: 0 | Status: SHIPPED | OUTPATIENT
Start: 2022-06-13 | End: 2022-07-11

## 2022-07-11 DIAGNOSIS — I10 ESSENTIAL HYPERTENSION: ICD-10-CM

## 2022-07-11 RX ORDER — HYDROCHLOROTHIAZIDE 25 MG/1
25 TABLET ORAL DAILY
Qty: 30 TABLET | Refills: 0 | Status: SHIPPED | OUTPATIENT
Start: 2022-07-11 | End: 2022-08-31 | Stop reason: SDUPTHER

## 2022-07-11 NOTE — TELEPHONE ENCOUNTER
Recent Visits  Date Type Provider Dept   03/18/21 Office Visit LEVON Guevara CNP [facility]   02/19/21 Office Visit LEVON Guevara CNP [facility]   02/05/21 Office Visit LEVON Guevara - CNP [facility]   Showing recent visits within past 540 days with a meds authorizing provider and meeting all other requirements  Future Appointments  No visits were found meeting these conditions.   Showing future appointments within next 150 days with a meds authorizing provider and meeting all other requirements     3/18/2021

## 2022-07-12 RX ORDER — HYDROCHLOROTHIAZIDE 25 MG/1
25 TABLET ORAL DAILY
Qty: 90 TABLET | OUTPATIENT
Start: 2022-07-12

## 2022-07-12 NOTE — TELEPHONE ENCOUNTER
Recent Visits  Date Type Provider Dept   03/18/21 Office Visit Carmen Richter, APRN - CNP [facility]   02/19/21 Office Visit Carmen Richter, APRN - CNP [facility]   02/05/21 Office Visit Carmen Richter, APRN - CNP [facility]   Showing recent visits within past 540 days with a meds authorizing provider and meeting all other requirements  Future Appointments  No visits were found meeting these conditions.   Showing future appointments within next 150 days with a meds authorizing provider and meeting all other requirements     3/18/2021

## 2022-08-10 DIAGNOSIS — I10 ESSENTIAL HYPERTENSION: ICD-10-CM

## 2022-08-10 RX ORDER — HYDROCHLOROTHIAZIDE 25 MG/1
25 TABLET ORAL DAILY
Qty: 90 TABLET | OUTPATIENT
Start: 2022-08-10

## 2022-08-25 DIAGNOSIS — I10 ESSENTIAL HYPERTENSION: ICD-10-CM

## 2022-08-26 RX ORDER — LISINOPRIL 10 MG/1
10 TABLET ORAL DAILY
Qty: 90 TABLET | Refills: 0 | OUTPATIENT
Start: 2022-08-26

## 2022-08-26 RX ORDER — HYDROCHLOROTHIAZIDE 25 MG/1
25 TABLET ORAL DAILY
Qty: 90 TABLET | OUTPATIENT
Start: 2022-08-26

## 2022-08-31 ENCOUNTER — TELEMEDICINE (OUTPATIENT)
Dept: INTERNAL MEDICINE CLINIC | Age: 22
End: 2022-08-31
Payer: MEDICARE

## 2022-08-31 DIAGNOSIS — I10 ESSENTIAL HYPERTENSION: ICD-10-CM

## 2022-08-31 DIAGNOSIS — G80.1 CEREBRAL PALSY WITH SPASTIC DIPLEGIA (HCC): ICD-10-CM

## 2022-08-31 DIAGNOSIS — R73.9 HYPERGLYCEMIA: ICD-10-CM

## 2022-08-31 DIAGNOSIS — E55.9 VITAMIN D DEFICIENCY: ICD-10-CM

## 2022-08-31 DIAGNOSIS — Z00.00 ANNUAL PHYSICAL EXAM: Primary | ICD-10-CM

## 2022-08-31 PROCEDURE — G0438 PPPS, INITIAL VISIT: HCPCS | Performed by: NURSE PRACTITIONER

## 2022-08-31 RX ORDER — LISINOPRIL 10 MG/1
10 TABLET ORAL DAILY
Qty: 90 TABLET | Refills: 3 | Status: SHIPPED | OUTPATIENT
Start: 2022-08-31

## 2022-08-31 RX ORDER — HYDROCHLOROTHIAZIDE 25 MG/1
25 TABLET ORAL DAILY
Qty: 90 TABLET | Refills: 3 | Status: SHIPPED | OUTPATIENT
Start: 2022-08-31

## 2022-08-31 ASSESSMENT — PATIENT HEALTH QUESTIONNAIRE - PHQ9
2. FEELING DOWN, DEPRESSED OR HOPELESS: 0
1. LITTLE INTEREST OR PLEASURE IN DOING THINGS: 0
SUM OF ALL RESPONSES TO PHQ QUESTIONS 1-9: 0
SUM OF ALL RESPONSES TO PHQ9 QUESTIONS 1 & 2: 0

## 2022-08-31 ASSESSMENT — ENCOUNTER SYMPTOMS
RESPIRATORY NEGATIVE: 1
GASTROINTESTINAL NEGATIVE: 1

## 2022-08-31 NOTE — PROGRESS NOTES
2022    TELEHEALTH EVALUATION -- Audio/Visual (During [place] [address] public health emergency)    HPI:    Felicitas Wilcox (:  2000) has requested an audio/video evaluation for the following concern(s): Annual physical:    Presents via video visit for annual physical examination and follow-up of chronic medical conditions. Accompanied today on the video visit by his grandmother and guardian. She assists with history given his underlying medical condition. He has a history of cerebral palsy. Overall, this is stable. He lives with his grandmother. She assists with his care. He has a history of hypertension and dyslipidemia. He denies any chest pain or shortness of breath. He is compliant with his medication regimen. He has been found to be vitamin D deficient in the past.  He is taking repletion. Review of Systems   Constitutional: Negative. Respiratory: Negative. Cardiovascular: Negative. Gastrointestinal: Negative. Genitourinary: Negative. Musculoskeletal: Negative. Neurological: Negative. Psychiatric/Behavioral: Negative. All other systems reviewed and are negative. Prior to Visit Medications    Medication Sig Taking? Authorizing Provider   hydroCHLOROthiazide (HYDRODIURIL) 25 MG tablet TAKE 1 TABLET BY MOUTH DAILY Yes LEVON Hernandez CNP   lisinopril (PRINIVIL;ZESTRIL) 10 MG tablet TAKE 1 TABLET BY MOUTH DAILY Yes LEVON Hernandez CNP   Cholecalciferol (VITAMIN D3) 25 MCG (1000 UT) CAPS Take 1,000 capsules by mouth daily Yes LEVON Hernandez CNP   Catheters ([address] CONDOM CATHETER) MISC 100 each by Does not apply route as needed (Urinary incontinence) Yes LEVON Hernandez CNP       Social History     Tobacco Use    Smoking status: Never    Smokeless tobacco: Never   Vaping Use    Vaping Use: Never used   Substance Use Topics    Alcohol use: No    Drug use:  No            PHYSICAL EXAMINATION:  [ INSTRUCTIONS:  \"[x]\" Indicates a positive item  \"[]\" Indicates a negative item  -- DELETE ALL ITEMS NOT EXAMINED]  Vital Signs: (As obtained by patient/caregiver or practitioner observation)    Blood pressure-  Heart rate-    Respiratory rate-    Temperature-  Pulse oximetry-     Constitutional: [x] Appears well-developed and well-nourished [x] No apparent distress      [] Abnormal-   Mental status  [x] Alert and awake  [x] Oriented to person/place/time [x]Able to follow commands      Eyes:  EOM    [x]  Normal  [] Abnormal-  Sclera  [x]  Normal  [] Abnormal -         Discharge [x]  None visible  [] Abnormal -    HENT:   [x] Normocephalic, atraumatic. [] Abnormal   [x] Mouth/Throat: Mucous membranes are moist.     External Ears [x] Normal  [] Abnormal-     Neck: [x] No visualized mass     Pulmonary/Chest: [x] Respiratory effort normal.  [x] No visualized signs of difficulty breathing or respiratory distress        [] Abnormal-      Musculoskeletal:   [x] Normal gait with no signs of ataxia         [x] Normal range of motion of neck        [] Abnormal-       Neurological:        [] No Facial Asymmetry (Cranial nerve 7 motor function) (limited exam to video visit)          [] No gaze palsy        [x] Abnormal- speech consistent with cerebral palsy        Skin:        [x] No significant exanthematous lesions or discoloration noted on facial skin         [] Abnormal-            Psychiatric:       [x] Normal Affect [x] No Hallucinations        [] Abnormal-     Other pertinent observable physical exam findings-       ASSESSMENT/PLAN:  1. Annual physical exam  -Discussed the importance of blood work. This will be ordered today and they will return to the office to have this drawn.  - Comprehensive Metabolic Panel; Future  - Hemoglobin A1C; Future  - Vitamin D 25 Hydroxy; Future    2. Essential hypertension  -They are not checking blood pressure at home. Discussed the importance of checking this.   When they come in for blood work, they will see one of our nurses for a blood pressure check. - hydroCHLOROthiazide (HYDRODIURIL) 25 MG tablet; Take 1 tablet by mouth daily  Dispense: 90 tablet; Refill: 3  - lisinopril (PRINIVIL;ZESTRIL) 10 MG tablet; Take 1 tablet by mouth daily  Dispense: 90 tablet; Refill: 3  - Cholecalciferol (VITAMIN D3) 25 MCG (1000 UT) CAPS; Take 1,000 capsules by mouth daily  Dispense: 90 capsule; Refill: 3  - Comprehensive Metabolic Panel; Future  - Hemoglobin A1C; Future    3. Hyperglycemia  - Comprehensive Metabolic Panel; Future  - Hemoglobin A1C; Future    4. Cerebral palsy with spastic diplegia (HCC)  - Chronic, stable  - Continue current regimen    5. Vitamin D deficiency  - Vitamin D 25 Hydroxy; Future      No follow-ups on file. Leinn Burciaga, was evaluated through a synchronous (real-time) audio-video encounter. The patient (or guardian if applicable) is aware that this is a billable service, which includes applicable co-pays. This Virtual Visit was conducted with patient's (and/or legal guardian's) consent. The visit was conducted pursuant to the emergency declaration under the [address] [place] [address], [address] [place] [address] authority and the [place] and [employer] Act. Patient identification was verified, and a caregiver was present when appropriate. The patient was located at Home: [place] [address]. Provider was located at [facility] (Appt Dept): [address] [place] [address],  [facility]. Total time spent on this encounter: Not billed by time    --LEVON Bernal CNP on 8/31/2022 at 12:05 PM    An electronic signature was used to authenticate this note.

## 2022-09-02 DIAGNOSIS — E55.9 VITAMIN D DEFICIENCY: ICD-10-CM

## 2022-09-02 DIAGNOSIS — Z00.00 ANNUAL PHYSICAL EXAM: ICD-10-CM

## 2022-09-02 DIAGNOSIS — R73.9 HYPERGLYCEMIA: ICD-10-CM

## 2022-09-02 DIAGNOSIS — I10 ESSENTIAL HYPERTENSION: ICD-10-CM

## 2022-09-02 LAB
A/G RATIO: 1.6 (ref 1.1–2.2)
ALBUMIN SERPL-MCNC: 4.5 G/DL (ref 3.4–5)
ALP BLD-CCNC: 91 U/L (ref 40–129)
ALT SERPL-CCNC: 38 U/L (ref 10–40)
ANION GAP SERPL CALCULATED.3IONS-SCNC: 13 MMOL/L (ref 3–16)
AST SERPL-CCNC: 19 U/L (ref 15–37)
BILIRUB SERPL-MCNC: 0.3 MG/DL (ref 0–1)
BUN BLDV-MCNC: 14 MG/DL (ref 7–20)
CALCIUM SERPL-MCNC: 9.8 MG/DL (ref 8.3–10.6)
CHLORIDE BLD-SCNC: 100 MMOL/L (ref 99–110)
CO2: 24 MMOL/L (ref 21–32)
CREAT SERPL-MCNC: 0.8 MG/DL (ref 0.9–1.3)
GFR AFRICAN AMERICAN: >60
GFR NON-AFRICAN AMERICAN: >60
GLUCOSE BLD-MCNC: 96 MG/DL (ref 70–99)
POTASSIUM SERPL-SCNC: 3.9 MMOL/L (ref 3.5–5.1)
SODIUM BLD-SCNC: 137 MMOL/L (ref 136–145)
TOTAL PROTEIN: 7.3 G/DL (ref 6.4–8.2)
VITAMIN D 25-HYDROXY: 30.5 NG/ML

## 2022-09-03 LAB
ESTIMATED AVERAGE GLUCOSE: 91.1 MG/DL
HBA1C MFR BLD: 4.8 %

## 2022-10-14 ENCOUNTER — TELEPHONE (OUTPATIENT)
Dept: INTERNAL MEDICINE CLINIC | Age: 22
End: 2022-10-14

## 2022-11-01 ENCOUNTER — TELEPHONE (OUTPATIENT)
Dept: INTERNAL MEDICINE CLINIC | Age: 22
End: 2022-11-01

## 2022-11-01 NOTE — TELEPHONE ENCOUNTER
I have already written orders for \"medical equipment\" assuming the \"medical equipment\" being referred to in this message is the same \"medical equipment\" I previous wrote orders for.

## 2022-11-01 NOTE — TELEPHONE ENCOUNTER
Fredrick Marti from [facility] [employer] [facility] is calling requesting written orders for medical equipment. The letter has already been signed, just needs the written orders.    Fredrick Marti [phone]

## 2022-11-14 ENCOUNTER — TELEPHONE (OUTPATIENT)
Dept: INTERNAL MEDICINE CLINIC | Age: 22
End: 2022-11-14

## 2022-11-14 DIAGNOSIS — G80.1 CEREBRAL PALSY WITH SPASTIC DIPLEGIA (HCC): Primary | ICD-10-CM

## 2022-11-14 NOTE — TELEPHONE ENCOUNTER
----- Message from Pedro Pablo Garcia sent at 11/14/2022  2:40 PM EST -----  Subject: Message to Provider    QUESTIONS  Information for Provider? Via Jeyson Paz, from [facility]: SANDI LEE of   [facility], needing to follow up about rest of equipment prescriptions   for patient. Last contact was kendrick Flaherty on 11/2. Please call back to   advise.  ---------------------------------------------------------------------------  --------------  CALL BACK INFO  [phone]; OK to leave message on voicemail  ---------------------------------------------------------------------------  --------------  SCRIPT ANSWERS  Relationship to Patient? Third Party  Third Party Type? Other  Other Third Party Type? [employer]  Representative Name?  Via Jeyson Jay [address]

## 2022-11-14 NOTE — TELEPHONE ENCOUNTER
Garret Patel [facility] [employer] [facility] is calling to check status of orders for Raised toilet seat and silicone cuffs for hand held shower head.

## 2022-11-15 ENCOUNTER — TELEPHONE (OUTPATIENT)
Dept: INTERNAL MEDICINE CLINIC | Age: 22
End: 2022-11-15

## 2022-11-15 NOTE — TELEPHONE ENCOUNTER
----- Message from Sony Nolan sent at 11/14/2022  2:40 PM EST -----  Subject: Message to Provider    QUESTIONS  Information for Provider? Sushant Casarez, from [facility]: SANDI LEE of   [facility], needing to follow up about rest of equipment prescriptions   for patient. Last contact was w/ Jeffry Homans on 11/2. Please call back to   advise.  ---------------------------------------------------------------------------  --------------  CALL BACK INFO  [phone]; OK to leave message on voicemail  ---------------------------------------------------------------------------  --------------  SCRIPT ANSWERS  Relationship to Patient? Third Party  Third Party Type? Other  Other Third Party Type? [facility] [employer]    Name?  Sushant Casarez

## 2023-03-10 ENCOUNTER — HOSPITAL ENCOUNTER (OUTPATIENT)
Dept: PHYSICAL THERAPY | Age: 23
Setting detail: THERAPIES SERIES
Discharge: HOME | End: 2023-03-10
Payer: MEDICARE

## 2023-03-10 NOTE — FLOWSHEET NOTE
[address] [place] [address] [employer]     Physical Therapy  Cancellation/No-show Note  Patient Name:  Izabel Carranza  :  2000   Date:  3/10/2023  Cancelled visits to date: 1  No-shows to date: 0    Patient status for today's appointment patient:  [x]  Cancelled - 3/10  []  Rescheduled appointment  []  No-show     Reason given by patient:  [x]  Patient ill  []  Conflicting appointment  []  No transportation    []  Conflict with work  []  No reason given  []  Other:     Comments:      Phone call information:   []  Phone call made today to patient at _ time at number provided:      []  Patient answered, conversation as follows:    []  Patient did not answer, message left as follows:  []  Phone call not made today  [x]  Phone call not needed - pt contacted us to cancel and provided reason for cancellation.      Electronically signed by:  Walter Calloway, PT, DPT

## 2023-03-17 ENCOUNTER — HOSPITAL ENCOUNTER (OUTPATIENT)
Dept: PHYSICAL THERAPY | Age: 23
Setting detail: THERAPIES SERIES
Discharge: HOME | End: 2023-03-17
Payer: MEDICARE

## 2023-03-17 PROCEDURE — 97162 PT EVAL MOD COMPLEX 30 MIN: CPT

## 2023-03-17 PROCEDURE — 97530 THERAPEUTIC ACTIVITIES: CPT

## 2023-03-17 PROCEDURE — 97110 THERAPEUTIC EXERCISES: CPT

## 2023-03-17 NOTE — PLAN OF CARE
[facility], [facility], [address]  Phone: [phone]   Fax: [phone]     Physical Therapy Certification    Dear Lilliana Jauregui*  ,    We had the pleasure of evaluating the following patient for physical therapy services at [address] [place] [address]. A summary of our findings can be found in the initial assessment below. This includes our plan of care. If you have any questions or concerns regarding these findings, please do not hesitate to contact me at the office phone number checked above. Thank you for the referral.       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 [place] through [facility] of care sent to provider:      [x]Faxed   []Co-signature    (attempts: 1[x] 3/17/23 2[] 3[])          Precautions/ Contra-indications:   Adhesive allergy:  [x]NO      []YES   Latex Allergy:   [x]NO      []YES     Preferred Language for Healthcare:   [x]English       []other:    C-SSRS Triggered by Intake questionnaire (Past 2 wk assessment ):   [x] No, Questionnaire did not trigger screening.   [] Yes, Patient intake triggered C-SSRS Screening     [] Completed, no further action required.    [] Completed, PCP notified via Epic      Functional Scale:       Date assessed:  3/17  Wheelchair Skills Test: --attempted several times to complete, however pt began talking and distracted from assessment each time. Plan to perform at first follow up. Relevant Medical History:   CP, obesity    SUBJECTIVE:     History of current complaint:   Patient reports gaining weight since completing PT services about 2 years and feels that he needs to address it to maintain current level of independency. Pt also being fit for a new wheelchair next week that may have to be larger than his current w/c due to his weight gain. That's an issue as his home has already been modified to fit his current w/c size and would be currently unable to fit a larger w/c. Mother states that she has just begun pt on a diet, however during assessment, pt reported continuing to drink sodas. **Discussed importance of lifestyle modification, including diet to be successful in weight loss management. Also informed pt and mother of Weight Loss Management office upstairs in this same building who may be able to offer other options. Patient reports several falls while transferring from wheelchair to bed or vise versa and now uses transfer lift with mother for assistance. Pt reports unable to perform a wheelie, can do so to enter home, over threshold but is all. Also reports some difficulty in forward propulsion. Mother states that it's common for pt to fall over when sitting unsupported EOB.     Pain Scale: 0/10  Easing factors: -  Provocative factors: -   Type: []Constant   []Intermittent  []Radiating []Localized []other:  Numbness/Tingling: none  Pt. reports bowel and bladder changes:   []yes   [x]no     Current function:  History of falls      [x]yes   []no   []unknown  Pt able to drive      []yes   [x]no   []unknown  Pt independent with ADLs   []yes   [x]no   []unknown  Pt required assistance from family for:      [x]bathing    [x]cooking    [x]cleaning    [x]dressing    [x]laundry    []unknown    Transfers:   requires transfer lift equipment to maintain safety  Ambulation:  Wheelchair bound    Occupation/School/Recreational activities: playing video games      Social support/Living environment:      Family/caregiver support:   [x] yes: mother, friend  []no  Equipment owned:  []SPC    []standard walker (SW)   []rolling walker (RW)  []rollator   []hemiwalker   []crutches   []loftstrands    []large based quad cane (LBQC)  []small based quad cane (SBQC)  [x]manual wc    [] electric wc    []lift chair     []hospital bed     []home o2    L      []reacher     []life alert     []none    []unknown  [x]other:  standing transfer lift, sit to stand stander   Home environment:   []apartment    [x]1 story w/basement     []2 story     [] in [address] [place] [address] facility   [] [facility] level home    [] mobile home    []senior apartment     [] unknown     Steps to enter first floor:    [] N/A    []no steps    []     steps to enter   []no handrail    []single HR     []bilateral HR   [x]ramp    [] unknown     Steps to second floor:  [x] N/A    []     steps    []full flight 12-13   []no handrail     [] single HR     [] bilateral HR   [] unknown     Bathroom: []tub shower    [] walk-in shower   []grab bars    [x]shower chair   []tub shower bench   []raised toilet seat w/arms  []raised toilet seat w/o arms   []BSC (3 in 1)   []unknown           OBJECTIVE:     Cognitive Status:    A&O to  [x]x4    []person    []place    []time    []situation    []orientation not directly assessed    [x]unable to easily switch topics of conversation   Able to follow:   []1 step commands    []1 step command inconsistently     []2 step commands       [x]multi-step commands     Vision:    [x]WNL    []wears glasses for reading    []wears glasses for sight    []Homonymous hemianopsia  []diplopia    []other:   Hearing:   [x]WNL    []wears hearing aides    []impaired     []tinnitus      Communication      []WNL    [x]slurred speech    []expressive aphasia    []receptive aphasia     []impaired:  Comments:  requires increased time for processing/responses      Functional Mobility    Bed Mobility:     Transfers:  Rolling to right side:      Sit to stand:  pt declined  Rolling to left side:      Stand to sit:    Scooting in bed:      Stand pivot:    Supine to sit:       Bed to chair:  pt declined  Sit to supine:      Wheelchair propulsion:    IND forward, backwards and maneuverability   -poor technique with several pushes with UEs vs long push and allowing wheels to roll  -Discussed improved technique and pt able to demonstrate allowing for increased speed with fewer pushes      Gait Testing:     Gait  not applicable  Level of Assistance needed:      Assistive Device Used:    Gait Deviations (firm surface/linoleum):      Stairs  not applicable  Level of Assistance needed:      Pt negotiated up/down 4 stairs:   []WNL with 0-1 HR, reciprocal pattern, no falter     []reciprocal    []step to pattern:       []no HR    []1 HR    [] B HR   Assistive Device Used:       Deficits noted:          Balance:   Static Sitting:  []normal  []good  []good-  [x]fair+  []fair   []fair -   []poor  Dynamic Sitting: []normal  []good  []good-  []fair+  []fair   [x]fair -   []poor  Static Standing:  []normal  []good  []good-  []fair+  []fair   []fair -   [x]poor  Dynamic Standing: []normal  []good  []good-  []fair+  []fair   []fair -   []poor    Balance/Special Tests/Outcome Measures: Result N/A Comments   SLS      Feet Together      Romberg         Tandem Stance      QOLIBRI      TUG Score      10-meter Walk      30 sec Sit to Stand      Mini-BESTest      Dynamic Gait Index      Functional Gait Assessment      Tinetti Assessment       ALVAREZ Balance Assessment               Flexibility     Hip flexion/Mick test:    [x]decreased R  [x]decreased L  Hamstrings:    [x]decreased R  [x]decreased L  Gastrocs:     [x]decreased R  [x]decreased L  Obers/TFL/ITB:    []decreased R  []decreased L  Piriformis:    [x]decreased R  [x]decreased L  Other:      Tone   RUE:  []WNL []Hypertonia [x]Hypotonia []Tremoring []Spastic []Clonus []Flaccid  LUE:  []WNL  []Hypertonia [x]Hypotonia []Tremoring []Spastic []Clonus []Flaccid  RLE:  []WNL  []Hypertonia []Hypotonia []Tremoring [x]Spastic []Clonus []Flaccid  LLE:   []WNL  []Hypertonia []Hypotonia []Tremoring [x]Spastic []Clonus []Flaccid      Sensation      Light touch:    []WNL    []Diminished    [x]Impaired   []Absent Location:   [] RUE    []LUE         [x]RLE     [x]LLE     Comment:    Proprioception:    []WNL    []Diminished    [x]Impaired   []Absent Location:   [] RUE    []LUE         [x]RLE     [x]LLE     Comment:        MMT LEFT RIGHT Comments   Neck flexion (C1-C2)      Neck sidebending (C3)      Shoulder flexion/elevation (C4)      Shoulder abduction (C5)      Shoulder ER      Shoulder IR      Elbow flexion/wrist extension (C6)      Elbow extension/wrist flexion (C7)      Thumb abduction (C8)      Finger abduction (T1)            Hip flexion (L1-L2)      Hip abduction      Hip extension      Knee extension (L2-L4)      Knee flexion      Ankle Dorsiflexion (L4-L5)      Ankle Plantar flexion (S1-S2)      Ankle Eversion (S1-S2)      Great Toe Ext (L5)            Core Strength             [x] Patient history, allergies, meds reviewed. Medical chart reviewed. See intake form. Review Of Systems (ROS):  [x]Performed Review of systems (Integumentary, CardioPulmonary, Neurological) by intake and observation. Intake form has been scanned into medical record. Patient has been instructed to contact their primary care physician regarding ROS issues if not already being addressed at this time.       Co-morbidities/Complexities (which will affect course of rehabilitation):   []None        []Hx of COVID   Arthritic conditions   []Rheumatoid arthritis (M05.9)  []Osteoarthritis (M19.91)  []Gout   Cardiovascular conditions   []Hypertension (I10)  []Hyperlipidemia (E78.5)  []Angina pectoris (I20)  []Atherosclerosis (I70)  []Pacemaker  []Hx of CABG/stent/  cardiac surgeries   Musculoskeletal conditions   []Disc pathology   []Congenital spine pathologies   []Osteoporosis (M81.8)  []Osteopenia (M85.8)  []Scoliosis       Endocrine conditions   []Hypothyroid (E03.9)  []Hyperthyroid Gastrointestinal conditions   []Constipation (T49.50)   Metabolic conditions   [x]Morbid obesity (E66.01)  []Diabetes type 1(E10.65) or 2 (E11.65)   []Neuropathy (G60.9)     Cardio/Pulmonary conditions   []Asthma (J45)  []Coughing   []COPD (J44.9)  []CHF  []A-fib   Psychological Disorders  []Anxiety (F41.9)  []Depression (F32.9)   []Other:   Developmental Disorders  []Autism (F84.0)  [x]CP (G80)  []Down Syndrome (Q90.9)  []Developmental delay     Neurological conditions  []Prior Stroke (I69.30)  []Parkinson's (G20)  []Encephalopathy (G93.40)  []MS (G35)  []Post-polio (G14)  []SCI  []TBI  []ALS Other conditions  []Fibromyalgia (M79.7)  []Vertigo  []Syncope  []Kidney Failure  []Cancer      []currently undergoing                treatment  []Pregnancy  []Incontinence   Prior surgeries  []involved limb  []previous spinal surgery  [] section birth  []hysterectomy  []bowel / bladder surgery  []other relevant surgeries   []Other:                Barriers to/and or personal factors that will affect rehab potential:              [x]Age  []Sex    []Smoker              [x]Motivation/Lack of Motivation                        [x]Co-Morbidities              []Cognitive Function, education/learning barriers              []Environmental, home barriers              []profession/work barriers  [x]past PT/medical experience  []other:    Falls Risk Assessment (30 days):   [] Falls Risk assessed and no intervention required. [x] Falls Risk assessed and Patient requires intervention due to being higher risk as he requires assistance for safe transfers  []Tinetti Balance:    Total Score:        Balance:            Gait:         Disability Index:       Risk of Falls:   []Low (> 24)   []Mod (19-23) []High (< 18)    []Dynamic Gait Index       Risk of Falls: <19 = increased fall risk    []Functional Gait Assessment           Indications: Non-specific older adults: <22/30 = risk of falls; Parkinson's patients <15-18/30 = risk of falls    []Skinner Balance Scale          []41-56 = independent     []21-40 = walking with assistance     []0-20 = wc bound    []Timed Up and Go (TUG)     seconds     []<12 = Independent    []<20 = Fall risk    []20-29  = High fall risk    []>30 = Severe fall risk  [] Falls education provided, including:       ASSESSMENT:   Patient is a 24 yo male who complains of recent increase in weight gain along with increased difficulty transferring and propelling wheelchair. Patient is getting fitted for new wheelchair next week and is nervous about getting a larger chair as his current sized chair can barely fit through each doorway and hallway at home. Is aware that improve lifestyle will help to lose weight to maintain current size of wheelchair. Patient presents with increased weakness throughout his core and extremities, impairing his functional mobility with transfers and wheelchair propulsion. Pt reports several falls while attempting to transfer without transfer lift. Patient can benefit from PT services to address these deficits, however is talkative, easily distracting sessions, unable to complete Wheelchair Skills Test.  Patient also reports nervousness and apprehension to returning to outpatient services as he doesn't feel comfortable interacting with unfamiliar people. Unclear if he will return to outpatient PT services but is aware of sedentary lifestyle and importance of increasing activity levels to both improve functionality and progress to his goal of weight loss. Plans to discuss with mother and schedule accordingly.   Patient also reports noticing reduced tolerance to environmental stimulation, affecting his decision to participate in outpatient PT, pt may benefit from OT order to address any sensory deficits. Functional Impairments:     []Noted scapular/hip hypomobility   []Noted scapular/hip hypermobility   [x]Assistance required with functional mobility/gait for safety   [x]Decreased core/proximal hip strength and neuromuscular control    [x]Decreased UE/LE functional strength    []Abnormal reflexes/sensation/myotomal/dermatomal deficits  []Hypertonic UE/LE   []Hypotonic UE/LE  []Dislocated//Hypermobile Glenohumeral joint  [x]Impaired balance/coordination/proprioceptive control    [x]other:  spastic BLEs    Functional Activity Limitations (from functional questionnaire and intake)   [x]Reduced ability to tolerate prolonged functional positions   [x]Reduced ability or difficulty with changes of positions or transfers between positions   [x]Reduced ability to maintain good posture and demonstrate good body mechanics with sitting, bending, and lifting   [x]Reduced ability to sleep   [x] Reduced ability or tolerance with driving and/or computer work   [x]Reduced ability to perform lifting, reaching, carrying tasks   []Reduced ability to squat   [x]Reduced ability to forward bend   []Reduced ability to ambulate prolonged functional periods/distances/surfaces   []Reduced ability to ascend/descend stairs   []other:       Participation Restrictions   [x]Reduced participation in self care activities   [x]Reduced participation in home management activities   [x]Reduced participation in work activities   [x]Reduced participation in social activities. []Reduced participation in sport/recreational activities.     Classification:   [x]Signs/symptoms consistent with Primary prevention/risk reduction for loss of balance and falls    [x]Signs/symptoms consistent with Impaired neuromotor development   [x]Signs/symptoms consistent with Impaired motor function and sensory integrity associated w/non-progressive disorders of CNS - Congenital/Aquired in Infancy/Childhood or Acquired in Adolescence/Adulthood   []Signs/symptoms consistent with Impaired motor function and sensory integrity associate w/progressive disorders of the CNS     []Signs/symptoms consistent with Impaired motor function and sensory integrity associated w/acute or chronic polyneuropathies   []Signs/symptoms consistent with Impaired motor function, peripheral nerve integrity, and sendory integrity associated w/non-progressive disorders of spinal cord   []other:      Prognosis/Rehab Potential:   Tolerance of evaluation/treatment:    []Excellent     []Excellent   [x]Good     [x]Good   []Fair      []Fair   []Poor      []Poor      Physical Therapy Evaluation Complexity Justification  [x] A history of present problem with:  [] no personal factors and/or comorbidities that impact the plan of care;  []1-2 personal factors and/or comorbidities that impact the plan of care  [x]3 personal factors and/or comorbidities that impact the plan of care  [x] An examination of body systems using standardized tests and measures addressing any of the following: body structures and functions (impairments), activity limitations, and/or participation restrictions;:  [] a total of 1-2 or more elements   [] a total of 3 or more elements   [x] a total of 4 or more elements   [x] A clinical presentation with:  [x] stable and/or uncomplicated characteristics   [] evolving clinical presentation with changing characteristics  [] unstable and unpredictable characteristics;   [x] Clinical decision making of [] low, [x] moderate, [] high complexity using standardized patient assessment instrument and/or measurable assessment of functional outcome. [] EVAL (LOW) 93165 (typically 15 minutes face-to-face)  [x] EVAL (MOD) 97566 (typically 30 minutes face-to-face)  [] EVAL (HIGH) 15494 (typically 45 minutes face-to-face)  [] RE-EVAL     PLAN: PT to begin focusing on:  Activation of scapular and hip musculature, Transfer & Gait safety, Evenly distributed weight-bearing through extremities, Pain Management    Frequency/Duration:  2 days per week for 8 Weeks:  Interventions:  [x]  Therapeutic exercise including: strength training, ROM, for LE, Glutes and core   [x]  NMR activation and proprioception for shoulder complex, glutes, UE/LE, and Core   [x]  Manual therapy as indicated for Shoulder & Hip complex, LE and core activation to include: STM, manual cues to facilitate/inhibit muscle groups. [x]  Modalities as needed that may include: thermal agents, E-stim, Biofeedback, US as indicated  [x]  Patient education on joint protection, postural re-education, home/activity modification, progression of HEP. HEP instruction: Written HEP instructions provided and reviewed. Access Code: [email or web address]  URL: [email or web address]  Date: 03/17/2023  Prepared by: Evie Cabrera    Exercises  Seated Single Leg Hamstring Stretch with AFO - Wheelchair - 3 x daily - 7 x weekly - 3 sets - 30\" hold  Wheelchair Push-Up (AKA) - 2 x daily - 7 x weekly - 1 sets - 10-15 reps  Seated Reverse Pull with Wheel Chair - 10 x daily - 7 x weekly - 5 sets - 10 reps  Seated Scapular Retraction - 2 x daily - 7 x weekly - 1 sets - 10-15 reps  Seated Gluteal Sets - 2 x daily - 7 x weekly - 1 sets - 10-15 reps  Hooklying Gluteal Sets - 2 x daily - 7 x weekly - 1 sets - 10-15 reps  Prone Hip Extension - 2 x daily - 7 x weekly - 1 sets - 10-15 reps  Supine Shoulder Flexion Extension Full Range AROM - 2 x daily - 7 x weekly - 10-15 reps  Seated Shoulder Flexion Full Range - 2 x daily - 7 x weekly - 1 sets - 10-15 reps  Seated Shoulder Overhead Press with Dumbbells with PLB - 2 x daily - 7 x weekly - 1 sets - 10-15 reps        GOALS:  Patient stated goal:  lose weight, improve transfer status  [] Progressing: [] Met: [] Not Met: [] Adjusted    Therapist goals for Patient:   Short Term Goals: To be achieved in: 2 weeks  1.  Independent in HEP and progression per patient tolerance, in order to prevent re-injury. [] Progressing: [] Met: [] Not Met: [] Adjusted      Long Term Goals: To be achieved in: 8 weeks  1. Patient to complete Wheelchair Skills Test with a score of 40 (score adjusted for clinic and patient barriers) to assist with reaching prior level of function. [] Progressing: [] Met: [] Not Met: [] Adjusted  2. Patient will demonstrate an increase in core strength to tolerate dynamic, unsupported sitting of 30 mins. [] Progressing: [] Met: [] Not Met: [] Adjusted  3. Patient will return to functional activities including donning and doffing B AFO & shoes without increased symptoms or restriction. [] Progressing: [] Met: [] Not Met: [] Adjusted  4. Patient to safely perform wheelchair to/from bed transfer independently to reduce dependence on caregivers. [] Progressing: [] Met: [] Not Met: [] Adjusted   5. Patient to report self directed HEP and independent mobility to assist in reaching goal of weight loss.   [] Progressing: [] Met: [] Not Met: [] Adjusted     Electronically signed by:  Karly Marino, PT

## 2023-03-17 NOTE — FLOWSHEET NOTE
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 [place] through [employer] Fax  Physician Information:  Dayo Pascual*    Plan of care signed (Y/N): []  Yes [x]  No     Date of Patient follow up with Physician:      Progress Report: []  Yes  [x]  No     Date Range for reporting period:  Beginning: 3/17/2023  Ending:       Progress report due (10 Rx/or 30 days whichever is less): visit #10 or  (date)     Recertification due (POC duration/ or 90 days whichever is less): visit #16 or 23 (date)     Visit # Insurance Allowable Auth required?  Date Range   Eval +  [x]  Yes, [place] Fax  []  No        Units approved Units used Date Range          Latex Allergy:  [x]NO      []YES  Preferred Language for Healthcare:   [x]English       []Other:        Functional Form:  Wheelchair Skills Test:   npv      Pain level:  0/10  Location:  -    SUBJECTIVE:  See eval    OBJECTIVE: See eval      RESTRICTIONS/PRECAUTIONS:      Interventions/Exercises:   Therapeutic Exercises (52655) Resistance / level Sets/sec Reps Notes                               Neuromuscular Re-ed (47311)        Cores stabiltiy                                                         Therapeutic Activities (73025) /  Functional Tasks        Transfers       Bed mobility                            Donning & doffing AFO & shoes                            Manual Intervention ([phone]) Modalities:     Pt. Education:  3/17/2023 patient educated on diagnosis, prognosis and expectations for rehab, all patient questions were answered    HEP instruction:  Access Code: [email or web address]  URL: [email or web address]  Date: 03/17/2023  Prepared by: Luis Daniel Kay     Exercises  Seated Single Leg Hamstring Stretch with AFO - Wheelchair - 3 x daily - 7 x weekly - 3 sets - 30\" hold  Wheelchair Push-Up (AKA) - 2 x daily - 7 x weekly - 1 sets - 10-15 reps  Seated Reverse Pull with Wheel Chair - 10 x daily - 7 x weekly - 5 sets - 10 reps  Seated Scapular Retraction - 2 x daily - 7 x weekly - 1 sets - 10-15 reps  Seated Gluteal Sets - 2 x daily - 7 x weekly - 1 sets - 10-15 reps  Hooklying Gluteal Sets - 2 x daily - 7 x weekly - 1 sets - 10-15 reps  Prone Hip Extension - 2 x daily - 7 x weekly - 1 sets - 10-15 reps  Supine Shoulder Flexion Extension Full Range AROM - 2 x daily - 7 x weekly - 10-15 reps  Seated Shoulder Flexion Full Range - 2 x daily - 7 x weekly - 1 sets - 10-15 reps  Seated Shoulder Overhead Press with Dumbbells with PLB - 2 x daily - 7 x weekly - 1 sets - 10-15 reps      Therapeutic Exercise and NMR EXR  [x] (51231) Provided verbal/tactile cueing for activities related to strengthening, flexibility, endurance, ROM for improvements in  [x] LE / Core stability: LE, hip, and core control with self care, mobility, lifting, ambulation. [x] UE / Shoulder complex: cervical, postural, scapular, scapulothoracic and UE control with self care, reaching, carrying, lifting, house/yardwork, driving, computer work.  [] (75316) Provided verbal/tactile cueing for activities related to improving balance, coordination, kinesthetic sense, posture, motor skill, proprioception to assist with   [] LE / Core stability: LE, hip, and core control in self care, mobility, lifting, ambulation and eccentric single leg control.    [] UE / Shoulder complex: cervical, scapular, scapulothoracic and UE control with self care, reaching, carrying, lifting, house/yardwork, driving, computer work.   [] (71238) Therapist is in constant attendance of 2 or more patients providing skilled therapy interventions, but not providing any significant amount of measurable one-on-one time to either patient, for improvements in  [] LE / Core stability: LE, hip, and core control in self care, mobility, lifting, ambulation and eccentric single leg control. [] UE / Shoulder complex: cervical, scapular, scapulothoracic and UE control with self care, reaching, carrying, lifting, house/yardwork, driving, computer work. NMR and Therapeutic Activities:    [x] (58504 or 87488) Provided verbal/tactile cueing for activities related to improving balance, coordination, kinesthetic sense, posture, motor skill, proprioception and motor activation to allow for proper function of   [x] LE / Core, hip and LE with self care and ADLs  [x] UE / Shoulder complex: cervical, postural, scapular, scapulothoracic and UE control with self care, carrying, lifting, driving, computer work.   [] (57888) Gait Re-education- Provided training and instruction to the patient for proper LE, core and hip recruitment, positioning, and eccentric body weight control with ambulation re-education, including ascending & descending stairs     Home Management Training / Self Care:  [] (69778) Provided self-care/home management training related to activities of daily living and compensatory training, and/or use of adaptive equipment for improvement with: ADLs and compensatory training, meal preparation, safety procedures and instruction in use of adaptive equipment, including bathing, grooming, dressing, personal hygiene, basic household cleaning and chores.        Home Exercise Program:    [x] (26331) Reviewed/Progressed HEP activities related to strengthening, flexibility, endurance, ROM of   [] LE / Core stability: core, hip and LE for functional self-care, mobility, lifting and ambulation/stair navigation   [] UE / Shoulder complex: cervical, postural, scapular, scapulothoracic and UE control with self care, reaching, carrying, lifting, house/yardwork, driving, computer work  [] (68318)Reviewed/Progressed HEP activities related to improving balance, coordination, kinesthetic sense, posture, motor skill, proprioception of   [] LE: core, hip and LE for self care, mobility, lifting, and ambulation/stair navigation    [] UE / Shoulder complex: cervical, postural,  scapular, scapulothoracic and UE control with self care, reaching, carrying, lifting, house/yardwork, driving, computer work    Manual Treatments:  PROM / STM / Oscillations-Mobs:  G-I, II, III, IV (PA's, Inf., Post.)  [] (99735) Provided manual therapy to mobilize shoulder complex, hip, LE, and/or cervicothoracic/LS spine soft tissue/joints for the purpose of modulating pain, promoting relaxation,  increasing ROM, reducing/eliminating soft tissue swelling/inflammation/restriction, improving soft tissue extensibility and allowing for proper ROM for normal function with   [] LE / Core stability: self care, mobility, lifting and ambulation. [] UE / Shoulder complex: self care, reaching, carrying, lifting, house/yardwork, driving, computer work. Modalities:  [] (58233) Vasopneumatic compression: Utilized vasopneumatic compression to decrease edema / swelling for the purpose of improving mobility and quad tone / recruitment which will allow for increased overall function including but not limited to self-care, transfers, ambulation, and ascending / descending stairs.          Charges:  Timed Code Treatment Minutes: 50   Total Treatment Minutes: 90     [] EVAL - LOW (90464)   [x] EVAL - MOD (88065)  [] EVAL - HIGH (42170)  [] RE-EVAL (26184)  [x] TE (22690) x  1    [] Ionto  [] NMR (86350) x      [] Vaso  [] Manual (40762) x      [] Ultrasound  [x] TA x  2     [] Mech Traction (14106)  [] Gait Training x     []  () (08287):   [] Aquatic therapy x   [] Other:   [] Group:     GOALS:   Patient stated goal:  lose weight, improve transfer status  [] Progressing: [] Met: [] Not Met: [] Adjusted     Therapist goals for Patient:   Short Term Goals: To be achieved in: 2 weeks  1. Independent in HEP and progression per patient tolerance, in order to prevent re-injury. [] Progressing: [] Met: [] Not Met: [] Adjusted        Long Term Goals: To be achieved in: 8 weeks  1. Patient to complete Wheelchair Skills Test with a score of 40 (score adjusted for clinic and patient barriers) to assist with reaching prior level of function. [] Progressing: [] Met: [] Not Met: [] Adjusted  2. Patient will demonstrate an increase in core strength to tolerate dynamic, unsupported sitting of 30 mins. [] Progressing: [] Met: [] Not Met: [] Adjusted  3. Patient will return to functional activities including donning and doffing B AFO & shoes without increased symptoms or restriction. [] Progressing: [] Met: [] Not Met: [] Adjusted  4. Patient to safely perform wheelchair to/from bed transfer independently to reduce dependence on caregivers. [] Progressing: [] Met: [] Not Met: [] Adjusted       5. Patient to report self directed HEP and independent mobility to assist in reaching goal of weight loss. [] Progressing: [] Met: [] Not Met: [] Adjusted     Overall Progression Towards Functional goals/ Treatment Progress Update:  [] Patient is progressing as expected towards functional goals listed. [] Progression is slowed due to complexities/Impairments listed. [] Progression has been slowed due to co-morbidities.   [x] Plan just implemented, too soon to assess goals progression <30days   [] Goals require adjustment due to lack of progress  [] Patient is not progressing as expected and requires additional follow up with physician  [] Other    Persisting Functional Limitations/Impairments:  []Sleeping [x]Sitting               []Standing [x]Transfers        []Walking []Kneeling               []Stairs []Squatting / bending   [x]ADLs []Reaching  []Lifting  []Housework  []Driving []Job related tasks  []Sports/Recreation [x]Other: wheelchair propulsion        ASSESSMENT:  See eval    Treatment/Activity Tolerance:  [x] Patient able to complete tx [] Patient limited by fatigue  [] Patient limited by pain  [] Patient limited by other medical complications  [] Other:     Prognosis: [x] Good [] Fair  [] Poor    Patient Requires Follow-up: [x] Yes  [] No    Plan for next treatment session:    PLAN: See eval. PT 2x / week for 8 weeks. [] Continue per plan of care [] Alter current plan (see comments)  [x] Plan of care initiated [] Hold pending MD visit [] Discharge    Electronically signed by: Torrey Johnson, PT PT, DPT    Note: If patient does not return for scheduled/ recommended follow up visits, his note will serve as a discharge from care along with most recent update on progress.

## 2023-04-17 ENCOUNTER — TELEPHONE (OUTPATIENT)
Dept: INTERNAL MEDICINE CLINIC | Age: 23
End: 2023-04-17

## 2023-04-17 DIAGNOSIS — E66.09 OBESITY DUE TO EXCESS CALORIES WITHOUT SERIOUS COMORBIDITY, UNSPECIFIED CLASSIFICATION: Primary | ICD-10-CM

## 2023-04-17 DIAGNOSIS — E66.9 OBESITY WITH SERIOUS COMORBIDITY, UNSPECIFIED CLASSIFICATION, UNSPECIFIED OBESITY TYPE: ICD-10-CM

## 2023-04-17 NOTE — TELEPHONE ENCOUNTER
Mother Bonny Quintana) came in and stated that she needs a dietician for the pt. Please call to advise.  [phone]

## 2023-04-17 NOTE — TELEPHONE ENCOUNTER
[facility], [address] [facility], [address] [facility] [facility], [address]   [phone]

## 2023-04-17 NOTE — TELEPHONE ENCOUNTER
[facility], [address] [facility], [address] [facility], [address]   [phone]

## 2023-06-28 DIAGNOSIS — I10 ESSENTIAL HYPERTENSION: ICD-10-CM

## 2023-06-28 RX ORDER — HYDROCHLOROTHIAZIDE 25 MG/1
25 TABLET ORAL DAILY
Qty: 90 TABLET | Refills: 0 | Status: SHIPPED | OUTPATIENT
Start: 2023-06-28

## 2023-09-05 ENCOUNTER — OFFICE VISIT (OUTPATIENT)
Dept: INTERNAL MEDICINE CLINIC | Age: 23
End: 2023-09-05

## 2023-09-05 VITALS — OXYGEN SATURATION: 98 % | HEART RATE: 94 BPM | SYSTOLIC BLOOD PRESSURE: 130 MMHG | DIASTOLIC BLOOD PRESSURE: 80 MMHG

## 2023-09-05 DIAGNOSIS — I10 ESSENTIAL HYPERTENSION: ICD-10-CM

## 2023-09-05 DIAGNOSIS — E55.9 VITAMIN D DEFICIENCY: ICD-10-CM

## 2023-09-05 DIAGNOSIS — G80.1 CEREBRAL PALSY WITH SPASTIC DIPLEGIA (HCC): ICD-10-CM

## 2023-09-05 DIAGNOSIS — Z00.00 ANNUAL PHYSICAL EXAM: Primary | ICD-10-CM

## 2023-09-05 DIAGNOSIS — Z00.00 ANNUAL PHYSICAL EXAM: ICD-10-CM

## 2023-09-05 RX ORDER — DIPHENHYDRAMINE HCL 25 MG
50 CAPSULE ORAL DAILY
COMMUNITY

## 2023-09-05 SDOH — ECONOMIC STABILITY: FOOD INSECURITY: WITHIN THE PAST 12 MONTHS, YOU WORRIED THAT YOUR FOOD WOULD RUN OUT BEFORE YOU GOT MONEY TO BUY MORE.: OFTEN TRUE

## 2023-09-05 SDOH — ECONOMIC STABILITY: HOUSING INSECURITY
IN THE LAST 12 MONTHS, WAS THERE A TIME WHEN YOU DID NOT HAVE A STEADY PLACE TO SLEEP OR SLEPT IN A SHELTER (INCLUDING NOW)?: NO

## 2023-09-05 SDOH — ECONOMIC STABILITY: INCOME INSECURITY: HOW HARD IS IT FOR YOU TO PAY FOR THE VERY BASICS LIKE FOOD, HOUSING, MEDICAL CARE, AND HEATING?: VERY HARD

## 2023-09-05 SDOH — ECONOMIC STABILITY: FOOD INSECURITY: WITHIN THE PAST 12 MONTHS, THE FOOD YOU BOUGHT JUST DIDN'T LAST AND YOU DIDN'T HAVE MONEY TO GET MORE.: OFTEN TRUE

## 2023-09-05 ASSESSMENT — PATIENT HEALTH QUESTIONNAIRE - PHQ9
SUM OF ALL RESPONSES TO PHQ QUESTIONS 1-9: 0
7. TROUBLE CONCENTRATING ON THINGS, SUCH AS READING THE NEWSPAPER OR WATCHING TELEVISION: 0
4. FEELING TIRED OR HAVING LITTLE ENERGY: 0
9. THOUGHTS THAT YOU WOULD BE BETTER OFF DEAD, OR OF HURTING YOURSELF: 0
SUM OF ALL RESPONSES TO PHQ9 QUESTIONS 1 & 2: 0
2. FEELING DOWN, DEPRESSED OR HOPELESS: 0
8. MOVING OR SPEAKING SO SLOWLY THAT OTHER PEOPLE COULD HAVE NOTICED. OR THE OPPOSITE, BEING SO FIGETY OR RESTLESS THAT YOU HAVE BEEN MOVING AROUND A LOT MORE THAN USUAL: 0
SUM OF ALL RESPONSES TO PHQ QUESTIONS 1-9: 0
SUM OF ALL RESPONSES TO PHQ QUESTIONS 1-9: 0
3. TROUBLE FALLING OR STAYING ASLEEP: 0
SUM OF ALL RESPONSES TO PHQ QUESTIONS 1-9: 0
6. FEELING BAD ABOUT YOURSELF - OR THAT YOU ARE A FAILURE OR HAVE LET YOURSELF OR YOUR FAMILY DOWN: 0
1. LITTLE INTEREST OR PLEASURE IN DOING THINGS: 0
5. POOR APPETITE OR OVEREATING: 0

## 2023-09-05 NOTE — PROGRESS NOTES
SUBJECTIVE:    Patient ID: Jey Cedeno is a 20 y.o. male. CC: Annual labs, cerebral palsy    HPI: The patient presents to the office today accompanied by his grandmother for annual physical examination. He has a history of cerebral palsy and is wheelchair-bound due to spastic diplegia. He is working with [employer]" to find opportunities for him. He has a history of hypertension. He denies any chest pain or shortness of breath. He is compliant with his medication regimen. Past Medical History:   Diagnosis Date    Borderline intellectual functioning     Cerebral palsy ([address])     Obesity     Vitamin D deficiency     Wheelchair bound         Current Outpatient Medications   Medication Sig Dispense Refill    diphenhydrAMINE (BENADRYL) 25 MG capsule Take 2 capsules by mouth daily 2 capsules nightly before bed      hydroCHLOROthiazide (HYDRODIURIL) 25 MG tablet TAKE 1 TABLET BY MOUTH DAILY 90 tablet 0    lisinopril (PRINIVIL;ZESTRIL) 10 MG tablet Take 1 tablet by mouth daily 90 tablet 3    Cholecalciferol (VITAMIN D3) 25 MCG (1000 UT) CAPS Take 1,000 capsules by mouth daily 90 capsule 3    Catheters (GIZMO CONDOM CATHETER) MISC 100 each by Does not apply route as needed (Urinary incontinence) 100 each 5     No current facility-administered medications for this visit. Review of Systems   Constitutional: Negative. HENT: Negative. Respiratory: Negative. Cardiovascular: Negative. Gastrointestinal: Negative. Genitourinary: Negative. Musculoskeletal:  Positive for gait problem. Neurological:  Positive for weakness. Psychiatric/Behavioral: Negative. OBJECTIVE:  Physical Exam  Vitals reviewed. Constitutional:       General: He is not in acute distress. Appearance: He is well-developed. He is not diaphoretic. HENT:      Head: Normocephalic and atraumatic. Eyes:      General: No scleral icterus.      Conjunctiva/sclera: Conjunctivae normal.   Neck:      Vascular:

## 2023-09-06 LAB
25(OH)D3 SERPL-MCNC: 31.2 NG/ML
ALBUMIN SERPL-MCNC: 4.6 G/DL (ref 3.4–5)
ALBUMIN/GLOB SERPL: 1.8 {RATIO} (ref 1.1–2.2)
ALP SERPL-CCNC: 93 U/L (ref 40–129)
ALT SERPL-CCNC: 34 U/L (ref 10–40)
ANION GAP SERPL CALCULATED.3IONS-SCNC: 13 MMOL/L (ref 3–16)
AST SERPL-CCNC: 19 U/L (ref 15–37)
BILIRUB SERPL-MCNC: 0.3 MG/DL (ref 0–1)
BUN SERPL-MCNC: 23 MG/DL (ref 7–20)
CALCIUM SERPL-MCNC: 9.8 MG/DL (ref 8.3–10.6)
CHLORIDE SERPL-SCNC: 102 MMOL/L (ref 99–110)
CHOLEST SERPL-MCNC: 195 MG/DL (ref 0–199)
CO2 SERPL-SCNC: 22 MMOL/L (ref 21–32)
CREAT SERPL-MCNC: 0.7 MG/DL (ref 0.9–1.3)
GFR SERPLBLD CREATININE-BSD FMLA CKD-EPI: >60 ML/MIN/{1.73_M2}
GLUCOSE SERPL-MCNC: 91 MG/DL (ref 70–99)
HDLC SERPL-MCNC: 43 MG/DL (ref 40–60)
LDLC SERPL CALC-MCNC: 126 MG/DL
POTASSIUM SERPL-SCNC: 3.9 MMOL/L (ref 3.5–5.1)
PROT SERPL-MCNC: 7.2 G/DL (ref 6.4–8.2)
SODIUM SERPL-SCNC: 137 MMOL/L (ref 136–145)
TRIGL SERPL-MCNC: 130 MG/DL (ref 0–150)
VLDLC SERPL CALC-MCNC: 26 MG/DL

## 2023-09-11 DIAGNOSIS — I10 ESSENTIAL HYPERTENSION: ICD-10-CM

## 2023-09-11 RX ORDER — UBIDECARENONE 100 MG
CAPSULE ORAL DAILY
Qty: 90 CAPSULE | Refills: 3 | Status: SHIPPED | OUTPATIENT
Start: 2023-09-11

## 2023-09-18 ASSESSMENT — ENCOUNTER SYMPTOMS
GASTROINTESTINAL NEGATIVE: 1
RESPIRATORY NEGATIVE: 1

## 2023-09-26 DIAGNOSIS — I10 ESSENTIAL HYPERTENSION: ICD-10-CM

## 2023-09-26 RX ORDER — HYDROCHLOROTHIAZIDE 25 MG/1
25 TABLET ORAL DAILY
Qty: 90 TABLET | Refills: 1 | Status: SHIPPED | OUTPATIENT
Start: 2023-09-26

## 2023-09-26 RX ORDER — LISINOPRIL 10 MG/1
10 TABLET ORAL DAILY
Qty: 90 TABLET | Refills: 1 | Status: SHIPPED | OUTPATIENT
Start: 2023-09-26

## 2023-10-24 ENCOUNTER — TELEPHONE (OUTPATIENT)
Dept: INTERNAL MEDICINE CLINIC | Age: 23
End: 2023-10-24

## 2023-10-24 NOTE — TELEPHONE ENCOUNTER
Zen[place] with [facility] states a form for medicare was sent over related to the DME  order for braces x 2 for -mutual patient requests this form and office notes all to -be faxed to # - also to call direct line [facility] BRAYDAMAYA @ [phone], or [phone] to update on process.

## 2024-03-24 DIAGNOSIS — I10 ESSENTIAL HYPERTENSION: ICD-10-CM

## 2024-03-26 RX ORDER — LISINOPRIL 10 MG/1
10 TABLET ORAL DAILY
Qty: 90 TABLET | Refills: 1 | Status: SHIPPED | OUTPATIENT
Start: 2024-03-26

## 2024-03-26 RX ORDER — HYDROCHLOROTHIAZIDE 25 MG/1
25 TABLET ORAL DAILY
Qty: 90 TABLET | Refills: 1 | Status: SHIPPED | OUTPATIENT
Start: 2024-03-26

## 2024-08-21 ENCOUNTER — OFFICE VISIT (OUTPATIENT)
Dept: INTERNAL MEDICINE CLINIC | Age: 24
End: 2024-08-21
Payer: COMMERCIAL

## 2024-08-21 VITALS
DIASTOLIC BLOOD PRESSURE: 80 MMHG | WEIGHT: 200 LBS | OXYGEN SATURATION: 99 % | HEART RATE: 98 BPM | SYSTOLIC BLOOD PRESSURE: 132 MMHG | HEIGHT: 68 IN | BODY MASS INDEX: 30.31 KG/M2

## 2024-08-21 DIAGNOSIS — I10 ESSENTIAL HYPERTENSION: ICD-10-CM

## 2024-08-21 DIAGNOSIS — E66.09 CLASS 1 OBESITY DUE TO EXCESS CALORIES WITH SERIOUS COMORBIDITY AND BODY MASS INDEX (BMI) OF 31.0 TO 31.9 IN ADULT: ICD-10-CM

## 2024-08-21 DIAGNOSIS — Z00.00 ANNUAL PHYSICAL EXAM: ICD-10-CM

## 2024-08-21 DIAGNOSIS — E55.9 VITAMIN D DEFICIENCY: ICD-10-CM

## 2024-08-21 DIAGNOSIS — R73.9 HYPERGLYCEMIA: ICD-10-CM

## 2024-08-21 DIAGNOSIS — Z00.00 ANNUAL PHYSICAL EXAM: Primary | ICD-10-CM

## 2024-08-21 DIAGNOSIS — G80.1 CEREBRAL PALSY WITH SPASTIC DIPLEGIA (HCC): ICD-10-CM

## 2024-08-21 LAB
25(OH)D3 SERPL-MCNC: 32.2 NG/ML
ALBUMIN SERPL-MCNC: 4.8 G/DL (ref 3.4–5)
ALBUMIN/GLOB SERPL: 1.6 {RATIO} (ref 1.1–2.2)
ALP SERPL-CCNC: 102 U/L (ref 40–129)
ALT SERPL-CCNC: 45 U/L (ref 10–40)
ANION GAP SERPL CALCULATED.3IONS-SCNC: 13 MMOL/L (ref 3–16)
AST SERPL-CCNC: 28 U/L (ref 15–37)
BASOPHILS # BLD: 0.2 K/UL (ref 0–0.2)
BASOPHILS NFR BLD: 2.6 %
BILIRUB SERPL-MCNC: 0.3 MG/DL (ref 0–1)
BUN SERPL-MCNC: 13 MG/DL (ref 7–20)
CALCIUM SERPL-MCNC: 10.1 MG/DL (ref 8.3–10.6)
CHLORIDE SERPL-SCNC: 97 MMOL/L (ref 99–110)
CHOLEST SERPL-MCNC: 202 MG/DL (ref 0–199)
CO2 SERPL-SCNC: 25 MMOL/L (ref 21–32)
CREAT SERPL-MCNC: 0.8 MG/DL (ref 0.9–1.3)
DEPRECATED RDW RBC AUTO: 14.5 % (ref 12.4–15.4)
EOSINOPHIL # BLD: 0.3 K/UL (ref 0–0.6)
EOSINOPHIL NFR BLD: 4.1 %
GFR SERPLBLD CREATININE-BSD FMLA CKD-EPI: >90 ML/MIN/{1.73_M2}
GLUCOSE SERPL-MCNC: 110 MG/DL (ref 70–99)
HCT VFR BLD AUTO: 40.6 % (ref 40.5–52.5)
HDLC SERPL-MCNC: 46 MG/DL (ref 40–60)
HGB BLD-MCNC: 14 G/DL (ref 13.5–17.5)
LDLC SERPL CALC-MCNC: 143 MG/DL
LYMPHOCYTES # BLD: 2.2 K/UL (ref 1–5.1)
LYMPHOCYTES NFR BLD: 27.2 %
MCH RBC QN AUTO: 29.1 PG (ref 26–34)
MCHC RBC AUTO-ENTMCNC: 34.6 G/DL (ref 31–36)
MCV RBC AUTO: 84.2 FL (ref 80–100)
MONOCYTES # BLD: 0.7 K/UL (ref 0–1.3)
MONOCYTES NFR BLD: 8 %
NEUTROPHILS # BLD: 4.8 K/UL (ref 1.7–7.7)
NEUTROPHILS NFR BLD: 58.1 %
PLATELET # BLD AUTO: 366 K/UL (ref 135–450)
PMV BLD AUTO: 7.9 FL (ref 5–10.5)
POTASSIUM SERPL-SCNC: 3.8 MMOL/L (ref 3.5–5.1)
PROT SERPL-MCNC: 7.8 G/DL (ref 6.4–8.2)
RBC # BLD AUTO: 4.83 M/UL (ref 4.2–5.9)
SODIUM SERPL-SCNC: 135 MMOL/L (ref 136–145)
TRIGL SERPL-MCNC: 63 MG/DL (ref 0–150)
TSH SERPL DL<=0.005 MIU/L-ACNC: 3.85 UIU/ML (ref 0.27–4.2)
VLDLC SERPL CALC-MCNC: 13 MG/DL
WBC # BLD AUTO: 8.2 K/UL (ref 4–11)

## 2024-08-21 PROCEDURE — 99395 PREV VISIT EST AGE 18-39: CPT | Performed by: NURSE PRACTITIONER

## 2024-08-21 PROCEDURE — 3075F SYST BP GE 130 - 139MM HG: CPT | Performed by: NURSE PRACTITIONER

## 2024-08-21 PROCEDURE — 3079F DIAST BP 80-89 MM HG: CPT | Performed by: NURSE PRACTITIONER

## 2024-08-21 RX ORDER — LISINOPRIL 10 MG/1
10 TABLET ORAL DAILY
Qty: 90 TABLET | Refills: 3 | Status: SHIPPED | OUTPATIENT
Start: 2024-08-21

## 2024-08-21 RX ORDER — HYDROCHLOROTHIAZIDE 25 MG/1
25 TABLET ORAL DAILY
Qty: 90 TABLET | Refills: 3 | Status: SHIPPED | OUTPATIENT
Start: 2024-08-21

## 2024-08-21 RX ORDER — UBIDECARENONE 100 MG
1000 CAPSULE ORAL DAILY
Qty: 90 CAPSULE | Refills: 3 | Status: SHIPPED | OUTPATIENT
Start: 2024-08-21

## 2024-08-21 ASSESSMENT — PATIENT HEALTH QUESTIONNAIRE - PHQ9
SUM OF ALL RESPONSES TO PHQ QUESTIONS 1-9: 0
SUM OF ALL RESPONSES TO PHQ QUESTIONS 1-9: 0
SUM OF ALL RESPONSES TO PHQ9 QUESTIONS 1 & 2: 0
2. FEELING DOWN, DEPRESSED OR HOPELESS: NOT AT ALL
SUM OF ALL RESPONSES TO PHQ QUESTIONS 1-9: 0
1. LITTLE INTEREST OR PLEASURE IN DOING THINGS: NOT AT ALL
SUM OF ALL RESPONSES TO PHQ QUESTIONS 1-9: 0

## 2024-08-21 ASSESSMENT — ENCOUNTER SYMPTOMS
RESPIRATORY NEGATIVE: 1
GASTROINTESTINAL NEGATIVE: 1

## 2024-08-21 NOTE — PROGRESS NOTES
SUBJECTIVE:    Patient ID: Jimmy Albert is a 24 y.o. male.    CC: Annual labs, cerebral palsy, hypertension    HPI: The patient presents to the office today accompanied by his grandmother for annual physical examination.    He has a history of cerebral palsy and is wheelchair-bound due to spastic diplegia.  Patient admits he is not very active.  He has not been exercising.    He has a history of hypertension.  He denies any chest pain or shortness of breath.  He is compliant with his medication regimen.      Past Medical History:   Diagnosis Date    Borderline intellectual functioning     Cerebral palsy (H[facility])     Obesity     Vitamin D deficiency     Wheelchair bound         Current Outpatient Medications   Medication Sig Dispense Refill    hydroCHLOROthiazide (HYDRODIURIL) 25 MG tablet Take 1 tablet by mouth daily 90 tablet 3    lisinopril (PRINIVIL;ZESTRIL) 10 MG tablet Take 1 tablet by mouth daily 90 tablet 3    vitamin D (D3-1000) 25 MCG (1000 UT) CAPS Take 1 capsule by mouth daily 90 capsule 3    diphenhydrAMINE (BENADRYL) 25 MG capsule Take 2 capsules by mouth daily 2 capsules nightly before bed      Catheters (GIZMO CONDOM CATHETER) MISC 100 each by Does not apply route as needed (Urinary incontinence) 100 each 5     No current facility-administered medications for this visit.           Review of Systems   Constitutional: Negative.    HENT: Negative.     Respiratory: Negative.     Cardiovascular: Negative.    Gastrointestinal: Negative.    Genitourinary: Negative.    Musculoskeletal:  Positive for gait problem.   Neurological:  Positive for weakness.   Psychiatric/Behavioral: Negative.           OBJECTIVE:  Physical Exam  Vitals reviewed.   Constitutional:       General: He is not in acute distress.     Appearance: He is well-developed. He is not diaphoretic.   HENT:      Head: Normocephalic and atraumatic.   Eyes:      General: No scleral icterus.     Conjunctiva/sclera: Conjunctivae normal.   Neck:

## 2024-08-22 LAB
EST. AVERAGE GLUCOSE BLD GHB EST-MCNC: 91.1 MG/DL
HBA1C MFR BLD: 4.8 %

## 2024-09-10 NOTE — FLOWSHEET NOTE
"  CHIEF COMPLAINT  Chief Complaint   Patient presents with    Otalgia     Patient is here today for ear pain. States ear pain started yesterday on left ear. Patient feels pressure on his head     Subjective:   Ryder Levi Hoenig is a 18 y.o. male who presents to urgent care with concerns for bilateral ear pain x 2 days.  Patient reports pain initially started on his right side, but now is in his left ear.  He denies any decreased hearing.  No tinnitus.  No drainage.  Patient does report symptoms of cough and congestion over the last week.  Denies any fevers or chills.  He does report a history of chronic ear infections.  No other pertinent past medical history.       Review of Systems   Constitutional:  Negative for chills and fever.   HENT:  Positive for ear pain. Negative for ear discharge, hearing loss and tinnitus.        PAST MEDICAL HISTORY  There are no problems to display for this patient.      SURGICAL HISTORY  patient denies any surgical history    ALLERGIES  Not on File    CURRENT MEDICATIONS  Home Medications       Reviewed by Conrado Alfaro'imtiaz (Medical Assistant) on 09/10/24 at 0849  Med List Status: <None>     Medication Last Dose Status        Patient Matthieu Taking any Medications                           SOCIAL HISTORY  Social History     Tobacco Use    Smoking status: Not on file    Smokeless tobacco: Not on file   Substance and Sexual Activity    Alcohol use: Not on file    Drug use: Not on file    Sexual activity: Not on file       FAMILY HISTORY  No family history on file.      Medications, Allergies, and current problem list reviewed today in Epic.     Objective:     BP 92/60   Pulse 74   Temp 37.3 °C (99.1 °F) (Temporal)   Resp 12   Ht 1.778 m (5' 10\")   Wt 66.7 kg (147 lb)   SpO2 99%     Physical Exam  Vitals reviewed.   Constitutional:       General: He is not in acute distress.     Appearance: Normal appearance. He is normal weight. He is not ill-appearing or toxic-appearing. " [facility]  Phone: [phone]   Fax: [phone]    Physical Therapy Daily Treatment Note  Date:  3/9/2021 - Date of service 3/8/21, note re-written on 3/9/21    Patient Name:  Skip Boast    :  2000  MRN: [id number]  Medical/Treatment Diagnosis Information:  Diagnosis: Cerebral palsy with spastic diplegia  Treatment Diagnosis: Decreased trunk control impacting ability to complete safe transfers, decreased standing tolerance, LE weakness  Insurance/Certification information:  PT Insurance Information: Medicare & Medicaid  Physician Information:  Referring Practitioner: JOHNNY Richardson  Plan of care signed (Y/N): [x]  Yes []  No     Date of Patient follow up with Physician:      Progress Report: []  Yes  []  No     Date Range for reporting period:  Beginning  3/1/2021   Ending      Progress report due (10 Rx/or 30 days whichever is less): visit #10 or 2/3/06     Recertification due (POC duration/ or 90 days whichever is less): visit #12 or 21     Visit # Insurance Allowable Auth required? Date Range   3/12 Medical necessity []  Yes  [x]  No n/a     Latex Allergy:  [x]NO      []YES  Preferred Language for Healthcare:   [x]English       []Other:      Functional Scale/Test Evaluation 3/1/2021 30 day  60 day  Discharge   STREAM 23                          Pain level:  0/10  Location:  none    SUBJECTIVE:  Pt reports he was able to use his stander for 1 hour.        OBJECTIVE:      RESTRICTIONS/PRECAUTIONS: recent Bell's Palsy    Interventions/Exercises:   Therapeutic Exercises (44647) Resistance / level Sets/sec Reps Notes   W/c push ups in preparation for standing                            Neuromuscular Re-ed (76985) /  Gait Training (85808)       Upright posture seated in W/C   X 5 reps holding football, rest of reps completed with L UE bracing on L knee, PT hand assist on sternum and lumbar spine to facilitate   HENT:      Head: Normocephalic.      Right Ear: Tympanic membrane is erythematous and bulging.      Left Ear: Tympanic membrane is erythematous and bulging.      Nose: Congestion present.      Mouth/Throat:      Mouth: Mucous membranes are moist.      Pharynx: Oropharynx is clear. Uvula midline. Postnasal drip present. No posterior oropharyngeal erythema.      Tonsils: 0 on the right. 0 on the left.   Cardiovascular:      Rate and Rhythm: Normal rate and regular rhythm.      Pulses: Normal pulses.      Heart sounds: Normal heart sounds.   Pulmonary:      Effort: Pulmonary effort is normal. No respiratory distress.      Breath sounds: Normal breath sounds. No stridor. No wheezing, rhonchi or rales.   Musculoskeletal:      Cervical back: Normal range of motion and neck supple.   Skin:     General: Skin is warm.      Capillary Refill: Capillary refill takes less than 2 seconds.   Neurological:      General: No focal deficit present.      Mental Status: He is alert.   Psychiatric:         Mood and Affect: Mood normal.         Assessment/Plan:     Diagnosis and associated orders:     1. Acute bilateral otitis media  amoxicillin-clavulanate (AUGMENTIN) 875-125 MG Tab      2. Nasal congestion  fluticasone (FLONASE) 50 MCG/ACT nasal spray         Comments/MDM:     Patient reports with 2-day history of worsening bilateral ear pain.  Upon physical exam.  Both tympanic membranes are erythematous and bulging.  TM intact.  Postnasal drip appreciated to oral pharynx.  Provided parent and patient with information on the etiology and pathogenesis of otitis media. Instructed to take antibiotics as prescribed. May give Tylenol/Motrin prn discomfort.  Advised patient on use of Zyrtec and Flonase.  May apply warm compress to the ear for prn discomfort.   Return to clinic if symptoms worsen or fail to resolve.         Differential diagnosis, natural history, supportive care, and indications for immediate follow-up  Therapeutic Activities (36267) /  Functional Tasks       Cone reaching across midline in seated  2 Pt seated in w/c using seat belt to help stay in w/c          [facility]one reaching across midline and diagonally   Pt seated in w/c using seat belt to help stay in w/c               STS to std walker            Transfer to w/c from mat table            STS from w/c to mat table - with large bolster placed on table in front of patient to hold onto   Min A, w/c close to mat table to assist in blocking pt's knees. Manual Intervention (17311)                                                   Sessions:  3/9: session began with transferring from w/c to mat table with min Ax 2. Increased time required and cueing to improve safety. Once on the mat table, pt worked on scooting leading with upper body first and performing small abdominal crunch then bridging to move hips. Pt required assistance to keep LEs stable in a hooklying position. Pt also performed rolling to each side R/L x 5, in which he rolled hips first then his upper body. Next pt rolled to prone and pushed through UEs to get into quadruped. Mod Ax 2 required for stability at pelvis and LEs. Pt cued for spine neutral position and then weight shifting to prepare for crawling. Pt able to move R UE and LE forward with mod A, but then was too fatigued and rested. Pt sat in tall kneeling and was able to attain this position by pushing up on a swiss ball. Pt transitioned next to sitting EOB and shifting weight fwd to stand at standard walker. First he completed partial stands with mod A x 2, with PT/OT blocking knees and feet. OT suggested pt stand at ladder but pt reported he was more comfortable using std walker. Lastly pt laid supine and rotated side to side with min A x 1, with reaching arm diagonally and protracting/retracting hips x 10 B/L.  Pt completed a stand/pivot transfer from the mat to the W/C with min A x 1 at the end discussed.    Advised the patient to follow-up with the primary care physician for recheck, reevaluation, and consideration of further management.    Please note that this dictation was created using voice recognition software. I have made a reasonable attempt to correct obvious errors, but I expect that there are errors of grammar and possibly content that I did not discover before finalizing the note.    This note was electronically signed by LEXIS Cox   of the session. Modalities:     Pt. Education:  -patient educated on diagnosis, prognosis and expectations for rehab  -all patient questions were answered    HEP instruction:      NMR and Therapeutic Activities:    [x] (08942 or 37856) Provided verbal/tactile cueing for activities related to improving balance, coordination, kinesthetic sense, posture, motor skill, proprioception and motor activation to allow for proper function of   [x] LE / Core, hip and LE with self care and ADLs  [x] UE / Shoulder complex: cervical, postural, scapular, scapulothoracic and UE control with self care, carrying, lifting, driving, computer work.   [] (08364) Gait Re-education- Provided training and instruction to the patient for proper LE, core and hip recruitment, positioning, and eccentric body weight control with ambulation re-education, including ascending & descending stairs     Home Management Training / Self Care:  [] (41449) Provided self-care/home management training related to activities of daily living and compensatory training, and/or use of adaptive equipment for improvement with: ADLs and compensatory training, meal preparation, safety procedures and instruction in use of adaptive equipment, including bathing, grooming, dressing, personal hygiene, basic household cleaning and chores. Therapeutic Exercise and NMR EXR  [x] (76970) Provided verbal/tactile cueing for activities related to strengthening, flexibility, endurance, ROM for improvements in  [x] LE / Core stability: LE, hip, and core control with self care, mobility, lifting, ambulation.   [] UE / Shoulder complex: cervical, postural, scapular, scapulothoracic and UE control with self care, reaching, carrying, lifting, house/yardwork, driving, computer work.  [] (36880) Provided verbal/tactile cueing for activities related to improving balance, coordination, kinesthetic sense, posture, motor skill, proprioception to assist with   [] LE / Core stability: LE, hip, and core control in self care, mobility, lifting, ambulation and eccentric single leg control. [] UE / Shoulder complex: cervical, scapular, scapulothoracic and UE control with self care, reaching, carrying, lifting, house/yardwork, driving, computer work.   [] (42118) Therapist is in constant attendance of 2 or more patients providing skilled therapy interventions, but not providing any significant amount of measurable one-on-one time to either patient, for improvements in  [] LE / Core stability: LE, hip, and core control in self care, mobility, lifting, ambulation and eccentric single leg control. [] UE / Shoulder complex: cervical, scapular, scapulothoracic and UE control with self care, reaching, carrying, lifting, house/yardwork, driving, computer work.      Home Exercise Program:    [x] (99269) Reviewed/Progressed HEP activities related to strengthening, flexibility, endurance, ROM of   [] LE / Core stability: core, hip and LE for functional self-care, mobility, lifting and ambulation/stair navigation   [] UE / Shoulder complex: cervical, postural, scapular, scapulothoracic and UE control with self care, reaching, carrying, lifting, house/yardwork, driving, computer work  [] (30998)Reviewed/Progressed HEP activities related to improving balance, coordination, kinesthetic sense, posture, motor skill, proprioception of   [] LE: core, hip and LE for self care, mobility, lifting, and ambulation/stair navigation    [] UE / Shoulder complex: cervical, postural,  scapular, scapulothoracic and UE control with self care, reaching, carrying, lifting, house/yardwork, driving, computer work    Manual Treatments:  PROM / STM / Oscillations-Mobs:  G-I, II, III, IV (PA's, Inf., Post.)  [] (90722) Provided manual therapy to mobilize shoulder complex, hip, LE, and/or cervicothoracic/LS spine soft tissue/joints for the purpose of modulating pain, promoting relaxation,  increasing ROM, reducing/eliminating soft tissue swelling/inflammation/restriction, improving soft tissue extensibility and allowing for proper ROM for normal function with   [] LE / Core stability: self care, mobility, lifting and ambulation. [] UE / Shoulder complex: self care, reaching, carrying, lifting, house/yardwork, driving, computer work. Modalities:  [] (63512) Vasopneumatic compression: Utilized vasopneumatic compression to decrease edema / swelling for the purpose of improving mobility and quad tone / recruitment which will allow for increased overall function including but not limited to self-care, transfers, ambulation, and ascending / descending stairs. Charges:  Timed Code Treatment Minutes: 90   Total Treatment Minutes: 90     [] EVAL - LOW (43014)   [] EVAL - MOD (11483)  [] EVAL - HIGH (23754)  [] RE-EVAL (58403)  [x] TE (26635) x 2     [] Ionto  [x] NMR (19437) x 1      [] Vaso  [] Manual (79869) x      [] Ultrasound  [x] TA x  3     [] Mech Traction (88611)  [] Gait Training x     [] ES (un) (34917):   [] Aquatic therapy x   [] Other:   [] Group:     GOALS:   Patient stated goal: improve ability to complete transfers   []? Progressing: []? Met: []? Not Met: []? Adjusted     Therapist goals for Patient:   Short Term Goals: To be achieved in: 2 weeks  1. Independent in HEP and progression per patient tolerance, in order to prevent re-injury. []? Progressing: []? Met: []? Not Met: []? Adjusted  2. Patient will have a decrease in pain to facilitate improvement in movement, function, and ADLs as indicated by Functional Deficits. []? Progressing: []? Met: []? Not Met: []? Adjusted     Long Term Goals: To be achieved in: 6 weeks  1. Patient will report increased standing tolerance to at least 30 minutes in standing frame. []? Progressing: []? Met: []? Not Met: []? Adjusted  2.  Patient will demonstrate an increase in strength to good shoulder & hip complex, and core activation to allow for proper functional mobility as indicated by patients Functional Deficits. []? Progressing: []? Met: []? Not Met: []? Adjusted  3. Patient will return to functional activities including demo'ing safe transfers to/from w/c with mod I.    []? Progressing: []? Met: []? Not Met: []? Adjusted  4. Patient will increase STREAM score to 32 or above for increased UE/LE movement in sitting, supine, and standing. []? Progressing: []? Met: []? Not Met: []? Adjusted          Overall Progression Towards Functional goals/ Treatment Progress Update:  [] Patient is progressing as expected towards functional goals listed. [] Progression is slowed due to complexities/Impairments listed. [] Progression has been slowed due to co-morbidities. [x] Plan just implemented, too soon to assess goals progression <30days   [] Goals require adjustment due to lack of progress  [] Patient is not progressing as expected and requires additional follow up with physician  [] Other    Persisting Functional Limitations/Impairments:  []Sleeping [x]Sitting               []Standing [x]Transfers        []Walking []Kneeling               []Stairs []Squatting / bending   [x]ADLs []Reaching  []Lifting  []Housework  []Driving []Job related tasks  []Sports/Recreation []Other:        ASSESSMENT: Pt with improved trunk control while sitting this visit. He was able to correct posture that was discussed last session with only minimal cueing. Pt felt much more comfortable with standing with assist of two. Will continue to treat patient for trunk control and LE strength for improved stability in sitting and standing.      Treatment/Activity Tolerance:  [x] Patient able to complete tx [] Patient limited by fatigue  [] Patient limited by pain  [] Patient limited by other medical complications  [] Other:     Prognosis: [] Good [] Fair  [] Poor    Patient Requires Follow-up: [x] Yes  [] No    Plan for next treatment session: transfers, seated core strength, will plan to co-treat with OT when possible    PLAN: See eval. PT 2x / week for 6 weeks. [x] Continue per plan of care [] Alter current plan (see comments)  [] Plan of care initiated [] Hold pending MD visit [] Discharge    Electronically signed by: Kaitlin Laughlin PT, DPT    Note: If patient does not return for scheduled/ recommended follow up visits, his note will serve as a discharge from care along with most recent update on progress.

## 2024-12-21 DIAGNOSIS — I10 ESSENTIAL HYPERTENSION: ICD-10-CM

## 2024-12-23 RX ORDER — HYDROCHLOROTHIAZIDE 25 MG/1
25 TABLET ORAL DAILY
Qty: 90 TABLET | Refills: 3 | Status: SHIPPED | OUTPATIENT
Start: 2024-12-23

## 2024-12-23 RX ORDER — LISINOPRIL 10 MG/1
10 TABLET ORAL DAILY
Qty: 90 TABLET | Refills: 3 | Status: SHIPPED | OUTPATIENT
Start: 2024-12-23

## 2025-03-14 ENCOUNTER — OFFICE VISIT (OUTPATIENT)
Dept: INTERNAL MEDICINE CLINIC | Age: 25
End: 2025-03-14
Payer: MEDICAID

## 2025-03-14 VITALS
OXYGEN SATURATION: 98 % | DIASTOLIC BLOOD PRESSURE: 84 MMHG | BODY MASS INDEX: 30.41 KG/M2 | HEIGHT: 68 IN | HEART RATE: 100 BPM | SYSTOLIC BLOOD PRESSURE: 122 MMHG

## 2025-03-14 DIAGNOSIS — Z00.00 ANNUAL PHYSICAL EXAM: ICD-10-CM

## 2025-03-14 DIAGNOSIS — E55.9 VITAMIN D DEFICIENCY: ICD-10-CM

## 2025-03-14 DIAGNOSIS — E66.811 CLASS 1 OBESITY DUE TO EXCESS CALORIES WITH SERIOUS COMORBIDITY AND BODY MASS INDEX (BMI) OF 30.0 TO 30.9 IN ADULT: ICD-10-CM

## 2025-03-14 DIAGNOSIS — R73.9 HYPERGLYCEMIA: ICD-10-CM

## 2025-03-14 DIAGNOSIS — I10 ESSENTIAL HYPERTENSION: ICD-10-CM

## 2025-03-14 DIAGNOSIS — G80.1 CEREBRAL PALSY WITH SPASTIC DIPLEGIA (HCC): ICD-10-CM

## 2025-03-14 DIAGNOSIS — E66.09 CLASS 1 OBESITY DUE TO EXCESS CALORIES WITH SERIOUS COMORBIDITY AND BODY MASS INDEX (BMI) OF 30.0 TO 30.9 IN ADULT: ICD-10-CM

## 2025-03-14 DIAGNOSIS — Z00.00 ANNUAL PHYSICAL EXAM: Primary | ICD-10-CM

## 2025-03-14 LAB
25(OH)D3 SERPL-MCNC: 29.6 NG/ML
ALBUMIN SERPL-MCNC: 4.7 G/DL (ref 3.4–5)
ALBUMIN/GLOB SERPL: 1.7 {RATIO} (ref 1.1–2.2)
ALP SERPL-CCNC: 93 U/L (ref 40–129)
ALT SERPL-CCNC: 56 U/L (ref 10–40)
ANION GAP SERPL CALCULATED.3IONS-SCNC: 13 MMOL/L (ref 3–16)
AST SERPL-CCNC: 29 U/L (ref 15–37)
BILIRUB SERPL-MCNC: 0.4 MG/DL (ref 0–1)
BUN SERPL-MCNC: 16 MG/DL (ref 7–20)
CALCIUM SERPL-MCNC: 10.2 MG/DL (ref 8.3–10.6)
CHLORIDE SERPL-SCNC: 98 MMOL/L (ref 99–110)
CHOLEST SERPL-MCNC: 200 MG/DL (ref 0–199)
CO2 SERPL-SCNC: 25 MMOL/L (ref 21–32)
CREAT SERPL-MCNC: 0.8 MG/DL (ref 0.9–1.3)
EST. AVERAGE GLUCOSE BLD GHB EST-MCNC: 91.1 MG/DL
GFR SERPLBLD CREATININE-BSD FMLA CKD-EPI: >90 ML/MIN/{1.73_M2}
GLUCOSE SERPL-MCNC: 102 MG/DL (ref 70–99)
HBA1C MFR BLD: 4.8 %
HDLC SERPL-MCNC: 39 MG/DL (ref 40–60)
LDLC SERPL CALC-MCNC: 131 MG/DL
POTASSIUM SERPL-SCNC: 3.7 MMOL/L (ref 3.5–5.1)
PROT SERPL-MCNC: 7.4 G/DL (ref 6.4–8.2)
SODIUM SERPL-SCNC: 136 MMOL/L (ref 136–145)
TRIGL SERPL-MCNC: 152 MG/DL (ref 0–150)
VLDLC SERPL CALC-MCNC: 30 MG/DL

## 2025-03-14 PROCEDURE — 3079F DIAST BP 80-89 MM HG: CPT | Performed by: NURSE PRACTITIONER

## 2025-03-14 PROCEDURE — 3074F SYST BP LT 130 MM HG: CPT | Performed by: NURSE PRACTITIONER

## 2025-03-14 PROCEDURE — 99395 PREV VISIT EST AGE 18-39: CPT | Performed by: NURSE PRACTITIONER

## 2025-03-14 SDOH — ECONOMIC STABILITY: FOOD INSECURITY: WITHIN THE PAST 12 MONTHS, THE FOOD YOU BOUGHT JUST DIDN'T LAST AND YOU DIDN'T HAVE MONEY TO GET MORE.: NEVER TRUE

## 2025-03-14 SDOH — ECONOMIC STABILITY: FOOD INSECURITY: WITHIN THE PAST 12 MONTHS, YOU WORRIED THAT YOUR FOOD WOULD RUN OUT BEFORE YOU GOT MONEY TO BUY MORE.: NEVER TRUE

## 2025-03-14 ASSESSMENT — ENCOUNTER SYMPTOMS
RESPIRATORY NEGATIVE: 1
GASTROINTESTINAL NEGATIVE: 1

## 2025-03-14 ASSESSMENT — PATIENT HEALTH QUESTIONNAIRE - PHQ9
SUM OF ALL RESPONSES TO PHQ QUESTIONS 1-9: 0
1. LITTLE INTEREST OR PLEASURE IN DOING THINGS: NOT AT ALL
SUM OF ALL RESPONSES TO PHQ QUESTIONS 1-9: 0
2. FEELING DOWN, DEPRESSED OR HOPELESS: NOT AT ALL

## 2025-03-14 NOTE — PROGRESS NOTES
SUBJECTIVE:    Patient ID: Jimmy Albert is a 24 y.o. male.    CC: Annual labs, cerebral palsy, hypertension    HPI: The patient presents to the office today accompanied by his grandmother for annual physical examination.    He has a history of cerebral palsy and is wheelchair-bound due to spastic diplegia.      He has a history of hypertension.  He denies any chest pain or shortness of breath.  He is compliant with his medication regimen.      Past Medical History:   Diagnosis Date    Borderline intellectual functioning     Cerebral palsy (HCC)     Obesity     Vitamin D deficiency     Wheelchair bound         Current Outpatient Medications   Medication Sig Dispense Refill    hydroCHLOROthiazide (HYDRODIURIL) 25 MG tablet TAKE 1 TABLET BY MOUTH DAILY 90 tablet 3    lisinopril (PRINIVIL;ZESTRIL) 10 MG tablet TAKE 1 TABLET BY MOUTH DAILY 90 tablet 3    vitamin D (D3-1000) 25 MCG (1000 UT) CAPS Take 1 capsule by mouth daily 90 capsule 3    diphenhydrAMINE (BENADRYL) 25 MG capsule Take 2 capsules by mouth daily 2 capsules nightly before bed      Catheters (GIZMO CONDOM CATHETER) MISC 100 each by Does not apply route as needed (Urinary incontinence) 100 each 5     No current facility-administered medications for this visit.           Review of Systems   Constitutional: Negative.    HENT: Negative.     Respiratory: Negative.     Cardiovascular: Negative.    Gastrointestinal: Negative.    Genitourinary: Negative.    Musculoskeletal:  Positive for gait problem.   Neurological:  Positive for weakness.   Psychiatric/Behavioral: Negative.           OBJECTIVE:  Physical Exam  Vitals reviewed.   Constitutional:       General: He is not in acute distress.     Appearance: He is well-developed. He is not diaphoretic.   HENT:      Head: Normocephalic and atraumatic.   Eyes:      General: No scleral icterus.     Conjunctiva/sclera: Conjunctivae normal.   Neck:      Vascular: No JVD.   Cardiovascular:      Rate and Rhythm: Normal

## 2025-03-19 ENCOUNTER — RESULTS FOLLOW-UP (OUTPATIENT)
Dept: INTERNAL MEDICINE CLINIC | Age: 25
End: 2025-03-19

## 2025-03-21 DIAGNOSIS — G80.1 CEREBRAL PALSY WITH SPASTIC DIPLEGIA (HCC): Primary | ICD-10-CM

## 2025-04-03 DIAGNOSIS — I10 ESSENTIAL HYPERTENSION: ICD-10-CM

## 2025-04-03 RX ORDER — UBIDECARENONE 100 MG
2000 CAPSULE ORAL DAILY
Qty: 180 CAPSULE | Refills: 1 | Status: SHIPPED | OUTPATIENT
Start: 2025-04-03

## 2025-04-03 NOTE — TELEPHONE ENCOUNTER
Medication: vitamin D3    Last Filled:  8/21/24    Patient Pharmacy: [facility] [address]     Patient Phone Number: [phone] (home)     Last appt: 3/14/2025   Next appt: 9/16/2025    Last OARRS:        No data to display                Last Labs DM:   Lab Results   Component Value Date/Time    LABA1C 4.8 03/14/2025 10:25 AM     Last Lipid:   Lab Results   Component Value Date/Time    CHOL 200 03/14/2025 10:25 AM    TRIG 152 03/14/2025 10:25 AM    HDL 39 03/14/2025 10:25 AM     Last PSA: No results found for: \"PSA\"  Last Thyroid:   Lab Results   Component Value Date/Time    TSH 3.85 08/21/2024 10:27 AM

## 2025-04-03 NOTE — TELEPHONE ENCOUNTER
Patient's mother Laurel called requesting refill of vitamin D3 sent to [facility] on [address]. Mother states patient was told to double the medication and take it BID. States patient ran out meds quickly since he is taking 2 capsules now. Requesting a refill sent to [facility]. Please advise